# Patient Record
Sex: MALE | Race: WHITE | Employment: OTHER | URBAN - METROPOLITAN AREA
[De-identification: names, ages, dates, MRNs, and addresses within clinical notes are randomized per-mention and may not be internally consistent; named-entity substitution may affect disease eponyms.]

---

## 2017-04-10 NOTE — H&P
80 y.o. male for open access colonoscopy for screening   Additional data for completion of the targeted pre-endoscopy H&P will be provided under 'H&P interval notes'. Please see that document which will be attached to this.   Wesly Castillo MD\

## 2017-04-11 RX ORDER — AMLODIPINE BESYLATE 5 MG/1
5 TABLET ORAL DAILY
COMMUNITY

## 2017-04-11 RX ORDER — ERGOCALCIFEROL 1.25 MG/1
50000 CAPSULE ORAL
COMMUNITY

## 2017-04-11 NOTE — PROGRESS NOTES
[address] [place] [address] Dr Egan Preprocedure Instructions      1. On the day of your surgery, please report to registration located on the 2nd floor of the  [facility]. yes    2. You must have a responsible adult to drive you to the hospital, stay at the hospital during your procedure and drive you home. If they leave your procedure will not be started (no exceptions). yes    3. Do not have anything to eat or drink (including water, gum, mints, coffee, and juice) after midnight. This does not apply to the medications you were instructed to take by your physician. yesIf you are currently taking Plavix, Coumadin, Aspirin, or other blood-thinning agents, contact your physician for special instructions. Yes, n/a    4. If you are having a procedure that requires bowel prep: We recommend that you have only clear liquids the day before your procedure and begin your bowel prep by 5:00 pm.  You may continue to drink clear liquids until midnight. If for any reason you are not able to complete your prep please notify your physician immediately. yes    5. Have a list of all current medications, including vitamins, herbal supplements and any other over the counter medications. no    6. If you wear glasses, contacts, dentures and/or hearing aids, they may be removed prior to procedure, please bring a case to store them in. yes    7. You should understand that if you do not follow these instructions your procedure may be cancelled. If your physical condition changes (I.e. fever, cold or flu) please contact your doctor as soon as possible. 8. It is important that you be on time.   If for any reason you are unable to keep your appointment please call )- the day of or your physicians office prior to your scheduled procedure

## 2017-04-12 ENCOUNTER — ANESTHESIA EVENT (OUTPATIENT)
Dept: ENDOSCOPY | Age: 72
End: 2017-04-12
Payer: MEDICARE

## 2017-04-12 ENCOUNTER — ANESTHESIA (OUTPATIENT)
Dept: ENDOSCOPY | Age: 72
End: 2017-04-12
Payer: MEDICARE

## 2017-04-12 ENCOUNTER — SURGERY (OUTPATIENT)
Age: 72
End: 2017-04-12

## 2017-04-12 ENCOUNTER — HOSPITAL ENCOUNTER (OUTPATIENT)
Age: 72
Setting detail: OUTPATIENT SURGERY
Discharge: HOME | End: 2017-04-12
Attending: SPECIALIST | Admitting: SPECIALIST
Payer: MEDICARE

## 2017-04-12 VITALS
HEART RATE: 78 BPM | OXYGEN SATURATION: 98 % | BODY MASS INDEX: 25.01 KG/M2 | TEMPERATURE: 97.7 F | RESPIRATION RATE: 19 BRPM | DIASTOLIC BLOOD PRESSURE: 80 MMHG | SYSTOLIC BLOOD PRESSURE: 122 MMHG | HEIGHT: 68 IN | WEIGHT: 165 LBS

## 2017-04-12 PROCEDURE — 74011250636 HC RX REV CODE- 250/636: Performed by: PHYSICIAN ASSISTANT

## 2017-04-12 PROCEDURE — 74011250636 HC RX REV CODE- 250/636

## 2017-04-12 PROCEDURE — 76060000031 HC ANESTHESIA FIRST 0.5 HR: Performed by: SPECIALIST

## 2017-04-12 PROCEDURE — 76040000019: Performed by: SPECIALIST

## 2017-04-12 RX ORDER — SODIUM CHLORIDE 9 MG/ML
50 INJECTION, SOLUTION INTRAVENOUS CONTINUOUS
Status: DISCONTINUED | OUTPATIENT
Start: 2017-04-12 | End: 2017-04-12 | Stop reason: HOSPADM

## 2017-04-12 RX ORDER — MIDAZOLAM HYDROCHLORIDE 1 MG/ML
.25-5 INJECTION, SOLUTION INTRAMUSCULAR; INTRAVENOUS
Status: DISCONTINUED | OUTPATIENT
Start: 2017-04-12 | End: 2017-04-12 | Stop reason: HOSPADM

## 2017-04-12 RX ORDER — PROPOFOL 10 MG/ML
INJECTION, EMULSION INTRAVENOUS AS NEEDED
Status: DISCONTINUED | OUTPATIENT
Start: 2017-04-12 | End: 2017-04-12 | Stop reason: HOSPADM

## 2017-04-12 RX ORDER — PROPOFOL 10 MG/ML
INJECTION, EMULSION INTRAVENOUS
Status: DISCONTINUED | OUTPATIENT
Start: 2017-04-12 | End: 2017-04-12 | Stop reason: HOSPADM

## 2017-04-12 RX ORDER — FENTANYL CITRATE 50 UG/ML
100 INJECTION, SOLUTION INTRAMUSCULAR; INTRAVENOUS ONCE
Status: DISCONTINUED | OUTPATIENT
Start: 2017-04-12 | End: 2017-04-12 | Stop reason: HOSPADM

## 2017-04-12 RX ORDER — DEXTROMETHORPHAN/PSEUDOEPHED 2.5-7.5/.8
1.2 DROPS ORAL
Status: DISCONTINUED | OUTPATIENT
Start: 2017-04-12 | End: 2017-04-12 | Stop reason: HOSPADM

## 2017-04-12 RX ORDER — NALOXONE HYDROCHLORIDE 0.4 MG/ML
0.4 INJECTION, SOLUTION INTRAMUSCULAR; INTRAVENOUS; SUBCUTANEOUS
Status: DISCONTINUED | OUTPATIENT
Start: 2017-04-12 | End: 2017-04-12 | Stop reason: HOSPADM

## 2017-04-12 RX ORDER — ATROPINE SULFATE 0.1 MG/ML
0.5 INJECTION INTRAVENOUS
Status: DISCONTINUED | OUTPATIENT
Start: 2017-04-12 | End: 2017-04-12 | Stop reason: HOSPADM

## 2017-04-12 RX ORDER — FLUMAZENIL 0.1 MG/ML
0.2 INJECTION INTRAVENOUS
Status: DISCONTINUED | OUTPATIENT
Start: 2017-04-12 | End: 2017-04-12 | Stop reason: HOSPADM

## 2017-04-12 RX ORDER — EPINEPHRINE 0.1 MG/ML
1 INJECTION INTRACARDIAC; INTRAVENOUS
Status: DISCONTINUED | OUTPATIENT
Start: 2017-04-12 | End: 2017-04-12 | Stop reason: HOSPADM

## 2017-04-12 RX ADMIN — PROPOFOL 80 MG: 10 INJECTION, EMULSION INTRAVENOUS at 09:45

## 2017-04-12 RX ADMIN — PROPOFOL 125 MCG/KG/MIN: 10 INJECTION, EMULSION INTRAVENOUS at 09:45

## 2017-04-12 RX ADMIN — PROPOFOL 20 MG: 10 INJECTION, EMULSION INTRAVENOUS at 09:49

## 2017-04-12 RX ADMIN — SODIUM CHLORIDE 50 ML/HR: 900 INJECTION, SOLUTION INTRAVENOUS at 09:24

## 2017-04-12 NOTE — ANESTHESIA PREPROCEDURE EVALUATION
Anesthetic History   No history of anesthetic complications            Review of Systems / Medical History  Patient summary reviewed, nursing notes reviewed and pertinent labs reviewed    Pulmonary  Within defined limits                 Neuro/Psych   Within defined limits           Cardiovascular  Within defined limits  Hypertension              Exercise tolerance: >4 METS     GI/Hepatic/Renal  Within defined limits              Endo/Other  Within defined limits           Other Findings              Physical Exam    Airway  Mallampati: II    Neck ROM: normal range of motion   Mouth opening: Normal     Cardiovascular  Regular rate and rhythm,  S1 and S2 normal,  no murmur, click, rub, or gallop  Rhythm: regular  Rate: normal         Dental  No notable dental hx       Pulmonary  Breath sounds clear to auscultation               Abdominal  GI exam deferred       Other Findings            Anesthetic Plan    ASA: 2  Anesthesia type: MAC          Induction: Intravenous  Anesthetic plan and risks discussed with: Patient

## 2017-04-12 NOTE — PERIOP NOTES
Initial RN admission and assessment performed and documented in Endoscopy navigator. Patient evaluated by anesthesia in pre-procedure holding. All procedural vital signs, airway assessment, and level of consciousness information monitored and recorded by anesthesia staff on the anesthesia record. Pt comfortable on stretcher, call bell in reach.

## 2017-04-12 NOTE — ANESTHESIA POSTPROCEDURE EVALUATION
Post-Anesthesia Evaluation and Assessment    Patient: Kemal Rosa MRN: [id number]  SSN: [id number]    YOB: 1945  Age: 80 y.o. Sex: male       Cardiovascular Function/Vital Signs  Visit Vitals    /77    Pulse 75    Temp 36.7 °C (98 °F)    Resp 17    Ht 5' 8\" (1.727 m)    Wt 74.8 kg (165 lb)    SpO2 95%    BMI 25.09 kg/m2       Patient is status post MAC anesthesia for Procedure(s):  COLONOSCOPY. Nausea/Vomiting: None    Postoperative hydration reviewed and adequate. Pain:  Pain Scale 1: Numeric (0 - 10) (04/12/17 0912)  Pain Intensity 1: 0 (04/12/17 0912)   Managed    Neurological Status: At baseline    Mental Status and Level of Consciousness: Arousable    Pulmonary Status:   O2 Device: Nasal cannula (04/12/17 0957)   Adequate oxygenation and airway patent    Complications related to anesthesia: None    Post-anesthesia assessment completed.  No concerns    Signed By: Rosy Pizarro MD     April 12, 2017

## 2017-04-12 NOTE — PERIOP NOTES
Report from SEGUN Roy, see anesthesia record. ABD remains soft and non-tender post procedure. Pt has no complaints at this time and tolerated the procedure well.

## 2017-04-12 NOTE — DISCHARGE INSTRUCTIONS
[address] [facility] ROSAURA Huang MD  [phone]      April 12, 2017    Emmanuel Gordon  YOB: 1945    COLONOSCOPY DISCHARGE INSTRUCTIONS    If there is redness at IV site you should apply warm compress to area. If redness or soreness persist contact Dr. Alexandre Huang' or your primary care doctor. There may be a slight amount of blood passed from the rectum. Gaseous discomfort may develop, but walking, belching will help relieve this. You may not operate a vehicle for 12 hours  You may not operate machinery or dangerous appliances for rest of today  You may not drink alcoholic beverages for 12 hours  Avoid making any critical decisions for 24 hours    DIET:  You may resume your normal diet, but some patients find that heavy or large meals may lead to indigestion or vomiting. I suggest a light meal as first food intake. MEDICATIONS:  The use of some over-the-counter pain medication may lead to bleeding after colon biopsies or polyp removal.  Tylenol (also called acetaminophen) is safe to take even if you have had colonoscopy with polyp removal.  Based on the procedure you had today you may safely take aspirin or aspirin-like products for the next ten (10) days. Remember that Tylenol (also called acetaminophen) is safe to take after colonoscopy even if you have had biopsies or polyps removed. ACTIVITY:  You may resume your normal household activities, but it is recommended that you spend the remainder of the day resting -  avoid any strenuous activity. CALL DR. Sobia Luis' OFFICE IF:  Increasing pain, nausea, vomiting  Abdominal distension (swelling)  Significant new or increased bleeding (oral or rectal)  Fever/Chills  Chest pain/shortness of breath                       Additional instructions:   Normal colonoscopy today without polyps. Great news.   Let me know if you need me, otherwise follow up with Dr. Eduard Zazueta for routine medical care. I would say that if you are in excellent health in 10 years you might consider doing this test again then, but if at age [de-identified] you have medical problems then the test might not have value for you. It was an honor to be your doctor today. Please let me or my office staff know if you have any feedback about today's procedure. Mu Skelton MD    Colonoscopy saves lives, and can prevent colon cancer. Everyone aged 48 or older needs colonoscopy.   Tell your family and friends: get the test!

## 2017-04-12 NOTE — IP AVS SNAPSHOT
Call Screen 
 
 
 [address] [facility] [address] [facility] 
[phone] Patient: Nel Kent MRN: [id number] :1945 You are allergic to the following No active allergies Recent Documentation Height Weight BMI Smoking Status 1.727 m 74.8 kg 25.09 kg/m2 Former Smoker Emergency Contacts Name Discharge Info Relation Home Work Mobile [address] CAREGIVER [3] Spouse [3] [phone] About your hospitalization You were admitted on:  2017 You last received care in the:  [facility] ENDOSCOPY You were discharged on:  2017 Unit phone number:  [phone] Why you were hospitalized Your primary diagnosis was:  Not on File Providers Seen During Your Hospitalizations Provider Role Specialty Primary office phone Ninfa Goodpasture, MD Attending Provider Gastroenterology [phone] Your Primary Care Physician (PCP) Primary Care Physician Office Phone Office Fax Ramon ARMENDARIZ [phone] Follow-up Information None Current Discharge Medication List  
  
CONTINUE these medications which have NOT CHANGED Dose & Instructions Dispensing Information Comments Morning Noon Evening Bedtime CALCIUM 600 + D 600-125 mg-unit Tab Generic drug:  calcium-cholecalciferol (d3) Your last dose was: Your next dose is:    
   
   
 Dose:  1 Tab Take 1 Tab by mouth daily. Refills:  0 NORVASC 5 mg tablet Generic drug:  amLODIPine Your last dose was: Your next dose is:    
   
   
 Dose:  5 mg Take 5 mg by mouth daily. Refills:  0  
     
   
   
   
  
 VITAMIN D2 50,000 unit capsule Generic drug:  ergocalciferol Your last dose was: Your next dose is:    
   
   
 Dose:  33871 Units Take 50,000 Units by mouth every seven (7) days. Refills:  0 Discharge Instructions [address] Lilia Camargo, [address] [facility] 
[phone] April 12, 2017 Tommie Ramirez YOB: 1945 COLONOSCOPY DISCHARGE INSTRUCTIONS If there is redness at IV site you should apply warm compress to area. If redness or soreness persist contact Dr. Lilia Camargo' or your primary care doctor. There may be a slight amount of blood passed from the rectum. Gaseous discomfort may develop, but walking, belching will help relieve this. You may not operate a vehicle for 12 hours You may not operate machinery or dangerous appliances for rest of today You may not drink alcoholic beverages for 12 hours Avoid making any critical decisions for 24 hours DIET: 
You may resume your normal diet, but some patients find that heavy or large meals may lead to indigestion or vomiting. I suggest a light meal as first food intake. MEDICATIONS: 
The use of some over-the-counter pain medication may lead to bleeding after colon biopsies or polyp removal.  Tylenol (also called acetaminophen) is safe to take even if you have had colonoscopy with polyp removal.  Based on the procedure you had today you may safely take aspirin or aspirin-like products for the next ten (10) days. Remember that Tylenol (also called acetaminophen) is safe to take after colonoscopy even if you have had biopsies or polyps removed. ACTIVITY: 
You may resume your normal household activities, but it is recommended that you spend the remainder of the day resting -  avoid any strenuous activity. CALL DR. Sylvia Nails' OFFICE IF: Increasing pain, nausea, vomiting Abdominal distension (swelling) Significant new or increased bleeding (oral or rectal) Fever/Chills Chest pain/shortness of breath Additional instructions:  
Normal colonoscopy today without polyps. Great news. Let me know if you need me, otherwise follow up with Dr. Eduard Zazueta for routine medical care. I would say that if you are in excellent health in 10 years you might consider doing this test again then, but if at age [de-identified] you have medical problems then the test might not have value for you. It was an honor to be your doctor today. Please let me or my office staff know if you have any feedback about today's procedure. Heydi Montes MD 
 
Colonoscopy saves lives, and can prevent colon cancer. Everyone aged 48 or older needs colonoscopy. Tell your family and friends: get the test! 
 
 
 
Discharge Orders None Introducing [facility]! Peg Hemp introduces [employer] patient portal. Now you can access parts of your medical record, email your doctor's office, and request medication refills online. 1. In your internet browser, go to [email or web address]://[email or web address]. [employer] 2. Click on the First Time User? Click Here link in the Sign In box. You will see the New Member Sign Up page. 3. Enter your [employer] Access Code exactly as it appears below. You will not need to use this code after youve completed the sign-up process. If you do not sign up before the expiration date, you must request a new code. · [employer] Access Code: [email or web address] Expires: 7/11/2017  8:15 AM 
 
4. Enter the last four digits of your Social Security Number (xxxx) and Date of Birth (mm/dd/yyyy) as indicated and click Submit. You will be taken to the next sign-up page. 5. Create a [employer] ID. This will be your [employer] login ID and cannot be changed, so think of one that is secure and easy to remember. 6. Create a [employer] password. You can change your password at any time. 7. Enter your Password Reset Question and Answer. This can be used at a later time if you forget your password. 8. Enter your e-mail address. You will receive e-mail notification when new information is available in [address] 9. Click Sign Up. You can now view and download portions of your medical record. 10. Click the Download Summary menu link to download a portable copy of your medical information. If you have questions, please visit the Frequently Asked Questions section of the [facility] website. Remember, [facility] is NOT to be used for urgent needs. For medical emergencies, dial 911. Now available from your iPhone and Android! General Information Please provide this summary of care documentation to your next provider. Patient Signature:  ____________________________________________________________ Date:  ____________________________________________________________  
  
[facility] Provider Signature:  ____________________________________________________________ Date:  ____________________________________________________________

## 2017-04-12 NOTE — INTERVAL H&P NOTE
Pre-Endoscopy H&P Update  Chief complaint/HPI/ROS:  The indication for the procedure, the patient's history and the patient's current medications are reviewed prior to the procedure and that data is reported on the H&P to which this document is attached. Any significant complaints with regard to organ systems will be noted, and if not mentioned then a review of systems is not contributory. Past Medical History:   Diagnosis Date    Hypertension       Past Surgical History:   Procedure Laterality Date    HX APPENDECTOMY      HX CATARACT REMOVAL Right      Social   Social History   Substance Use Topics    Smoking status: Former Smoker    Smokeless tobacco: Not on file    Alcohol use No      History reviewed. No pertinent family history. No Known Allergies   Prior to Admission Medications   Prescriptions Last Dose Informant Patient Reported? Taking? amLODIPine (NORVASC) 5 mg tablet 4/11/2017 at am  Yes Yes   Sig: Take 5 mg by mouth daily. calcium-cholecalciferol, d3, (CALCIUM 600 + D) 600-125 mg-unit tab 4/11/2017 at am  Yes Yes   Sig: Take 1 Tab by mouth daily. ergocalciferol (VITAMIN D2) 50,000 unit capsule 4/10/2017 at am  Yes Yes   Sig: Take 50,000 Units by mouth every seven (7) days. Facility-Administered Medications: None       PHYSICAL EXAM:  The patient is examined immediately prior to the procedure. Visit Vitals    /72    Pulse 66    Temp 98 °F (36.7 °C)    Resp 9    Ht 5' 8\" (1.727 m)    Wt 74.8 kg (165 lb)    SpO2 97%    BMI 25.09 kg/m2     Gen: Appears comfortable, no distress. Pulm: comfortable respirations with no abnormal audible breath sounds  CV: heart regular, well perfused  GI: abdomen flat. PLAN:  Informed consent discussion held, patient afforded an opportunity to ask questions and all questions answered. After being advised of the risks, benefits, and alternatives, the patient requested that we proceed and indicated so on a written consent form.       Will proceed with procedure as planned.   Jj Leahy MD

## 2017-04-12 NOTE — PROCEDURES
[address] [facility] ROSAURA Davalos MD  [phone]      2017    Colonoscopy Procedure Note  Jason Alonso  :  1945  [facility] Record Number: [id number]    Indications:     Screening colonoscopy  PCP:  Ej Arizmendi MD  Anesthesia/Sedation: Conscious Sedation/Moderate Sedation  Endoscopist:  Dr. Silvano Byrd  Complications:  None  Estimated Blood Loss:  None    Permit:  The indications, risks, benefits and alternatives were reviewed with the patient or their decision maker who was provided an opportunity to ask questions and all questions were answered. The specific risks of colonoscopy with conscious sedation were reviewed, including but not limited to anesthetic complication, bleeding, adverse drug reaction, missed lesion, infection, IV site reactions, and intestinal perforation which would lead to the need for surgical repair. Alternatives to colonoscopy including radiographic imaging, observation without testing, or laboratory testing were reviewed including the limitations of those alternatives. After considering the options and having all their questions answered, the patient or their decision maker provided both verbal and written consent to proceed. Procedure in Detail:  After obtaining informed consent, positioning of the patient in the left lateral decubitus position, and conduction of a pre-procedure pause or \"time out\" the endoscope was introduced into the anus and advanced to the cecum, which was identified by the ileocecal valve and appendiceal orifice. The quality of the colonic preparation was good. A careful inspection was made as the colonoscope was withdrawn, findings and interventions are described below. Findings: There is diverticulosis in the sigmoid colon without complications such as bleeding, inflammatory change, or luminal narrowing.       Specimens: none    Complications:   None; patient tolerated the procedure well. Impression:  Normal colonoscopy to the cecum, with no evidence of neoplasia, diverticular disease, or mucosal abnormality. Recommendations:     - For colon cancer screening in this average-risk patient, colonoscopy may be repeated in 10 years. Thank you for entrusting me with this patient's care. Please do not hesitate to contact me with any questions or if I can be of assistance with any of your other patients' GI needs.     Signed By: Clarence Booker MD                        April 12, 2017

## 2017-04-12 NOTE — PROGRESS NOTES
Mary Beth Rebolledo  1945  [id number]    Situation:  Verbal report received from:   Wilmer Gil RN  Procedure: Procedure(s):  COLONOSCOPY    Background:    Preoperative diagnosis: SCREENING  Postoperative diagnosis: Diverticulosis    :  Dr. Adriana Mccloud  Assistant(s): Endoscopy Technician-1: Liz Jain  Endoscopy RN-1: Edwin Farley RN    Specimens: * No specimens in log *  H. Pylori  no    Assessment:  Intra-procedure medications      Anesthesia gave intra-procedure sedation and medications, see anesthesia flow sheet yes    Intravenous fluids: NS@ KVO     Vital signs stable   yes    Abdominal assessment: round and soft   yes    Recommendation:  Discharge patient per MD order  yes.   Return to floor  outpatient  Family or Friend   spouse  Permission to share finding with family or friend yes

## 2017-05-30 ENCOUNTER — OFFICE VISIT (OUTPATIENT)
Dept: ONCOLOGY | Age: 72
End: 2017-05-30

## 2017-05-30 ENCOUNTER — HOSPITAL ENCOUNTER (OUTPATIENT)
Dept: INFUSION THERAPY | Age: 72
Discharge: HOME | End: 2017-05-30
Payer: MEDICARE

## 2017-05-30 VITALS
SYSTOLIC BLOOD PRESSURE: 141 MMHG | HEART RATE: 61 BPM | TEMPERATURE: 98 F | RESPIRATION RATE: 16 BRPM | DIASTOLIC BLOOD PRESSURE: 82 MMHG

## 2017-05-30 VITALS
WEIGHT: 165.8 LBS | DIASTOLIC BLOOD PRESSURE: 79 MMHG | HEIGHT: 68 IN | HEART RATE: 63 BPM | OXYGEN SATURATION: 97 % | SYSTOLIC BLOOD PRESSURE: 137 MMHG | RESPIRATION RATE: 18 BRPM | TEMPERATURE: 97.8 F | BODY MASS INDEX: 25.13 KG/M2

## 2017-05-30 DIAGNOSIS — D69.6 THROMBOCYTOPENIA (HCC): ICD-10-CM

## 2017-05-30 DIAGNOSIS — D70.9 NEUTROPENIA, UNSPECIFIED TYPE (HCC): Primary | ICD-10-CM

## 2017-05-30 LAB
ALBUMIN SERPL BCP-MCNC: 4.3 G/DL (ref 3.5–5)
ALBUMIN/GLOB SERPL: 1.2 {RATIO} (ref 1.1–2.2)
ALP SERPL-CCNC: 47 U/L (ref 45–117)
ALT SERPL-CCNC: 33 U/L (ref 12–78)
APTT PPP: 26.6 SEC (ref 22.1–32.5)
AST SERPL W P-5'-P-CCNC: 23 U/L (ref 15–37)
BASOPHILS # BLD AUTO: 0 K/UL (ref 0–0.1)
BASOPHILS # BLD: 0 % (ref 0–1)
BILIRUB DIRECT SERPL-MCNC: 0.1 MG/DL (ref 0–0.2)
BILIRUB SERPL-MCNC: 0.6 MG/DL (ref 0.2–1)
CRP SERPL-MCNC: <0.29 MG/DL
EOSINOPHIL # BLD: 0 K/UL (ref 0–0.4)
EOSINOPHIL NFR BLD: 0 % (ref 0–7)
ERYTHROCYTE [DISTWIDTH] IN BLOOD BY AUTOMATED COUNT: 15.5 % (ref 11.5–14.5)
ERYTHROCYTE [SEDIMENTATION RATE] IN BLOOD: 10 MM/HR (ref 0–20)
FIBRINOGEN PPP-MCNC: 259 MG/DL (ref 200–475)
FOLATE SERPL-MCNC: 19.3 NG/ML (ref 5–21)
GLOBULIN SER CALC-MCNC: 3.6 G/DL (ref 2–4)
HCT VFR BLD AUTO: 40.8 % (ref 36.6–50.3)
HCYS SERPL-SCNC: 15.1 UMOL/L (ref 3.7–13.9)
HGB BLD-MCNC: 13.7 G/DL (ref 12.1–17)
INR PPP: 1.1 (ref 0.9–1.1)
LYMPHOCYTES # BLD AUTO: 45 % (ref 12–49)
LYMPHOCYTES # BLD: 1.5 K/UL (ref 0.8–3.5)
MCH RBC QN AUTO: 29.4 PG (ref 26–34)
MCHC RBC AUTO-ENTMCNC: 33.6 G/DL (ref 30–36.5)
MCV RBC AUTO: 87.6 FL (ref 80–99)
MONOCYTES # BLD: 0.6 K/UL (ref 0–1)
MONOCYTES NFR BLD AUTO: 18 % (ref 5–13)
NEUTS SEG # BLD: 1.2 K/UL (ref 1.8–8)
NEUTS SEG NFR BLD AUTO: 37 % (ref 32–75)
PLATELET # BLD AUTO: 112 K/UL (ref 150–400)
PROT SERPL-MCNC: 7.9 G/DL (ref 6.4–8.2)
PROTHROMBIN TIME: 10.8 SEC (ref 9–11.1)
RBC # BLD AUTO: 4.66 M/UL (ref 4.1–5.7)
THERAPEUTIC RANGE,PTTT: NORMAL SECS (ref 58–77)
VIT B12 SERPL-MCNC: 348 PG/ML (ref 211–911)
WBC # BLD AUTO: 3.3 K/UL (ref 4.1–11.1)

## 2017-05-30 PROCEDURE — 82607 VITAMIN B-12: CPT | Performed by: INTERNAL MEDICINE

## 2017-05-30 PROCEDURE — 83090 ASSAY OF HOMOCYSTEINE: CPT | Performed by: INTERNAL MEDICINE

## 2017-05-30 PROCEDURE — 86140 C-REACTIVE PROTEIN: CPT | Performed by: INTERNAL MEDICINE

## 2017-05-30 PROCEDURE — 86704 HEP B CORE ANTIBODY TOTAL: CPT | Performed by: INTERNAL MEDICINE

## 2017-05-30 PROCEDURE — 85610 PROTHROMBIN TIME: CPT | Performed by: INTERNAL MEDICINE

## 2017-05-30 PROCEDURE — 85652 RBC SED RATE AUTOMATED: CPT | Performed by: INTERNAL MEDICINE

## 2017-05-30 PROCEDURE — 83921 ORGANIC ACID SINGLE QUANT: CPT | Performed by: INTERNAL MEDICINE

## 2017-05-30 PROCEDURE — 85384 FIBRINOGEN ACTIVITY: CPT | Performed by: INTERNAL MEDICINE

## 2017-05-30 PROCEDURE — 80076 HEPATIC FUNCTION PANEL: CPT | Performed by: INTERNAL MEDICINE

## 2017-05-30 PROCEDURE — 82746 ASSAY OF FOLIC ACID SERUM: CPT | Performed by: INTERNAL MEDICINE

## 2017-05-30 PROCEDURE — 36415 COLL VENOUS BLD VENIPUNCTURE: CPT | Performed by: INTERNAL MEDICINE

## 2017-05-30 PROCEDURE — 87389 HIV-1 AG W/HIV-1&-2 AB AG IA: CPT | Performed by: INTERNAL MEDICINE

## 2017-05-30 PROCEDURE — 84165 PROTEIN E-PHORESIS SERUM: CPT | Performed by: INTERNAL MEDICINE

## 2017-05-30 PROCEDURE — 85730 THROMBOPLASTIN TIME PARTIAL: CPT | Performed by: INTERNAL MEDICINE

## 2017-05-30 PROCEDURE — 85025 COMPLETE CBC W/AUTO DIFF WBC: CPT | Performed by: INTERNAL MEDICINE

## 2017-05-30 PROCEDURE — 86038 ANTINUCLEAR ANTIBODIES: CPT | Performed by: INTERNAL MEDICINE

## 2017-05-30 PROCEDURE — 80074 ACUTE HEPATITIS PANEL: CPT | Performed by: INTERNAL MEDICINE

## 2017-05-30 NOTE — PROGRESS NOTES
Blood pressure 141/82, pulse 61, temperature 98 °F (36.7 °C), resp. rate 16. Pt arrived at 1440,labs drawn peripherally from left [facility]. Pt tolerated well and left at 1450.

## 2017-05-30 NOTE — MR AVS SNAPSHOT
Visit Information Date & Time Provider Department Dept. Phone Encounter #  
 5/30/2017  1:00 PM Any Chan MD [facility] at [address] [place] [address] [id number] Follow-up Instructions Return in about 3 months (around 8/30/2017). Follow-up and Disposition History Upcoming Health Maintenance Date Due Hepatitis C Screening 1945 DTaP/Tdap/Td series (1 - Tdap) 10/1/1967 FOBT Q 1 YEAR AGE 50-75 10/1/1996 ZOSTER VACCINE AGE 60> 10/1/2006 GLAUCOMA SCREENING Q2Y 10/1/2011 Pneumococcal 65+ Low/Medium Risk (1 of 2 - PCV13) 10/1/2011 MEDICARE YEARLY EXAM 10/1/2011 INFLUENZA AGE 9 TO ADULT 8/1/2017 Allergies as of 5/30/2017  Review Complete On: 5/30/2017 By: Any Chan MD  
 No Known Allergies Current Immunizations  Never Reviewed No immunizations on file. Not reviewed this visit You Were Diagnosed With   
  
 Codes Comments Neutropenia, unspecified type [facility]    -  Primary ICD-10-CM: D70.9 ICD-9-CM: 288.00 Thrombocytopenia ([facility] [address] [facility]     ICD-10-CM: D69.6 ICD-9-CM: 287.5 Vitals BP Pulse Temp Resp Height(growth percentile) Weight(growth percentile)  
 137/79 63 97.8 °F (36.6 °C) (Temporal) 18 5' 8\" (1.727 m) 165 lb 12.8 oz (75.2 kg) SpO2 BMI Smoking Status 97% 25.21 kg/m2 Never Smoker Vitals History BMI and BSA Data Body Mass Index Body Surface Area  
 25.21 kg/m 2 1.9 m 2 Preferred Pharmacy Pharmacy Name Phone [id number] [address] [facility] [address] [phone] Your Updated Medication List  
  
   
This list is accurate as of: 5/30/17  2:10 PM.  Always use your most recent med list.  
  
  
  
  
 CALCIUM 600 + D 600-125 mg-unit Tab Generic drug:  calcium-cholecalciferol (d3) Take 1 Tab by mouth daily. NORVASC 5 mg tablet Generic drug:  amLODIPine Take 5 mg by mouth daily. VITAMIN D2 50,000 unit capsule Generic drug:  ergocalciferol Take 50,000 Units by mouth every seven (7) days. Follow-up Instructions Return in about 3 months (around 8/30/2017). To-Do List   
 05/30/2017 2:15 PM  
  Appointment with Deshaun Torres at [place] [address] ([phone]) Introducing [facility]! Alonzo Sterling introduces [employer] patient portal. Now you can access parts of your medical record, email your doctor's office, and request medication refills online. 1. In your internet browser, go to [email or web address]. [employer] 2. Click on the First Time User? Click Here link in the Sign In box. You will see the New Member Sign Up page. 3. Enter your [employer] Access Code exactly as it appears below. You will not need to use this code after youve completed the sign-up process. If you do not sign up before the expiration date, you must request a new code. · [employer] Access Code: [email or web address] Expires: 7/11/2017  8:15 AM 
 
4. Enter the last four digits of your Social Security Number (xxxx) and Date of Birth (mm/dd/yyyy) as indicated and click Submit. You will be taken to the next sign-up page. 5. Create a [employer] ID. This will be your [employer] login ID and cannot be changed, so think of one that is secure and easy to remember. 6. Create a [employer] password. You can change your password at any time. 7. Enter your Password Reset Question and Answer. This can be used at a later time if you forget your password. 8. Enter your e-mail address. You will receive e-mail notification when new information is available in [address] 9. Click Sign Up. You can now view and download portions of your medical record. 10. Click the Download Summary menu link to download a portable copy of your medical information. If you have questions, please visit the Frequently Asked Questions section of the [employer] website.  Remember, [employer] is NOT to be used for urgent needs. For medical emergencies, dial 911. Now available from your iPhone and Android! Please provide this summary of care documentation to your next provider. Your primary care clinician is listed as Padmini Kurtz. If you have any questions after today's visit, please call [phone].

## 2017-05-30 NOTE — PROGRESS NOTES
[employer]  [address] [place] [address], [address] [place] [address]  [address]  W: [phone]  F: [address]      Reason for visit:  Evaluation for low WBC    HPI:   Princess Bass is a 80 y.o.  male who I was asked to see in consultation at the request of Dr. Litzy Noble  for evaluation for low WBC.    5/9/17 wbc 2.8, hgb 14.1; plt 124; ANC 1    Denies fevers, no weight loss, no drenching night sweats. Only very occasional etoh use. States that in [place], 9-10 months ago, his wbc and plt count were low as well during an ED visit for a fever. No other complaints today. DX   Encounter Diagnoses   Name Primary?  Thrombocytopenia (HCC)     Neutropenia, unspecified type ([facility] [address] Yes              Past Medical History:   Diagnosis Date    GERD (gastroesophageal reflux disease)     Hypertension      Past Surgical History:   Procedure Laterality Date    COLONOSCOPY N/A 4/12/2017    COLONOSCOPY performed by James Bates MD at [address] [facility] HX APPENDECTOMY      HX CATARACT REMOVAL Right      Social History     Social History    Marital status:      Spouse name: N/A    Number of children: N/A    Years of education: N/A     Social History Main Topics    Smoking status: Never Smoker    Smokeless tobacco: None    Alcohol use No    Drug use: No    Sexual activity: Not Asked     Other Topics Concern    None     Social History Narrative     Family History   Problem Relation Age of Onset    Hypertension Mother        Current Outpatient Prescriptions   Medication Sig Dispense Refill    amLODIPine (NORVASC) 5 mg tablet Take 5 mg by mouth daily.  ergocalciferol (VITAMIN D2) 50,000 unit capsule Take 50,000 Units by mouth every seven (7) days.  calcium-cholecalciferol, d3, (CALCIUM 600 + D) 600-125 mg-unit tab Take 1 Tab by mouth daily.          No Known Allergies    Review of Systems    A comprehensive review of systems was performed and all systems were negative except for HPI. Objective:  Physical Exam:  Visit Vitals    /79    Pulse 63    Temp 97.8 °F (36.6 °C) (Temporal)    Resp 18    Ht 5' 8\" (1.727 m)    Wt 165 lb 12.8 oz (75.2 kg)    SpO2 97%    BMI 25.21 kg/m2       General:  Alert, cooperative, no distress, appears stated age. Head:  Normocephalic, without obvious abnormality, atraumatic. Eyes:  Conjunctivae/corneas clear. PERRL, EOMs intact. Throat: Lips, mucosa, and tongue normal.    Neck: Supple, symmetrical, trachea midline, no adenopathy, thyroid: no enlargement/tenderness/nodules   Back:   Symmetric, no curvature. ROM normal. No CVA tenderness. Lungs:   Clear to auscultation bilaterally. Chest wall:  No tenderness or deformity. Heart:  Regular rate and rhythm, S1, S2 normal, no murmur, click, rub or gallop. Abdomen:   Soft, non-tender. Bowel sounds normal. No masses,  No organomegaly. Extremities: Extremities normal, atraumatic, no cyanosis or edema. Skin: Skin color, texture, turgor normal. No rashes or lesions. Lymph nodes: Cervical, supraclavicular, and axillary nodes normal.   Neurologic: CNII-XII intact. Diagnostic Imaging   No results found for this or any previous visit. No results found for this or any previous visit. Lab Results  No results found for: WBC, HGB, HGBP, HCT, PHCT, RBCH, PLT, MCV, HGBEXT, HCTEXT, PLTEXT, HGBEXT, HCTEXT, PLTEXT    No results found for: NA, K, CL, CO2, AGAP, GLU, BUN, CREA, BUCR, GFRAA, GFRNA, CA, TBIL, GPT, SGOT, AP, TP, ALB, GLOB, AGRAT, ALT    . Assessment/Plan:  80 y.o. male with neutropenia and thrombocytopenia. Records reviewed and summarized above. PS 0    1. Neutropenia:  The most common etiology, especially in patients without recurrent infections, would be benign ethnic neutropenia, as s commonly have a lower WBC than Caucasians.   There is no evidence by history of an inherited neutropenia, no history of chronic or recurrent infection, and no medication that is likely to be contributing. I will explore some other possibilities today with labwork (smear review, ESR, CRP, SILVER, B12, folate, homocysteine, MMA,  HIV (discussed this), SPEP)    If these studies are negative and the neutrophil count remains >1000, then a period of observation may be reasonable, with further evaluation (ie bone marrow biopsy) only if the counts worsen or the patient develops recurrent infections. We discussed this today. 2. Thrombocytopenia:  Mild, count > 100; labs as above. The differential diagnosis for thrombocytopenia is broad, and can be generally broken down into three groups: Decreased Production, Increased Consumption, or Pseudothrombocytopenia. Causes of decreased production include infection, HIV, etoh toxicity, B12 and folate deficiency, and primary bone marrow disorders. Causes of increased consumption include various drugs, DIC, TTP, ITP, APLA, splenomegaly, and physical destruction related to valvular disease, aneurysms, etc.  Pseudothrombocytopenia is a laboratory artifact and can be ruled out by a smear review. I will start by obtaining some labs to explore some of these possibilities. This will include smear review, PT, PTT, fibrinogen, liver function tests, HepB, HepC. If the above tests are normal, will have him return in 3 months for a repeat CBC. Thank you for this consult. All of the patient's questions were answered today. There are no Patient Instructions on file for this visit.    Follow-up Disposition: Not on File    Nenita Buchanan MD

## 2017-05-31 LAB
HAV IGM SERPL QL IA: NONREACTIVE
HBV CORE IGM SER QL: NONREACTIVE
HBV SURFACE AG SER QL: <0.1 INDEX
HBV SURFACE AG SER QL: NEGATIVE
HCV AB SERPL QL IA: NONREACTIVE
HCV COMMENT,HCGAC: NORMAL
HIV 1+2 AB+HIV1 P24 AG SERPL QL IA: NONREACTIVE
HIV12 RESULT COMMENT, HHIVC: NORMAL
SP1: NORMAL
SP2: NORMAL
SP3: NORMAL

## 2017-06-01 ENCOUNTER — TELEPHONE (OUTPATIENT)
Dept: ONCOLOGY | Age: 72
End: 2017-06-01

## 2017-06-01 LAB
ALBUMIN SERPL ELPH-MCNC: 4.2 G/DL (ref 2.9–4.4)
ALBUMIN/GLOB SERPL: 1.3 {RATIO} (ref 0.7–1.7)
ALPHA1 GLOB SERPL ELPH-MCNC: 0.2 G/DL (ref 0–0.4)
ALPHA2 GLOB SERPL ELPH-MCNC: 0.7 G/DL (ref 0.4–1)
ANA SER QL: NEGATIVE
B-GLOBULIN SERPL ELPH-MCNC: 1 G/DL (ref 0.7–1.3)
GAMMA GLOB SERPL ELPH-MCNC: 1.3 G/DL (ref 0.4–1.8)
GLOBULIN SER CALC-MCNC: 3.2 G/DL (ref 2.2–3.9)
HBV CORE AB SERPL QL IA: NEGATIVE
M PROTEIN SERPL ELPH-MCNC: NORMAL G/DL
PROT SERPL-MCNC: 7.4 G/DL (ref 6–8.5)
SEE BELOW:, 164879: NORMAL

## 2017-06-01 NOTE — TELEPHONE ENCOUNTER
Called and spoke to patient's wife and advised her that we were still waiting for results of one lab test.  Wife voices understanding and requests to be called as soon as results are in.

## 2017-06-07 LAB — METHYLMALONATE SERPL-SCNC: 185 NMOL/L (ref 0–378)

## 2017-06-08 NOTE — TELEPHONE ENCOUNTER
Called and spoke to patient and his wife and advised them of lab results and to return to see us in 3 months with labs prior to appointment. Patient states he already has his appointment scheduled and denies any further questions at this time.

## 2017-06-08 NOTE — TELEPHONE ENCOUNTER
----- Message from Wu Huang NP sent at 6/8/2017  4:18 PM EDT -----  See us and CBC in 3 months. We could not find anything wrong so we will keep an eye on this. Thanks!

## 2017-06-08 NOTE — PROGRESS NOTES
See us and CBC in 3 months. We could not find anything wrong so we will keep an eye on this. Thanks!

## 2017-08-28 ENCOUNTER — HOSPITAL ENCOUNTER (OUTPATIENT)
Dept: INFUSION THERAPY | Age: 72
Discharge: HOME | End: 2017-08-28
Payer: MEDICARE

## 2017-08-28 ENCOUNTER — OFFICE VISIT (OUTPATIENT)
Dept: ONCOLOGY | Age: 72
End: 2017-08-28

## 2017-08-28 VITALS
TEMPERATURE: 97 F | SYSTOLIC BLOOD PRESSURE: 133 MMHG | HEART RATE: 90 BPM | DIASTOLIC BLOOD PRESSURE: 79 MMHG | RESPIRATION RATE: 16 BRPM

## 2017-08-28 VITALS
DIASTOLIC BLOOD PRESSURE: 103 MMHG | SYSTOLIC BLOOD PRESSURE: 156 MMHG | OXYGEN SATURATION: 93 % | HEIGHT: 68 IN | RESPIRATION RATE: 18 BRPM | TEMPERATURE: 97.4 F | WEIGHT: 168.6 LBS | HEART RATE: 87 BPM | BODY MASS INDEX: 25.55 KG/M2

## 2017-08-28 DIAGNOSIS — D70.9 NEUTROPENIA, UNSPECIFIED TYPE (HCC): Primary | ICD-10-CM

## 2017-08-28 DIAGNOSIS — D72.821 MONOCYTOSIS: ICD-10-CM

## 2017-08-28 DIAGNOSIS — D69.6 THROMBOCYTOPENIA (HCC): ICD-10-CM

## 2017-08-28 LAB
BASOPHILS # BLD: 0 K/UL (ref 0–0.1)
BASOPHILS NFR BLD: 0 % (ref 0–1)
EOSINOPHIL # BLD: 0 K/UL (ref 0–0.4)
EOSINOPHIL NFR BLD: 0 % (ref 0–7)
ERYTHROCYTE [DISTWIDTH] IN BLOOD BY AUTOMATED COUNT: 15.7 % (ref 11.5–14.5)
HCT VFR BLD AUTO: 39.2 % (ref 36.6–50.3)
HGB BLD-MCNC: 13 G/DL (ref 12.1–17)
LYMPHOCYTES # BLD: 1.6 K/UL (ref 0.8–3.5)
LYMPHOCYTES NFR BLD: 44 % (ref 12–49)
MCH RBC QN AUTO: 28.6 PG (ref 26–34)
MCHC RBC AUTO-ENTMCNC: 33.2 G/DL (ref 30–36.5)
MCV RBC AUTO: 86.2 FL (ref 80–99)
MONOCYTES # BLD: 0.7 K/UL (ref 0–1)
MONOCYTES NFR BLD: 19 % (ref 5–13)
NEUTS SEG # BLD: 1.4 K/UL (ref 1.8–8)
NEUTS SEG NFR BLD: 37 % (ref 32–75)
PLATELET # BLD AUTO: 108 K/UL (ref 150–400)
RBC # BLD AUTO: 4.55 M/UL (ref 4.1–5.7)
WBC # BLD AUTO: 3.8 K/UL (ref 4.1–11.1)

## 2017-08-28 PROCEDURE — 36415 COLL VENOUS BLD VENIPUNCTURE: CPT | Performed by: INTERNAL MEDICINE

## 2017-08-28 PROCEDURE — 85025 COMPLETE CBC W/AUTO DIFF WBC: CPT | Performed by: INTERNAL MEDICINE

## 2017-08-28 PROCEDURE — 36415 COLL VENOUS BLD VENIPUNCTURE: CPT

## 2017-08-28 NOTE — PROGRESS NOTES
[employer]  [address] [place] [address], [address] [facility]  [address]  W: [phone]  F: [phone]     f/uHEME/ONC CONSULT      Reason for visit:  Evaluation for low WBC    HPI:   Mike Adams is a 80 y.o.  male who I was asked to see in consultation at the request of Dr. Princess Leal  for evaluation for low WBC.    5/9/17 wbc 2.8, hgb 14.1; plt 124; ANC 1    Denies fevers, no weight loss, no drenching night sweats. Only very occasional etoh use. States that in [place], 9-10 months ago, his wbc and plt count were low as well during an ED visit for a fever. Interval history:  2/10 back pain. DX   Encounter Diagnoses   Name Primary?  Thrombocytopenia (HCC)     Neutropenia, unspecified type ([facility] [address] Yes    Monocytosis               Past Medical History:   Diagnosis Date    GERD (gastroesophageal reflux disease)     Hypertension      Past Surgical History:   Procedure Laterality Date    COLONOSCOPY N/A 4/12/2017    COLONOSCOPY performed by Duc Walker MD at [facility] HX APPENDECTOMY      HX CATARACT REMOVAL Right      Social History     Social History    Marital status:      Spouse name: N/A    Number of children: N/A    Years of education: N/A     Social History Main Topics    Smoking status: Never Smoker    Smokeless tobacco: Never Used    Alcohol use No    Drug use: No    Sexual activity: Not Asked     Other Topics Concern    None     Social History Narrative     Family History   Problem Relation Age of Onset    Hypertension Mother        Current Outpatient Prescriptions   Medication Sig Dispense Refill    amLODIPine (NORVASC) 5 mg tablet Take 5 mg by mouth daily.  ergocalciferol (VITAMIN D2) 50,000 unit capsule Take 50,000 Units by mouth every seven (7) days.  calcium-cholecalciferol, d3, (CALCIUM 600 + D) 600-125 mg-unit tab Take 1 Tab by mouth two (2) times a day.          No Known Allergies    Review of Systems    A comprehensive review of systems was performed and all systems were negative except for HPI and for the symptom review form, reviewed and scanned in.        Objective:  Physical Exam:  Visit Vitals    BP (!) 156/103    Pulse 87    Temp 97.4 °F (36.3 °C) (Temporal)    Resp 18    Ht 5' 8\" (1.727 m)    Wt 168 lb 9.6 oz (76.5 kg)    SpO2 93%    BMI 25.64 kg/m2       General:  Alert, cooperative, no distress, appears stated age. Head:  Normocephalic, without obvious abnormality, atraumatic. Eyes:  Conjunctivae/corneas clear. PERRL, EOMs intact. Throat: Lips, mucosa, and tongue normal.    Neck: Supple, symmetrical, trachea midline, no adenopathy, thyroid: no enlargement/tenderness/nodules   Back:   Symmetric, no curvature. ROM normal. No CVA tenderness. Lungs:   Clear to auscultation bilaterally. Chest wall:  No tenderness or deformity. Heart:  Regular rate and rhythm, S1, S2 normal, no murmur, click, rub or gallop. Abdomen:   Soft, non-tender. Bowel sounds normal. No masses,  No organomegaly. Extremities: Extremities normal, atraumatic, no cyanosis or edema. Skin: Skin color, texture, turgor normal. No rashes or lesions. Lymph nodes: Cervical, supraclavicular, and axillary nodes normal.   Neurologic: CNII-XII intact. Diagnostic Imaging   No results found for this or any previous visit. No results found for this or any previous visit. Lab Results  Lab Results   Component Value Date/Time    WBC 3.8 08/28/2017 10:52 AM    HGB 13.0 08/28/2017 10:52 AM    HCT 39.2 08/28/2017 10:52 AM    PLATELET 910 01/94/2713 10:52 AM    MCV 86.2 08/28/2017 10:52 AM       Lab Results   Component Value Date/Time    AST (SGOT) 23 05/30/2017 02:34 PM    Alk.  phosphatase 47 05/30/2017 02:34 PM    Protein, total 7.9 05/30/2017 02:34 PM    Protein, total 7.4 05/30/2017 02:34 PM    Albumin 4.3 05/30/2017 02:34 PM    Globulin 3.6 05/30/2017 02:34 PM    A-G Ratio 1.2 05/30/2017 02:34 PM    ALT (SGPT) 33 05/30/2017 02:34 PM       Lab Results   Component Value Date/Time    Vitamin B12 348 05/30/2017 02:34 PM    Folate 19.3 05/30/2017 02:34 PM    Homocysteine, plasma 15.1 05/30/2017 02:34 PM    Methylmalonic acid 185 05/30/2017 02:34 PM    Sed rate, automated 10 05/30/2017 02:34 PM    C-Reactive protein <0.29 05/30/2017 02:34 PM    M-spike Not Observed 05/30/2017 02:34 PM    SILVER Direct NEGATIVE  05/30/2017 02:34 PM    Hep C  virus Ab Interp. NONREACTIVE 05/30/2017 02:34 PM     Lab Results   Component Value Date/Time    INR 1.1 05/30/2017 02:34 PM    aPTT 26.6 05/30/2017 02:34 PM    Fibrinogen 259 05/30/2017 02:34 PM         Assessment/Plan:  80 y.o. male with neutropenia and thrombocytopenia. PS 0    1. Neutropenia:  The most common etiology, especially in patients without recurrent infections, would be benign ethnic neutropenia, as s commonly have a lower WBC than Caucasians. There is no evidence by history of an inherited neutropenia, no history of chronic or recurrent infection, and no medication that is likely to be contributing. Lab work above are unrevealing. Since the neutrophil count remains >1000, then a period of observation may be reasonable, with further evaluation (ie bone marrow biopsy) only if the counts worsen or the patient develops recurrent infections. We discussed this today. He would prefer to wait 3 months and then proceed if the counts have not improved. Also discussed abd US to eval spleen and liver, he would prefer to hold for now    2. Thrombocytopenia:  Mild, worse, count > 100; labs as above. See above, will have him return in 3 months for a repeat CBC. 3. Monocytosis:  Stable; discussed bone marrow biopsy, will monitor for now    Thank you for this consult. All of the patient's questions were answered today. There are no Patient Instructions on file for this visit. Follow-up Disposition:  Return in about 3 months (around 11/28/2017).     Shahida Rowley MD

## 2017-08-28 NOTE — MR AVS SNAPSHOT
Visit Information Date & Time Provider Department Dept. Phone Encounter #  
 8/28/2017 11:00 AM MD [facility] at [address] [place] [address] Rd [id number] Follow-up Instructions Return in about 3 months (around 11/28/2017). Follow-up and Disposition History Your Appointments 11/28/2017 11:00 AM  
ESTABLISHED PATIENT with MD [facility] at [facility]) Appt Note: 3 mo f/u  
 [address] [facility], [address] [facility] [address] [id number]  
[phone]  
  
   
 [facility], [address] [facility] Upcoming Health Maintenance Date Due DTaP/Tdap/Td series (1 - Tdap) 10/1/1967 FOBT Q 1 YEAR AGE 50-75 10/1/1996 ZOSTER VACCINE AGE 60> 8/1/2006 GLAUCOMA SCREENING Q2Y 10/1/2011 Pneumococcal 65+ Low/Medium Risk (1 of 2 - PCV13) 10/1/2011 MEDICARE YEARLY EXAM 10/1/2011 INFLUENZA AGE 9 TO ADULT 8/1/2017 Allergies as of 8/28/2017  Review Complete On: 8/28/2017 By: Gunjan Funk MD  
 No Known Allergies Current Immunizations  Never Reviewed No immunizations on file. Not reviewed this visit You Were Diagnosed With   
  
 Codes Comments Neutropenia, unspecified type ([facility] [address]    -  Primary ICD-10-CM: D70.9 ICD-9-CM: 288.00 Thrombocytopenia ([facility] [address]     ICD-10-CM: D69.6 ICD-9-CM: 287.5 Monocytosis     ICD-10-CM: U13.747 ICD-9-CM: 319.69 Vitals BP Pulse Temp Resp Height(growth percentile) Weight(growth percentile) (!) 156/103 87 97.4 °F (36.3 °C) (Temporal) 18 5' 8\" (1.727 m) 168 lb 9.6 oz (76.5 kg) SpO2 BMI Smoking Status 93% 25.64 kg/m2 Never Smoker Vitals History BMI and BSA Data Body Mass Index Body Surface Area  
 25.64 kg/m 2 1.92 m 2 Preferred Pharmacy Pharmacy Name Phone [id number] [address] [phone] Your Updated Medication List  
  
   
This list is accurate as of: 8/28/17 12:12 PM.  Always use your most recent med list.  
  
  
  
  
 CALCIUM 600 + D 600-125 mg-unit Tab Generic drug:  calcium-cholecalciferol (d3) Take 1 Tab by mouth two (2) times a day. NORVASC 5 mg tablet Generic drug:  amLODIPine Take 5 mg by mouth daily. VITAMIN D2 50,000 unit capsule Generic drug:  ergocalciferol Take 50,000 Units by mouth every seven (7) days. Follow-up Instructions Return in about 3 months (around 11/28/2017). Introducing [facility]! [facility] introduces [employer] patient portal. Now you can access parts of your medical record, email your doctor's office, and request medication refills online. 1. In your internet browser, go to [email or web address] 2. Click on the First Time User? Click Here link in the Sign In box. You will see the New Member Sign Up page. 3. Enter your [employer] Access Code exactly as it appears below. You will not need to use this code after youve completed the sign-up process. If you do not sign up before the expiration date, you must request a new code. · [employer] Access Code: [email or web address] Expires: 10/7/2017  2:09 PM 
 
4. Enter the last four digits of your Social Security Number (xxxx) and Date of Birth (mm/dd/yyyy) as indicated and click Submit. You will be taken to the next sign-up page. 5. Create a fintonict ID. This will be your [employer] login ID and cannot be changed, so think of one that is secure and easy to remember. 6. Create a [employer] password. You can change your password at any time. 7. Enter your Password Reset Question and Answer. This can be used at a later time if you forget your password. 8. Enter your e-mail address. You will receive e-mail notification when new information is available in [address] 9. Click Sign Up. You can now view and download portions of your medical record. 10. Click the Download Summary menu link to download a portable copy of your medical information. If you have questions, please visit the Frequently Asked Questions section of the [employer] website. Remember, [employer] is NOT to be used for urgent needs. For medical emergencies, dial 911. Now available from your iPhone and Android! Please provide this summary of care documentation to your next provider. Your primary care clinician is listed as Abdulaziz Upton. If you have any questions after today's visit, please call [phone].

## 2017-08-28 NOTE — PROGRESS NOTES
[facility]    Pt arrived at [phone] left at 1053    Blood pressure 133/79, pulse 90, temperature 97 °F (36.1 °C), resp. rate 16.       Labs drawn peripherally as ordered    Lab will be in [facility]

## 2017-08-29 ENCOUNTER — APPOINTMENT (OUTPATIENT)
Dept: INFUSION THERAPY | Age: 72
End: 2017-08-29
Payer: MEDICARE

## 2017-11-28 ENCOUNTER — OFFICE VISIT (OUTPATIENT)
Dept: ONCOLOGY | Age: 72
End: 2017-11-28

## 2017-11-28 ENCOUNTER — HOSPITAL ENCOUNTER (OUTPATIENT)
Dept: INFUSION THERAPY | Age: 72
Discharge: HOME | End: 2017-11-28
Payer: MEDICARE

## 2017-11-28 VITALS
HEIGHT: 68 IN | SYSTOLIC BLOOD PRESSURE: 162 MMHG | BODY MASS INDEX: 25.79 KG/M2 | RESPIRATION RATE: 18 BRPM | TEMPERATURE: 97.6 F | OXYGEN SATURATION: 97 % | HEART RATE: 63 BPM | WEIGHT: 170.2 LBS | DIASTOLIC BLOOD PRESSURE: 89 MMHG

## 2017-11-28 VITALS
RESPIRATION RATE: 16 BRPM | DIASTOLIC BLOOD PRESSURE: 89 MMHG | SYSTOLIC BLOOD PRESSURE: 162 MMHG | TEMPERATURE: 97.6 F | HEART RATE: 63 BPM

## 2017-11-28 DIAGNOSIS — D72.821 MONOCYTOSIS: ICD-10-CM

## 2017-11-28 DIAGNOSIS — D70.9 NEUTROPENIA, UNSPECIFIED TYPE (HCC): Primary | ICD-10-CM

## 2017-11-28 DIAGNOSIS — D69.6 THROMBOCYTOPENIA (HCC): ICD-10-CM

## 2017-11-28 LAB
BASOPHILS # BLD: 0 K/UL (ref 0–0.1)
BASOPHILS NFR BLD: 0 % (ref 0–1)
DIFFERENTIAL METHOD BLD: ABNORMAL
EOSINOPHIL # BLD: 0 K/UL (ref 0–0.4)
EOSINOPHIL NFR BLD: 0 % (ref 0–7)
ERYTHROCYTE [DISTWIDTH] IN BLOOD BY AUTOMATED COUNT: 15.4 % (ref 11.5–14.5)
HCT VFR BLD AUTO: 40 % (ref 36.6–50.3)
HGB BLD-MCNC: 13.7 G/DL (ref 12.1–17)
LYMPHOCYTES # BLD: 1.9 K/UL (ref 0.8–3.5)
LYMPHOCYTES NFR BLD: 50 % (ref 12–49)
MCH RBC QN AUTO: 28.7 PG (ref 26–34)
MCHC RBC AUTO-ENTMCNC: 34.3 G/DL (ref 30–36.5)
MCV RBC AUTO: 83.9 FL (ref 80–99)
MONOCYTES # BLD: 0.8 K/UL (ref 0–1)
MONOCYTES NFR BLD: 20 % (ref 5–13)
NEUTS SEG # BLD: 1.2 K/UL (ref 1.8–8)
NEUTS SEG NFR BLD: 30 % (ref 32–75)
PLATELET # BLD AUTO: 104 K/UL (ref 150–400)
RBC # BLD AUTO: 4.77 M/UL (ref 4.1–5.7)
RBC MORPH BLD: ABNORMAL
WBC # BLD AUTO: 3.9 K/UL (ref 4.1–11.1)

## 2017-11-28 PROCEDURE — 85025 COMPLETE CBC W/AUTO DIFF WBC: CPT | Performed by: INTERNAL MEDICINE

## 2017-11-28 PROCEDURE — 36415 COLL VENOUS BLD VENIPUNCTURE: CPT | Performed by: INTERNAL MEDICINE

## 2017-11-28 NOTE — PROGRESS NOTES
Blood pressure 162/89, pulse 63, temperature 97.6 °F (36.4 °C), resp. rate 16. Pt arrived at 1052,labs drawn peripherally from left [facility]. Pt tolerated well and left at 1057.

## 2017-11-28 NOTE — PROGRESS NOTES
[address]  [address] [place] [address], [address] [place] [address]  [address], [address]  W: [phone]  F: [phone]     f/uHEME/ONC CONSULT      Reason for visit:  Evaluation for low WBC    HPI:   Doug Thomas is a 71 y.o.  male who I was asked to see in consultation at the request of Dr. Vincent Leigh  for evaluation for low WBC.    5/9/17 wbc 2.8, hgb 14.1; plt 124; ANC 1    Denies fevers, no weight loss, no drenching night sweats. Only very occasional etoh use. States that in [place], 9-10 months ago, his wbc and plt count were low as well during an ED visit for a fever. Interval history:  No complaints    DX   Encounter Diagnoses   Name Primary?  Thrombocytopenia (HCC)     Neutropenia, unspecified type ([facility] [address] Yes    Monocytosis               Past Medical History:   Diagnosis Date    GERD (gastroesophageal reflux disease)     Hypertension      Past Surgical History:   Procedure Laterality Date    COLONOSCOPY N/A 4/12/2017    COLONOSCOPY performed by Sparkle Segura MD at [address] [facility] HX APPENDECTOMY      HX CATARACT REMOVAL Right      Social History     Social History    Marital status:      Spouse name: N/A    Number of children: N/A    Years of education: N/A     Social History Main Topics    Smoking status: Never Smoker    Smokeless tobacco: Never Used    Alcohol use No    Drug use: No    Sexual activity: Not Asked     Other Topics Concern    None     Social History Narrative     Family History   Problem Relation Age of Onset    Hypertension Mother        Current Outpatient Prescriptions   Medication Sig Dispense Refill    amLODIPine (NORVASC) 5 mg tablet Take 5 mg by mouth daily.  ergocalciferol (VITAMIN D2) 50,000 unit capsule Take 50,000 Units by mouth every seven (7) days.          No Known Allergies    Review of Systems    A comprehensive review of systems was performed and all systems were negative except for HPI and for the symptom review form, reviewed and scanned in.        Objective:  Physical Exam:  Visit Vitals    /89    Pulse 63    Temp 97.6 °F (36.4 °C) (Temporal)    Resp 18    Ht 5' 8\" (1.727 m)    Wt 170 lb 3.2 oz (77.2 kg)    SpO2 97%    BMI 25.88 kg/m2       General:  Alert, cooperative, no distress, appears stated age. Head:  Normocephalic, without obvious abnormality, atraumatic. Eyes:  Conjunctivae/corneas clear. PERRL, EOMs intact. Throat: Lips, mucosa, and tongue normal.    Neck: Supple, symmetrical, trachea midline, no adenopathy, thyroid: no enlargement/tenderness/nodules   Back:   Symmetric, no curvature. ROM normal. No CVA tenderness. Lungs:   Clear to auscultation bilaterally. Chest wall:  No tenderness or deformity. Heart:  Regular rate and rhythm, S1, S2 normal, no murmur, click, rub or gallop. Abdomen:   Soft, non-tender. Bowel sounds normal. No masses,  No organomegaly. Extremities: Extremities normal, atraumatic, no cyanosis or edema. Skin: Skin color, texture, turgor normal. No rashes or lesions. Lymph nodes: Cervical, supraclavicular, and axillary nodes normal.   Neurologic: CNII-XII intact. Diagnostic Imaging   No results found for this or any previous visit. No results found for this or any previous visit. Lab Results  Lab Results   Component Value Date/Time    WBC 3.9 11/28/2017 10:54 AM    HGB 13.7 11/28/2017 10:54 AM    HCT 40.0 11/28/2017 10:54 AM    PLATELET 248 54/49/0649 10:54 AM    MCV 83.9 11/28/2017 10:54 AM       Lab Results   Component Value Date/Time    AST (SGOT) 23 05/30/2017 02:34 PM    Alk.  phosphatase 47 05/30/2017 02:34 PM    Protein, total 7.9 05/30/2017 02:34 PM    Protein, total 7.4 05/30/2017 02:34 PM    Albumin 4.3 05/30/2017 02:34 PM    Globulin 3.6 05/30/2017 02:34 PM    A-G Ratio 1.2 05/30/2017 02:34 PM    ALT (SGPT) 33 05/30/2017 02:34 PM       Lab Results   Component Value Date/Time    Vitamin B12 348 05/30/2017 02:34 PM    Folate 19.3 05/30/2017 02:34 PM Homocysteine, plasma 15.1 05/30/2017 02:34 PM    Methylmalonic acid 185 05/30/2017 02:34 PM    Sed rate, automated 10 05/30/2017 02:34 PM    C-Reactive protein <0.29 05/30/2017 02:34 PM    M-spike Not Observed 05/30/2017 02:34 PM    SILVER Direct NEGATIVE  05/30/2017 02:34 PM    Hep C  virus Ab Interp. NONREACTIVE 05/30/2017 02:34 PM     Lab Results   Component Value Date/Time    INR 1.1 05/30/2017 02:34 PM    aPTT 26.6 05/30/2017 02:34 PM    Fibrinogen 259 05/30/2017 02:34 PM         Assessment/Plan:  71 y.o. male with neutropenia and thrombocytopenia. PS 0    1. Neutropenia:  The most common etiology, especially in patients without recurrent infections, would be benign ethnic neutropenia, as s commonly have a lower WBC than Caucasians. There is no evidence by history of an inherited neutropenia, no history of chronic or recurrent infection, and no medication that is likely to be contributing. Lab work above are unrevealing. Since the neutrophil count remains >1000, then a period of observation may be reasonable, with further evaluation (ie bone marrow biopsy) only if the counts worsen or the patient develops recurrent infections. We discussed that this testing would be warranted if his counts are worse today -- if stable, then ok with monitoring. Overall stable. He would prefer to wait 3 months and then proceed if the counts have worsened. Also discussed abd US to eval spleen and liver, he would prefer to hold for now    2. Thrombocytopenia:  Mild, worse, count > 100; labs as above. See above, will have him return in 3 months for a repeat CBC. 3. Monocytosis:  Stable; discussed bone marrow biopsy, will monitor for now    Thank you for this consult. All of the patient's questions were answered today. > 15 min were spent with this patient with > 50% of that time spent in face to face counseling    There are no Patient Instructions on file for this visit.    Follow-up Disposition:  Return in about 3 months (around 2/28/2018).     Christine Sotelo MD

## 2018-03-07 ENCOUNTER — HOSPITAL ENCOUNTER (OUTPATIENT)
Dept: INFUSION THERAPY | Age: 73
Discharge: HOME | End: 2018-03-07
Payer: MEDICARE

## 2018-03-07 ENCOUNTER — OFFICE VISIT (OUTPATIENT)
Dept: ONCOLOGY | Age: 73
End: 2018-03-07

## 2018-03-07 VITALS
DIASTOLIC BLOOD PRESSURE: 80 MMHG | RESPIRATION RATE: 18 BRPM | SYSTOLIC BLOOD PRESSURE: 146 MMHG | HEIGHT: 68 IN | OXYGEN SATURATION: 97 % | TEMPERATURE: 97.3 F | BODY MASS INDEX: 25.58 KG/M2 | WEIGHT: 168.8 LBS | HEART RATE: 65 BPM

## 2018-03-07 VITALS
RESPIRATION RATE: 20 BRPM | DIASTOLIC BLOOD PRESSURE: 72 MMHG | HEART RATE: 70 BPM | TEMPERATURE: 98.2 F | SYSTOLIC BLOOD PRESSURE: 148 MMHG

## 2018-03-07 DIAGNOSIS — D70.9 NEUTROPENIA, UNSPECIFIED TYPE (HCC): Primary | ICD-10-CM

## 2018-03-07 DIAGNOSIS — D69.6 THROMBOCYTOPENIA (HCC): ICD-10-CM

## 2018-03-07 DIAGNOSIS — D72.821 MONOCYTOSIS: ICD-10-CM

## 2018-03-07 LAB
BASOPHILS # BLD: 0 K/UL (ref 0–0.1)
BASOPHILS NFR BLD: 0 % (ref 0–1)
DIFFERENTIAL METHOD BLD: ABNORMAL
EOSINOPHIL # BLD: 0 K/UL (ref 0–0.4)
EOSINOPHIL NFR BLD: 0 % (ref 0–7)
ERYTHROCYTE [DISTWIDTH] IN BLOOD BY AUTOMATED COUNT: 16 % (ref 11.5–14.5)
HCT VFR BLD AUTO: 39.1 % (ref 36.6–50.3)
HGB BLD-MCNC: 12.6 G/DL (ref 12.1–17)
IMM GRANULOCYTES # BLD: 0.1 K/UL (ref 0–0.04)
IMM GRANULOCYTES NFR BLD AUTO: 1 % (ref 0–0.5)
LYMPHOCYTES # BLD: 1.7 K/UL (ref 0.8–3.5)
LYMPHOCYTES NFR BLD: 45 % (ref 12–49)
MCH RBC QN AUTO: 27.9 PG (ref 26–34)
MCHC RBC AUTO-ENTMCNC: 32.2 G/DL (ref 30–36.5)
MCV RBC AUTO: 86.5 FL (ref 80–99)
MONOCYTES # BLD: 0.7 K/UL (ref 0–1)
MONOCYTES NFR BLD: 18 % (ref 5–13)
NEUTS SEG # BLD: 1.3 K/UL (ref 1.8–8)
NEUTS SEG NFR BLD: 36 % (ref 32–75)
NRBC # BLD: 0 K/UL (ref 0–0.01)
NRBC BLD-RTO: 0 PER 100 WBC
PLATELET # BLD AUTO: 130 K/UL (ref 150–400)
PMV BLD AUTO: 10.8 FL (ref 8.9–12.9)
RBC # BLD AUTO: 4.52 M/UL (ref 4.1–5.7)
WBC # BLD AUTO: 3.7 K/UL (ref 4.1–11.1)

## 2018-03-07 PROCEDURE — 36415 COLL VENOUS BLD VENIPUNCTURE: CPT | Performed by: INTERNAL MEDICINE

## 2018-03-07 PROCEDURE — 85025 COMPLETE CBC W/AUTO DIFF WBC: CPT | Performed by: INTERNAL MEDICINE

## 2018-03-07 RX ORDER — VITAMIN E 1000 UNIT
1000 CAPSULE ORAL DAILY
COMMUNITY

## 2018-03-07 NOTE — PROGRESS NOTES
[facility]  [address] [employer], [address] [place] [address] [address]  W: [phone]  F: [phone]     f/uHEME/ONC CONSULT      Reason for visit:  Evaluation for low WBC    HPI:   Den White is a 71 y.o.  male who I was asked to see in consultation at the request of Dr. Connie Lizarraga  for evaluation for low WBC.    5/9/17 wbc 2.8, hgb 14.1; plt 124; ANC 1    Denies fevers, no weight loss, no drenching night sweats. Only very occasional etoh use. Interval history:  In today for follow up. Complains of gr 1 sob, gr 1 libido. DX   Encounter Diagnoses   Name Primary?  Neutropenia, unspecified type ([address] Yes    Thrombocytopenia ([address]     Monocytosis               Past Medical History:   Diagnosis Date    GERD (gastroesophageal reflux disease)     Hypertension      Past Surgical History:   Procedure Laterality Date    COLONOSCOPY N/A 4/12/2017    COLONOSCOPY performed by Loyd Juares MD at [facility] HX APPENDECTOMY      HX CATARACT REMOVAL Right      Social History     Social History    Marital status:      Spouse name: N/A    Number of children: N/A    Years of education: N/A     Social History Main Topics    Smoking status: Never Smoker    Smokeless tobacco: Never Used    Alcohol use No    Drug use: No    Sexual activity: Not Asked     Other Topics Concern    None     Social History Narrative     Family History   Problem Relation Age of Onset    Hypertension Mother        Current Outpatient Prescriptions   Medication Sig Dispense Refill    ascorbic acid, vitamin C, (VITAMIN C) 1,000 mg tablet Take 1,000 mg by mouth daily.  amLODIPine (NORVASC) 5 mg tablet Take 5 mg by mouth daily.  ergocalciferol (VITAMIN D2) 50,000 unit capsule Take 50,000 Units by mouth every seven (7) days.          No Known Allergies    Review of Systems    A comprehensive review of systems was performed and all systems were negative except for HPI and for the symptom review form, reviewed and scanned in.        Objective:  Physical Exam:  Visit Vitals    /80    Pulse 65    Temp 97.3 °F (36.3 °C) (Temporal)    Resp 18    Ht 5' 8\" (1.727 m)    Wt 168 lb 12.8 oz (76.6 kg)    SpO2 97%    BMI 25.67 kg/m2       General:  Alert, cooperative, no distress, appears stated age. Head:  Normocephalic, without obvious abnormality, atraumatic. Eyes:  Conjunctivae/corneas clear. PERRL, EOMs intact. Throat: Lips, mucosa, and tongue normal.    Neck: Supple, symmetrical, trachea midline, no adenopathy, thyroid: no enlargement/tenderness/nodules   Back:   Symmetric, no curvature. ROM normal. No CVA tenderness. Lungs:   Clear to auscultation bilaterally. Chest wall:  No tenderness or deformity. Heart:  Regular rate and rhythm, S1, S2 normal, no murmur, click, rub or gallop. Abdomen:   Soft, non-tender. Bowel sounds normal. No masses,  No organomegaly. Extremities: Extremities normal, atraumatic, no cyanosis or edema. Skin: Skin color, texture, turgor normal. No rashes or lesions. Lymph nodes: Cervical, supraclavicular, and axillary nodes normal.   Neurologic: CNII-XII intact. Diagnostic Imaging   No results found for this or any previous visit. No results found for this or any previous visit. Lab Results  Lab Results   Component Value Date/Time    WBC 3.7 (L) 03/07/2018 08:55 AM    HGB 12.6 03/07/2018 08:55 AM    HCT 39.1 03/07/2018 08:55 AM    PLATELET 078 (L) 94/55/7262 08:55 AM    MCV 86.5 03/07/2018 08:55 AM       Lab Results   Component Value Date/Time    AST (SGOT) 23 05/30/2017 02:34 PM    Alk.  phosphatase 47 05/30/2017 02:34 PM    Protein, total 7.9 05/30/2017 02:34 PM    Protein, total 7.4 05/30/2017 02:34 PM    Albumin 4.3 05/30/2017 02:34 PM    Globulin 3.6 05/30/2017 02:34 PM    A-G Ratio 1.2 05/30/2017 02:34 PM    ALT (SGPT) 33 05/30/2017 02:34 PM       Lab Results   Component Value Date/Time    Vitamin B12 348 05/30/2017 02:34 PM    Folate 19.3 05/30/2017 02:34 PM    Homocysteine, plasma 15.1 (H) 05/30/2017 02:34 PM    Methylmalonic acid 185 05/30/2017 02:34 PM    Sed rate, automated 10 05/30/2017 02:34 PM    C-Reactive protein <0.29 05/30/2017 02:34 PM    M-Aric Not Observed 05/30/2017 02:34 PM    SILVER Direct NEGATIVE  05/30/2017 02:34 PM    Hep C  virus Ab Interp. NONREACTIVE 05/30/2017 02:34 PM     Lab Results   Component Value Date/Time    INR 1.1 05/30/2017 02:34 PM    aPTT 26.6 05/30/2017 02:34 PM    Fibrinogen 259 05/30/2017 02:34 PM         Assessment/Plan:  71 y.o. male with neutropenia and thrombocytopenia. PS 0    1. Neutropenia:  The most common etiology, especially in patients without recurrent infections, would be benign ethnic neutropenia, as s commonly have a lower WBC than Caucasians. There is no evidence by history of an inherited neutropenia, no history of chronic or recurrent infection, and no medication that is likely to be contributing. Lab work above are unrevealing. Since the neutrophil count remains >1000, then a period of observation may be reasonable, with further evaluation (ie bone marrow biopsy) only if the counts worsen or the patient develops recurrent infections. We discussed that this testing would be warranted if his counts are worse today -- if stable, then ok with monitoring. Overall stable. He would prefer to wait 3 months and then proceed if the counts have worsened. Also discussed abd US to eval spleen and liver, he would prefer to hold for now    2. Thrombocytopenia:  Mild, worse, count > 100; labs as above. See above, will have him return in 3 months for a repeat CBC. 3. Monocytosis:  Stable; discussed bone marrow biopsy, will monitor for now    Thank you for this consult. All of the patient's questions were answered today.     > 15 min were spent with this patient with > 50% of that time spent in face to face counseling    There are no Patient Instructions on file for this visit. Follow-up Disposition:  Return in about 3 months (around 6/7/2018).     Aurora Holley MD

## 2018-03-07 NOTE — PROGRESS NOTES
Patient arrived at 853 am alert and with no complications. Patient BP was taken from Right Arm and blood was drawn from his [facility], operated by [facility] with no complications.      BP: 148/78  Temp: 98.2  Heart Rate: 70  Resp: 20

## 2018-06-05 ENCOUNTER — OFFICE VISIT (OUTPATIENT)
Dept: ONCOLOGY | Age: 73
End: 2018-06-05

## 2018-06-05 ENCOUNTER — HOSPITAL ENCOUNTER (OUTPATIENT)
Dept: INFUSION THERAPY | Age: 73
Discharge: HOME | End: 2018-06-05
Payer: MEDICARE

## 2018-06-05 VITALS
OXYGEN SATURATION: 97 % | HEIGHT: 68 IN | SYSTOLIC BLOOD PRESSURE: 131 MMHG | TEMPERATURE: 98 F | BODY MASS INDEX: 25.4 KG/M2 | WEIGHT: 167.6 LBS | RESPIRATION RATE: 18 BRPM | DIASTOLIC BLOOD PRESSURE: 88 MMHG | HEART RATE: 69 BPM

## 2018-06-05 VITALS
DIASTOLIC BLOOD PRESSURE: 77 MMHG | SYSTOLIC BLOOD PRESSURE: 136 MMHG | TEMPERATURE: 98.2 F | RESPIRATION RATE: 18 BRPM | HEART RATE: 63 BPM

## 2018-06-05 DIAGNOSIS — D70.9 NEUTROPENIA, UNSPECIFIED TYPE (HCC): Primary | ICD-10-CM

## 2018-06-05 DIAGNOSIS — D69.6 THROMBOCYTOPENIA (HCC): ICD-10-CM

## 2018-06-05 DIAGNOSIS — D72.821 MONOCYTOSIS: ICD-10-CM

## 2018-06-05 LAB
BASOPHILS # BLD: 0 K/UL (ref 0–0.1)
BASOPHILS NFR BLD: 0 % (ref 0–1)
DIFFERENTIAL METHOD BLD: ABNORMAL
EOSINOPHIL # BLD: 0 K/UL (ref 0–0.4)
EOSINOPHIL NFR BLD: 0 % (ref 0–7)
ERYTHROCYTE [DISTWIDTH] IN BLOOD BY AUTOMATED COUNT: 14.7 % (ref 11.5–14.5)
HCT VFR BLD AUTO: 41.4 % (ref 36.6–50.3)
HGB BLD-MCNC: 13.5 G/DL (ref 12.1–17)
IMM GRANULOCYTES # BLD: 0 K/UL (ref 0–0.04)
IMM GRANULOCYTES NFR BLD AUTO: 1 % (ref 0–0.5)
LYMPHOCYTES # BLD: 1.7 K/UL (ref 0.8–3.5)
LYMPHOCYTES NFR BLD: 47 % (ref 12–49)
MCH RBC QN AUTO: 28.4 PG (ref 26–34)
MCHC RBC AUTO-ENTMCNC: 32.6 G/DL (ref 30–36.5)
MCV RBC AUTO: 87 FL (ref 80–99)
MONOCYTES # BLD: 0.7 K/UL (ref 0–1)
MONOCYTES NFR BLD: 19 % (ref 5–13)
NEUTS SEG # BLD: 1.2 K/UL (ref 1.8–8)
NEUTS SEG NFR BLD: 33 % (ref 32–75)
NRBC # BLD: 0 K/UL (ref 0–0.01)
NRBC BLD-RTO: 0 PER 100 WBC
PLATELET # BLD AUTO: 91 K/UL (ref 150–400)
PMV BLD AUTO: 10.6 FL (ref 8.9–12.9)
RBC # BLD AUTO: 4.76 M/UL (ref 4.1–5.7)
WBC # BLD AUTO: 3.6 K/UL (ref 4.1–11.1)

## 2018-06-05 PROCEDURE — 85025 COMPLETE CBC W/AUTO DIFF WBC: CPT | Performed by: INTERNAL MEDICINE

## 2018-06-05 PROCEDURE — 36415 COLL VENOUS BLD VENIPUNCTURE: CPT | Performed by: INTERNAL MEDICINE

## 2018-06-05 NOTE — PROGRESS NOTES
[employer]  [facility], [address] [place] [address]  [place]  W: [phone]  F: [phone]     f/uHEME/ONC CONSULT      Reason for visit:  Evaluation for low WBC    HPI:   Torrey Huff is a 71 y.o.  male who I was asked to see in consultation at the request of Dr. Destiney Solorzano  for evaluation for low WBC.    5/9/17 wbc 2.8, hgb 14.1; plt 124; ANC 1    Denies fevers, no weight loss, no drenching night sweats. Only very occasional etoh use. Interval history:  In today for follow up. Complains of gr 2 libido. DX   Encounter Diagnoses   Name Primary?  Neutropenia, unspecified type ([address] Yes    Thrombocytopenia ([address]     Monocytosis               Past Medical History:   Diagnosis Date    GERD (gastroesophageal reflux disease)     Hypertension      Past Surgical History:   Procedure Laterality Date    COLONOSCOPY N/A 4/12/2017    COLONOSCOPY performed by Vibha Holley MD at [facility] HX APPENDECTOMY      HX CATARACT REMOVAL Right      Social History     Social History    Marital status:      Spouse name: N/A    Number of children: N/A    Years of education: N/A     Social History Main Topics    Smoking status: Never Smoker    Smokeless tobacco: Never Used    Alcohol use No    Drug use: No    Sexual activity: Not Asked     Other Topics Concern    None     Social History Narrative     Family History   Problem Relation Age of Onset    Hypertension Mother        Current Outpatient Prescriptions   Medication Sig Dispense Refill    ascorbic acid, vitamin C, (VITAMIN C) 1,000 mg tablet Take 1,000 mg by mouth daily.  amLODIPine (NORVASC) 5 mg tablet Take 5 mg by mouth daily.  ergocalciferol (VITAMIN D2) 50,000 unit capsule Take 50,000 Units by mouth every seven (7) days.          No Known Allergies    Review of Systems    A comprehensive review of systems was performed and all systems were negative except for HPI and for the symptom review form, reviewed and scanned in.        Objective:  Physical Exam:  Visit Vitals    /88    Pulse 69    Temp 98 °F (36.7 °C) (Temporal)    Resp 18    Ht 5' 8\" (1.727 m)    Wt 167 lb 9.6 oz (76 kg)    SpO2 97%    BMI 25.48 kg/m2       General:  Alert, cooperative, no distress, appears stated age. Head:  Normocephalic, without obvious abnormality, atraumatic. Eyes:  Conjunctivae/corneas clear. PERRL, EOMs intact. Throat: Lips, mucosa, and tongue normal.    Neck: Supple, symmetrical, trachea midline, no adenopathy, thyroid: no enlargement/tenderness/nodules   Back:   Symmetric, no curvature. ROM normal. No CVA tenderness. Lungs:   Clear to auscultation bilaterally. Chest wall:  No tenderness or deformity. Heart:  Regular rate and rhythm, S1, S2 normal, no murmur, click, rub or gallop. Abdomen:   Soft, non-tender. Bowel sounds normal. No masses,  No organomegaly. Extremities: Extremities normal, atraumatic, no cyanosis or edema. Skin: Skin color, texture, turgor normal. No rashes or lesions. Lymph nodes: Cervical, supraclavicular, and axillary nodes normal.   Neurologic: CNII-XII intact. Diagnostic Imaging   No results found for this or any previous visit. No results found for this or any previous visit. Lab Results  Lab Results   Component Value Date/Time    WBC 3.7 (L) 03/07/2018 08:55 AM    HGB 12.6 03/07/2018 08:55 AM    HCT 39.1 03/07/2018 08:55 AM    PLATELET 163 (L) 30/65/6521 08:55 AM    MCV 86.5 03/07/2018 08:55 AM       Lab Results   Component Value Date/Time    AST (SGOT) 23 05/30/2017 02:34 PM    Alk.  phosphatase 47 05/30/2017 02:34 PM    Protein, total 7.9 05/30/2017 02:34 PM    Protein, total 7.4 05/30/2017 02:34 PM    Albumin 4.3 05/30/2017 02:34 PM    Globulin 3.6 05/30/2017 02:34 PM    A-G Ratio 1.2 05/30/2017 02:34 PM    ALT (SGPT) 33 05/30/2017 02:34 PM       Lab Results   Component Value Date/Time    Vitamin B12 348 05/30/2017 02:34 PM    Folate 19.3 05/30/2017 02:34 PM    Homocysteine, plasma 15.1 (H) 05/30/2017 02:34 PM    Methylmalonic acid 185 05/30/2017 02:34 PM    Sed rate, automated 10 05/30/2017 02:34 PM    C-Reactive protein <0.29 05/30/2017 02:34 PM    M-Aric Not Observed 05/30/2017 02:34 PM    SILVER Direct NEGATIVE  05/30/2017 02:34 PM    Hep C  virus Ab Interp. NONREACTIVE 05/30/2017 02:34 PM     Lab Results   Component Value Date/Time    INR 1.1 05/30/2017 02:34 PM    aPTT 26.6 05/30/2017 02:34 PM    Fibrinogen 259 05/30/2017 02:34 PM         Assessment/Plan:  71 y.o. male with neutropenia and thrombocytopenia. PS 0    1. Neutropenia:  The most common etiology, especially in patients without recurrent infections, would be benign ethnic neutropenia, as s commonly have a lower WBC than Caucasians. There is no evidence by history of an inherited neutropenia, no history of chronic or recurrent infection, and no medication that is likely to be contributing. Lab work above are unrevealing. Since the neutrophil count remains >1000, then a period of observation may be reasonable, with further evaluation (ie bone marrow biopsy) only if the counts worsen or the patient develops recurrent infections. We discussed that this testing would be warranted if his counts are worse today -- if stable, then ok with monitoring. Overall they have been stable. He would prefer to monitor his labs and then proceed if the counts have worsened. Also discussed abd US to eval spleen and liver, he would prefer to hold for now    [address] is 1.2    2. Thrombocytopenia: goal count > 100; labs as above. See above, will have him return in 6 months for a repeat CBC. 91 today    3. Monocytosis:  Stable; discussed bone marrow biopsy, will monitor for now    Thank you for this consult. All of the patient's questions were answered today.     > 15 min were spent with this patient with > 50% of that time spent in face to face counseling    There are no Patient Instructions on file for this visit. Follow-up Disposition:  Return for 6 month, Rebeca Greene.     Joanie Villa MD

## 2018-06-05 NOTE — PROGRESS NOTES
Patient arrived at 901am for lab work. Patient was drawn from [facility] with no complications. Patient left at 0913.      BP: 136/77  Heart Rate: 63  Resp: 18  Temp: 98.2

## 2018-06-06 NOTE — PROGRESS NOTES
Please fax to pt'shome number.   Please let him know his wbc is stable, plt's have worsened but can monitor them again in 6 months

## 2018-11-26 DIAGNOSIS — D72.821 MONOCYTOSIS: ICD-10-CM

## 2018-11-26 DIAGNOSIS — D69.6 THROMBOCYTOPENIA (HCC): ICD-10-CM

## 2018-11-26 DIAGNOSIS — D70.9 NEUTROPENIA, UNSPECIFIED TYPE (HCC): ICD-10-CM

## 2018-12-12 LAB
BASOPHILS # BLD AUTO: 0 X10E3/UL (ref 0–0.2)
BASOPHILS NFR BLD AUTO: 0 %
EOSINOPHIL # BLD AUTO: 0 X10E3/UL (ref 0–0.4)
EOSINOPHIL NFR BLD AUTO: 0 %
ERYTHROCYTE [DISTWIDTH] IN BLOOD BY AUTOMATED COUNT: 15.6 % (ref 12.3–15.4)
HCT VFR BLD AUTO: 41.7 % (ref 37.5–51)
HGB BLD-MCNC: 14.2 G/DL (ref 13–17.7)
IMM GRANULOCYTES # BLD: 0 X10E3/UL (ref 0–0.1)
IMM GRANULOCYTES NFR BLD: 1 %
LYMPHOCYTES # BLD AUTO: 2.2 X10E3/UL (ref 0.7–3.1)
LYMPHOCYTES NFR BLD AUTO: 51 %
MCH RBC QN AUTO: 28.6 PG (ref 26.6–33)
MCHC RBC AUTO-ENTMCNC: 34.1 G/DL (ref 31.5–35.7)
MCV RBC AUTO: 84 FL (ref 79–97)
MONOCYTES # BLD AUTO: 1 X10E3/UL (ref 0.1–0.9)
MONOCYTES NFR BLD AUTO: 22 %
MORPHOLOGY BLD-IMP: ABNORMAL
NEUTROPHILS # BLD AUTO: 1.1 X10E3/UL (ref 1.4–7)
NEUTROPHILS NFR BLD AUTO: 26 %
PLATELET # BLD AUTO: 92 X10E3/UL (ref 150–379)
RBC # BLD AUTO: 4.97 X10E6/UL (ref 4.14–5.8)
WBC # BLD AUTO: 4.4 X10E3/UL (ref 3.4–10.8)

## 2018-12-18 ENCOUNTER — OFFICE VISIT (OUTPATIENT)
Dept: ONCOLOGY | Age: 73
End: 2018-12-18

## 2018-12-18 VITALS
WEIGHT: 167.2 LBS | BODY MASS INDEX: 25.34 KG/M2 | SYSTOLIC BLOOD PRESSURE: 143 MMHG | HEART RATE: 64 BPM | DIASTOLIC BLOOD PRESSURE: 81 MMHG | TEMPERATURE: 98 F | RESPIRATION RATE: 18 BRPM | HEIGHT: 68 IN | OXYGEN SATURATION: 97 %

## 2018-12-18 DIAGNOSIS — D70.9 NEUTROPENIA, UNSPECIFIED TYPE (HCC): ICD-10-CM

## 2018-12-18 DIAGNOSIS — D69.6 THROMBOCYTOPENIA (HCC): Primary | ICD-10-CM

## 2018-12-18 NOTE — PROGRESS NOTES
[address], [address] [place] [address]  [address]  W: [phone]  F: [phone]     f/uHEME/ONC CONSULT      Reason for visit:  Evaluation for low WBC    HPI:   Rubén Baugh is a 68 y.o.  male who I was asked to see in consultation at the request of Dr. Bernstein Plan  for evaluation for low WBC.    5/9/17 wbc 2.8, hgb 14.1; plt 124; ANC 1    Denies fevers, no weight loss, no drenching night sweats. Only very occasional etoh use. Interval history:  In today for follow up. Complains of gr 1 libido. DX   Encounter Diagnoses   Name Primary?  Thrombocytopenia ([facility]) Yes    Neutropenia, unspecified type ([facility]               Past Medical History:   Diagnosis Date    GERD (gastroesophageal reflux disease)     Hypertension      Past Surgical History:   Procedure Laterality Date    COLONOSCOPY N/A 4/12/2017    COLONOSCOPY performed by Linda Vargas MD at [address] [facility] HX APPENDECTOMY      HX CATARACT REMOVAL Right      Social History     Socioeconomic History    Marital status:      Spouse name: Not on file    Number of children: Not on file    Years of education: Not on file    Highest education level: Not on file   Tobacco Use    Smoking status: Never Smoker    Smokeless tobacco: Never Used   Substance and Sexual Activity    Alcohol use: No    Drug use: No     Family History   Problem Relation Age of Onset    Hypertension Mother        Current Outpatient Medications   Medication Sig Dispense Refill    ascorbic acid, vitamin C, (VITAMIN C) 1,000 mg tablet Take 1,000 mg by mouth daily.  amLODIPine (NORVASC) 5 mg tablet Take 5 mg by mouth daily.  ergocalciferol (VITAMIN D2) 50,000 unit capsule Take 50,000 Units by mouth every seven (7) days.          No Known Allergies    Review of Systems    A comprehensive review of systems was performed and all systems were negative except for HPI and for the symptom review form, reviewed and scanned in.        Objective:  Physical Exam:  Visit Vitals  /81   Pulse 64   Temp 98 °F (36.7 °C) (Temporal)   Resp 18   Ht 5' 8\" (1.727 m)   Wt 167 lb 3.2 oz (75.8 kg)   SpO2 97%   BMI 25.42 kg/m²       General:  Alert, cooperative, no distress, appears stated age. Head:  Normocephalic, without obvious abnormality, atraumatic. Eyes:  Conjunctivae/corneas clear. PERRL, EOMs intact. Throat: Lips, mucosa, and tongue normal.    Neck: Supple, symmetrical, trachea midline, no adenopathy, thyroid: no enlargement/tenderness/nodules   Back:   Symmetric, no curvature. ROM normal. No CVA tenderness. Lungs:   Clear to auscultation bilaterally. Chest wall:  No tenderness or deformity. Heart:  Regular rate and rhythm, S1, S2 normal, no murmur, click, rub or gallop. Abdomen:   Soft, non-tender. Bowel sounds normal. No masses,  No organomegaly. Extremities: Extremities normal, atraumatic, no cyanosis or edema. Skin: Skin color, texture, turgor normal. No rashes or lesions. Lymph nodes: Cervical, supraclavicular, and axillary nodes normal.   Neurologic: CNII-XII intact. Diagnostic Imaging   No results found for this or any previous visit. No results found for this or any previous visit. Lab Results  Lab Results   Component Value Date/Time    WBC 4.4 12/11/2018 10:28 AM    HGB 14.2 12/11/2018 10:28 AM    HCT 41.7 12/11/2018 10:28 AM    PLATELET 92 (LL) 07/03/0071 10:28 AM    MCV 84 12/11/2018 10:28 AM       Lab Results   Component Value Date/Time    AST (SGOT) 23 05/30/2017 02:34 PM    Alk.  phosphatase 47 05/30/2017 02:34 PM    Protein, total 7.9 05/30/2017 02:34 PM    Protein, total 7.4 05/30/2017 02:34 PM    Albumin 4.3 05/30/2017 02:34 PM    Globulin 3.6 05/30/2017 02:34 PM    A-G Ratio 1.2 05/30/2017 02:34 PM    ALT (SGPT) 33 05/30/2017 02:34 PM       Lab Results   Component Value Date/Time    Vitamin B12 348 05/30/2017 02:34 PM    Folate 19.3 05/30/2017 02:34 PM    Homocysteine, plasma 15.1 (H) 05/30/2017 02:34 PM    Methylmalonic acid 185 05/30/2017 02:34 PM    Sed rate, automated 10 05/30/2017 02:34 PM    C-Reactive protein <0.29 05/30/2017 02:34 PM    M-Aric Not Observed 05/30/2017 02:34 PM    SILVER Direct NEGATIVE  05/30/2017 02:34 PM    Hep C  virus Ab Interp. NONREACTIVE 05/30/2017 02:34 PM     Lab Results   Component Value Date/Time    INR 1.1 05/30/2017 02:34 PM    aPTT 26.6 05/30/2017 02:34 PM    Fibrinogen 259 05/30/2017 02:34 PM         Assessment/Plan:  68 y.o. male with neutropenia and thrombocytopenia. PS 0    1. Neutropenia:  The most common etiology, especially in patients without recurrent infections, would be benign ethnic neutropenia, as s commonly have a lower WBC than Caucasians. There is no evidence by history of an inherited neutropenia, no history of chronic or recurrent infection, and no medication that is likely to be contributing. Lab work above are unrevealing. Since the neutrophil count remains >1000, then a period of observation may be reasonable, with further evaluation (ie bone marrow biopsy) only if the counts worsen or the patient develops recurrent infections. We discussed that this testing would be warranted if his counts are worse today -- if stable, then ok with monitoring. Overall they have been stable. He would prefer to monitor his labs and then proceed if the counts have worsened. Also discussed abd US to eval spleen and liver, he is agreeable now. ANC is 1.1    2. Thrombocytopenia: goal count > 100; labs as above. See above, will have him return in 6 months for a repeat CBC. 92 today    3. Monocytosis:  Stable; discussed bone marrow biopsy, will monitor for now    Thank you for this consult. All of the patient's questions were answered today. There are no Patient Instructions on file for this visit. Follow-up Disposition:  Return in about 6 months (around 6/18/2019).     Praveena Springer MD

## 2018-12-26 ENCOUNTER — HOSPITAL ENCOUNTER (OUTPATIENT)
Dept: ULTRASOUND IMAGING | Age: 73
Discharge: HOME | End: 2018-12-26
Attending: INTERNAL MEDICINE
Payer: MEDICARE

## 2018-12-26 DIAGNOSIS — D69.6 THROMBOCYTOPENIA (HCC): ICD-10-CM

## 2018-12-26 PROCEDURE — 76700 US EXAM ABDOM COMPLETE: CPT

## 2018-12-26 NOTE — PROGRESS NOTES
Let him know this shows fatty liver and normal spleen.   Fatty liver can cause a mildly low plt count

## 2018-12-28 ENCOUNTER — TELEPHONE (OUTPATIENT)
Dept: ONCOLOGY | Age: 73
End: 2018-12-28

## 2018-12-28 NOTE — TELEPHONE ENCOUNTER
12/28/18 5:05 PM: Called patient and verified ID x 2. Advised patient that per Dr. Jeanne Juan, his abdominal ultrasound showed fatty liver and normal spleen, and that fatty liver can cause a mildly low platelet count. Patient verbalized understanding and denied questions or concerns. ----- Message from Humaira Yoo MD sent at 12/26/2018  3:43 PM EST -----  Let him know this shows fatty liver and normal spleen.   Fatty liver can cause a mildly low plt count

## 2019-06-07 LAB
BASOPHILS # BLD AUTO: 0 X10E3/UL (ref 0–0.2)
BASOPHILS NFR BLD AUTO: 0 %
EOSINOPHIL # BLD AUTO: 0 X10E3/UL (ref 0–0.4)
EOSINOPHIL NFR BLD AUTO: 0 %
ERYTHROCYTE [DISTWIDTH] IN BLOOD BY AUTOMATED COUNT: 15.8 % (ref 12.3–15.4)
HCT VFR BLD AUTO: 39.7 % (ref 37.5–51)
HGB BLD-MCNC: 13.5 G/DL (ref 13–17.7)
IMM GRANULOCYTES # BLD AUTO: 0 X10E3/UL (ref 0–0.1)
IMM GRANULOCYTES NFR BLD AUTO: 1 %
LYMPHOCYTES # BLD AUTO: 1.6 X10E3/UL (ref 0.7–3.1)
LYMPHOCYTES NFR BLD AUTO: 47 %
MCH RBC QN AUTO: 29.3 PG (ref 26.6–33)
MCHC RBC AUTO-ENTMCNC: 34 G/DL (ref 31.5–35.7)
MCV RBC AUTO: 86 FL (ref 79–97)
MONOCYTES # BLD AUTO: 0.7 X10E3/UL (ref 0.1–0.9)
MONOCYTES NFR BLD AUTO: 19 %
MORPHOLOGY BLD-IMP: ABNORMAL
NEUTROPHILS # BLD AUTO: 1.2 X10E3/UL (ref 1.4–7)
NEUTROPHILS NFR BLD AUTO: 33 %
PLATELET # BLD AUTO: 85 X10E3/UL (ref 150–450)
RBC # BLD AUTO: 4.61 X10E6/UL (ref 4.14–5.8)
WBC # BLD AUTO: 3.5 X10E3/UL (ref 3.4–10.8)

## 2019-06-12 ENCOUNTER — OFFICE VISIT (OUTPATIENT)
Dept: ONCOLOGY | Age: 74
End: 2019-06-12

## 2019-06-12 VITALS
DIASTOLIC BLOOD PRESSURE: 83 MMHG | RESPIRATION RATE: 18 BRPM | HEART RATE: 63 BPM | OXYGEN SATURATION: 97 % | HEIGHT: 68 IN | BODY MASS INDEX: 25.07 KG/M2 | SYSTOLIC BLOOD PRESSURE: 116 MMHG | TEMPERATURE: 97.7 F | WEIGHT: 165.4 LBS

## 2019-06-12 DIAGNOSIS — D69.6 THROMBOCYTOPENIA (HCC): Primary | ICD-10-CM

## 2019-06-12 DIAGNOSIS — D72.821 MONOCYTOSIS: ICD-10-CM

## 2019-06-12 DIAGNOSIS — D70.9 NEUTROPENIA, UNSPECIFIED TYPE (HCC): ICD-10-CM

## 2019-06-12 NOTE — PROGRESS NOTES
[address]  [address] [place] [address], [address] [place] [address]  [place], [address]  W: [phone]  F: [phone]     f/uHEME/ONC CONSULT      Reason for visit:  Evaluation for low WBC    HPI:   Siomara Dennis is a 68 y.o.  male who I was asked to see in consultation at the request of Dr. Sung Hernandez  for evaluation for low WBC.    5/9/17 wbc 2.8, hgb 14.1; plt 124; ANC 1    Denies fevers, no weight loss, no drenching night sweats. Only very occasional etoh use. Interval history:  In today for follow up. Complains of gr 1 libido. DX   Encounter Diagnoses   Name Primary?  Thrombocytopenia (HCC) Yes    Neutropenia, unspecified type ([facility]     Monocytosis               Past Medical History:   Diagnosis Date    GERD (gastroesophageal reflux disease)     Hypertension      Past Surgical History:   Procedure Laterality Date    COLONOSCOPY N/A 4/12/2017    COLONOSCOPY performed by Marie Chandler MD at [address] HX APPENDECTOMY      HX CATARACT REMOVAL Right      Social History     Socioeconomic History    Marital status:      Spouse name: Not on file    Number of children: Not on file    Years of education: Not on file    Highest education level: Not on file   Tobacco Use    Smoking status: Never Smoker    Smokeless tobacco: Never Used   Substance and Sexual Activity    Alcohol use: No    Drug use: No     Family History   Problem Relation Age of Onset    Hypertension Mother        Current Outpatient Medications   Medication Sig Dispense Refill    ascorbic acid, vitamin C, (VITAMIN C) 1,000 mg tablet Take 1,000 mg by mouth daily.  amLODIPine (NORVASC) 5 mg tablet Take 5 mg by mouth daily.  ergocalciferol (VITAMIN D2) 50,000 unit capsule Take 50,000 Units by mouth every seven (7) days.          No Known Allergies    Review of Systems    A comprehensive review of systems was performed and all systems were negative except for HPI and for the symptom review form, reviewed and scanned in.        Objective:  Physical Exam:  Visit Vitals  /83   Pulse 63   Temp 97.7 °F (36.5 °C) (Temporal)   Resp 18   Ht 5' 8\" (1.727 m)   Wt 165 lb 6.4 oz (75 kg)   SpO2 97%   BMI 25.15 kg/m²       General:  Alert, cooperative, no distress, appears stated age. Head:  Normocephalic, without obvious abnormality, atraumatic. Eyes:  Conjunctivae/corneas clear. PERRL, EOMs intact. Throat: Lips, mucosa, and tongue normal.    Neck: Supple, symmetrical, trachea midline, no adenopathy, thyroid: no enlargement/tenderness/nodules   Back:   Symmetric, no curvature. ROM normal. No CVA tenderness. Lungs:   Clear to auscultation bilaterally. Chest wall:  No tenderness or deformity. Heart:  Regular rate and rhythm, S1, S2 normal, no murmur, click, rub or gallop. Abdomen:   Soft, non-tender. Bowel sounds normal. No masses,  No organomegaly. Extremities: Extremities normal, atraumatic, no cyanosis or edema. Skin: Skin color, texture, turgor normal. No rashes or lesions. Lymph nodes: Cervical, supraclavicular, and axillary nodes normal.   Neurologic: CNII-XII intact. Diagnostic Imaging   No results found for this or any previous visit. No results found for this or any previous visit. Lab Results  Lab Results   Component Value Date/Time    WBC 3.5 06/05/2019 10:40 AM    HGB 13.5 06/05/2019 10:40 AM    HCT 39.7 06/05/2019 10:40 AM    PLATELET 85 (LL) 37/35/5953 10:40 AM    MCV 86 06/05/2019 10:40 AM       Lab Results   Component Value Date/Time    AST (SGOT) 23 05/30/2017 02:34 PM    Alk.  phosphatase 47 05/30/2017 02:34 PM    Protein, total 7.9 05/30/2017 02:34 PM    Protein, total 7.4 05/30/2017 02:34 PM    Albumin 4.3 05/30/2017 02:34 PM    Globulin 3.6 05/30/2017 02:34 PM    A-G Ratio 1.2 05/30/2017 02:34 PM    ALT (SGPT) 33 05/30/2017 02:34 PM       Lab Results   Component Value Date/Time    Vitamin B12 348 05/30/2017 02:34 PM    Folate 19.3 05/30/2017 02:34 PM Homocysteine, plasma 15.1 (H) 05/30/2017 02:34 PM    Methylmalonic acid 185 05/30/2017 02:34 PM    Sed rate, automated 10 05/30/2017 02:34 PM    C-Reactive protein <0.29 05/30/2017 02:34 PM    M-Aric Not Observed 05/30/2017 02:34 PM    SILVER Direct NEGATIVE  05/30/2017 02:34 PM    Hep C  virus Ab Interp. NONREACTIVE 05/30/2017 02:34 PM     Lab Results   Component Value Date/Time    INR 1.1 05/30/2017 02:34 PM    aPTT 26.6 05/30/2017 02:34 PM    Fibrinogen 259 05/30/2017 02:34 PM     12/26/18 US abd  IMPRESSION:      Less than complete visualization of the liver due to overlying bowel gas. However, heterogeneous increased echogenicity suggest possible fatty  infiltration.     No gallstones.     Multiple bilateral renal cysts.     Slight increase echogenicity of left renal parenchyma may suggest some component  of medical renal disease.     Small nonobstructing renal calculus on the left. Assessment/Plan:  68 y.o. male with neutropenia and thrombocytopenia. PS 0    1. Neutropenia:  The most common etiology, especially in patients without recurrent infections, would be benign ethnic neutropenia, as s commonly have a lower WBC than Caucasians. There is no evidence by history of an inherited neutropenia, no history of chronic or recurrent infection, and no medication that is likely to be contributing. Lab work above are unrevealing. Since the neutrophil count remains >1000, then a period of observation may be reasonable, with further evaluation (ie bone marrow biopsy) only if the counts worsen or the patient develops recurrent infections. We discussed that this testing would be warranted if his counts are worse today -- if stable, then ok with monitoring. Overall they have been stable. He would prefer to monitor his labs and then proceed if the counts have worsened. ANC is 1.2    2. Thrombocytopenia: goal count > 100; labs as above.   See above, will have him return in 6 months for a repeat CBC. 85 today, but clumping    3. Monocytosis: resolved    4. Occasional metamyelocyte:  Discussed bone marrow biopsy    Thank you for this consult. All of the patient's questions were answered today. There are no Patient Instructions on file for this visit. Follow-up and Dispositions    · Return in about 6 months (around 12/12/2019).          Stevo Nielsen MD

## 2020-01-18 LAB
BASOPHILS # BLD AUTO: 0 X10E3/UL (ref 0–0.2)
BASOPHILS NFR BLD AUTO: 1 %
EOSINOPHIL # BLD AUTO: 0 X10E3/UL (ref 0–0.4)
EOSINOPHIL NFR BLD AUTO: 2 %
ERYTHROCYTE [DISTWIDTH] IN BLOOD BY AUTOMATED COUNT: 13.9 % (ref 11.6–15.4)
HCT VFR BLD AUTO: 40.3 % (ref 37.5–51)
HGB BLD-MCNC: 13.7 G/DL (ref 13–17.7)
IMM GRANULOCYTES # BLD AUTO: 0 X10E3/UL (ref 0–0.1)
IMM GRANULOCYTES NFR BLD AUTO: 1 %
LYMPHOCYTES # BLD AUTO: 1.5 X10E3/UL (ref 0.7–3.1)
LYMPHOCYTES NFR BLD AUTO: 38 %
MCH RBC QN AUTO: 28.9 PG (ref 26.6–33)
MCHC RBC AUTO-ENTMCNC: 34 G/DL (ref 31.5–35.7)
MCV RBC AUTO: 85 FL (ref 79–97)
MONOCYTES # BLD AUTO: 0.8 X10E3/UL (ref 0.1–0.9)
MONOCYTES NFR BLD AUTO: 23 %
MORPHOLOGY BLD-IMP: ABNORMAL
NEUTROPHILS # BLD AUTO: 1.5 X10E3/UL (ref 1.4–7)
NEUTROPHILS NFR BLD AUTO: 35 %
PLATELET # BLD AUTO: 86 X10E3/UL (ref 150–450)
RBC # BLD AUTO: 4.74 X10E6/UL (ref 4.14–5.8)
WBC # BLD AUTO: 3.9 X10E3/UL (ref 3.4–10.8)

## 2020-01-22 ENCOUNTER — OFFICE VISIT (OUTPATIENT)
Dept: ONCOLOGY | Age: 75
End: 2020-01-22

## 2020-01-22 VITALS
SYSTOLIC BLOOD PRESSURE: 127 MMHG | DIASTOLIC BLOOD PRESSURE: 79 MMHG | WEIGHT: 165.4 LBS | BODY MASS INDEX: 25.07 KG/M2 | TEMPERATURE: 96.1 F | HEART RATE: 61 BPM | RESPIRATION RATE: 18 BRPM | OXYGEN SATURATION: 97 % | HEIGHT: 68 IN

## 2020-01-22 DIAGNOSIS — D69.6 THROMBOCYTOPENIA (HCC): Primary | ICD-10-CM

## 2020-01-22 NOTE — PROGRESS NOTES
[employer]  [address] [facility], [address] [facility]  [address]  W: [phone]  F: [phone]     f/uHEME/ONC CONSULT      Reason for visit:  Evaluation for low WBC    HPI:   Vincent Hester is a 69 y.o.  male who I was asked to see in consultation at the request of Dr. Steve Proctor  for evaluation for low WBC.    5/9/17 wbc 2.8, hgb 14.1; plt 124; ANC 1    Denies fevers, no weight loss, no drenching night sweats. Only very occasional etoh use. Interval history:  In today for follow up. No complaints today    DX   No diagnosis found. Past Medical History:   Diagnosis Date    GERD (gastroesophageal reflux disease)     Hypertension      Past Surgical History:   Procedure Laterality Date    COLONOSCOPY N/A 4/12/2017    COLONOSCOPY performed by Annette Jimenez MD at [facility] HX APPENDECTOMY      HX CATARACT REMOVAL Right      Social History     Socioeconomic History    Marital status:      Spouse name: Not on file    Number of children: Not on file    Years of education: Not on file    Highest education level: Not on file   Tobacco Use    Smoking status: Never Smoker    Smokeless tobacco: Never Used   Substance and Sexual Activity    Alcohol use: No    Drug use: No     Family History   Problem Relation Age of Onset    Hypertension Mother        Current Outpatient Medications   Medication Sig Dispense Refill    ascorbic acid, vitamin C, (VITAMIN C) 1,000 mg tablet Take 1,000 mg by mouth daily.  amLODIPine (NORVASC) 5 mg tablet Take 5 mg by mouth daily.  ergocalciferol (VITAMIN D2) 50,000 unit capsule Take 50,000 Units by mouth every seven (7) days.          No Known Allergies    Review of Systems    A comprehensive review of systems was performed and all systems were negative except for HPI and for the symptom review form, reviewed and scanned in.        Objective:  Physical Exam:  Visit Vitals  /79 (BP 1 Location: Left arm, BP Patient Position: Sitting)   Pulse 61   Temp 96.1 °F (35.6 °C) (Temporal)   Resp 18   Ht 5' 8\" (1.727 m)   Wt 165 lb 6.4 oz (75 kg)   SpO2 97%   BMI 25.15 kg/m²       General:  Alert, cooperative, no distress, appears stated age. Head:  Normocephalic, without obvious abnormality, atraumatic. Eyes:  Conjunctivae/corneas clear. PERRL, EOMs intact. Throat: Lips, mucosa, and tongue normal.    Neck: Supple, symmetrical, trachea midline, no adenopathy, thyroid: no enlargement/tenderness/nodules   Back:   Symmetric, no curvature. ROM normal. No CVA tenderness. Lungs:   Clear to auscultation bilaterally. Chest wall:  No tenderness or deformity. Heart:  Regular rate and rhythm, S1, S2 normal, no murmur, click, rub or gallop. Abdomen:   Soft, non-tender. Bowel sounds normal. No masses,  No organomegaly. Extremities: Extremities normal, atraumatic, no cyanosis or edema. Skin: Skin color, texture, turgor normal. No rashes or lesions. Lymph nodes: Cervical, supraclavicular, and axillary nodes normal.   Neurologic: CNII-XII intact. Diagnostic Imaging   No results found for this or any previous visit. No results found for this or any previous visit. Lab Results  Lab Results   Component Value Date/Time    WBC 3.9 01/17/2020 11:39 AM    HGB 13.7 01/17/2020 11:39 AM    HCT 40.3 01/17/2020 11:39 AM    PLATELET 86 (LL) 78/51/3538 11:39 AM    MCV 85 01/17/2020 11:39 AM       Lab Results   Component Value Date/Time    AST (SGOT) 23 05/30/2017 02:34 PM    Alk.  phosphatase 47 05/30/2017 02:34 PM    Protein, total 7.9 05/30/2017 02:34 PM    Protein, total 7.4 05/30/2017 02:34 PM    Albumin 4.3 05/30/2017 02:34 PM    Globulin 3.6 05/30/2017 02:34 PM    A-G Ratio 1.2 05/30/2017 02:34 PM    ALT (SGPT) 33 05/30/2017 02:34 PM       Lab Results   Component Value Date/Time    Vitamin B12 348 05/30/2017 02:34 PM    Folate 19.3 05/30/2017 02:34 PM    Homocysteine, plasma 15.1 (H) 05/30/2017 02:34 PM Methylmalonic acid 185 05/30/2017 02:34 PM    Sed rate, automated 10 05/30/2017 02:34 PM    C-Reactive protein <0.29 05/30/2017 02:34 PM    M-Aric Not Observed 05/30/2017 02:34 PM    SILVER Direct NEGATIVE  05/30/2017 02:34 PM    Hep C  virus Ab Interp. NONREACTIVE 05/30/2017 02:34 PM     Lab Results   Component Value Date/Time    INR 1.1 05/30/2017 02:34 PM    aPTT 26.6 05/30/2017 02:34 PM    Fibrinogen 259 05/30/2017 02:34 PM     12/26/18 US abd  IMPRESSION:      Less than complete visualization of the liver due to overlying bowel gas. However, heterogeneous increased echogenicity suggest possible fatty  infiltration.     No gallstones.     Multiple bilateral renal cysts.     Slight increase echogenicity of left renal parenchyma may suggest some component  of medical renal disease.     Small nonobstructing renal calculus on the left. Assessment/Plan:  69 y.o. male with neutropenia and thrombocytopenia. PS 0    1. Neutropenia:  The most common etiology, especially in patients without recurrent infections, would be benign ethnic neutropenia, as s commonly have a lower WBC than Caucasians. There is no evidence by history of an inherited neutropenia, no history of chronic or recurrent infection, and no medication that is likely to be contributing. Lab work above are unrevealing. Since the neutrophil count remains >1000, then a period of observation may be reasonable, with further evaluation (ie bone marrow biopsy) only if the counts worsen or the patient develops recurrent infections. We discussed that this testing would be warranted if his counts are worse today -- if stable, then ok with monitoring. Overall they have been stable. He would prefer to monitor his labs and then proceed if the counts have worsened. ANC is 1.5 on 1/17/20    2. Thrombocytopenia: goal count > 100; labs as above. See above, will have him return in 6 months for a repeat CBC. 86 today, but clumping    3. Monocytosis: resolved    4. Occasional metamyelocyte:  Not seen    Thank you for this consult. All of the patient's questions were answered today. > 15 min were spent with this patient with > 50% of that time spent in face to face counseling        There are no Patient Instructions on file for this visit.        Zhanna Castellanos MD

## 2020-01-22 NOTE — PROGRESS NOTES
Demetrio Ana Cristina is a 69 y.o. male Follow up for the Evaluation for Thrombocytopenia. 1. Have you been to the ER, urgent care clinic since your last visit? Hospitalized since your last visit? No      2. Have you seen or consulted any other health care providers outside of the [address] [place] [address] since your last visit? Include any pap smears or colon screening.  No

## 2020-08-12 LAB
BASOPHILS # BLD AUTO: 0 X10E3/UL (ref 0–0.2)
BASOPHILS NFR BLD AUTO: 1 %
EOSINOPHIL # BLD AUTO: 0 X10E3/UL (ref 0–0.4)
EOSINOPHIL NFR BLD AUTO: 0 %
ERYTHROCYTE [DISTWIDTH] IN BLOOD BY AUTOMATED COUNT: 14.4 % (ref 11.6–15.4)
HCT VFR BLD AUTO: 42 % (ref 37.5–51)
HGB BLD-MCNC: 14.4 G/DL (ref 13–17.7)
IMM GRANULOCYTES # BLD AUTO: 0.1 X10E3/UL (ref 0–0.1)
IMM GRANULOCYTES NFR BLD AUTO: 1 %
LYMPHOCYTES # BLD AUTO: 1.6 X10E3/UL (ref 0.7–3.1)
LYMPHOCYTES NFR BLD AUTO: 38 %
MCH RBC QN AUTO: 29.3 PG (ref 26.6–33)
MCHC RBC AUTO-ENTMCNC: 34.3 G/DL (ref 31.5–35.7)
MCV RBC AUTO: 85 FL (ref 79–97)
MONOCYTES # BLD AUTO: 1.1 X10E3/UL (ref 0.1–0.9)
MONOCYTES NFR BLD AUTO: 26 %
MORPHOLOGY BLD-IMP: ABNORMAL
NEUTROPHILS # BLD AUTO: 1.5 X10E3/UL (ref 1.4–7)
NEUTROPHILS NFR BLD AUTO: 34 %
PLATELET # BLD AUTO: 94 X10E3/UL (ref 150–450)
RBC # BLD AUTO: 4.92 X10E6/UL (ref 4.14–5.8)
WBC # BLD AUTO: 4.3 X10E3/UL (ref 3.4–10.8)

## 2020-08-19 ENCOUNTER — OFFICE VISIT (OUTPATIENT)
Dept: ONCOLOGY | Age: 75
End: 2020-08-19
Payer: MEDICARE

## 2020-08-19 VITALS
DIASTOLIC BLOOD PRESSURE: 73 MMHG | RESPIRATION RATE: 18 BRPM | WEIGHT: 162 LBS | TEMPERATURE: 98.7 F | SYSTOLIC BLOOD PRESSURE: 134 MMHG | BODY MASS INDEX: 24.63 KG/M2 | OXYGEN SATURATION: 97 % | HEART RATE: 56 BPM

## 2020-08-19 DIAGNOSIS — D69.6 THROMBOCYTOPENIA (HCC): Primary | ICD-10-CM

## 2020-08-19 DIAGNOSIS — D69.6 THROMBOCYTOPENIA (HCC): ICD-10-CM

## 2020-08-19 PROCEDURE — G8427 DOCREV CUR MEDS BY ELIG CLIN: HCPCS | Performed by: INTERNAL MEDICINE

## 2020-08-19 PROCEDURE — 3017F COLORECTAL CA SCREEN DOC REV: CPT | Performed by: INTERNAL MEDICINE

## 2020-08-19 PROCEDURE — G8420 CALC BMI NORM PARAMETERS: HCPCS | Performed by: INTERNAL MEDICINE

## 2020-08-19 PROCEDURE — G8432 DEP SCR NOT DOC, RNG: HCPCS | Performed by: INTERNAL MEDICINE

## 2020-08-19 PROCEDURE — G8536 NO DOC ELDER MAL SCRN: HCPCS | Performed by: INTERNAL MEDICINE

## 2020-08-19 PROCEDURE — 99214 OFFICE O/P EST MOD 30 MIN: CPT | Performed by: INTERNAL MEDICINE

## 2020-08-19 PROCEDURE — 1101F PT FALLS ASSESS-DOCD LE1/YR: CPT | Performed by: INTERNAL MEDICINE

## 2020-08-19 NOTE — PROGRESS NOTES
Debbi Meza is a 69 y.o. male Follow up for the Evaluation of Thrombocytopenia. 1. Have you been to the ER, urgent care clinic since your last visit? Hospitalized since your last visit? No    2. Have you seen or consulted any other health care providers outside of the [address] [place] [address] since your last visit? Include any pap smears or colon screening.  No

## 2020-08-19 NOTE — PROGRESS NOTES
[address]  [address] [place] [address], [address] [place] [address] [address]  W: [phone]  F: [phone]     f/uHEME/ONC CONSULT      Reason for visit:  Evaluation for low WBC    HPI:   Jenny Eisenberg is a 69 y.o.  male who I was asked to see in consultation at the request of Dr. Lytle Phalen  for evaluation for low WBC.    5/9/17 wbc 2.8, hgb 14.1; plt 124; ANC 1    Denies fevers, no weight loss, no drenching night sweats. Only very occasional etoh use. Interval history:  In today for follow up. No complaints today    DX   No diagnosis found. Past Medical History:   Diagnosis Date    GERD (gastroesophageal reflux disease)     Hypertension      Past Surgical History:   Procedure Laterality Date    COLONOSCOPY N/A 4/12/2017    COLONOSCOPY performed by Griselda Lincoln MD at [facility] [facility] HX APPENDECTOMY      HX CATARACT REMOVAL Right      Social History     Socioeconomic History    Marital status:      Spouse name: Not on file    Number of children: Not on file    Years of education: Not on file    Highest education level: Not on file   Tobacco Use    Smoking status: Never Smoker    Smokeless tobacco: Never Used   Substance and Sexual Activity    Alcohol use: No    Drug use: No     Family History   Problem Relation Age of Onset    Hypertension Mother        Current Outpatient Medications   Medication Sig Dispense Refill    ascorbic acid, vitamin C, (VITAMIN C) 1,000 mg tablet Take 1,000 mg by mouth daily.  amLODIPine (NORVASC) 5 mg tablet Take 5 mg by mouth daily.  ergocalciferol (VITAMIN D2) 50,000 unit capsule Take 50,000 Units by mouth every seven (7) days.          No Known Allergies    Review of Systems    A comprehensive review of systems was performed and all systems were negative except for HPI and for the symptom review form, reviewed and scanned in.        Objective:  Physical Exam:  Visit Vitals  /73 (BP 1 Location: Right arm, BP Patient Position: Sitting)   Pulse (!) 56   Temp 98.7 °F (37.1 °C) (Temporal)   Resp 18   Wt 162 lb (73.5 kg)   SpO2 97%   BMI 24.63 kg/m²       General: No distress  Eyes: Anicteric sclerae  HENT: Atraumatic  Neck: Supple  Respiratory: Normal respiratory effort  CV: No peripheral edema  GI: nondistended  Skin: No rashes, ecchymoses, or petechiae  Psych: Alert, oriented, appropriate affect, normal judgment/insight                                                                  Diagnostic Imaging   No results found for this or any previous visit. No results found for this or any previous visit. Lab Results  Lab Results   Component Value Date/Time    WBC 4.3 08/11/2020 10:16 AM    HGB 14.4 08/11/2020 10:16 AM    HCT 42.0 08/11/2020 10:16 AM    PLATELET 94 (LL) 99/65/7241 10:16 AM    MCV 85 08/11/2020 10:16 AM       Lab Results   Component Value Date/Time    Alk. phosphatase 47 05/30/2017 02:34 PM    Protein, total 7.9 05/30/2017 02:34 PM    Protein, total 7.4 05/30/2017 02:34 PM    Albumin 4.3 05/30/2017 02:34 PM    Globulin 3.6 05/30/2017 02:34 PM    A-G Ratio 1.2 05/30/2017 02:34 PM    ALT (SGPT) 33 05/30/2017 02:34 PM       Lab Results   Component Value Date/Time    Vitamin B12 348 05/30/2017 02:34 PM    Folate 19.3 05/30/2017 02:34 PM    Homocysteine, plasma 15.1 (H) 05/30/2017 02:34 PM    Methylmalonic acid 185 05/30/2017 02:34 PM    Sed rate, automated 10 05/30/2017 02:34 PM    C-Reactive protein <0.29 05/30/2017 02:34 PM    M-Aric Not Observed 05/30/2017 02:34 PM    SILVER Direct NEGATIVE  05/30/2017 02:34 PM    Hep C  virus Ab Interp. NONREACTIVE 05/30/2017 02:34 PM     Lab Results   Component Value Date/Time    INR 1.1 05/30/2017 02:34 PM    aPTT 26.6 05/30/2017 02:34 PM    Fibrinogen 259 05/30/2017 02:34 PM     12/26/18 US abd  IMPRESSION:      Less than complete visualization of the liver due to overlying bowel gas.   However, heterogeneous increased echogenicity suggest possible fatty  infiltration.     No gallstones.     Multiple bilateral renal cysts.     Slight increase echogenicity of left renal parenchyma may suggest some component  of medical renal disease.     Small nonobstructing renal calculus on the left. Assessment/Plan:  69 y.o. male with neutropenia and thrombocytopenia. PS 0    1. Neutropenia:  The most common etiology, especially in patients without recurrent infections, would be benign ethnic neutropenia, as s commonly have a lower WBC than Caucasians. There is no evidence by history of an inherited neutropenia, no history of chronic or recurrent infection, and no medication that is likely to be contributing. Lab work above are unrevealing. Since the neutrophil count remains >1000, then a period of observation may be reasonable, with further evaluation (ie bone marrow biopsy) only if the counts worsen or the patient develops recurrent infections. We discussed that this testing would be warranted if his counts are worse today -- if stable, then ok with monitoring. Overall they have been stable. He would prefer to monitor his labs and then proceed if the counts have worsened. ANC is 1.5 on 1/17/20 and 8/11/20    2. Thrombocytopenia: goal count > 100; labs as above. See above, will have him return in 12 months for a repeat CBC. 94 8/11/20, but clumping    3. Monocytosis: mild, continuing to monitor    4. Occasional metamyelocyte:  Not seen    Thank you for this consult. All of the patient's questions were answered today. There are no Patient Instructions on file for this visit.        Marcella Byers MD

## 2021-09-13 ENCOUNTER — TELEPHONE (OUTPATIENT)
Dept: ONCOLOGY | Age: 76
End: 2021-09-13

## 2021-09-13 NOTE — TELEPHONE ENCOUNTER
Called patient to cancel appt per Dr. Lance Cary. Informed that we will call back to reschedule. No answer.  Left a vm

## 2021-10-25 ENCOUNTER — OFFICE VISIT (OUTPATIENT)
Dept: ONCOLOGY | Age: 76
End: 2021-10-25
Payer: MEDICARE

## 2021-10-25 VITALS
DIASTOLIC BLOOD PRESSURE: 84 MMHG | TEMPERATURE: 98 F | HEIGHT: 68 IN | BODY MASS INDEX: 25.01 KG/M2 | WEIGHT: 165 LBS | RESPIRATION RATE: 18 BRPM | OXYGEN SATURATION: 96 % | HEART RATE: 81 BPM | SYSTOLIC BLOOD PRESSURE: 128 MMHG

## 2021-10-25 DIAGNOSIS — D70.9 NEUTROPENIA, UNSPECIFIED TYPE (HCC): ICD-10-CM

## 2021-10-25 DIAGNOSIS — D69.6 THROMBOCYTOPENIA (HCC): Primary | ICD-10-CM

## 2021-10-25 PROCEDURE — 3017F COLORECTAL CA SCREEN DOC REV: CPT | Performed by: INTERNAL MEDICINE

## 2021-10-25 PROCEDURE — G8419 CALC BMI OUT NRM PARAM NOF/U: HCPCS | Performed by: INTERNAL MEDICINE

## 2021-10-25 PROCEDURE — G8432 DEP SCR NOT DOC, RNG: HCPCS | Performed by: INTERNAL MEDICINE

## 2021-10-25 PROCEDURE — G8427 DOCREV CUR MEDS BY ELIG CLIN: HCPCS | Performed by: INTERNAL MEDICINE

## 2021-10-25 PROCEDURE — 99214 OFFICE O/P EST MOD 30 MIN: CPT | Performed by: INTERNAL MEDICINE

## 2021-10-25 PROCEDURE — G8536 NO DOC ELDER MAL SCRN: HCPCS | Performed by: INTERNAL MEDICINE

## 2021-10-25 PROCEDURE — 1101F PT FALLS ASSESS-DOCD LE1/YR: CPT | Performed by: INTERNAL MEDICINE

## 2021-10-25 RX ORDER — DOXYCYCLINE 100 MG/1
100 CAPSULE ORAL 2 TIMES DAILY
Qty: 20 CAPSULE | Refills: 0 | Status: SHIPPED | OUTPATIENT
Start: 2021-10-25 | End: 2021-11-04

## 2021-10-25 NOTE — PROGRESS NOTES
[employer]  [address] [place] [address], [address] [place] [address]  Moriah [address]  W: [phone]  F: [phone]     f/uHEME/ONC CONSULT      Reason for visit:  Evaluation for low WBC    HPI:   Nirmal Pedroza is a 77 y.o.  male who I was asked to see in consultation at the request of Dr. Solitario Swartz  for evaluation for low WBC.    5/9/17 wbc 2.8, hgb 14.1; plt 124; ANC 1    Denies fevers, no weight loss, no drenching night sweats. Only very occasional etoh use. Interval history:  In today for follow up. Reports gr 1 itching    DX   No diagnosis found. Past Medical History:   Diagnosis Date    GERD (gastroesophageal reflux disease)     Hypertension      Past Surgical History:   Procedure Laterality Date    COLONOSCOPY N/A 4/12/2017    COLONOSCOPY performed by Kelly Toledo MD at [facility] HX APPENDECTOMY      HX CATARACT REMOVAL Right      Social History     Socioeconomic History    Marital status:      Spouse name: Not on file    Number of children: Not on file    Years of education: Not on file    Highest education level: Not on file   Tobacco Use    Smoking status: Never Smoker    Smokeless tobacco: Never Used   Substance and Sexual Activity    Alcohol use: No    Drug use: No     Social Determinants of Health     Financial Resource Strain:     Difficulty of Paying Living Expenses:    Food Insecurity:     Worried About Running Out of Food in the Last Year:     Ran Out of Food in the Last Year:    Transportation Needs:     Lack of Transportation (Medical):      Lack of Transportation (Non-Medical):    Physical Activity:     Days of Exercise per Week:     Minutes of Exercise per Session:    Stress:     Feeling of Stress :    Social Connections:     Frequency of Communication with Friends and Family:     Frequency of Social Gatherings with Friends and Family:     Attends [employer]ist Services:     Active Member of Clubs or Organizations:     Attends Club or Organization Meetings:     Marital Status:      Family History   Problem Relation Age of Onset    Hypertension Mother        Current Outpatient Medications   Medication Sig Dispense Refill    ascorbic acid, vitamin C, (VITAMIN C) 1,000 mg tablet Take 1,000 mg by mouth daily.  amLODIPine (NORVASC) 5 mg tablet Take 5 mg by mouth daily.  ergocalciferol (VITAMIN D2) 50,000 unit capsule Take 50,000 Units by mouth every seven (7) days. No Known Allergies    Review of Systems    A comprehensive review of systems was performed and all systems were negative except for HPI and for the symptom review form, reviewed and scanned in.        Objective:  Physical Exam:  Visit Vitals  /84   Pulse 81   Temp 98 °F (36.7 °C) (Temporal)   Resp 18   Ht 5' 8\" (1.727 m)   Wt 165 lb (74.8 kg)   SpO2 96%   BMI 25.09 kg/m²       General: No distress  Eyes: Anicteric sclerae  HENT: Atraumatic  Neck: Supple  Respiratory: Normal respiratory effort  CV: No peripheral edema  GI: nondistended  Skin: erythema migrans on back  Psych: Alert, oriented, appropriate affect, normal judgment/insight                                                                  Diagnostic Imaging   No results found for this or any previous visit. No results found for this or any previous visit. Lab Results  Lab Results   Component Value Date/Time    WBC 4.3 08/11/2020 10:16 AM    HGB 14.4 08/11/2020 10:16 AM    HCT 42.0 08/11/2020 10:16 AM    PLATELET 94 (LL) 44/61/2438 10:16 AM    MCV 85 08/11/2020 10:16 AM       Lab Results   Component Value Date/Time    Alk.  phosphatase 47 05/30/2017 02:34 PM    Protein, total 7.9 05/30/2017 02:34 PM    Protein, total 7.4 05/30/2017 02:34 PM    Albumin 4.3 05/30/2017 02:34 PM    Globulin 3.6 05/30/2017 02:34 PM    A-G Ratio 1.2 05/30/2017 02:34 PM    ALT (SGPT) 33 05/30/2017 02:34 PM       Lab Results   Component Value Date/Time    Vitamin B12 348 05/30/2017 02:34 PM    Folate 19.3 05/30/2017 02:34 PM    Homocysteine, plasma 15.1 (H) 05/30/2017 02:34 PM    Methylmalonic acid 185 05/30/2017 02:34 PM    Sed rate, automated 10 05/30/2017 02:34 PM    C-Reactive protein <0.29 05/30/2017 02:34 PM    M-Aric Not Observed 05/30/2017 02:34 PM    SILVER Direct NEGATIVE  05/30/2017 02:34 PM    Hep C virus Ab Interp. NONREACTIVE 05/30/2017 02:34 PM     Lab Results   Component Value Date/Time    INR 1.1 05/30/2017 02:34 PM    aPTT 26.6 05/30/2017 02:34 PM    Fibrinogen 259 05/30/2017 02:34 PM     12/26/18 US abd  IMPRESSION:      Less than complete visualization of the liver due to overlying bowel gas. However, heterogeneous increased echogenicity suggest possible fatty  infiltration.     No gallstones.     Multiple bilateral renal cysts.     Slight increase echogenicity of left renal parenchyma may suggest some component  of medical renal disease.     Small nonobstructing renal calculus on the left. Assessment/Plan:  77 y.o. male with neutropenia and thrombocytopenia. PS 0    1. Neutropenia:  The most common etiology, especially in patients without recurrent infections, would be benign ethnic neutropenia, as s commonly have a lower WBC than Caucasians. There is no evidence by history of an inherited neutropenia, no history of chronic or recurrent infection, and no medication that is likely to be contributing. Lab work above are unrevealing. ANC is 1.5 on 1/17/20 and 8/11/20    ANC 0.98 on 7/23/21. Discussed bone marrow bx today, he declines, recommend then getting cbc with diff in 6 months, if worse, will recommend bone marrow bx    2. Thrombocytopenia: goal count > 100; labs as above. See above, will have him return in 12 months for a repeat CBC. 94 8/11/20, but clumping    plt 83 on 7/23/21    3. Monocytosis: normal, 7/2021    4. Occasional metamyelocyte:  Not seen    5.  Lyme disease/erythema migrans: will rx doxycycline 100 mg bid for 10 days    Thank you for this consult. All of the patient's questions were answered today. Strongly recommend covid19 vaccine for him    There are no Patient Instructions on file for this visit.        Mak Gómez MD

## 2021-10-25 NOTE — PROGRESS NOTES
Yamileth Madrigal is a 77 y.o. male Evaluation for low WBC. 1. Have you been to the ER, urgent care clinic since your last visit? Hospitalized since your last visit? No    2. Have you seen or consulted any other health care providers outside of the [address] [place] [address] since your last visit? Include any pap smears or colon screening.  No

## 2021-12-02 ENCOUNTER — APPOINTMENT (OUTPATIENT)
Dept: GENERAL RADIOLOGY | Age: 76
DRG: 177 | End: 2021-12-02
Attending: STUDENT IN AN ORGANIZED HEALTH CARE EDUCATION/TRAINING PROGRAM
Payer: MEDICARE

## 2021-12-02 ENCOUNTER — HOSPITAL ENCOUNTER (EMERGENCY)
Age: 76
Discharge: HOME | DRG: 177 | End: 2021-12-02
Attending: STUDENT IN AN ORGANIZED HEALTH CARE EDUCATION/TRAINING PROGRAM
Payer: MEDICARE

## 2021-12-02 VITALS
WEIGHT: 165 LBS | SYSTOLIC BLOOD PRESSURE: 124 MMHG | HEART RATE: 94 BPM | DIASTOLIC BLOOD PRESSURE: 71 MMHG | OXYGEN SATURATION: 94 % | TEMPERATURE: 100.2 F | RESPIRATION RATE: 20 BRPM | HEIGHT: 68 IN | BODY MASS INDEX: 25.01 KG/M2

## 2021-12-02 DIAGNOSIS — U07.1 COVID-19: Primary | ICD-10-CM

## 2021-12-02 LAB
ALBUMIN SERPL-MCNC: 4 G/DL (ref 3.5–5)
ALBUMIN/GLOB SERPL: 1 {RATIO} (ref 1.1–2.2)
ALP SERPL-CCNC: 43 U/L (ref 45–117)
ALT SERPL-CCNC: 27 U/L (ref 12–78)
ANION GAP SERPL CALC-SCNC: 12 MMOL/L (ref 5–15)
AST SERPL-CCNC: 24 U/L (ref 15–37)
BASOPHILS # BLD: 0 K/UL (ref 0–0.1)
BASOPHILS NFR BLD: 0 % (ref 0–1)
BILIRUB SERPL-MCNC: 0.8 MG/DL (ref 0.2–1)
BUN SERPL-MCNC: 23 MG/DL (ref 6–20)
BUN/CREAT SERPL: 17 (ref 12–20)
CALCIUM SERPL-MCNC: 8.6 MG/DL (ref 8.5–10.1)
CHLORIDE SERPL-SCNC: 98 MMOL/L (ref 97–108)
CO2 SERPL-SCNC: 26 MMOL/L (ref 21–32)
COVID-19 RAPID TEST, COVR: DETECTED
CREAT SERPL-MCNC: 1.34 MG/DL (ref 0.7–1.3)
D DIMER PPP FEU-MCNC: 1.23 MG/L FEU (ref 0–0.65)
DIFFERENTIAL METHOD BLD: ABNORMAL
EOSINOPHIL # BLD: 0 K/UL (ref 0–0.4)
EOSINOPHIL NFR BLD: 0 % (ref 0–7)
ERYTHROCYTE [DISTWIDTH] IN BLOOD BY AUTOMATED COUNT: 15.4 % (ref 11.5–14.5)
GLOBULIN SER CALC-MCNC: 4.1 G/DL (ref 2–4)
GLUCOSE SERPL-MCNC: 132 MG/DL (ref 65–100)
HCT VFR BLD AUTO: 42 % (ref 36.6–50.3)
HGB BLD-MCNC: 14.1 G/DL (ref 12.1–17)
IMM GRANULOCYTES # BLD AUTO: 0 K/UL
IMM GRANULOCYTES NFR BLD AUTO: 0 %
LACTATE BLD-SCNC: 1.02 MMOL/L (ref 0.4–2)
LYMPHOCYTES # BLD: 1.1 K/UL (ref 0.8–3.5)
LYMPHOCYTES NFR BLD: 26 % (ref 12–49)
MCH RBC QN AUTO: 29 PG (ref 26–34)
MCHC RBC AUTO-ENTMCNC: 33.6 G/DL (ref 30–36.5)
MCV RBC AUTO: 86.4 FL (ref 80–99)
MONOCYTES # BLD: 1.7 K/UL (ref 0–1)
MONOCYTES NFR BLD: 43 % (ref 5–13)
NEUTS SEG # BLD: 1.3 K/UL (ref 1.8–8)
NEUTS SEG NFR BLD: 31 % (ref 32–75)
NRBC # BLD: 0 K/UL (ref 0–0.01)
NRBC BLD-RTO: 0 PER 100 WBC
PLATELET # BLD AUTO: 47 K/UL (ref 150–400)
PMV BLD AUTO: 11.2 FL (ref 8.9–12.9)
POTASSIUM SERPL-SCNC: 3.9 MMOL/L (ref 3.5–5.1)
PROCALCITONIN SERPL-MCNC: <0.05 NG/ML
PROT SERPL-MCNC: 8.1 G/DL (ref 6.4–8.2)
RBC # BLD AUTO: 4.86 M/UL (ref 4.1–5.7)
RBC MORPH BLD: ABNORMAL
SODIUM SERPL-SCNC: 136 MMOL/L (ref 136–145)
SOURCE, COVRS: ABNORMAL
WBC # BLD AUTO: 4.1 K/UL (ref 4.1–11.1)
WBC MORPH BLD: ABNORMAL

## 2021-12-02 PROCEDURE — 87635 SARS-COV-2 COVID-19 AMP PRB: CPT

## 2021-12-02 PROCEDURE — 80053 COMPREHEN METABOLIC PANEL: CPT

## 2021-12-02 PROCEDURE — 85025 COMPLETE CBC W/AUTO DIFF WBC: CPT

## 2021-12-02 PROCEDURE — 84145 PROCALCITONIN (PCT): CPT

## 2021-12-02 PROCEDURE — 99284 EMERGENCY DEPT VISIT MOD MDM: CPT

## 2021-12-02 PROCEDURE — 71045 X-RAY EXAM CHEST 1 VIEW: CPT

## 2021-12-02 PROCEDURE — 74011250636 HC RX REV CODE- 250/636: Performed by: STUDENT IN AN ORGANIZED HEALTH CARE EDUCATION/TRAINING PROGRAM

## 2021-12-02 PROCEDURE — 36415 COLL VENOUS BLD VENIPUNCTURE: CPT

## 2021-12-02 PROCEDURE — 96360 HYDRATION IV INFUSION INIT: CPT

## 2021-12-02 PROCEDURE — 74011250637 HC RX REV CODE- 250/637: Performed by: STUDENT IN AN ORGANIZED HEALTH CARE EDUCATION/TRAINING PROGRAM

## 2021-12-02 PROCEDURE — 85379 FIBRIN DEGRADATION QUANT: CPT

## 2021-12-02 PROCEDURE — 93005 ELECTROCARDIOGRAM TRACING: CPT

## 2021-12-02 PROCEDURE — 83605 ASSAY OF LACTIC ACID: CPT

## 2021-12-02 RX ORDER — ACETAMINOPHEN 650 MG/1
650 SUPPOSITORY RECTAL
Status: DISCONTINUED | OUTPATIENT
Start: 2021-12-02 | End: 2021-12-02 | Stop reason: HOSPADM

## 2021-12-02 RX ORDER — ACETAMINOPHEN 325 MG/1
650 TABLET ORAL
Status: DISCONTINUED | OUTPATIENT
Start: 2021-12-02 | End: 2021-12-02 | Stop reason: HOSPADM

## 2021-12-02 RX ADMIN — ACETAMINOPHEN 650 MG: 325 TABLET ORAL at 16:48

## 2021-12-02 RX ADMIN — SODIUM CHLORIDE 1000 ML: 9 INJECTION, SOLUTION INTRAVENOUS at 17:27

## 2021-12-02 NOTE — ED PROVIDER NOTES
The history is provided by the patient. Shortness of Breath  This is a new problem. The problem occurs frequently. The current episode started more than 2 days ago. The problem has been gradually worsening. Associated symptoms include a fever and cough. Pertinent negatives include no headaches, no chest pain, no vomiting and no abdominal pain. Precipitated by: wife also sick and here in the ED, both unvaccinated. He has tried nothing for the symptoms. He has had no prior hospitalizations. He has had no prior ED visits. Associated medical issues do not include asthma, COPD or heart failure. Past Medical History:   Diagnosis Date    GERD (gastroesophageal reflux disease)     Hypertension        Past Surgical History:   Procedure Laterality Date    COLONOSCOPY N/A 4/12/2017    COLONOSCOPY performed by Hamlet Vaughn MD at [facility] HX APPENDECTOMY      HX CATARACT REMOVAL Right          Family History:   Problem Relation Age of Onset    Hypertension Mother        Social History     Socioeconomic History    Marital status:      Spouse name: Not on file    Number of children: Not on file    Years of education: Not on file    Highest education level: Not on file   Occupational History    Not on file   Tobacco Use    Smoking status: Never Smoker    Smokeless tobacco: Never Used   Substance and Sexual Activity    Alcohol use: No    Drug use: No    Sexual activity: Not on file   Other Topics Concern    Not on file   Social History Narrative    Not on file     Social Determinants of Health     Financial Resource Strain:     Difficulty of Paying Living Expenses: Not on file   Food Insecurity:     Worried About [address] in the Last Year: Not on file    [address] in the Last Year: Not on file   Transportation Needs:     Lack of Transportation (Medical): Not on file    Lack of Transportation (Non-Medical):  Not on file   Physical Activity:     Days of Exercise per Week: Not on file    Minutes of Exercise per Session: Not on file   Stress:     Feeling of Stress : Not on file   Social Connections:     Frequency of Communication with Friends and Family: Not on file    Frequency of Social Gatherings with Friends and Family: Not on file    Attends Muslim Services: Not on file    Active Member of [address] [place] [address] [employer] or Organizations: Not on file    Attends Club or Organization Meetings: Not on file    Marital Status: Not on file   Intimate Partner Violence:     Fear of Current or Ex-Partner: Not on file    Emotionally Abused: Not on file    Physically Abused: Not on file    Sexually Abused: Not on file   Housing Stability:     Unable to Pay for Housing in the Last Year: Not on file    Number of [place] in the Last Year: Not on file    Unstable Housing in the Last Year: Not on file         ALLERGIES: Patient has no known allergies. Review of Systems   Constitutional: Positive for fatigue and fever. Respiratory: Positive for cough and shortness of breath (see HPI). Cardiovascular: Negative for chest pain. Gastrointestinal: Negative for abdominal pain, diarrhea and vomiting. Musculoskeletal: Positive for myalgias. Neurological: Negative for headaches. All other systems reviewed and are negative. There were no vitals filed for this visit. Physical Exam  Vitals and nursing note reviewed. Constitutional:       General: He is not in acute distress. Appearance: He is well-developed. HENT:      Head: Normocephalic and atraumatic. Eyes:      Conjunctiva/sclera: Conjunctivae normal.   Cardiovascular:      Rate and Rhythm: Regular rhythm. Tachycardia present. Pulmonary:      Effort: Tachypnea and accessory muscle usage present. No respiratory distress. Abdominal:      General: Abdomen is flat. There is no distension. Musculoskeletal:      Cervical back: Normal range of motion and neck supple. Right lower leg: No edema.       Left lower leg: No edema. Skin:     General: Skin is warm and dry. Neurological:      Mental Status: He is alert and oriented to person, place, and time. Motor: No abnormal muscle tone. MDM       Procedures    EKG interpretation: 4:41  Rhythm: sinus tachycardia; and regular . Rate (approx.): 107; Axis: normal; Intervals: normal ; ST/T wave: no STEMI; EKG documented and interpreted by Kierra Hudson MD, ED MD.    6:06 PM  Patient requesting discharge home. He has to care for his pet dog. Wife is being hospitalized currently for Covid pneumonia and respiratory failure. Discussed outpatient management and return to ER precautions provided. He agrees with understands plan. All questions answered.

## 2021-12-02 NOTE — ED NOTES
The patient was discharged home by Dr. Miles Mansfield  in stable condition. The patient is alert and oriented, in no respiratory distress and discharge vital signs obtained. The patient's diagnosis, condition and treatment were explained. The patient expressed understanding. No prescriptions given/e-scribed to pharmacy. No work/school note given. A discharge plan has been developed. A  was not involved in the process. Aftercare instructions were given. Pt ambulatory out of the ED.

## 2021-12-02 NOTE — ED TRIAGE NOTES
Patient presents ambulatory to treatment area with a steady gait. Patient complains of shortness of breath, cough, fever, diarrhea, and vomiting. Symptoms began on Saturday. Patient is not vaccinated against covid 19. SPO2 91% after walking.

## 2021-12-03 ENCOUNTER — PATIENT OUTREACH (OUTPATIENT)
Dept: CASE MANAGEMENT | Age: 76
End: 2021-12-03

## 2021-12-03 LAB
ATRIAL RATE: 107 BPM
CALCULATED P AXIS, ECG09: 23 DEGREES
CALCULATED R AXIS, ECG10: -19 DEGREES
CALCULATED T AXIS, ECG11: 23 DEGREES
DIAGNOSIS, 93000: NORMAL
P-R INTERVAL, ECG05: 148 MS
PATH REV BLD -IMP: NORMAL
Q-T INTERVAL, ECG07: 330 MS
QRS DURATION, ECG06: 88 MS
QTC CALCULATION (BEZET), ECG08: 440 MS
VENTRICULAR RATE, ECG03: 107 BPM

## 2021-12-03 NOTE — PROGRESS NOTES
Patient contacted regarding COVID-19 diagnosis. Discussed COVID-19 related testing which was available at this time. Test results were positive. Patient informed of results, if available? yes. LPN Care Coordinator contacted the patient by telephone to perform post discharge assessment. Call within 2 business days of discharge: Yes Verified name and  with patient as identifiers. Provided introduction to self, and explanation of the CTN/ACM role, and reason for call due to risk factors for infection and/or exposure to COVID-19. Symptoms reviewed with patient who verbalized the following symptoms: no new symptoms and no worsening symptoms      Due to no new or worsening symptoms encounter was not routed to provider for escalation. Discussed follow-up appointments. If no appointment was previously scheduled, appointment scheduling offered:  no. [facility] follow up appointment(s): No future appointments. Non-[facility] follow up appointment(s): NA         Advance Care Planning:   Does patient have an Advance Directive: decision makers updated. Educated patient about risk for severe COVID-19 due to risk factors according to CDC guidelines. LPN CC reviewed discharge instructions, medical action plan and red flag symptoms with the patient who verbalized understanding. Discussed COVID vaccination status: no. Education provided on COVID-19 vaccination as appropriate. Discussed exposure protocols and quarantine with CDC Guidelines. Patient was given an opportunity to verbalize any questions and concerns and agrees to contact LPN CC or health care provider for questions related to their healthcare. Reviewed and educated patient on any new and changed medications related to discharge diagnosis     Was patient discharged with a pulse oximeter? no     LPN CC provided contact information. Plan for follow-up call in 5-7 days based on severity of symptoms and risk factors.

## 2021-12-05 ENCOUNTER — APPOINTMENT (OUTPATIENT)
Dept: GENERAL RADIOLOGY | Age: 76
DRG: 177 | End: 2021-12-05
Attending: EMERGENCY MEDICINE
Payer: MEDICARE

## 2021-12-05 ENCOUNTER — HOSPITAL ENCOUNTER (INPATIENT)
Age: 76
LOS: 8 days | Discharge: HOME | DRG: 177 | End: 2021-12-13
Attending: EMERGENCY MEDICINE | Admitting: INTERNAL MEDICINE
Payer: MEDICARE

## 2021-12-05 DIAGNOSIS — E87.6 HYPOKALEMIA: ICD-10-CM

## 2021-12-05 DIAGNOSIS — E87.1 HYPONATREMIA: ICD-10-CM

## 2021-12-05 DIAGNOSIS — R79.89 ABNORMAL LFTS: ICD-10-CM

## 2021-12-05 DIAGNOSIS — U07.1 COVID: Primary | ICD-10-CM

## 2021-12-05 PROBLEM — J96.01 ACUTE RESPIRATORY FAILURE WITH HYPOXIA (HCC): Status: ACTIVE | Noted: 2021-12-05

## 2021-12-05 LAB
ALBUMIN SERPL-MCNC: 3.7 G/DL (ref 3.5–5)
ALBUMIN/GLOB SERPL: 0.9 {RATIO} (ref 1.1–2.2)
ALP SERPL-CCNC: 36 U/L (ref 45–117)
ALT SERPL-CCNC: 43 U/L (ref 12–78)
ANION GAP SERPL CALC-SCNC: 13 MMOL/L (ref 5–15)
AST SERPL-CCNC: 51 U/L (ref 15–37)
BASOPHILS # BLD: 0 K/UL (ref 0–0.1)
BASOPHILS NFR BLD: 0 % (ref 0–1)
BILIRUB SERPL-MCNC: 1.6 MG/DL (ref 0.2–1)
BUN SERPL-MCNC: 26 MG/DL (ref 6–20)
BUN/CREAT SERPL: 21 (ref 12–20)
CALCIUM SERPL-MCNC: 8.1 MG/DL (ref 8.5–10.1)
CHLORIDE SERPL-SCNC: 92 MMOL/L (ref 97–108)
CO2 SERPL-SCNC: 24 MMOL/L (ref 21–32)
CREAT SERPL-MCNC: 1.25 MG/DL (ref 0.7–1.3)
DIFFERENTIAL METHOD BLD: ABNORMAL
EOSINOPHIL # BLD: 0 K/UL (ref 0–0.4)
EOSINOPHIL NFR BLD: 0 % (ref 0–7)
ERYTHROCYTE [DISTWIDTH] IN BLOOD BY AUTOMATED COUNT: 15 % (ref 11.5–14.5)
GLOBULIN SER CALC-MCNC: 4.2 G/DL (ref 2–4)
GLUCOSE SERPL-MCNC: 135 MG/DL (ref 65–100)
HCT VFR BLD AUTO: 39.3 % (ref 36.6–50.3)
HGB BLD-MCNC: 13.6 G/DL (ref 12.1–17)
IMM GRANULOCYTES # BLD AUTO: 0 K/UL
IMM GRANULOCYTES NFR BLD AUTO: 0 %
LYMPHOCYTES # BLD: 2.2 K/UL (ref 0.8–3.5)
LYMPHOCYTES NFR BLD: 30 % (ref 12–49)
MCH RBC QN AUTO: 28.6 PG (ref 26–34)
MCHC RBC AUTO-ENTMCNC: 34.6 G/DL (ref 30–36.5)
MCV RBC AUTO: 82.6 FL (ref 80–99)
MONOCYTES # BLD: 2.5 K/UL (ref 0–1)
MONOCYTES NFR BLD: 35 % (ref 5–13)
MYELOCYTES NFR BLD MANUAL: 1 %
NEUTS BAND NFR BLD MANUAL: 3 % (ref 0–6)
NEUTS SEG # BLD: 2.4 K/UL (ref 1.8–8)
NEUTS SEG NFR BLD: 31 % (ref 32–75)
NRBC # BLD: 0 K/UL (ref 0–0.01)
NRBC BLD-RTO: 0 PER 100 WBC
PLATELET # BLD AUTO: 32 K/UL (ref 150–400)
PMV BLD AUTO: ABNORMAL FL (ref 8.9–12.9)
POTASSIUM SERPL-SCNC: 3.1 MMOL/L (ref 3.5–5.1)
PROT SERPL-MCNC: 7.9 G/DL (ref 6.4–8.2)
RBC # BLD AUTO: 4.76 M/UL (ref 4.1–5.7)
RBC MORPH BLD: ABNORMAL
RBC MORPH BLD: ABNORMAL
SODIUM SERPL-SCNC: 129 MMOL/L (ref 136–145)
TROPONIN-HIGH SENSITIVITY: 25 NG/L (ref 0–76)
WBC # BLD AUTO: 7.2 K/UL (ref 4.1–11.1)
WBC MORPH BLD: ABNORMAL

## 2021-12-05 PROCEDURE — 84484 ASSAY OF TROPONIN QUANT: CPT

## 2021-12-05 PROCEDURE — 85025 COMPLETE CBC W/AUTO DIFF WBC: CPT

## 2021-12-05 PROCEDURE — 80053 COMPREHEN METABOLIC PANEL: CPT

## 2021-12-05 PROCEDURE — 71045 X-RAY EXAM CHEST 1 VIEW: CPT

## 2021-12-05 PROCEDURE — 74011000258 HC RX REV CODE- 258: Performed by: EMERGENCY MEDICINE

## 2021-12-05 PROCEDURE — 36415 COLL VENOUS BLD VENIPUNCTURE: CPT

## 2021-12-05 PROCEDURE — 65270000029 HC RM PRIVATE

## 2021-12-05 PROCEDURE — 96374 THER/PROPH/DIAG INJ IV PUSH: CPT

## 2021-12-05 PROCEDURE — 74011250636 HC RX REV CODE- 250/636: Performed by: EMERGENCY MEDICINE

## 2021-12-05 PROCEDURE — 99284 EMERGENCY DEPT VISIT MOD MDM: CPT

## 2021-12-05 RX ADMIN — DOXYCYCLINE 100 MG: 100 INJECTION, POWDER, LYOPHILIZED, FOR SOLUTION INTRAVENOUS at 22:15

## 2021-12-06 ENCOUNTER — APPOINTMENT (OUTPATIENT)
Dept: CT IMAGING | Age: 76
DRG: 177 | End: 2021-12-06
Attending: FAMILY MEDICINE
Payer: MEDICARE

## 2021-12-06 LAB
ANION GAP SERPL CALC-SCNC: 11 MMOL/L (ref 5–15)
B PERT DNA SPEC QL NAA+PROBE: NOT DETECTED
BORDETELLA PARAPERTUSSIS PCR, BORPAR: NOT DETECTED
BUN SERPL-MCNC: 22 MG/DL (ref 6–20)
BUN/CREAT SERPL: 22 (ref 12–20)
C PNEUM DNA SPEC QL NAA+PROBE: NOT DETECTED
CALCIUM SERPL-MCNC: 7.5 MG/DL (ref 8.5–10.1)
CHLORIDE SERPL-SCNC: 94 MMOL/L (ref 97–108)
CO2 SERPL-SCNC: 26 MMOL/L (ref 21–32)
COMMENT, HOLDF: NORMAL
CREAT SERPL-MCNC: 1 MG/DL (ref 0.7–1.3)
CRP SERPL-MCNC: 5.63 MG/DL (ref 0–0.6)
D DIMER PPP FEU-MCNC: 3.74 MG/L FEU (ref 0–0.65)
FLUAV H1 2009 PAND RNA SPEC QL NAA+PROBE: NOT DETECTED
FLUAV H1 RNA SPEC QL NAA+PROBE: NOT DETECTED
FLUAV H3 RNA SPEC QL NAA+PROBE: NOT DETECTED
FLUAV SUBTYP SPEC NAA+PROBE: NOT DETECTED
FLUBV RNA SPEC QL NAA+PROBE: NOT DETECTED
GLUCOSE SERPL-MCNC: 129 MG/DL (ref 65–100)
HADV DNA SPEC QL NAA+PROBE: NOT DETECTED
HCOV 229E RNA SPEC QL NAA+PROBE: NOT DETECTED
HCOV HKU1 RNA SPEC QL NAA+PROBE: NOT DETECTED
HCOV NL63 RNA SPEC QL NAA+PROBE: NOT DETECTED
HCOV OC43 RNA SPEC QL NAA+PROBE: NOT DETECTED
HMPV RNA SPEC QL NAA+PROBE: NOT DETECTED
HPIV1 RNA SPEC QL NAA+PROBE: NOT DETECTED
HPIV2 RNA SPEC QL NAA+PROBE: NOT DETECTED
HPIV3 RNA SPEC QL NAA+PROBE: NOT DETECTED
HPIV4 RNA SPEC QL NAA+PROBE: NOT DETECTED
M PNEUMO DNA SPEC QL NAA+PROBE: NOT DETECTED
MAGNESIUM SERPL-MCNC: 2.3 MG/DL (ref 1.6–2.4)
PHOSPHATE SERPL-MCNC: 2.7 MG/DL (ref 2.6–4.7)
POTASSIUM SERPL-SCNC: 3.1 MMOL/L (ref 3.5–5.1)
PROCALCITONIN SERPL-MCNC: 0.09 NG/ML
RSV RNA SPEC QL NAA+PROBE: NOT DETECTED
RV+EV RNA SPEC QL NAA+PROBE: NOT DETECTED
SAMPLES BEING HELD,HOLD: NORMAL
SARS-COV-2 PCR, COVPCR: DETECTED
SODIUM SERPL-SCNC: 131 MMOL/L (ref 136–145)

## 2021-12-06 PROCEDURE — 86738 MYCOPLASMA ANTIBODY: CPT

## 2021-12-06 PROCEDURE — 71275 CT ANGIOGRAPHY CHEST: CPT

## 2021-12-06 PROCEDURE — 74011250637 HC RX REV CODE- 250/637: Performed by: INTERNAL MEDICINE

## 2021-12-06 PROCEDURE — 3E0333Z INTRODUCTION OF ANTI-INFLAMMATORY INTO PERIPHERAL VEIN, PERCUTANEOUS APPROACH: ICD-10-PCS | Performed by: INTERNAL MEDICINE

## 2021-12-06 PROCEDURE — 84100 ASSAY OF PHOSPHORUS: CPT

## 2021-12-06 PROCEDURE — 94640 AIRWAY INHALATION TREATMENT: CPT

## 2021-12-06 PROCEDURE — 86713 LEGIONELLA ANTIBODY: CPT

## 2021-12-06 PROCEDURE — 86140 C-REACTIVE PROTEIN: CPT

## 2021-12-06 PROCEDURE — 74011250636 HC RX REV CODE- 250/636: Performed by: INTERNAL MEDICINE

## 2021-12-06 PROCEDURE — 85379 FIBRIN DEGRADATION QUANT: CPT

## 2021-12-06 PROCEDURE — 80048 BASIC METABOLIC PNL TOTAL CA: CPT

## 2021-12-06 PROCEDURE — 74011000636 HC RX REV CODE- 636

## 2021-12-06 PROCEDURE — 74011250637 HC RX REV CODE- 250/637: Performed by: EMERGENCY MEDICINE

## 2021-12-06 PROCEDURE — 74011250637 HC RX REV CODE- 250/637: Performed by: FAMILY MEDICINE

## 2021-12-06 PROCEDURE — 74011000250 HC RX REV CODE- 250: Performed by: INTERNAL MEDICINE

## 2021-12-06 PROCEDURE — 65270000029 HC RM PRIVATE

## 2021-12-06 PROCEDURE — 84145 PROCALCITONIN (PCT): CPT

## 2021-12-06 PROCEDURE — 0202U NFCT DS 22 TRGT SARS-COV-2: CPT

## 2021-12-06 PROCEDURE — 87040 BLOOD CULTURE FOR BACTERIA: CPT

## 2021-12-06 PROCEDURE — 36415 COLL VENOUS BLD VENIPUNCTURE: CPT

## 2021-12-06 PROCEDURE — 83735 ASSAY OF MAGNESIUM: CPT

## 2021-12-06 RX ORDER — ACETAMINOPHEN 325 MG/1
650 TABLET ORAL
Status: DISCONTINUED | OUTPATIENT
Start: 2021-12-06 | End: 2021-12-13 | Stop reason: HOSPADM

## 2021-12-06 RX ORDER — GUAIFENESIN 600 MG/1
600 TABLET, EXTENDED RELEASE ORAL EVERY 12 HOURS
Status: DISCONTINUED | OUTPATIENT
Start: 2021-12-06 | End: 2021-12-13 | Stop reason: HOSPADM

## 2021-12-06 RX ORDER — DEXAMETHASONE SODIUM PHOSPHATE 10 MG/ML
6 INJECTION INTRAMUSCULAR; INTRAVENOUS EVERY 24 HOURS
Status: DISCONTINUED | OUTPATIENT
Start: 2021-12-06 | End: 2021-12-08

## 2021-12-06 RX ORDER — ONDANSETRON 2 MG/ML
4 INJECTION INTRAMUSCULAR; INTRAVENOUS
Status: DISCONTINUED | OUTPATIENT
Start: 2021-12-06 | End: 2021-12-13 | Stop reason: HOSPADM

## 2021-12-06 RX ORDER — ONDANSETRON 4 MG/1
4 TABLET, ORALLY DISINTEGRATING ORAL
Status: DISCONTINUED | OUTPATIENT
Start: 2021-12-06 | End: 2021-12-13 | Stop reason: HOSPADM

## 2021-12-06 RX ORDER — ENOXAPARIN SODIUM 100 MG/ML
40 INJECTION SUBCUTANEOUS DAILY
Status: DISCONTINUED | OUTPATIENT
Start: 2021-12-07 | End: 2021-12-06

## 2021-12-06 RX ORDER — IPRATROPIUM BROMIDE AND ALBUTEROL SULFATE 2.5; .5 MG/3ML; MG/3ML
3 SOLUTION RESPIRATORY (INHALATION)
Status: DISCONTINUED | OUTPATIENT
Start: 2021-12-06 | End: 2021-12-13 | Stop reason: HOSPADM

## 2021-12-06 RX ORDER — POTASSIUM CHLORIDE 750 MG/1
40 TABLET, FILM COATED, EXTENDED RELEASE ORAL
Status: COMPLETED | OUTPATIENT
Start: 2021-12-06 | End: 2021-12-06

## 2021-12-06 RX ORDER — SODIUM CHLORIDE AND POTASSIUM CHLORIDE .9; .15 G/100ML; G/100ML
SOLUTION INTRAVENOUS CONTINUOUS
Status: DISCONTINUED | OUTPATIENT
Start: 2021-12-06 | End: 2021-12-09

## 2021-12-06 RX ORDER — SODIUM CHLORIDE 0.9 % (FLUSH) 0.9 %
5-40 SYRINGE (ML) INJECTION EVERY 8 HOURS
Status: DISCONTINUED | OUTPATIENT
Start: 2021-12-06 | End: 2021-12-13 | Stop reason: HOSPADM

## 2021-12-06 RX ORDER — POLYETHYLENE GLYCOL 3350 17 G/17G
17 POWDER, FOR SOLUTION ORAL DAILY PRN
Status: DISCONTINUED | OUTPATIENT
Start: 2021-12-06 | End: 2021-12-12

## 2021-12-06 RX ORDER — ACETAMINOPHEN 500 MG
1000 TABLET ORAL
Status: COMPLETED | OUTPATIENT
Start: 2021-12-06 | End: 2021-12-06

## 2021-12-06 RX ORDER — ACETAMINOPHEN 650 MG/1
650 SUPPOSITORY RECTAL
Status: DISCONTINUED | OUTPATIENT
Start: 2021-12-06 | End: 2021-12-13 | Stop reason: HOSPADM

## 2021-12-06 RX ORDER — SODIUM CHLORIDE 0.9 % (FLUSH) 0.9 %
5-40 SYRINGE (ML) INJECTION AS NEEDED
Status: DISCONTINUED | OUTPATIENT
Start: 2021-12-06 | End: 2021-12-13 | Stop reason: HOSPADM

## 2021-12-06 RX ORDER — IPRATROPIUM BROMIDE AND ALBUTEROL SULFATE 2.5; .5 MG/3ML; MG/3ML
3 SOLUTION RESPIRATORY (INHALATION)
Status: DISCONTINUED | OUTPATIENT
Start: 2021-12-06 | End: 2021-12-06

## 2021-12-06 RX ORDER — BENZONATATE 100 MG/1
100 CAPSULE ORAL
Status: DISCONTINUED | OUTPATIENT
Start: 2021-12-06 | End: 2021-12-13 | Stop reason: HOSPADM

## 2021-12-06 RX ADMIN — Medication 10 ML: at 14:00

## 2021-12-06 RX ADMIN — IPRATROPIUM BROMIDE AND ALBUTEROL 1 PUFF: 20; 100 SPRAY, METERED RESPIRATORY (INHALATION) at 19:58

## 2021-12-06 RX ADMIN — Medication 10 ML: at 21:00

## 2021-12-06 RX ADMIN — GUAIFENESIN 600 MG: 600 TABLET ORAL at 20:55

## 2021-12-06 RX ADMIN — POTASSIUM CHLORIDE 40 MEQ: 750 TABLET, FILM COATED, EXTENDED RELEASE ORAL at 12:55

## 2021-12-06 RX ADMIN — WATER 2 G: 1 INJECTION INTRAMUSCULAR; INTRAVENOUS; SUBCUTANEOUS at 08:33

## 2021-12-06 RX ADMIN — IOPAMIDOL 100 ML: 755 INJECTION, SOLUTION INTRAVENOUS at 19:33

## 2021-12-06 RX ADMIN — DEXAMETHASONE SODIUM PHOSPHATE 6 MG: 10 INJECTION INTRAMUSCULAR; INTRAVENOUS at 08:32

## 2021-12-06 RX ADMIN — POTASSIUM CHLORIDE AND SODIUM CHLORIDE: 900; 150 INJECTION, SOLUTION INTRAVENOUS at 08:34

## 2021-12-06 RX ADMIN — GUAIFENESIN 600 MG: 600 TABLET ORAL at 09:00

## 2021-12-06 RX ADMIN — ACETAMINOPHEN 1000 MG: 500 TABLET, FILM COATED ORAL at 08:26

## 2021-12-06 RX ADMIN — AZITHROMYCIN DIHYDRATE 500 MG: 500 INJECTION, POWDER, LYOPHILIZED, FOR SOLUTION INTRAVENOUS at 08:36

## 2021-12-06 NOTE — ED NOTES
TRANSFER - OUT REPORT:    Verbal report given to jose martin paramedic (name) on Yamileth Madrigal  being transferred to [facility] (unit) for routine progression of care       Report consisted of patients Situation, Background, Assessment and   Recommendations(SBAR). Information from the following report(s) SBAR was reviewed with the receiving nurse. Lines:   Peripheral IV 12/05/21 Right Antecubital (Active)   Site Assessment Clean, dry, & intact 12/05/21 2120   Phlebitis Assessment 0 12/05/21 2120   Infiltration Assessment 0 12/05/21 2120   Dressing Status Clean, dry, & intact 12/05/21 2120   Dressing Type Transparent 12/05/21 2120   Hub Color/Line Status Pink; Flushed; Patent 12/05/21 2120   Action Taken Blood drawn 12/05/21 2120        Opportunity for questions and clarification was provided.       Patient transported with:   Monitor

## 2021-12-06 NOTE — ROUTINE PROCESS
TRANSFER - OUT REPORT:    Verbal report given to FIONA Whitman rn (name) on Yamileth Madrigal  being transferred to [facility] (unit) for change in patient condition(na)       Report consisted of patients Situation, Background, Assessment and   Recommendations(SBAR). Information from the following report(s) SBAR was reviewed with the receiving nurse. Lines:   Peripheral IV 12/05/21 Right Antecubital (Active)   Site Assessment Clean, dry, & intact 12/05/21 2120   Phlebitis Assessment 0 12/05/21 2120   Infiltration Assessment 0 12/05/21 2120   Dressing Status Clean, dry, & intact 12/05/21 2120   Dressing Type Transparent 12/05/21 2120   Hub Color/Line Status Pink; Flushed; Patent 12/05/21 2120   Action Taken Blood drawn 12/05/21 2120        Opportunity for questions and clarification was provided.       Patient transported with:   Monitor

## 2021-12-06 NOTE — ED TRIAGE NOTES
Pt is brought in by EMS. EMS reports that pt was seen in our facility on the 2nd of December and was diagnosed Covid-19 positive. Pt complains of SOB. Pt is on room air and 96% however, pt has been having bronchospasms.

## 2021-12-06 NOTE — CONSULTS
Pulmonary, Critical Care, and Sleep Medicine~Consult Note    Name: Jori Oneal MRN: [id number]   : 1945 Hospital: [address] [place] [address] Dr   Date: 2021 5:05 PM Admission: 2021     Impression Plan   1. Acute hypoxemic respiratory failure  2. covid-19 pneumonia  3. Elevated d-dimer  4. Thrombocytopenia  5. hyponatremia 1. Cont supp o2 for goal sats >90%  2. Cont dexamethasone  3. He declines baricitinib  4. Agree w/ CTA chest   5. Trend ddimer, crp  6. Cont abx for now. F/u cx  7. SCDs (no pharm dvt ppx with thrombocytopenia)    Thank you for the consult     Daily Progression:    HPI: 78 y/o unvaccinated M who was diagnosed with covid-19 in our ED on  presents with worsening shortness of breath. Symptoms began on . Hypoxemic to 81% in the ED. Currently on 2L with sats 99%. Febrile to 102.6    Labs: wbc 7.2, plt 32, ddimer 3.74, Na 131, pct 0.09, crp 5.63    CXR with LLL opacity    ROS: Feeling ok currently. Repeatedly asks if he can get up to go to the bathroom. Denies SOB, cough, chest pain, leg pain/swelling. Social hx: non-smoker    I have reviewed the labs and previous days notes. Past Medical History:   Diagnosis Date    GERD (gastroesophageal reflux disease)     Hypertension       Past Surgical History:   Procedure Laterality Date    COLONOSCOPY N/A 2017    COLONOSCOPY performed by Marie Clay MD at [facility] HX APPENDECTOMY      HX CATARACT REMOVAL Right       Prior to Admission medications    Medication Sig Start Date End Date Taking? Authorizing Provider   ascorbic acid, vitamin C, (VITAMIN C) 1,000 mg tablet Take 1,000 mg by mouth daily. Yes Provider, Historical   amLODIPine (NORVASC) 5 mg tablet Take 5 mg by mouth daily. Yes Provider, Historical   ergocalciferol (VITAMIN D2) 50,000 unit capsule Take 50,000 Units by mouth.  Take every other Monday   Yes Provider, Historical     No Known Allergies   Social History     Tobacco Use    Smoking status: Never Smoker    Smokeless tobacco: Never Used   Substance Use Topics    Alcohol use: No      Family History   Problem Relation Age of Onset    Hypertension Mother      OBJECTIVE:     Vital Signs:       Visit Vitals  /72 (BP 1 Location: Left upper arm, BP Patient Position: Lying; At rest)   Pulse 88   Temp 97.6 °F (36.4 °C)   Resp 18   SpO2 96%      Temp (24hrs), Av.4 °F (37.4 °C), Min:97.6 °F (36.4 °C), Max:102.6 °F (39.2 °C)     Intake/Output:     Last shift: No intake/output data recorded. Last 3 shifts: No intake/output data recorded.       No intake or output data in the 24 hours ending 21 1705    Physical Exam:                                        Exam Findings Other   General: No resp distress noted, appears stated age    [de-identified]:  No ulcers, JVD not elevated, no cervical LAD    Chest: No pectus deformity, normal chest rise b/l    HEART:  RRR, no murmurs/rubs/gallops    Lungs:  Normal resp effort    ABD: Soft/NT, non rigid mildly distended    EXT: No cyanosis/clubbing/edema, normal peripheral pulses    Skin: No rashes or ulcers, no mottling    Neuro: A/O x 3        Medications:  Current Facility-Administered Medications   Medication Dose Route Frequency    0.9% sodium chloride with KCl 20 mEq/L infusion   IntraVENous CONTINUOUS    cefTRIAXone (ROCEPHIN) 2 g in sterile water (preservative free) 20 mL IV syringe  2 g IntraVENous Q24H    guaiFENesin ER (MUCINEX) tablet 600 mg  600 mg Oral Q12H    benzonatate (TESSALON) capsule 100 mg  100 mg Oral TID PRN    dexamethasone (PF) (DECADRON) 10 mg/mL injection 6 mg  6 mg IntraVENous Q24H    azithromycin (ZITHROMAX) 500 mg in 0.9% sodium chloride 250 mL (VIAL-MATE)  500 mg IntraVENous Q24H    albuterol-ipratropium (DUO-NEB) 2.5 MG-0.5 MG/3 ML  3 mL Nebulization Q4H PRN    Or    ipratropium-albuterol (COMBIVENT RESPIMAT) 20 mcg-100 mcg inhalation spray  1 Puff Inhalation Q4H PRN    sodium chloride (NS) flush 5-40 mL  5-40 mL IntraVENous Q8H    sodium chloride (NS) flush 5-40 mL  5-40 mL IntraVENous PRN    acetaminophen (TYLENOL) tablet 650 mg  650 mg Oral Q6H PRN    Or    acetaminophen (TYLENOL) suppository 650 mg  650 mg Rectal Q6H PRN    polyethylene glycol (MIRALAX) packet 17 g  17 g Oral DAILY PRN    ondansetron (ZOFRAN ODT) tablet 4 mg  4 mg Oral Q8H PRN    Or    ondansetron (ZOFRAN) injection 4 mg  4 mg IntraVENous Q6H PRN    ipratropium-albuterol (COMBIVENT RESPIMAT) 20 mcg-100 mcg inhalation spray  1 Puff Inhalation Q6H RT       Labs:  ABG No results for input(s): PHI, PCO2I, PO2I, HCO3I, SO2I, FIO2I in the last 72 hours. CBC Recent Labs     12/05/21 2136   WBC 7.2   HGB 13.6   HCT 39.3   PLT 32*   MCV 82.6   MCH 59.6        Metabolic  Panel Recent Labs     12/06/21  0846 12/05/21  2136   * 129*   K 3.1* 3.1*   CL 94* 92*   CO2 26 24   * 135*   BUN 22* 26*   CREA 1.00 1.25   CA 7.5* 8.1*   MG 2.3  --    PHOS 2.7  --    ALB  --  3.7   ALT  --  43        Pertinent Labs                Sindi Barajas [place], [place]  12/6/2021

## 2021-12-06 NOTE — H&P
Hospitalist Admission Note    NAME: Sarah Connolly   :  1945   MRN:  [id number]     Date/Time:  2021 12:13 PM    Patient PCP: Manfred Vazquez MD  ______________________________________________________________________  Given the patient's current clinical presentation, I have a high level of concern for decompensation if discharged from the emergency department. Complex decision making was performed, which includes reviewing the patient's available past medical records, laboratory results, and x-ray films. My assessment of this patient's clinical condition and my plan of care is as follows. Assessment / Plan:    Covid Pneumonia   - Patient diagnosed Covid + on    - sating 99% on room air   - continue Decadron  - does not meet criteria for remdesivir   - CXR reviewed . Left lobe opacity   - continue IV Rocephin and IV Azithromycin for secondary bacteria infection in viral pneumonia   - Keep sat > 90%  - Albuterol inhaler as needed for bronchospasms  - Droplet and contact Isolation  - Monitor Inflammatory Marker   - Urine Legionella and strep Pneumonia pending   - Follow blood culture   - check UA   - Tylenol for fever  - pulmonology consult     Thrombocytopenia  - Likely 2/2 above   - Monitor platelets closely   - transfuse if < 10K or < 50K with bleeding     Hyponatremia PTA  - Na 129   - Monitor closely q6h   - Likely due to pneumonia   - Check Serum Osm, Urine Osm and Na level     Hypokalemia  -K 3.1 pta  - Replace     Elevated ddimer   - dimer 3.74  - will obtain CTA chest to r/o PE    Elevated LFT's  - repeat Labs in am     Hx HTN  - Resume Norvasc       Code Status: Full code     DVT Prophylaxis: SCD's  GI Prophylaxis: not indicated          Subjective:   CHIEF COMPLAINT: Shortness of breath    HISTORY OF PRESENT ILLNESS:     Janene Reid is a 77 y.o.    male who presents with hx of GERD, HTN and hypertension who presented at [address] [place] [address] with complaint of shortness of breath. Patient was diagnosed with COVID -19 infection on 12/2. He reports that at home he continued to feel weak with occasional cough and bronchospasms. Patient reports that he is not vaccinated. He is a poor historian. In the ED he had CXR which shows a left lower lobe opacification. He was also febrile T 102. 6. Routine lab test were abnormal for Na 129, K 3.1 and platelets 32. Patient D-dimer 3.74. He was given Doxycyline x 1 dose and transferred for admission to Hospitalist services. We were asked to admit for work up and evaluation of the above problems. Past Medical History:   Diagnosis Date    GERD (gastroesophageal reflux disease)     Hypertension         Past Surgical History:   Procedure Laterality Date    COLONOSCOPY N/A 4/12/2017    [facility] HX APPENDECTOMY      HX CATARACT REMOVAL Right        Social History     Tobacco Use    Smoking status: Never Smoker    Smokeless tobacco: Never Used   Substance Use Topics    Alcohol use: No        Family History   Problem Relation Age of Onset    Hypertension Mother      No Known Allergies     Prior to Admission medications    Medication Sig Start Date End Date Taking? Authorizing Provider   amLODIPine (NORVASC) 5 mg tablet Take 5 mg by mouth daily. Yes Provider, Historical   ascorbic acid, vitamin C, (VITAMIN C) 1,000 mg tablet Take 1,000 mg by mouth daily. Patient not taking: Reported on 12/2/2021    Provider, Historical   ergocalciferol (VITAMIN D2) 50,000 unit capsule Take 50,000 Units by mouth. Take every other Monday    Provider, Historical       REVIEW OF SYSTEMS:     I am not able to complete the review of systems because:    The patient is intubated and sedated    The patient has altered mental status due to his acute medical problems    The patient has baseline aphasia from prior stroke(s)    The patient has baseline dementia and is not reliable historian    The patient is in acute medical distress and unable to provide information           Total of 12 systems reviewed as follows:       POSITIVE= underlined text  Negative = text not underlined  General:  fever, chills, sweats, generalized weakness, weight loss/gain,      loss of appetite   Eyes:    blurred vision, eye pain, loss of vision, double vision  ENT:    rhinorrhea, pharyngitis   Respiratory:   cough, sputum production, SOB, FLORES, wheezing, pleuritic pain   Cardiology:   chest pain, palpitations, orthopnea, PND, edema, syncope   Gastrointestinal:  abdominal pain , N/V, diarrhea, dysphagia, constipation, bleeding   Genitourinary:  frequency, urgency, dysuria, hematuria, incontinence   Muskuloskeletal :  arthralgia, myalgia, back pain  Hematology:  easy bruising, nose or gum bleeding, lymphadenopathy   Dermatological: rash, ulceration, pruritis, color change / jaundice  Endocrine:   hot flashes or polydipsia   Neurological:  headache, dizziness, confusion, focal weakness, paresthesia,     Speech difficulties, memory loss, gait difficulty  Psychological: Feelings of anxiety, depression, agitation    Objective:   VITALS:    Visit Vitals  /72 (BP 1 Location: Left upper arm, BP Patient Position: Sitting)   Pulse 87   Temp (!) 102.6 °F (39.2 °C)   Resp 19   SpO2 95%       PHYSICAL EXAM:    General:    Lethargic, no distress, appears stated age. HEENT: Atraumatic, anicteric sclerae, pink conjunctivae     No oral ulcers, mucosa moist, throat clear, dentition fair  Neck:  Supple, symmetrical,  thyroid: non tender  Lungs:   Clear to auscultation bilaterally. No Wheezing or Rhonchi. No rales. Chest wall:  No tenderness  No Accessory muscle use. Heart:   Regular  rhythm,  No  murmur   No edema  Abdomen:   Soft, non-tender. Not distended. Bowel sounds normal  Extremities: No cyanosis. No clubbing,      Skin turgor normal, Capillary refill normal, Radial dial pulse 2+  Skin:     Not pale.   Not Jaundiced  No rashes   Psych:  Good insight. Not depressed. Not anxious or agitated. Neurologic: EOMs intact. No facial asymmetry. No aphasia or slurred speech. Symmetrical strength, Sensation grossly intact. Alert and oriented X 4.     _______________________________________________________________________  Care Plan discussed with:    Comments   Patient x    Family      RN     Care Manager                    Consultant:      _______________________________________________________________________  Expected  Disposition:   Home with Family x   HH/PT/OT/RN    SNF/LTC    MIRZA    ________________________________________________________________________  TOTAL TIME:  39 Minutes    Critical Care Provided     Minutes non procedure based      Comments     Reviewed previous records   >50% of visit spent in counseling and coordination of care x Discussion with patient and/or family and questions answered       ________________________________________________________________________  Signed: Tyron Alamo MD    Procedures: see electronic medical records for all procedures/Xrays and details which were not copied into this note but were reviewed prior to creation of Plan. LAB DATA REVIEWED:    Recent Results (from the past 24 hour(s))   CBC WITH AUTOMATED DIFF    Collection Time: 12/05/21  9:36 PM   Result Value Ref Range    WBC 7.2 4.1 - 11.1 K/uL    RBC 4.76 4.10 - 5.70 M/uL    HGB 13.6 12.1 - 17.0 g/dL    HCT 39.3 36.6 - 50.3 %    MCV 82.6 80.0 - 99.0 FL    MCH 28.6 26.0 - 34.0 PG    MCHC 34.6 30.0 - 36.5 g/dL    RDW 15.0 (H) 11.5 - 14.5 %    PLATELET 32 (LL) 904 - 400 K/uL    MPV ABNORMAL 8.9 - 12.9 FL    NRBC 0.0 0.0  WBC    ABSOLUTE NRBC 0.00 0.00 - 0.01 K/uL    NEUTROPHILS 31 (L) 32 - 75 %    BAND NEUTROPHILS 3 0 - 6 %    LYMPHOCYTES 30 12 - 49 %    MONOCYTES 35 (H) 5 - 13 %    EOSINOPHILS 0 0 - 7 %    BASOPHILS 0 0 - 1 %    MYELOCYTES 1 (H) 0 %    IMMATURE GRANULOCYTES 0 %    ABS.  NEUTROPHILS 2.4 1.8 - 8.0 K/UL    ABS. LYMPHOCYTES 2.2 0.8 - 3.5 K/UL    ABS. MONOCYTES 2.5 (H) 0.0 - 1.0 K/UL    ABS. EOSINOPHILS 0.0 0.0 - 0.4 K/UL    ABS. BASOPHILS 0.0 0.0 - 0.1 K/UL    ABS. IMM. GRANS. 0.0 K/UL    DF MANUAL      RBC COMMENTS HOLLY CELLS  PRESENT        RBC COMMENTS ANISOCYTOSIS  1+        WBC COMMENTS REACTIVE LYMPHS     METABOLIC PANEL, COMPREHENSIVE    Collection Time: 12/05/21  9:36 PM   Result Value Ref Range    Sodium 129 (L) 136 - 145 mmol/L    Potassium 3.1 (L) 3.5 - 5.1 mmol/L    Chloride 92 (L) 97 - 108 mmol/L    CO2 24 21 - 32 mmol/L    Anion gap 13 5 - 15 mmol/L    Glucose 135 (H) 65 - 100 mg/dL    BUN 26 (H) 6 - 20 MG/DL    Creatinine 1.25 0.70 - 1.30 MG/DL    BUN/Creatinine ratio 21 (H) 12 - 20      GFR est AA >60 >60 ml/min/1.73m2    GFR est non-AA 56 (L) >60 ml/min/1.73m2    Calcium 8.1 (L) 8.5 - 10.1 MG/DL    Bilirubin, total 1.6 (H) 0.2 - 1.0 MG/DL    ALT (SGPT) 43 12 - 78 U/L    AST (SGOT) 51 (H) 15 - 37 U/L    Alk.  phosphatase 36 (L) 45 - 117 U/L    Protein, total 7.9 6.4 - 8.2 g/dL    Albumin 3.7 3.5 - 5.0 g/dL    Globulin 4.2 (H) 2.0 - 4.0 g/dL    A-G Ratio 0.9 (L) 1.1 - 2.2     TROPONIN-HIGH SENSITIVITY    Collection Time: 12/05/21  9:36 PM   Result Value Ref Range    Troponin-High Sensitivity 25 0 - 76 ng/L   METABOLIC PANEL, BASIC    Collection Time: 12/06/21  8:46 AM   Result Value Ref Range    Sodium 131 (L) 136 - 145 mmol/L    Potassium 3.1 (L) 3.5 - 5.1 mmol/L    Chloride 94 (L) 97 - 108 mmol/L    CO2 26 21 - 32 mmol/L    Anion gap 11 5 - 15 mmol/L    Glucose 129 (H) 65 - 100 mg/dL    BUN 22 (H) 6 - 20 MG/DL    Creatinine 1.00 0.70 - 1.30 MG/DL    BUN/Creatinine ratio 22 (H) 12 - 20      GFR est AA >60 >60 ml/min/1.73m2    GFR est non-AA >60 >60 ml/min/1.73m2    Calcium 7.5 (L) 8.5 - 10.1 MG/DL   PHOSPHORUS    Collection Time: 12/06/21  8:46 AM   Result Value Ref Range    Phosphorus 2.7 2.6 - 4.7 MG/DL   MAGNESIUM    Collection Time: 12/06/21  8:46 AM   Result Value Ref Range Magnesium 2.3 1.6 - 2.4 mg/dL   D DIMER    Collection Time: 12/06/21  8:46 AM   Result Value Ref Range    D-dimer 3.74 (H) 0.00 - 0.65 mg/L FEU   C REACTIVE PROTEIN, QT    Collection Time: 12/06/21  8:46 AM   Result Value Ref Range    C-Reactive protein 5.63 (H) 0.00 - 0.60 mg/dL   SAMPLES BEING HELD    Collection Time: 12/06/21  8:54 AM   Result Value Ref Range    SAMPLES BEING HELD 1LAV,1BL,1GY     COMMENT        Add-on orders for these samples will be processed based on acceptable specimen integrity and analyte stability, which may vary by analyte.

## 2021-12-06 NOTE — ED PROVIDER NOTES
71-year-old male history of GERD and hypertension presents to the emergency department by EMS with chief complaint shortness of breath. He was diagnosed 3 days ago in this emergency department with Covid. He is not vaccinated. He called EMS today for increasing shortness of breath. Currently he is having the hiccups and complains of \"bronchospasm. \"  Review of the medical record indicates that he was seen by oncology for neutropenia, at that time he also had mild thrombocytopenia with plan for 12-month follow-up. Recent labs showing significantly declining platelet count. The history is provided by the patient and medical records. Shortness of Breath  This is a new problem. The problem occurs continuously. The current episode started more than 2 days ago. The problem has been rapidly worsening. Associated symptoms include a fever, headaches, sore throat, cough, wheezing and chest pain. Pertinent negatives include no vomiting, no abdominal pain, no rash and no leg swelling. He has had no prior hospitalizations. He has had prior ED visits. Associated medical issues comments: Covid.         Past Medical History:   Diagnosis Date    GERD (gastroesophageal reflux disease)     Hypertension        Past Surgical History:   Procedure Laterality Date    COLONOSCOPY N/A 4/12/2017    [facility] HX APPENDECTOMY      HX CATARACT REMOVAL Right          Family History:   Problem Relation Age of Onset    Hypertension Mother        Social History     Socioeconomic History    Marital status:      Spouse name: Not on file    Number of children: Not on file    Years of education: Not on file    Highest education level: Not on file   Occupational History    Not on file   Tobacco Use    Smoking status: Never Smoker    Smokeless tobacco: Never Used   Substance and Sexual Activity    Alcohol use: No    Drug use: No    Sexual activity: Not on file   Other Topics Concern    Not on file   Social History Narrative    Not on file     Social Determinants of Health     Financial Resource Strain:     Difficulty of Paying Living Expenses: Not on file   Food Insecurity:     Worried About Running Out of Food in the Last Year: Not on file    Jaun of Food in the Last Year: Not on file   Transportation Needs:     Lack of Transportation (Medical): Not on file    Lack of Transportation (Non-Medical): Not on file   Physical Activity:     Days of Exercise per Week: Not on file    Minutes of Exercise per Session: Not on file   Stress:     Feeling of Stress : Not on file   Social Connections:     Frequency of Communication with Friends and Family: Not on file    Frequency of Social Gatherings with Friends and Family: Not on file    Attends [employer] Services: Not on file    Active Member of [address] [place] [address] or Organizations: Not on file    Attends Club or Organization Meetings: Not on file    Marital Status: Not on file   Intimate Partner Violence:     Fear of Current or Ex-Partner: Not on file    Emotionally Abused: Not on file    Physically Abused: Not on file    Sexually Abused: Not on file   Housing Stability:     Unable to Pay for Housing in the Last Year: Not on file    Number of [place] in the Last Year: Not on file    Unstable Housing in the Last Year: Not on file         ALLERGIES: Patient has no known allergies. Review of Systems   Constitutional: Positive for fever. Negative for fatigue. HENT: Positive for sore throat. Negative for sneezing. Respiratory: Positive for cough, shortness of breath and wheezing. Cardiovascular: Positive for chest pain. Negative for leg swelling. Gastrointestinal: Negative for abdominal pain, diarrhea, nausea and vomiting. Genitourinary: Negative for difficulty urinating and dysuria. Musculoskeletal: Negative for arthralgias and myalgias. Skin: Negative for color change and rash.    Neurological: Positive for headaches. Negative for weakness. Psychiatric/Behavioral: Negative for agitation and behavioral problems. Vitals:    12/05/21 2115   BP: (!) 151/80   Pulse: 95   Resp: 18   Temp: 99.5 °F (37.5 °C)   SpO2: 97%            Physical Exam  Vitals and nursing note reviewed. Constitutional:       General: He is not in acute distress. Appearance: Normal appearance. He is well-developed. He is not ill-appearing, toxic-appearing or diaphoretic. HENT:      Head: Normocephalic and atraumatic. Nose: Nose normal.      Mouth/Throat:      Mouth: Mucous membranes are moist.      Pharynx: Oropharynx is clear. Eyes:      Extraocular Movements: Extraocular movements intact. Conjunctiva/sclera: Conjunctivae normal.      Pupils: Pupils are equal, round, and reactive to light. Cardiovascular:      Rate and Rhythm: Normal rate and regular rhythm. Pulses: Normal pulses. Heart sounds: No murmur heard. Pulmonary:      Effort: Pulmonary effort is normal. No respiratory distress. Breath sounds: Normal breath sounds. No wheezing. Chest:      Chest wall: No mass or tenderness. Abdominal:      General: There is no distension. Palpations: Abdomen is soft. Tenderness: There is no abdominal tenderness. There is no guarding or rebound. Musculoskeletal:         General: No swelling, tenderness, deformity or signs of injury. Normal range of motion. Cervical back: Normal range of motion and neck supple. No rigidity. No muscular tenderness. Right lower leg: No tenderness. No edema. Left lower leg: No tenderness. No edema. Skin:     General: Skin is warm and dry. Capillary Refill: Capillary refill takes less than 2 seconds. Neurological:      General: No focal deficit present. Mental Status: He is alert and oriented to person, place, and time.    Psychiatric:         Mood and Affect: Mood normal.         Behavior: Behavior normal.          MDM  Number of Diagnoses or Management Options  Diagnosis management comments: 28-year-old male presents as above with worsening shortness of breath in setting of Covid infection. He has no hypoxia but does have significant hyponatremia, thrombocytopenia, focal left lower lobe infiltrate. Also has abnormal LFTs. Although he test positive for Covid this raises a concern for potential legionnaires disease, rather typical or atypical pneumonia. Plan for admission to the hospital for further management. Amount and/or Complexity of Data Reviewed  Clinical lab tests: reviewed  Tests in the radiology section of CPT®: reviewed  Decide to obtain previous medical records or to obtain history from someone other than the patient: yes           Procedures          Perfect Serve Consult for Admission  10:11 PM    ED Room Number: Joel  Patient Name and age:  Don Rubio 77 y.o.  male  Working Diagnosis:   1. COVID    2. Hyponatremia    3. Abnormal LFTs    4. Hypokalemia        COVID-19 Suspicion:  yes  Sepsis present:  no  Reassessment needed: N/A  Code Status:  Full Code  Readmission: no  Isolation Requirements:  yes  Recommended Level of Care:  telemetry  Department:[facility] - [phone]  Other: Patient with positive Covid test, wife has Covid. Has focal left lower lobe infiltrate on x-ray. Hyponatremia, thrombocytopenia, abnormal LFTs concerning for potential legionnaires disease.

## 2021-12-06 NOTE — ED NOTES
Discharge or Transfer Assessment: Patient A&O x4 and in no distress. Physical re-examination reveals assessment of vital signs completed at the time of admission transfer and/or discharge.

## 2021-12-07 LAB
ANION GAP SERPL CALC-SCNC: 11 MMOL/L (ref 5–15)
ANION GAP SERPL CALC-SCNC: 8 MMOL/L (ref 5–15)
BASOPHILS # BLD: 0 K/UL (ref 0–0.1)
BASOPHILS NFR BLD: 0 % (ref 0–1)
BUN SERPL-MCNC: 27 MG/DL (ref 6–20)
BUN SERPL-MCNC: 28 MG/DL (ref 6–20)
BUN/CREAT SERPL: 26 (ref 12–20)
BUN/CREAT SERPL: 28 (ref 12–20)
CALCIUM SERPL-MCNC: 7.2 MG/DL (ref 8.5–10.1)
CALCIUM SERPL-MCNC: 7.7 MG/DL (ref 8.5–10.1)
CHLORIDE SERPL-SCNC: 100 MMOL/L (ref 97–108)
CHLORIDE SERPL-SCNC: 100 MMOL/L (ref 97–108)
CO2 SERPL-SCNC: 23 MMOL/L (ref 21–32)
CO2 SERPL-SCNC: 24 MMOL/L (ref 21–32)
COMMENT, HOLDF: NORMAL
CREAT SERPL-MCNC: 1 MG/DL (ref 0.7–1.3)
CREAT SERPL-MCNC: 1.02 MG/DL (ref 0.7–1.3)
CRP SERPL HS-MCNC: >9.5 MG/L
CRP SERPL HS-MCNC: >9.5 MG/L
CRP SERPL-MCNC: 8.21 MG/DL (ref 0–0.6)
D DIMER PPP FEU-MCNC: 2.25 MG/L FEU (ref 0–0.65)
D DIMER PPP FEU-MCNC: 2.98 MG/L FEU (ref 0–0.65)
DIFFERENTIAL METHOD BLD: ABNORMAL
EOSINOPHIL # BLD: 0 K/UL (ref 0–0.4)
EOSINOPHIL # BLD: 0 K/UL (ref 0–0.4)
EOSINOPHIL # BLD: 0.1 K/UL (ref 0–0.4)
EOSINOPHIL NFR BLD: 0 % (ref 0–7)
EOSINOPHIL NFR BLD: 0 % (ref 0–7)
EOSINOPHIL NFR BLD: 1 % (ref 0–7)
ERYTHROCYTE [DISTWIDTH] IN BLOOD BY AUTOMATED COUNT: 15.5 % (ref 11.5–14.5)
ERYTHROCYTE [DISTWIDTH] IN BLOOD BY AUTOMATED COUNT: 15.6 % (ref 11.5–14.5)
ERYTHROCYTE [DISTWIDTH] IN BLOOD BY AUTOMATED COUNT: 15.8 % (ref 11.5–14.5)
GLUCOSE SERPL-MCNC: 144 MG/DL (ref 65–100)
GLUCOSE SERPL-MCNC: 147 MG/DL (ref 65–100)
HCT VFR BLD AUTO: 35.2 % (ref 36.6–50.3)
HCT VFR BLD AUTO: 36.7 % (ref 36.6–50.3)
HCT VFR BLD AUTO: 37.1 % (ref 36.6–50.3)
HGB BLD-MCNC: 12.1 G/DL (ref 12.1–17)
HGB BLD-MCNC: 12.8 G/DL (ref 12.1–17)
HGB BLD-MCNC: 13.2 G/DL (ref 12.1–17)
IMM GRANULOCYTES # BLD AUTO: 0 K/UL
IMM GRANULOCYTES # BLD AUTO: 0 K/UL
IMM GRANULOCYTES # BLD AUTO: 0 K/UL (ref 0–0.04)
IMM GRANULOCYTES NFR BLD AUTO: 0 %
IMM GRANULOCYTES NFR BLD AUTO: 0 %
IMM GRANULOCYTES NFR BLD AUTO: 0 % (ref 0–0.5)
LYMPHOCYTES # BLD: 0.3 K/UL (ref 0.8–3.5)
LYMPHOCYTES # BLD: 0.4 K/UL (ref 0.8–3.5)
LYMPHOCYTES # BLD: 1.1 K/UL (ref 0.8–3.5)
LYMPHOCYTES NFR BLD: 2 % (ref 12–49)
LYMPHOCYTES NFR BLD: 3 % (ref 12–49)
LYMPHOCYTES NFR BLD: 7 % (ref 12–49)
M PNEUMO IGG SER IA-ACNC: 401 U/ML (ref 0–99)
M PNEUMO IGM SER IA-ACNC: <770 U/ML (ref 0–769)
MCH RBC QN AUTO: 28.3 PG (ref 26–34)
MCH RBC QN AUTO: 28.5 PG (ref 26–34)
MCH RBC QN AUTO: 28.8 PG (ref 26–34)
MCHC RBC AUTO-ENTMCNC: 34.4 G/DL (ref 30–36.5)
MCHC RBC AUTO-ENTMCNC: 34.9 G/DL (ref 30–36.5)
MCHC RBC AUTO-ENTMCNC: 35.6 G/DL (ref 30–36.5)
MCV RBC AUTO: 80.8 FL (ref 80–99)
MCV RBC AUTO: 81.7 FL (ref 80–99)
MCV RBC AUTO: 82.4 FL (ref 80–99)
METAMYELOCYTES NFR BLD MANUAL: 2 %
METAMYELOCYTES NFR BLD MANUAL: 2 %
METAMYELOCYTES NFR BLD MANUAL: 5 %
MONOCYTES # BLD: 4.1 K/UL (ref 0–1)
MONOCYTES # BLD: 4.4 K/UL (ref 0–1)
MONOCYTES # BLD: 4.8 K/UL (ref 0–1)
MONOCYTES NFR BLD: 27 % (ref 5–13)
MONOCYTES NFR BLD: 32 % (ref 5–13)
MONOCYTES NFR BLD: 38 % (ref 5–13)
MYELOCYTES NFR BLD MANUAL: 1 %
MYELOCYTES NFR BLD MANUAL: 2 %
NEUTS BAND NFR BLD MANUAL: 1 %
NEUTS BAND NFR BLD MANUAL: 13 % (ref 0–6)
NEUTS BAND NFR BLD MANUAL: 19 % (ref 0–6)
NEUTS SEG # BLD: 6.9 K/UL (ref 1.8–8)
NEUTS SEG # BLD: 8.4 K/UL (ref 1.8–8)
NEUTS SEG # BLD: 9.6 K/UL (ref 1.8–8)
NEUTS SEG NFR BLD: 41 % (ref 32–75)
NEUTS SEG NFR BLD: 42 % (ref 32–75)
NEUTS SEG NFR BLD: 62 % (ref 32–75)
NRBC # BLD: 0 K/UL (ref 0–0.01)
NRBC # BLD: 0.02 K/UL (ref 0–0.01)
NRBC # BLD: 0.02 K/UL (ref 0–0.01)
NRBC BLD-RTO: 0 PER 100 WBC
NRBC BLD-RTO: 0.1 PER 100 WBC
NRBC BLD-RTO: 0.2 PER 100 WBC
PLATELET # BLD AUTO: 42 K/UL (ref 150–400)
PLATELET # BLD AUTO: 48 K/UL (ref 150–400)
PLATELET # BLD AUTO: 71 K/UL (ref 150–400)
POTASSIUM SERPL-SCNC: 3.6 MMOL/L (ref 3.5–5.1)
POTASSIUM SERPL-SCNC: 3.7 MMOL/L (ref 3.5–5.1)
RBC # BLD AUTO: 4.27 M/UL (ref 4.1–5.7)
RBC # BLD AUTO: 4.49 M/UL (ref 4.1–5.7)
RBC # BLD AUTO: 4.59 M/UL (ref 4.1–5.7)
RBC MORPH BLD: ABNORMAL
SAMPLES BEING HELD,HOLD: NORMAL
SODIUM SERPL-SCNC: 132 MMOL/L (ref 136–145)
SODIUM SERPL-SCNC: 134 MMOL/L (ref 136–145)
UR CULT HOLD, URHOLD: NORMAL
WBC # BLD AUTO: 12.7 K/UL (ref 4.1–11.1)
WBC # BLD AUTO: 13.8 K/UL (ref 4.1–11.1)
WBC # BLD AUTO: 15.2 K/UL (ref 4.1–11.1)

## 2021-12-07 PROCEDURE — 85379 FIBRIN DEGRADATION QUANT: CPT

## 2021-12-07 PROCEDURE — 74011250637 HC RX REV CODE- 250/637: Performed by: INTERNAL MEDICINE

## 2021-12-07 PROCEDURE — 80048 BASIC METABOLIC PNL TOTAL CA: CPT

## 2021-12-07 PROCEDURE — 86141 C-REACTIVE PROTEIN HS: CPT

## 2021-12-07 PROCEDURE — 86140 C-REACTIVE PROTEIN: CPT

## 2021-12-07 PROCEDURE — XW0DXM6 INTRODUCTION OF BARICITINIB INTO MOUTH AND PHARYNX, EXTERNAL APPROACH, NEW TECHNOLOGY GROUP 6: ICD-10-PCS | Performed by: FAMILY MEDICINE

## 2021-12-07 PROCEDURE — 74011000250 HC RX REV CODE- 250: Performed by: INTERNAL MEDICINE

## 2021-12-07 PROCEDURE — 74011250636 HC RX REV CODE- 250/636: Performed by: INTERNAL MEDICINE

## 2021-12-07 PROCEDURE — 36415 COLL VENOUS BLD VENIPUNCTURE: CPT

## 2021-12-07 PROCEDURE — 87449 NOS EACH ORGANISM AG IA: CPT

## 2021-12-07 PROCEDURE — 85025 COMPLETE CBC W/AUTO DIFF WBC: CPT

## 2021-12-07 PROCEDURE — 65270000029 HC RM PRIVATE

## 2021-12-07 PROCEDURE — 74011250637 HC RX REV CODE- 250/637: Performed by: FAMILY MEDICINE

## 2021-12-07 PROCEDURE — 94640 AIRWAY INHALATION TREATMENT: CPT

## 2021-12-07 RX ORDER — CODEINE PHOSPHATE AND GUAIFENESIN 10; 100 MG/5ML; MG/5ML
5 SOLUTION ORAL
Status: DISCONTINUED | OUTPATIENT
Start: 2021-12-07 | End: 2021-12-13 | Stop reason: HOSPADM

## 2021-12-07 RX ADMIN — IPRATROPIUM BROMIDE AND ALBUTEROL 1 PUFF: 20; 100 SPRAY, METERED RESPIRATORY (INHALATION) at 07:36

## 2021-12-07 RX ADMIN — GUAIFENESIN 600 MG: 600 TABLET ORAL at 20:23

## 2021-12-07 RX ADMIN — WATER 2 G: 1 INJECTION INTRAMUSCULAR; INTRAVENOUS; SUBCUTANEOUS at 08:00

## 2021-12-07 RX ADMIN — BARICITINIB 4 MG: 2 TABLET, FILM COATED ORAL at 15:39

## 2021-12-07 RX ADMIN — GUAIFENESIN AND CODEINE PHOSPHATE 5 ML: 100; 10 SOLUTION ORAL at 23:07

## 2021-12-07 RX ADMIN — ACETAMINOPHEN 650 MG: 325 TABLET ORAL at 09:35

## 2021-12-07 RX ADMIN — DEXAMETHASONE SODIUM PHOSPHATE 6 MG: 10 INJECTION INTRAMUSCULAR; INTRAVENOUS at 09:36

## 2021-12-07 RX ADMIN — AZITHROMYCIN DIHYDRATE 500 MG: 500 INJECTION, POWDER, LYOPHILIZED, FOR SOLUTION INTRAVENOUS at 09:00

## 2021-12-07 RX ADMIN — IPRATROPIUM BROMIDE AND ALBUTEROL 1 PUFF: 20; 100 SPRAY, METERED RESPIRATORY (INHALATION) at 01:28

## 2021-12-07 RX ADMIN — IPRATROPIUM BROMIDE AND ALBUTEROL 1 PUFF: 20; 100 SPRAY, METERED RESPIRATORY (INHALATION) at 13:29

## 2021-12-07 RX ADMIN — Medication 10 ML: at 15:39

## 2021-12-07 RX ADMIN — Medication 10 ML: at 20:24

## 2021-12-07 RX ADMIN — Medication 10 ML: at 05:30

## 2021-12-07 RX ADMIN — ACETAMINOPHEN 650 MG: 325 TABLET ORAL at 01:25

## 2021-12-07 RX ADMIN — IPRATROPIUM BROMIDE AND ALBUTEROL 1 PUFF: 20; 100 SPRAY, METERED RESPIRATORY (INHALATION) at 20:37

## 2021-12-07 RX ADMIN — BENZONATATE 100 MG: 100 CAPSULE ORAL at 20:23

## 2021-12-07 RX ADMIN — GUAIFENESIN 600 MG: 600 TABLET ORAL at 09:35

## 2021-12-07 NOTE — PROGRESS NOTES
Medical Progress Note      NAME: Tanesha Rose   :  1945  MRM:  [id number]    Date/Time: 2021  8:15 AM         Subjective:     Patient seen today. No new complaints. He does not feel any better or worse. He is still febrile. However sating 93%. He now wants the drug baricitinib he initially declined yesterday with the pulmonologist.    Past Medical History reviewed and unchanged from Admission History and Physical    Review of Systems   Constitutional: Positive for fever. Negative for activity change. Respiratory: Positive for cough and shortness of breath. Cardiovascular: Negative. Gastrointestinal: Negative. Genitourinary: Negative. Musculoskeletal: Negative. Neurological: Negative. Psychiatric/Behavioral: Negative. Objective:       Vitals:      Last 24hrs VS reviewed since prior progress note. Most recent are:    Visit Vitals  /72 (BP 1 Location: Left upper arm, BP Patient Position: At rest)   Pulse 79   Temp 97.9 °F (36.6 °C)   Resp 20   SpO2 92%     SpO2 Readings from Last 6 Encounters:   21 92%   21 94%   10/25/21 96%   20 97%   20 97%   19 97%            Intake/Output Summary (Last 24 hours) at 2021 1617  Last data filed at 2021 0108  Gross per 24 hour   Intake    Output 300 ml   Net -300 ml          Exam:      Physical Exam  Constitutional:       General: He is not in acute distress. Appearance: He is ill-appearing. HENT:      Head: Normocephalic. Cardiovascular:      Rate and Rhythm: Normal rate and regular rhythm. Pulses: Normal pulses. Heart sounds: Normal heart sounds. Pulmonary:      Effort: Pulmonary effort is normal.      Breath sounds: Normal breath sounds. Skin:     General: Skin is warm. Neurological:      Mental Status: He is alert. Mental status is at baseline.            Medications:  Current Facility-Administered Medications   Medication Dose Route Frequency    baricitinib (OLUMIANT) tablet 4 mg  4 mg Oral DAILY    0.9% sodium chloride with KCl 20 mEq/L infusion   IntraVENous CONTINUOUS    cefTRIAXone (ROCEPHIN) 2 g in sterile water (preservative free) 20 mL IV syringe  2 g IntraVENous Q24H    guaiFENesin ER (MUCINEX) tablet 600 mg  600 mg Oral Q12H    benzonatate (TESSALON) capsule 100 mg  100 mg Oral TID PRN    dexamethasone (PF) (DECADRON) 10 mg/mL injection 6 mg  6 mg IntraVENous Q24H    azithromycin (ZITHROMAX) 500 mg in 0.9% sodium chloride 250 mL (VIAL-MATE)  500 mg IntraVENous Q24H    albuterol-ipratropium (DUO-NEB) 2.5 MG-0.5 MG/3 ML  3 mL Nebulization Q4H PRN    Or    ipratropium-albuterol (COMBIVENT RESPIMAT) 20 mcg-100 mcg inhalation spray  1 Puff Inhalation Q4H PRN    sodium chloride (NS) flush 5-40 mL  5-40 mL IntraVENous Q8H    sodium chloride (NS) flush 5-40 mL  5-40 mL IntraVENous PRN    acetaminophen (TYLENOL) tablet 650 mg  650 mg Oral Q6H PRN    Or    acetaminophen (TYLENOL) suppository 650 mg  650 mg Rectal Q6H PRN    polyethylene glycol (MIRALAX) packet 17 g  17 g Oral DAILY PRN    ondansetron (ZOFRAN ODT) tablet 4 mg  4 mg Oral Q8H PRN    Or    ondansetron (ZOFRAN) injection 4 mg  4 mg IntraVENous Q6H PRN    ipratropium-albuterol (COMBIVENT RESPIMAT) 20 mcg-100 mcg inhalation spray  1 Puff Inhalation Q6H RT       ______________________________________________________________________      Lab Review:     Recent Labs     12/07/21  1220 12/07/21  0257 12/07/21  0202   WBC 15.2* 13.8* 12.7*   HGB 12.1 13.2 12.8   HCT 35.2* 37.1 36.7   PLT 48* 42* 71*     Recent Labs     12/07/21  1209 12/07/21  0257 12/06/21  0846 12/05/21 2136 12/05/21 2136   * 132* 131*   < > 129*   K 3.7 3.6 3.1*   < > 3.1*    100 94*   < > 92*   CO2 23 24 26   < > 24   * 144* 129*   < > 135*   BUN 27* 28* 22*   < > 26*   CREA 1.02 1.00 1.00   < > 1.25   CA 7.2* 7.7* 7.5*   < > 8.1*   MG  --   --  2.3  --   --    PHOS  --   --  2.7  --   --    ALB  --   -- --   --  3.7   ALT  --   --   --   --  43    < > = values in this interval not displayed. No components found for: GLPOC  No results for input(s): PH, PCO2, PO2, HCO3, FIO2 in the last 72 hours. No results for input(s): INR, INREXT, INREXT, INREXT in the last 72 hours. Other pertinent lab:    Ref Range & Units 12/6/21 0846   Adenovirus NOTD   Not detected    Coronavirus 229E NOTD   Not detected    Coronavirus HKU1 NOTD   Not detected    Coronavirus CVNL63 NOTD   Not detected    Coronavirus OC43 NOTD   Not detected    SARS-CoV-2, PCR NOTD   Detected Panic     Metapneumovirus NOTD   Not detected    Rhinovirus and Enterovirus NOTD   Not detected    Influenza A NOTD   Not detected    Influenza A, subtype H1 NOTD   Not detected    Influenza A, subtype H3 NOTD   Not detected    INFLUENZA A H1N1 PCR NOTD   Not detected    Influenza B NOTD   Not detected    Parainfluenza 1 NOTD   Not detected    Parainfluenza 2 NOTD   Not detected    Parainfluenza 3 NOTD   Not detected    Parainfluenza virus 4 NOTD   Not detected    RSV by PCR NOTD   Not detected    B. parapertussis, PCR NOTD   Not detected    Bordetella pertussis - PCR NOTD   Not detected    Chlamydophila pneumoniae DNA, QL, PCR NOTD   Not detected    Mycoplasma pneumoniae DNA, QL, PCR NOTD   Not detected               Assessment:     Active Problems:    Acute respiratory failure with hypoxia ([facility] [address] (12/5/2021)      follow-up noncontrast chest CT should be considered at 12  months. Plan:     Risk of deterioration: medium               Covid Pneumonia   - Patient diagnosed Covid + on 12/2   - sating 99% on room air   - continue Decadron  - Patient declined baricitinib  - CXR reviewed . Left lobe opacity   - CTA chest reviewed. Diffuse bilateral airspace disease compatible with COVID pneumonia.  No acute pulmonary embolus  - continue IV Rocephin and IV Azithromycin for secondary bacteria infection in viral pneumonia   - Keep sat > 90%  - Albuterol inhaler as needed for bronchospasms  - Droplet and contact Isolation  - Monitor Inflammatory Marker   - Urine Legionella and strep Pneumonia pending   - Blood culture NGTD  - check UA   - Tylenol as needed  for fever  - pulmonology consult     Left Lung Nodule   - CTA chest reviewed.  Reveals a 6 mm nodule in the left lower lobe   - follow-up noncontrast chest CT should be considered at 12  months.     Thrombocytopenia  - Likely 2/2 above   - Monitor platelets closely   - transfuse if < 10K or < 50K with bleeding      Hyponatremia PTA  - Na 129   - Monitor closely q6h   - Likely due to pneumonia   - Check Serum Osm, Urine Osm and Na level      Hypokalemia  -K 3.1 pta  - Replace      Elevated ddimer   - dimer 3.74  - will obtain CTA chest to r/o PE     Elevated LFT's  - repeat Labs in am      Hx HTN  - Resume Norvasc         Code Status: Full code      DVT Prophylaxis: SCD's  GI Prophylaxis: not indicated    Disposition: TBD          Total time spent with patient: [phone] discussed with: Patient    Discussed:  Code Status    Prophylaxis:  Lovenox    Disposition:  Home w/Family           ___________________________________________________    Attending Physician: Nikole Velarde MD

## 2021-12-07 NOTE — PROGRESS NOTES
12/7/2021 2:30 PM  Care Management Assessment      Reason for Admission:Emergency -       ICD-10-CM ICD-9-CM    1. COVID  U07.1 079.89    2. Hyponatremia  E87.1 276.1    3. Abnormal LFTs  R94.5 790.6    4. Hypokalemia  E87.6 276.8        Assessment:   Via phone call with pt. Charted address and phone numbers confirmed. RUR: 13%  Risk Level: [x]Low []Moderate []High  Value-based purchasing: [] Yes [x] No  Bundle patient: [] Yes [x] No   Specify:     Advance Directive: Full Code. [x] No AD on file. Confirmed primary decision maker with pt.    [] AD on file. [] Current AD not on file. Copy requested. [] Requests AD, and referral submitted to [facility]. Healthcare Decision Maker:   Primary Decision Maker: Janice Song - [facility] - [phone]    Assessment:    Age: 77    Sex: [x] Male []Female     Residency: [x]Private residence []Apartment []Assisted Living []LTC []Other:   Exterior Steps: 0  Interior Steps: 0    Lives With: [x]With spouse []Other family members []Underage children []Alone []Care provider []Other:    Prior functioning:  [x]Independent with ADLs and iADLS []Dependent with ADLs and iADLs []Partial dependence, Specify:     Prior DME required:  [x]None []RW []Cane []Crutches []Bedside commode []CPAP []Home O2 (Liter/Provider: ) []Nebulizer   []Shower Chair []Wheelchair []Hospital Bed []Jack []Stair lift []Rollator []Other:    DME available: Pt reported his wife has a nebulizer    Rehab history: [x]None []Outpatient PT []Home Health (Provider/Date: ) []SNF (Provider/Date: ) []IPR (Provider/Date: ) []LTC (Provider/Date: ) []Hospice (Provider/Date: )  []Other:     Discharge Concerns: []Yes [x]No []Unknown   Describe:    Comments: Pt's wife is admitted with COVID to [facility] as well.      Insurer:   Insurance [employer]                [address] [employer] CHOICE PPO/PFJOSSUE Phone: [phone]    Subscriber: Ramon Do Subscriber#: [id number]    Group#: [id number] Precert#: --          PCP: Belén Hernandez   Address: [address] [place] [id number]   Phone number: [phone]   Current patient: [x]Yes []No   Approximate date of last visit: Unable to recall    Access to virtual PCP visits: []Yes [x]No    Pharmacy: [address] Transport: TBD, pt reported if his wife is feeling better she will transport him home. Transition of care plan:    -Unable to determine at this time. Awaiting clinical progress, and disposition recommendations. CM will follow for needs. DG Austin   Care Management Interventions  PCP Verified by CM:  Yes Pablo Bell Signup: No  Discharge Durable Medical Equipment: No  Physical Therapy Consult: No  Occupational Therapy Consult: No  Speech Therapy Consult: No  Support Systems: Spouse/Significant Other  Discharge Location  Discharge Placement: Home with family assistance

## 2021-12-07 NOTE — PROGRESS NOTES
Pulmonary, Critical Care, and Sleep Medicine~Consult Note    Name: Ulysses Fredrickson MRN: [id number]   : 1945 Hospital: [facility]   Date: 2021  Admission: 2021     Impression Plan   1. Acute hypoxemic respiratory failure  2. covid-19 pneumonia  3. Abnormal chest CT  4. Elevated d-dimer  5. Thrombocytopenia  6. hyponatremia 1. Cont supp o2 for goal sats >90%  2. Cont dexamethasone  3. He declines baricitinib  4. Trend ddimer, crp  5. Cont empiric abx for now. F/u cx  6. SCDs (no pharm dvt ppx with thrombocytopenia)  7. Will need repeat chest CT in 6-12 month to f/u lung nodule. He is a nonsmoker. Daily Progression:    HPI: 76 y/o unvaccinated M who was diagnosed with covid-19 in our ED on  presents with worsening shortness of breath. Symptoms began on . Hypoxemic to 81% in the ED. Currently on 2L with sats 99%. Febrile to 102.6    Labs: wbc 7.2, plt 32, ddimer 3.74, Na 131, pct 0.09, crp 5.63    CXR with LLL opacity    ROS: Feeling ok currently. Repeatedly asks if he can get up to go to the bathroom. Denies SOB, cough, chest pain, leg pain/swelling. Social hx: non-smoker    I have reviewed the labs and previous days notes. Interval history  Tmax 103.1  Sats 93% on 2L  WBC 13.8  Plt 42  Na 132  D-dimer 2.98  Blood cx no growth x 16 hrs    Chest CTA (2021), personally visualized and report reviewed. .Diffuse bilateral airspace disease compatible with COVID pneumonia. No acute pulmonary embolus. 6 mm nodule right lower lobe. ROS: Reports hiccups and fever. Denies SOB. Coughing during exam.  Denies CP. Denies abd pain or LE pain/swelling.     Past Medical History:   Diagnosis Date    GERD (gastroesophageal reflux disease)     Hypertension       Past Surgical History:   Procedure Laterality Date    COLONOSCOPY N/A 2017    COLONOSCOPY performed by Hamlet Vaughn MD at [address] HX APPENDECTOMY      HX CATARACT REMOVAL Right       Prior to Admission medications    Medication Sig Start Date End Date Taking? Authorizing Provider   ascorbic acid, vitamin C, (VITAMIN C) 1,000 mg tablet Take 1,000 mg by mouth daily. Yes Provider, Historical   amLODIPine (NORVASC) 5 mg tablet Take 5 mg by mouth daily. Yes Provider, Historical   ergocalciferol (VITAMIN D2) 50,000 unit capsule Take 50,000 Units by mouth. Take every other Monday   Yes Provider, Historical     No Known Allergies   Social History     Tobacco Use    Smoking status: Never Smoker    Smokeless tobacco: Never Used   Substance Use Topics    Alcohol use: No      Family History   Problem Relation Age of Onset    Hypertension Mother      OBJECTIVE:     Vital Signs:       Visit Vitals  /75 (BP 1 Location: Left upper arm, BP Patient Position: At rest)   Pulse 94   Temp (!) 101.5 °F (38.6 °C)   Resp 16   SpO2 93%      Temp (24hrs), Av.8 °F (37.7 °C), Min:97.5 °F (36.4 °C), Max:103.1 °F (39.5 °C)     Intake/Output:     Last shift: No intake/output data recorded.     Last 3 shifts:  1901 -  0700  In: -   Out: 300 [Urine:300]          Intake/Output Summary (Last 24 hours) at 2021 1787  Last data filed at 2021 0108  Gross per 24 hour   Intake    Output 300 ml   Net -300 ml       Physical Exam:                                        Exam Findings Other   General: No resp distress noted, appears stated age, frequent hiccups    HEENT:  No ulcers, JVD not elevated, no cervical LAD    Chest: No pectus deformity, normal chest rise b/l    HEART:  RRR, no murmurs/rubs/gallops    Lungs:  Normal resp effort, CTAB    ABD: Soft/NT, non rigid mildly distended    EXT: No cyanosis/clubbing/edema, normal peripheral pulses    Skin: No rashes or ulcers, no mottling    Neuro: A/O x 3        Medications:  Current Facility-Administered Medications   Medication Dose Route Frequency    0.9% sodium chloride with KCl 20 mEq/L infusion   IntraVENous CONTINUOUS  cefTRIAXone (ROCEPHIN) 2 g in sterile water (preservative free) 20 mL IV syringe  2 g IntraVENous Q24H    guaiFENesin ER (MUCINEX) tablet 600 mg  600 mg Oral Q12H    benzonatate (TESSALON) capsule 100 mg  100 mg Oral TID PRN    dexamethasone (PF) (DECADRON) 10 mg/mL injection 6 mg  6 mg IntraVENous Q24H    azithromycin (ZITHROMAX) 500 mg in 0.9% sodium chloride 250 mL (VIAL-MATE)  500 mg IntraVENous Q24H    albuterol-ipratropium (DUO-NEB) 2.5 MG-0.5 MG/3 ML  3 mL Nebulization Q4H PRN    Or    ipratropium-albuterol (COMBIVENT RESPIMAT) 20 mcg-100 mcg inhalation spray  1 Puff Inhalation Q4H PRN    sodium chloride (NS) flush 5-40 mL  5-40 mL IntraVENous Q8H    sodium chloride (NS) flush 5-40 mL  5-40 mL IntraVENous PRN    acetaminophen (TYLENOL) tablet 650 mg  650 mg Oral Q6H PRN    Or    acetaminophen (TYLENOL) suppository 650 mg  650 mg Rectal Q6H PRN    polyethylene glycol (MIRALAX) packet 17 g  17 g Oral DAILY PRN    ondansetron (ZOFRAN ODT) tablet 4 mg  4 mg Oral Q8H PRN    Or    ondansetron (ZOFRAN) injection 4 mg  4 mg IntraVENous Q6H PRN    ipratropium-albuterol (COMBIVENT RESPIMAT) 20 mcg-100 mcg inhalation spray  1 Puff Inhalation Q6H RT       Labs:  ABG No results for input(s): PHI, PCO2I, PO2I, HCO3I, SO2I, FIO2I in the last 72 hours.      CBC Recent Labs     12/07/21  0257 12/07/21  0202 12/05/21  2136   WBC 13.8* 12.7* 7.2   HGB 13.2 12.8 13.6   HCT 37.1 36.7 39.3   PLT 42* 71* 32*   MCV 80.8 81.7 82.6   MCH 28.8 28.5 51.8        Metabolic  Panel Recent Labs     12/07/21  0257 12/06/21  0846 12/05/21  2136   * 131* 129*   K 3.6 3.1* 3.1*    94* 92*   CO2 24 26 24   * 129* 135*   BUN 28* 22* 26*   CREA 1.00 1.00 1.25   CA 7.7* 7.5* 8.1*   MG  --  2.3  --    PHOS  --  2.7  --    ALB  --   --  3.7   ALT  --   --  [place] [address] [place] [address]  12/7/2021

## 2021-12-07 NOTE — ACP (ADVANCE CARE PLANNING)
Advance Care Planning   Healthcare Decision Maker:       Primary Decision Maker: Lady Baptiste - [phone]    Click here to complete [address] including selection of the Healthcare Decision Maker Relationship (ie \"Primary\")  Today we documented Decision Maker(s) consistent with Legal Next of Kin hierarchy. Conversation completed over the phone with pt. Pt reported he does not have any other family he would want to add as a back up to his wife.    JULIO ShelbyW

## 2021-12-07 NOTE — PROGRESS NOTES
Fall Risk Interventions:                        Medication Interventions: Patient to call before getting OOB                                                                    Most Recent Note                                                                     My Note                                      Note Details                     Date of Service:                          Type:              Service:                                                                                       Insert SmartText                                                                                                                                                                                                                               Pend                                        Sign                                Cancel                                                        Fall Risk Interventions:                        Medication Interventions: Patient to call before getting OOB

## 2021-12-07 NOTE — PROGRESS NOTES
Due to Contact Restrictions did not enter the room. Provided prayer at [facility] patient's doorway. Did not include sacramental care.     Cam Ortiz

## 2021-12-07 NOTE — PROGRESS NOTES
Doctor paged at [address] am for pt of fever 103.1 F. tylenol administered per protocol. Ordered obtained to put cold washcloth on pt head and notify if tylenol doesn't break fever.

## 2021-12-07 NOTE — PROGRESS NOTES
Critical value obtained by Prabhjot Haley. Platelet 42. Doctor notified and no order obtained at time.

## 2021-12-07 NOTE — PROGRESS NOTES
Baricitinib Initiation Note    This patient meets criteria for initiation of baricitinib based on the following:  Confirmed COVID-19 (+) and hospitalized  Requiring oxygen support - specific O2 saturation is not a determinant for baricitinib  Elevated inflammatory markers (CRP, D-dimer, LDH, or ferritin). Concomitant therapy with dexamethasone 6-20 mg IV/PO daily (or equivalent steroid)  Do not give if patient has already received tocilizumab for COVID-19 treatment due to long half-life of tocilizumab (16 days)     Prescribers should weigh risks/benefits before initiating therapy in patients with the following:   Pregnancy  Severe hepatic impairment  Chronic/recurrent infections  Hemoglobin <8.0 g/dL   History/current thrombosis    Plan:  - Baricitinib 4 mg po daily X 14 days or until hospital discharge, whichever is sooner, has been ordered. - Renal function will be monitored daily while on baricitinib. -VTE prophylaxis is recommended unless contraindicated.     Thank you  Gabby Velásquez, PharmD  [phone]

## 2021-12-07 NOTE — PROGRESS NOTES
[address] visit. Mr. Hillary Winkler is [address]. He is unable to receive communion due to medical restrictions. Prayer for spiritual communion offered outside his room.     IGOR French, RN, ACSW, LCSW   Page:  [phone]

## 2021-12-07 NOTE — PROGRESS NOTES
Fall Risk Interventions:  Mobility Interventions: Communicate number of staff needed for ambulation/transfer           Medication Interventions: Patient to call before getting OOB     Elimination Interventions: Call light in reach

## 2021-12-08 PROBLEM — I10 HTN (HYPERTENSION), BENIGN: Status: ACTIVE | Noted: 2021-12-08

## 2021-12-08 PROBLEM — U07.1 PNEUMONIA DUE TO COVID-19 VIRUS: Status: ACTIVE | Noted: 2021-12-08

## 2021-12-08 PROBLEM — D69.6 THROMBOCYTOPENIA (HCC): Status: ACTIVE | Noted: 2021-12-08

## 2021-12-08 PROBLEM — J12.82 PNEUMONIA DUE TO COVID-19 VIRUS: Status: ACTIVE | Noted: 2021-12-08

## 2021-12-08 LAB
ANION GAP SERPL CALC-SCNC: 4 MMOL/L (ref 5–15)
BASOPHILS # BLD: 0 K/UL (ref 0–0.1)
BASOPHILS NFR BLD: 0 % (ref 0–1)
BUN SERPL-MCNC: 28 MG/DL (ref 6–20)
BUN/CREAT SERPL: 29 (ref 12–20)
CALCIUM SERPL-MCNC: 7.5 MG/DL (ref 8.5–10.1)
CHLORIDE SERPL-SCNC: 101 MMOL/L (ref 97–108)
CO2 SERPL-SCNC: 26 MMOL/L (ref 21–32)
CREAT SERPL-MCNC: 0.98 MG/DL (ref 0.7–1.3)
DIFFERENTIAL METHOD BLD: ABNORMAL
EOSINOPHIL # BLD: 0.1 K/UL (ref 0–0.4)
EOSINOPHIL NFR BLD: 1 % (ref 0–7)
ERYTHROCYTE [DISTWIDTH] IN BLOOD BY AUTOMATED COUNT: 15.9 % (ref 11.5–14.5)
GLUCOSE SERPL-MCNC: 118 MG/DL (ref 65–100)
HCT VFR BLD AUTO: 36.6 % (ref 36.6–50.3)
HGB BLD-MCNC: 12.3 G/DL (ref 12.1–17)
IMM GRANULOCYTES # BLD AUTO: 0 K/UL
IMM GRANULOCYTES NFR BLD AUTO: 0 %
L PNEUMO1 AG UR QL IA: NEGATIVE
LYMPHOCYTES # BLD: 1 K/UL (ref 0.8–3.5)
LYMPHOCYTES NFR BLD: 7 % (ref 12–49)
MCH RBC QN AUTO: 29.1 PG (ref 26–34)
MCHC RBC AUTO-ENTMCNC: 33.6 G/DL (ref 30–36.5)
MCV RBC AUTO: 86.5 FL (ref 80–99)
METAMYELOCYTES NFR BLD MANUAL: 1 %
MONOCYTES # BLD: 2.6 K/UL (ref 0–1)
MONOCYTES NFR BLD: 18 % (ref 5–13)
NEUTS SEG # BLD: 10.4 K/UL (ref 1.8–8)
NEUTS SEG NFR BLD: 73 % (ref 32–75)
NRBC # BLD: 0 K/UL (ref 0–0.01)
NRBC BLD-RTO: 0 PER 100 WBC
PLATELET # BLD AUTO: 61 K/UL (ref 150–400)
PMV BLD AUTO: ABNORMAL FL (ref 8.9–12.9)
POTASSIUM SERPL-SCNC: 4.3 MMOL/L (ref 3.5–5.1)
RBC # BLD AUTO: 4.23 M/UL (ref 4.1–5.7)
RBC MORPH BLD: ABNORMAL
SODIUM SERPL-SCNC: 131 MMOL/L (ref 136–145)
SPECIMEN SOURCE: NORMAL
WBC # BLD AUTO: 14.3 K/UL (ref 4.1–11.1)

## 2021-12-08 PROCEDURE — 94640 AIRWAY INHALATION TREATMENT: CPT

## 2021-12-08 PROCEDURE — 94760 N-INVAS EAR/PLS OXIMETRY 1: CPT

## 2021-12-08 PROCEDURE — 74011000250 HC RX REV CODE- 250: Performed by: INTERNAL MEDICINE

## 2021-12-08 PROCEDURE — 77030012794 HC KT NSL CANN/HGH TRAN -A

## 2021-12-08 PROCEDURE — 65660000000 HC RM CCU STEPDOWN

## 2021-12-08 PROCEDURE — 74011250636 HC RX REV CODE- 250/636: Performed by: PHYSICIAN ASSISTANT

## 2021-12-08 PROCEDURE — 80048 BASIC METABOLIC PNL TOTAL CA: CPT

## 2021-12-08 PROCEDURE — 74011250637 HC RX REV CODE- 250/637: Performed by: INTERNAL MEDICINE

## 2021-12-08 PROCEDURE — 77010033678 HC OXYGEN DAILY

## 2021-12-08 PROCEDURE — 74011250637 HC RX REV CODE- 250/637: Performed by: FAMILY MEDICINE

## 2021-12-08 PROCEDURE — 74011250636 HC RX REV CODE- 250/636: Performed by: INTERNAL MEDICINE

## 2021-12-08 PROCEDURE — 85025 COMPLETE CBC W/AUTO DIFF WBC: CPT

## 2021-12-08 RX ORDER — DEXAMETHASONE SODIUM PHOSPHATE 10 MG/ML
6 INJECTION INTRAMUSCULAR; INTRAVENOUS EVERY 12 HOURS
Status: DISCONTINUED | OUTPATIENT
Start: 2021-12-08 | End: 2021-12-13

## 2021-12-08 RX ADMIN — GUAIFENESIN 600 MG: 600 TABLET ORAL at 21:04

## 2021-12-08 RX ADMIN — ACETAMINOPHEN 650 MG: 325 TABLET ORAL at 03:14

## 2021-12-08 RX ADMIN — IPRATROPIUM BROMIDE AND ALBUTEROL 1 PUFF: 20; 100 SPRAY, METERED RESPIRATORY (INHALATION) at 09:56

## 2021-12-08 RX ADMIN — Medication 10 ML: at 21:05

## 2021-12-08 RX ADMIN — Medication 10 ML: at 18:07

## 2021-12-08 RX ADMIN — WATER 2 G: 1 INJECTION INTRAMUSCULAR; INTRAVENOUS; SUBCUTANEOUS at 08:49

## 2021-12-08 RX ADMIN — BARICITINIB 4 MG: 2 TABLET, FILM COATED ORAL at 09:00

## 2021-12-08 RX ADMIN — POTASSIUM CHLORIDE AND SODIUM CHLORIDE: 900; 150 INJECTION, SOLUTION INTRAVENOUS at 06:10

## 2021-12-08 RX ADMIN — IPRATROPIUM BROMIDE AND ALBUTEROL 1 PUFF: 20; 100 SPRAY, METERED RESPIRATORY (INHALATION) at 14:46

## 2021-12-08 RX ADMIN — GUAIFENESIN AND CODEINE PHOSPHATE 5 ML: 100; 10 SOLUTION ORAL at 03:10

## 2021-12-08 RX ADMIN — POTASSIUM CHLORIDE AND SODIUM CHLORIDE: 900; 150 INJECTION, SOLUTION INTRAVENOUS at 21:04

## 2021-12-08 RX ADMIN — AZITHROMYCIN DIHYDRATE 500 MG: 500 INJECTION, POWDER, LYOPHILIZED, FOR SOLUTION INTRAVENOUS at 08:49

## 2021-12-08 RX ADMIN — IPRATROPIUM BROMIDE AND ALBUTEROL 1 PUFF: 20; 100 SPRAY, METERED RESPIRATORY (INHALATION) at 21:05

## 2021-12-08 RX ADMIN — GUAIFENESIN 600 MG: 600 TABLET ORAL at 08:49

## 2021-12-08 RX ADMIN — DEXAMETHASONE SODIUM PHOSPHATE 6 MG: 10 INJECTION INTRAMUSCULAR; INTRAVENOUS at 21:04

## 2021-12-08 RX ADMIN — IPRATROPIUM BROMIDE AND ALBUTEROL 1 PUFF: 20; 100 SPRAY, METERED RESPIRATORY (INHALATION) at 02:01

## 2021-12-08 NOTE — PROGRESS NOTES
12/8/2021  4:23 PM  TRANSFER - IN REPORT:    Verbal report received  From Kaitlin Amador on Ijeoma Adams  being transferred to [facility] Rm 330  for transfer of care  Report consisted of patients Situation, Background, Assessment and   Recommendations(SBAR). Transition of Care Plan from [address] Low Risk of readmission    Is This a Readmission NO  Is this a Bundle NO    Care Management Progress Note         ICD-10-CM ICD-9-CM     1. COVID  U07.1 079.89     2. Hyponatremia  E87.1 276.1     3. Abnormal LFTs  R94.5 790.6     4. Hypokalemia  E87.6 276. 8           RUR:  13%  Risk Level: []? Low [x]? Moderate []? High  Value-based purchasing: []? Yes [x]? No  Bundle patient: []? Yes [x]? No              Specify:      Transition of care plan:  1. Awaiting medical clearance and DC order. Pulm following. Pt requiring more O2 - 8L midflow. Pt likely to be transferred to [facility]. 2. TBD based on medical progression. NOTE: Pt's healthcare decision maker also hospitalized, and pt has no other NOK. 3. Outpatient follow-up. 4. Pt's family to transport.     Severiano Monarch, BS

## 2021-12-08 NOTE — PROGRESS NOTES
1651: TRANSFER - IN REPORT:    Verbal report received from [facility]) on Rowena Childers  being received from 4th floor(unit) for change in patient condition(Increased oxygen demands)      Report consisted of patients Situation, Background, Assessment and   Recommendations(SBAR). Information from the following report(s) SBAR, Kardex, Accordion and Cardiac Rhythm NSR was reviewed with the receiving nurse. Opportunity for questions and clarification was provided. Assessment completed upon patients arrival to unit and care assumed. This patient was assisted with Intentional Toileting every 2 hours during this shift as appropriate. Documentation of ambulation and output reflected on Flowsheet as appropriate. Purposeful hourly rounding was completed using AIDET and 5Ps. Outcomes of PHR documented as they occurred. Bed alarm in use as appropriate. Dual Suction and ambubag in place.

## 2021-12-08 NOTE — PROGRESS NOTES
12/8/2021     4:04 PM  TRANSFER - OUT REPORT:    Verbal report given to Liliana Byrd on Monalisa Conway being transferred to [facility] for change in patient condition(O2)   Report consisted of patients Situation, Background, Assessment and   Recommendations(SBAR). 2:35 PM  Care Management Progress Note      ICD-10-CM ICD-9-CM    1. COVID  U07.1 079.89    2. Hyponatremia  E87.1 276.1    3. Abnormal LFTs  R94.5 790.6    4. Hypokalemia  E87.6 276.8        RUR:  13%  Risk Level: []Low [x]Moderate []High  Value-based purchasing: [] Yes [x] No  Bundle patient: [] Yes [x] No   Specify:     Transition of care plan:  1. Awaiting medical clearance and DC order. Pulm following. Pt requiring more O2 - 8L midflow. Pt likely to be transferred to [facility]. 2. TBD based on medical progression. NOTE: Pt's healthcare decision maker also hospitalized, and pt has no other NOK. 3. Outpatient follow-up. 4. Pt's family to transport.

## 2021-12-08 NOTE — PROGRESS NOTES
Lenin Lujan [facility] 79  [address] [facility], [address] [place] [address]  [phone]      Medical Progress Note      NAME:         Eli Paul   :        1945  MRM:        [id number]    Date of service: 2021      Subjective: Patient has been seen and examined as a follow up for multiple medical issues. Chart, labs, diagnostics reviewed. He seems more hypoxic. Has an intermittent cough. Denies any SOB but now with increased oxygen requirement     Objective:    Vital Signs:    Visit Vitals  /70 (BP 1 Location: Left upper arm, BP Patient Position: At rest)   Pulse (!) 101   Temp 98.1 °F (36.7 °C)   Resp 20   SpO2 96%      No intake or output data in the 24 hours ending 21 1645     Physical Examination:    General:   Weak looking although not in any acute distress   Eyes:   pink conjunctivae, PERRLA with no discharge. ENT:   no ottorrhea or rhinorrhea with dry mucous membranes  Neck: no masses, thyroid non-tender and trachea central.  Pulm:  no accessory muscle use, decreased breath sounds without crackles or wheezes  Card:  no JVD or murmurs, has regular and normal S1, S2 without thrills, bruits or peripheral edema  Abd:  Soft, non-tender, non-distended, normoactive bowel sounds   Musc:  No cyanosis, clubbing, atrophy or deformities. Skin:  No rashes, bruising or ulcers. Neuro: Awake and alert.  Generally a non focal exam. Follows commands appropriately  Psych:  Has a fair insight to his illness     Current Facility-Administered Medications   Medication Dose Route Frequency    dexamethasone (PF) (DECADRON) 10 mg/mL injection 6 mg  6 mg IntraVENous Q12H    baricitinib (OLUMIANT) tablet 4 mg  4 mg Oral DAILY    guaiFENesin-codeine (ROBITUSSIN AC) 100-10 mg/5 mL solution 5 mL  5 mL Oral Q4H PRN    0.9% sodium chloride with KCl 20 mEq/L infusion   IntraVENous CONTINUOUS    cefTRIAXone (ROCEPHIN) 2 g in sterile water (preservative free) 20 mL IV syringe  2 g IntraVENous Q24H    guaiFENesin ER (MUCINEX) tablet 600 mg  600 mg Oral Q12H    benzonatate (TESSALON) capsule 100 mg  100 mg Oral TID PRN    azithromycin (ZITHROMAX) 500 mg in 0.9% sodium chloride 250 mL (VIAL-MATE)  500 mg IntraVENous Q24H    albuterol-ipratropium (DUO-NEB) 2.5 MG-0.5 MG/3 ML  3 mL Nebulization Q4H PRN    Or    ipratropium-albuterol (COMBIVENT RESPIMAT) 20 mcg-100 mcg inhalation spray  1 Puff Inhalation Q4H PRN    sodium chloride (NS) flush 5-40 mL  5-40 mL IntraVENous Q8H    sodium chloride (NS) flush 5-40 mL  5-40 mL IntraVENous PRN    acetaminophen (TYLENOL) tablet 650 mg  650 mg Oral Q6H PRN    Or    acetaminophen (TYLENOL) suppository 650 mg  650 mg Rectal Q6H PRN    polyethylene glycol (MIRALAX) packet 17 g  17 g Oral DAILY PRN    ondansetron (ZOFRAN ODT) tablet 4 mg  4 mg Oral Q8H PRN    Or    ondansetron (ZOFRAN) injection 4 mg  4 mg IntraVENous Q6H PRN    ipratropium-albuterol (COMBIVENT RESPIMAT) 20 mcg-100 mcg inhalation spray  1 Puff Inhalation Q6H RT        Laboratory data and review:    Recent Labs     12/08/21  0140 12/07/21  1220 12/07/21  0257   WBC 14.3* 15.2* 13.8*   HGB 12.3 12.1 13.2   HCT 36.6 35.2* 37.1   PLT 61* 48* 42*     Recent Labs     12/08/21  0140 12/07/21  1209 12/07/21  0257 12/06/21  0846 12/06/21  0846 12/05/21  2136 12/05/21 2136   * 134* 132*   < > 131*   < > 129*   K 4.3 3.7 3.6   < > 3.1*   < > 3.1*    100 100   < > 94*   < > 92*   CO2 26 23 24   < > 26   < > 24   * 147* 144*   < > 129*   < > 135*   BUN 28* 27* 28*   < > 22*   < > 26*   CREA 0.98 1.02 1.00   < > 1.00   < > 1.25   CA 7.5* 7.2* 7.7*   < > 7.5*   < > 8.1*   MG  --   --   --   --  2.3  --   --    PHOS  --   --   --   --  2.7  --   --    ALB  --   --   --   --   --   --  3.7   ALT  --   --   --   --   --   --  43    < > = values in this interval not displayed.      No components found for: [place]    Diagnostics: Imaging studies have been reviewed    Telemetry reviewed by me:   normal sinus rhythm    Assessment and Plan:    Acute respiratory failure with hypoxia ([address] (12/5/2021) POA: now with worsening and increased oxygen requirements. Due to CoV-19 virus infection. CTA showed no PE. Increase to midflow oxygen. Transfer to telemetry and monitor closely. Pneumonia due to COVID-19 virus (12/8/2021) POA:  Diagnosed 12/2. Cultures and serologies neg thus far. Continue Baricitinib, Dexamethasone, empiric IV Ceftriaxone and Azithromycin. No chemical DVT prophylaxis due to the thrombocytopenia    HTN (hypertension), benign (12/8/2021) POA: BP stable. Continue to monitor     Hyponatremia POA: likely from a decreased intake. Monitor    Thrombocytopenia ([facility] [address] [address] (12/8/2021) POA: seems chronic.  Monitor    Total time spent for the patient's care: [address] discussed with: Patient and Nursing Staff    Discussed:  Care Plan and D/C Planning    Prophylaxis:  SCD's    Anticipated Disposition:  Home w/Family           ___________________________________________________    Attending Physician:   Aryan Diaz MD

## 2021-12-08 NOTE — PROGRESS NOTES
Consulted respiratory therapy pat found sitting on the side of the bed in pod position breathing 32 breaths per minute, pt assisted back to bed spo2 82% with pleth on 5 liters at 30 breath per minute on 5 liters pt is in no apparent distress, noted wheezing expiratory at this time. Nurse notified of finding above.

## 2021-12-08 NOTE — PROGRESS NOTES
Pulmonary, Critical Care, and Sleep Medicine~Consult Note    Name: Bautista Wong MRN: [id number]   : 1945 Hospital: [facility]  [place]    Date: 2021  Admission: 2021     Impression Plan   1. Acute hypoxemic respiratory failure  2. covid-19 pneumonia  3. Abnormal chest CT  4. Elevated d-dimer  5. Thrombocytopenia  6. hyponatremia 1. Cont supp o2 for goal sats >90%  2. Cont dexamethasone - increase to q12hr  3. Continue baricitinib, monitor LFTs  4. Trend ddimer, crp  5. Cont empiric abx for now. F/u cx  6. SCDs (no pharm dvt ppx with thrombocytopenia)  7. Will need repeat chest CT in 6-12 month to f/u lung nodule. He is a nonsmoker. Daily Progression:    HPI: 76 y/o unvaccinated M who was diagnosed with covid-19 in our ED on  presents with worsening shortness of breath. Symptoms began on . Hypoxemic to 81% in the ED. Currently on 2L with sats 99%. Febrile to 102.6    Labs: wbc 7.2, plt 32, ddimer 3.74, Na 131, pct 0.09, crp 5.63    CXR with LLL opacity    ROS: Feeling ok currently. Repeatedly asks if he can get up to go to the bathroom. Denies SOB, cough, chest pain, leg pain/swelling. Social hx: non-smoker    I have reviewed the labs and previous days notes. Interval history  Tmax 101  Sats 96% on 5L  WBC 14.3  Plt 61 - better  Na 131  D-dimer 2.25 - better  CRP 8.21 - worse  Blood cx no growth x 2 days    Chest CTA (2021), personally visualized and report reviewed. .Diffuse bilateral airspace disease compatible with COVID pneumonia. No acute pulmonary embolus. 6 mm nodule right lower lobe. ROS: Feeling worse today. Did finally agree to initiate baricitinib,  Continues with fevers, cough,. Denies CP. Denies abd pain or LE pain/swelling.     Past Medical History:   Diagnosis Date    GERD (gastroesophageal reflux disease)     Hypertension       Past Surgical History:   Procedure Laterality Date    COLONOSCOPY N/A 2017    COLONOSCOPY performed by Evan Byrd MD at [address] [place] [address] HX APPENDECTOMY      HX CATARACT REMOVAL Right       Prior to Admission medications    Medication Sig Start Date End Date Taking? Authorizing Provider   ascorbic acid, vitamin C, (VITAMIN C) 1,000 mg tablet Take 1,000 mg by mouth daily. Yes Provider, Historical   amLODIPine (NORVASC) 5 mg tablet Take 5 mg by mouth daily. Yes Provider, Historical   ergocalciferol (VITAMIN D2) 50,000 unit capsule Take 50,000 Units by mouth. Take every other Monday   Yes Provider, Historical     No Known Allergies   Social History     Tobacco Use    Smoking status: Never Smoker    Smokeless tobacco: Never Used   Substance Use Topics    Alcohol use: No      Family History   Problem Relation Age of Onset    Hypertension Mother      OBJECTIVE:     Vital Signs:       Visit Vitals  BP (!) 150/83   Pulse 77   Temp 98.2 °F (36.8 °C)   Resp 18   SpO2 96%      Temp (24hrs), Av.9 °F (37.2 °C), Min:97.9 °F (36.6 °C), Max:101 °F (38.3 °C)     Intake/Output:     Last shift: No intake/output data recorded. Last 3 shifts:  1901 -  0700  In: -   Out: 300 [Urine:300]        No intake or output data in the 24 hours ending 21 0932    Physical Exam:                                        Exam Findings Other   General: No resp distress noted, appears stated age, frequent hiccups    HEENT:  No ulcers, JVD not elevated, no cervical LAD    Chest: No pectus deformity, normal chest rise b/l    HEART:  RRR, no murmurs/rubs/gallops    Lungs:  Crackles in the bases and coughing.     ABD: Soft/NT, non rigid mildly distended    EXT: No cyanosis/clubbing/edema, normal peripheral pulses    Skin: No rashes or ulcers, no mottling    Neuro: A/O x 3        Medications:  Current Facility-Administered Medications   Medication Dose Route Frequency    baricitinib (OLUMIANT) tablet 4 mg  4 mg Oral DAILY    guaiFENesin-codeine (ROBITUSSIN AC) 100-10 mg/5 mL solution 5 mL  5 mL Oral Q4H PRN    0.9% sodium chloride with KCl 20 mEq/L infusion   IntraVENous CONTINUOUS    cefTRIAXone (ROCEPHIN) 2 g in sterile water (preservative free) 20 mL IV syringe  2 g IntraVENous Q24H    guaiFENesin ER (MUCINEX) tablet 600 mg  600 mg Oral Q12H    benzonatate (TESSALON) capsule 100 mg  100 mg Oral TID PRN    dexamethasone (PF) (DECADRON) 10 mg/mL injection 6 mg  6 mg IntraVENous Q24H    azithromycin (ZITHROMAX) 500 mg in 0.9% sodium chloride 250 mL (VIAL-MATE)  500 mg IntraVENous Q24H    albuterol-ipratropium (DUO-NEB) 2.5 MG-0.5 MG/3 ML  3 mL Nebulization Q4H PRN    Or    ipratropium-albuterol (COMBIVENT RESPIMAT) 20 mcg-100 mcg inhalation spray  1 Puff Inhalation Q4H PRN    sodium chloride (NS) flush 5-40 mL  5-40 mL IntraVENous Q8H    sodium chloride (NS) flush 5-40 mL  5-40 mL IntraVENous PRN    acetaminophen (TYLENOL) tablet 650 mg  650 mg Oral Q6H PRN    Or    acetaminophen (TYLENOL) suppository 650 mg  650 mg Rectal Q6H PRN    polyethylene glycol (MIRALAX) packet 17 g  17 g Oral DAILY PRN    ondansetron (ZOFRAN ODT) tablet 4 mg  4 mg Oral Q8H PRN    Or    ondansetron (ZOFRAN) injection 4 mg  4 mg IntraVENous Q6H PRN    ipratropium-albuterol (COMBIVENT RESPIMAT) 20 mcg-100 mcg inhalation spray  1 Puff Inhalation Q6H RT       Labs:  ABG No results for input(s): PHI, PCO2I, PO2I, HCO3I, SO2I, FIO2I in the last 72 hours.      CBC Recent Labs     12/08/21  0140 12/07/21  1220 12/07/21  0257   WBC 14.3* 15.2* 13.8*   HGB 12.3 12.1 13.2   HCT 36.6 35.2* 37.1   PLT 61* 48* 42*   MCV 86.5 82.4 80.8   MCH 29.1 28.3 27.5        Metabolic  Panel Recent Labs     12/08/21  0140 12/07/21  1209 12/07/21  0257 12/06/21  0846 12/06/21  0846 12/05/21  2136 12/05/21 2136   * 134* 132*   < > 131*   < > 129*   K 4.3 3.7 3.6   < > 3.1*   < > 3.1*    100 100   < > 94*   < > 92*   CO2 26 23 24   < > 26   < > 24   * 147* 144*   < > 129*   < > 135*   BUN 28* 27* 28*   < > 22*   < > 26*   CREA 0.98 1.02 1.00   < > 1.00   < > 1.25   CA 7.5* 7.2* 7.7*   < > 7.5*   < > 8.1*   MG  --   --   --   --  2.3  --   --    PHOS  --   --   --   --  2.7  --   --    ALB  --   --   --   --   --   --  3.7   ALT  --   --   --   --   --   --  43    < > = values in this interval not displayed.                     Don Araiza [place]  12/8/2021

## 2021-12-08 NOTE — PROGRESS NOTES
Due to Contact Restrictions did not enter the room. Provided prayer at [facility] 5 patient's doorway. Did not include sacramental care.     Meaghan Verde

## 2021-12-08 NOTE — PROGRESS NOTES
Physician Progress Note      Alphonso Glynn  [facility] #:                  [id number]  :                       1945  ADMIT DATE:       2021 9:07 PM  [address] [place] DATE:  RESPONDING  PROVIDER #:        Saloni Teague MD          QUERY TEXT:    Patient admitted with COVID pneumonia  Noted documentation of acute respiratory failure . Blanchie Bevels In order to support the diagnosis of acute respiratory failure, please include additional clinical indicators in your documentation. Or please document if the diagnosis of acute respiratory failure has been ruled out after further study. The medical record reflects the following:  Risk Factors: SOB  Clinical Indicators:   pulmonary  1. Acute hypoxemic respiratory failure  ED  Pt is brought in by EMS. EMS reports that pt was seen in our facility on the  and was diagnosed Covid-19 positive. Pt complains of SOB. Pt is on room air and 96% however, pt has been having bronchospasms. Pulmonary:  Effort: Pulmonary effort is normal. No respiratory distress. Breath sounds: Normal breath sounds. No wheezing.     Treatment: no supplemental oxygen    Thank you,  Anoop Brewster RN, CDI, CRCR, CCDS  Certified Clinical   [phone]        Acute Respiratory Failure Clinical Indicators per [employer] Training Guide and Quick Reference Guide:  pO2 < 60 mmHg or SpO2 (pulse oximetry) < 91% breathing room air  pCO2 > 50 and pH < 7.35  P/F ratio (pO2 / FIO2) < 300  pO2 decrease or pCO2 increase by 10 mmHg from baseline (if known)  Supplemental oxygen of 40% or more  Presence of respiratory distress, tachypnea, dyspnea, shortness of breath, wheezing  Unable to speak in complete sentences  Use of accessory muscles to breathe  Extreme anxiety and feeling of impending doom  Tripod position  Confusion/altered mental status/obtunded  Options provided:  -- Acute Hypoxic Respiratory Failure as evidenced by, Please document evidence. -- Acute Respiratory Failure ruled out after study  -- Other - I will add my own diagnosis  -- Disagree - Not applicable / Not valid  -- Disagree - Clinically unable to determine / Unknown  -- Refer to Clinical Documentation Reviewer    PROVIDER RESPONSE TEXT:    Acute Respiratory Failure has been ruled out after study.     Query created by: Priscila Johnson on 12/7/2021 11:35 AM      Electronically signed by:  Georges Valverde MD 12/8/2021 1:20 PM

## 2021-12-08 NOTE — PROGRESS NOTES
Patient is being transferred to higher level of care due to increased oxygen needs. Pt 02 saturation drops with exertion, pt is currently on 9L/min per RT. Report called in to receiving Nurse Lencho Judge RN. Kardex and medication reviewed.

## 2021-12-08 NOTE — PROGRESS NOTES
Fall Risk Interventions:              Medication Interventions: Patient to call before getting OOB     Elimination Interventions: Call light in reach     History of Falls Interventions: Bed/chair exit alarm

## 2021-12-09 LAB
ALBUMIN SERPL-MCNC: 2.6 G/DL (ref 3.5–5)
ALBUMIN/GLOB SERPL: 0.7 {RATIO} (ref 1.1–2.2)
ALP SERPL-CCNC: 35 U/L (ref 45–117)
ALT SERPL-CCNC: 43 U/L (ref 12–78)
ANION GAP SERPL CALC-SCNC: 5 MMOL/L (ref 5–15)
AST SERPL-CCNC: 57 U/L (ref 15–37)
BILIRUB DIRECT SERPL-MCNC: 0.4 MG/DL (ref 0–0.2)
BILIRUB SERPL-MCNC: 1.2 MG/DL (ref 0.2–1)
BUN SERPL-MCNC: 24 MG/DL (ref 6–20)
BUN/CREAT SERPL: 30 (ref 12–20)
CALCIUM SERPL-MCNC: 7.1 MG/DL (ref 8.5–10.1)
CHLORIDE SERPL-SCNC: 99 MMOL/L (ref 97–108)
CO2 SERPL-SCNC: 26 MMOL/L (ref 21–32)
COMMENT, HOLDF: NORMAL
CREAT SERPL-MCNC: 0.8 MG/DL (ref 0.7–1.3)
CRP SERPL HS-MCNC: >9.5 MG/L
CRP SERPL-MCNC: 8.38 MG/DL (ref 0–0.6)
D DIMER PPP FEU-MCNC: 2.91 MG/L FEU (ref 0–0.65)
GLOBULIN SER CALC-MCNC: 3.5 G/DL (ref 2–4)
GLUCOSE SERPL-MCNC: 135 MG/DL (ref 65–100)
L PNEUMO IGG TITR SER IF: NORMAL {TITER}
POTASSIUM SERPL-SCNC: 5.2 MMOL/L (ref 3.5–5.1)
PROT SERPL-MCNC: 6.1 G/DL (ref 6.4–8.2)
SAMPLES BEING HELD,HOLD: NORMAL
SODIUM SERPL-SCNC: 130 MMOL/L (ref 136–145)

## 2021-12-09 PROCEDURE — 74011250637 HC RX REV CODE- 250/637: Performed by: INTERNAL MEDICINE

## 2021-12-09 PROCEDURE — 65660000000 HC RM CCU STEPDOWN

## 2021-12-09 PROCEDURE — 94664 DEMO&/EVAL PT USE INHALER: CPT

## 2021-12-09 PROCEDURE — 74011000250 HC RX REV CODE- 250: Performed by: INTERNAL MEDICINE

## 2021-12-09 PROCEDURE — 74011250637 HC RX REV CODE- 250/637: Performed by: FAMILY MEDICINE

## 2021-12-09 PROCEDURE — 94760 N-INVAS EAR/PLS OXIMETRY 1: CPT

## 2021-12-09 PROCEDURE — 86141 C-REACTIVE PROTEIN HS: CPT

## 2021-12-09 PROCEDURE — 94640 AIRWAY INHALATION TREATMENT: CPT

## 2021-12-09 PROCEDURE — 86140 C-REACTIVE PROTEIN: CPT

## 2021-12-09 PROCEDURE — 85379 FIBRIN DEGRADATION QUANT: CPT

## 2021-12-09 PROCEDURE — 80076 HEPATIC FUNCTION PANEL: CPT

## 2021-12-09 PROCEDURE — 36415 COLL VENOUS BLD VENIPUNCTURE: CPT

## 2021-12-09 PROCEDURE — 74011250636 HC RX REV CODE- 250/636: Performed by: PHYSICIAN ASSISTANT

## 2021-12-09 PROCEDURE — 74011250636 HC RX REV CODE- 250/636: Performed by: INTERNAL MEDICINE

## 2021-12-09 PROCEDURE — 77010033711 HC HIGH FLOW OXYGEN

## 2021-12-09 PROCEDURE — 80048 BASIC METABOLIC PNL TOTAL CA: CPT

## 2021-12-09 RX ADMIN — GUAIFENESIN 600 MG: 600 TABLET ORAL at 21:17

## 2021-12-09 RX ADMIN — IPRATROPIUM BROMIDE AND ALBUTEROL 1 PUFF: 20; 100 SPRAY, METERED RESPIRATORY (INHALATION) at 19:42

## 2021-12-09 RX ADMIN — IPRATROPIUM BROMIDE AND ALBUTEROL 1 PUFF: 20; 100 SPRAY, METERED RESPIRATORY (INHALATION) at 02:16

## 2021-12-09 RX ADMIN — GUAIFENESIN 600 MG: 600 TABLET ORAL at 08:20

## 2021-12-09 RX ADMIN — AZITHROMYCIN DIHYDRATE 500 MG: 500 INJECTION, POWDER, LYOPHILIZED, FOR SOLUTION INTRAVENOUS at 08:04

## 2021-12-09 RX ADMIN — DEXAMETHASONE SODIUM PHOSPHATE 6 MG: 10 INJECTION INTRAMUSCULAR; INTRAVENOUS at 21:17

## 2021-12-09 RX ADMIN — Medication 10 ML: at 03:51

## 2021-12-09 RX ADMIN — BENZONATATE 100 MG: 100 CAPSULE ORAL at 03:52

## 2021-12-09 RX ADMIN — IPRATROPIUM BROMIDE AND ALBUTEROL 1 PUFF: 20; 100 SPRAY, METERED RESPIRATORY (INHALATION) at 08:35

## 2021-12-09 RX ADMIN — BENZONATATE 100 MG: 100 CAPSULE ORAL at 21:17

## 2021-12-09 RX ADMIN — Medication 10 ML: at 21:17

## 2021-12-09 RX ADMIN — DEXAMETHASONE SODIUM PHOSPHATE 6 MG: 10 INJECTION INTRAMUSCULAR; INTRAVENOUS at 08:06

## 2021-12-09 RX ADMIN — IPRATROPIUM BROMIDE AND ALBUTEROL 1 PUFF: 20; 100 SPRAY, METERED RESPIRATORY (INHALATION) at 13:28

## 2021-12-09 RX ADMIN — BARICITINIB 4 MG: 2 TABLET, FILM COATED ORAL at 08:03

## 2021-12-09 RX ADMIN — WATER 2 G: 1 INJECTION INTRAMUSCULAR; INTRAVENOUS; SUBCUTANEOUS at 08:03

## 2021-12-09 NOTE — PROGRESS NOTES
Bedside and Verbal shift change report given to Michelle Jain (oncoming nurse) by Mak Kennedy (offgoing nurse). Report included the following information SBAR, Kardex, Intake/Output, MAR, Accordion, Recent Results, Med Rec Status and Cardiac Rhythm NSR.     0353: Morning labs obtained. Lavender tube submitted for hold in case CBC is ordered later. CAREPLAN:  Problem: Gas Exchange - Impaired  Goal: Absence of hypoxia  Outcome: Progressing Towards Goal     Problem: Isolation Precautions - Risk of Spread of Infection  Goal: Prevent transmission of infectious organism to others  Outcome: Progressing Towards Goal     Problem: Falls - Risk of  Goal: *Absence of Falls  Description: Document Delma Fall Risk and appropriate interventions in the flowsheet.   Outcome: Progressing Towards Goal  Note: Fall Risk Interventions:       Medication Interventions: Bed/chair exit alarm, Patient to call before getting OOB, Teach patient to arise slowly    Elimination Interventions: Call light in reach, Bed/chair exit alarm, Patient to call for help with toileting needs    History of Falls Interventions: Bed/chair exit alarm

## 2021-12-09 NOTE — PROGRESS NOTES
Lenin Lujan [address]er[address] [place] [address]  [address] [facility], [address] [place] [address]  [phone]      Medical Progress Note      NAME:         Tasha Brewster   :        1945  MRM:        [id number]    Date of service: 2021      Subjective: Patient has been seen and examined as a follow up for multiple medical issues. Chart, labs, diagnostics reviewed. He remains weak and hypoxic. Not worse. Has an intermittent cough. Denies any SOB but now with increased oxygen requirement     Objective:    Vital Signs:    Visit Vitals  BP (!) 150/84 (BP 1 Location: Left upper arm, BP Patient Position: At rest)   Pulse (!) 59   Temp 97.8 °F (36.6 °C)   Resp 18   SpO2 94%          Intake/Output Summary (Last 24 hours) at 2021 0852  Last data filed at 2021 2189  Gross per 24 hour   Intake 240 ml   Output 1250 ml   Net -1010 ml        Physical Examination:    General:   Weak looking although not in any acute distress   Eyes:   pink conjunctivae, PERRLA with no discharge. ENT:   no ottorrhea or rhinorrhea with dry mucous membranes  Neck: no masses, thyroid non-tender and trachea central.  Pulm:  decreased breath sounds without crackles or wheezes  Card:  no JVD or murmurs, has regular and normal S1, S2 without thrills, bruits or peripheral edema  Abd:  Soft, non-tender, non-distended, normoactive bowel sounds   Musc:  No cyanosis, clubbing, atrophy or deformities. Skin:  No rashes, bruising or ulcers. Neuro: Awake and alert.  Generally a non focal exam. Follows commands appropriately  Psych:  Has a fair insight to his illness     Current Facility-Administered Medications   Medication Dose Route Frequency    dexamethasone (PF) (DECADRON) 10 mg/mL injection 6 mg  6 mg IntraVENous Q12H    baricitinib (OLUMIANT) tablet 4 mg  4 mg Oral DAILY    guaiFENesin-codeine (ROBITUSSIN AC) 100-10 mg/5 mL solution 5 mL  5 mL Oral Q4H PRN    cefTRIAXone (ROCEPHIN) 2 g in sterile water (preservative free) 20 mL IV syringe  2 g IntraVENous Q24H    guaiFENesin ER (MUCINEX) tablet 600 mg  600 mg Oral Q12H    benzonatate (TESSALON) capsule 100 mg  100 mg Oral TID PRN    azithromycin (ZITHROMAX) 500 mg in 0.9% sodium chloride 250 mL (VIAL-MATE)  500 mg IntraVENous Q24H    albuterol-ipratropium (DUO-NEB) 2.5 MG-0.5 MG/3 ML  3 mL Nebulization Q4H PRN    Or    ipratropium-albuterol (COMBIVENT RESPIMAT) 20 mcg-100 mcg inhalation spray  1 Puff Inhalation Q4H PRN    sodium chloride (NS) flush 5-40 mL  5-40 mL IntraVENous Q8H    sodium chloride (NS) flush 5-40 mL  5-40 mL IntraVENous PRN    acetaminophen (TYLENOL) tablet 650 mg  650 mg Oral Q6H PRN    Or    acetaminophen (TYLENOL) suppository 650 mg  650 mg Rectal Q6H PRN    polyethylene glycol (MIRALAX) packet 17 g  17 g Oral DAILY PRN    ondansetron (ZOFRAN ODT) tablet 4 mg  4 mg Oral Q8H PRN    Or    ondansetron (ZOFRAN) injection 4 mg  4 mg IntraVENous Q6H PRN    ipratropium-albuterol (COMBIVENT RESPIMAT) 20 mcg-100 mcg inhalation spray  1 Puff Inhalation Q6H RT        Laboratory data and review:    Recent Labs     12/08/21  0140 12/07/21  1220 12/07/21  0257   WBC 14.3* 15.2* 13.8*   HGB 12.3 12.1 13.2   HCT 36.6 35.2* 37.1   PLT 61* 48* 42*     Recent Labs     12/09/21  0353 12/08/21  0140 12/07/21  1209   * 131* 134*   K 5.2* 4.3 3.7   CL 99 101 100   CO2 26 26 23   * 118* 147*   BUN 24* 28* 27*   CREA 0.80 0.98 1.02   CA 7.1* 7.5* 7.2*   ALB 2.6*  --   --    ALT 43  --   --      No components found for: [place]    Diagnostics: Imaging studies have been reviewed    Telemetry reviewed by me:   normal sinus rhythm    Assessment and Plan:    Acute respiratory failure with hypoxia ([facility] [address] (12/5/2021) POA: now with worsening and increased oxygen requirements. Due to CoV-19 virus infection. CTA showed no PE.  Continue midflow oxygen, treat the COVID, and wean as tolerated to keep sats > 88%      Pneumonia due to COVID-19 virus (12/8/2021) POA:  Diagnosed 12/2. Cultures and serologies neg. Continue Baricitinib, Dexamethasone, empiric IV Ceftriaxone and Azithromycin. Combivent. No chemical DVT prophylaxis due to the thrombocytopenia    HTN (hypertension), benign (12/8/2021) POA: BP variable. Continue to monitor     Hyponatremia POA: likely from a decreased intake. Stable. Has a high K+ today. . DC K+ infusion     Thrombocytopenia (HCC) (12/8/2021) POA: seems chronic. Better today.  Monitor    Total time spent for the patient's care: [address] [facility] discussed with: Patient and Nursing Staff    Discussed:  Care Plan and D/C Planning    Prophylaxis:  SCD's    Anticipated Disposition:  Home w/Family           ___________________________________________________    Attending Physician:   Jesusita Mijares MD

## 2021-12-09 NOTE — PROGRESS NOTES
0700: Verbal shift change report given to Nisha Marie (oncoming nurse) by Mary Carmen Duff RN (offgoing nurse). Report included the following information SBAR, Kardex, Accordion and Cardiac Rhythm NSR. This patient was assisted with Intentional Toileting every 2 hours during this shift as appropriate. Documentation of ambulation and output reflected on Flowsheet as appropriate. Purposeful hourly rounding was completed using AIDET and 5Ps. Outcomes of PHR documented as they occurred. Bed alarm in use as appropriate. Dual Suction and ambubag in place.

## 2021-12-09 NOTE — PROGRESS NOTES
Transition of Care Plan:  13%, covid+  1. CM following for any needs  2. Pt's wife also hospitalized  3. Pt to f/u OP  4. Pulmonary following- pt on 6L mid-darius O2 (weaning as tolerated)  5. Pt's family to transport home at d/c. L. Annamary Curling

## 2021-12-09 NOTE — PROGRESS NOTES
Pulmonary, Critical Care, and Sleep Medicine~Consult Note    Name: Paula Asher MRN: [id number]   : 1945 Hospital: [facility]   Date: 2021  Admission: 2021     Impression Plan   1. Acute hypoxemic respiratory failure  2. covid-19 pneumonia  3. Abnormal chest CT  4. Elevated d-dimer  5. Thrombocytopenia  6. hyponatremia 1. Cont supp o2 for goal sats >90%; on 6L  2. Cont dexamethasone 6mg q12hr  3. Continue baricitinib, monitor LFTs  4. Trend ddimer, crp  5. Cont empiric abx for now. F/u cx  6. SCDs (no pharm dvt ppx with thrombocytopenia)  7. IS, OOB to chair  8. Will need repeat chest CT in 6-12 month to f/u lung nodule. He is a nonsmoker. Daily Progression:    HPI: 78 y/o unvaccinated M who was diagnosed with covid-19 in our ED on  presents with worsening shortness of breath. Symptoms began on . Hypoxemic to 81% in the ED. Currently on 2L with sats 99%. Febrile to 102.6    Labs: wbc 7.2, plt 32, ddimer 3.74, Na 131, pct 0.09, crp 5.63    CXR with LLL opacity    ROS: Feeling ok currently. Repeatedly asks if he can get up to go to the bathroom. Denies SOB, cough, chest pain, leg pain/swelling. Social hx: non-smoker    I have reviewed the labs and previous days notes. Interval history  Afebrile  Sats 94% on 5L  WBC 14.3  Plt 61 - better  Na 130  D-dimer 2.91 - worse  CRP 8.38 - stable  AST slightly elevate at 57  Blood cx no growth x 3 days    Chest CTA (2021), personally visualized and report reviewed. .Diffuse bilateral airspace disease compatible with COVID pneumonia. No acute pulmonary embolus. 6 mm nodule right lower lobe. ROS: Feeling about the same. Denies SOB currently at rest with O2 in place. Continues with cough,. Denies CP. Denies abd pain or LE pain/swelling.     Past Medical History:   Diagnosis Date    GERD (gastroesophageal reflux disease)     Hypertension       Past Surgical History:   Procedure Laterality Date    COLONOSCOPY N/A 2017    COLONOSCOPY performed by Kelly Toledo MD at [facility] HX APPENDECTOMY      HX CATARACT REMOVAL Right       Prior to Admission medications    Medication Sig Start Date End Date Taking? Authorizing Provider   ascorbic acid, vitamin C, (VITAMIN C) 1,000 mg tablet Take 1,000 mg by mouth daily. Yes Provider, Historical   amLODIPine (NORVASC) 5 mg tablet Take 5 mg by mouth daily. Yes Provider, Historical   ergocalciferol (VITAMIN D2) 50,000 unit capsule Take 50,000 Units by mouth. Take every other Monday   Yes Provider, Historical     No Known Allergies   Social History     Tobacco Use    Smoking status: Never Smoker    Smokeless tobacco: Never Used   Substance Use Topics    Alcohol use: No      Family History   Problem Relation Age of Onset    Hypertension Mother      OBJECTIVE:     Vital Signs:       Visit Vitals  BP (!) 150/84 (BP 1 Location: Left upper arm, BP Patient Position: At rest)   Pulse (!) 59   Temp 97.8 °F (36.6 °C)   Resp 18   SpO2 94%      Temp (24hrs), Av.5 °F (36.9 °C), Min:97.6 °F (36.4 °C), Max:99.6 °F (37.6 °C)     Intake/Output:     Last shift: 701 - 1900  In: -   Out: 400 [Urine:400]    Last 3 shifts: 1901 - 700  In: 240 [P.O.:240]  Out: 850 [Urine:850]          Intake/Output Summary (Last 24 hours) at 2021 0949  Last data filed at 2021 1005  Gross per 24 hour   Intake 240 ml   Output 1250 ml   Net -1010 ml       Physical Exam:                                        Exam Findings Other   General: No resp distress noted, appears stated age, frequent hiccups    HEENT:  No ulcers, JVD not elevated, no cervical LAD    Chest: No pectus deformity, normal chest rise b/l    HEART:  RRR, no murmurs/rubs/gallops    Lungs:  Crackles in the bases and coughing.     ABD: Soft/NT, non rigid mildly distended    EXT: No cyanosis/clubbing/edema, normal peripheral pulses    Skin: No rashes or ulcers, no mottling    Neuro: A/O x 3        Medications:  Current Facility-Administered Medications   Medication Dose Route Frequency    dexamethasone (PF) (DECADRON) 10 mg/mL injection 6 mg  6 mg IntraVENous Q12H    baricitinib (OLUMIANT) tablet 4 mg  4 mg Oral DAILY    guaiFENesin-codeine (ROBITUSSIN AC) 100-10 mg/5 mL solution 5 mL  5 mL Oral Q4H PRN    cefTRIAXone (ROCEPHIN) 2 g in sterile water (preservative free) 20 mL IV syringe  2 g IntraVENous Q24H    guaiFENesin ER (MUCINEX) tablet 600 mg  600 mg Oral Q12H    benzonatate (TESSALON) capsule 100 mg  100 mg Oral TID PRN    azithromycin (ZITHROMAX) 500 mg in 0.9% sodium chloride 250 mL (VIAL-MATE)  500 mg IntraVENous Q24H    albuterol-ipratropium (DUO-NEB) 2.5 MG-0.5 MG/3 ML  3 mL Nebulization Q4H PRN    Or    ipratropium-albuterol (COMBIVENT RESPIMAT) 20 mcg-100 mcg inhalation spray  1 Puff Inhalation Q4H PRN    sodium chloride (NS) flush 5-40 mL  5-40 mL IntraVENous Q8H    sodium chloride (NS) flush 5-40 mL  5-40 mL IntraVENous PRN    acetaminophen (TYLENOL) tablet 650 mg  650 mg Oral Q6H PRN    Or    acetaminophen (TYLENOL) suppository 650 mg  650 mg Rectal Q6H PRN    polyethylene glycol (MIRALAX) packet 17 g  17 g Oral DAILY PRN    ondansetron (ZOFRAN ODT) tablet 4 mg  4 mg Oral Q8H PRN    Or    ondansetron (ZOFRAN) injection 4 mg  4 mg IntraVENous Q6H PRN    ipratropium-albuterol (COMBIVENT RESPIMAT) 20 mcg-100 mcg inhalation spray  1 Puff Inhalation Q6H RT       Labs:  ABG No results for input(s): PHI, PCO2I, PO2I, HCO3I, SO2I, FIO2I in the last 72 hours.      CBC Recent Labs     12/08/21  0140 12/07/21  1220 12/07/21  0257   WBC 14.3* 15.2* 13.8*   HGB 12.3 12.1 13.2   HCT 36.6 35.2* 37.1   PLT 61* 48* 42*   MCV 86.5 82.4 80.8   MCH 29.1 28.3 60.6        Metabolic  Panel Recent Labs     12/09/21  0353 12/08/21  0140 12/07/21  1209   * 131* 134*   K 5.2* 4.3 3.7   CL 99 101 100   CO2 26 26 23   * 118* 147*   BUN 24* 28* 27*   CREA 0.80 0. 98 1.02   CA 7.1* 7.5* 7.2*   ALB 2.6*  --   --    ALT 43  --   --                     Neil Soriano  12/9/2021

## 2021-12-09 NOTE — PROGRESS NOTES
[facility] visit. Mr. Tim Krause is unable to receive communion. Prayer for spiritual communion offered outside his room.     IGOR Holliday, RN, ACSW, LCSW   Page:  [phone]

## 2021-12-10 ENCOUNTER — PATIENT OUTREACH (OUTPATIENT)
Dept: CASE MANAGEMENT | Age: 76
End: 2021-12-10

## 2021-12-10 LAB
ALBUMIN SERPL-MCNC: 2.7 G/DL (ref 3.5–5)
ALBUMIN/GLOB SERPL: 0.6 {RATIO} (ref 1.1–2.2)
ALP SERPL-CCNC: 41 U/L (ref 45–117)
ALT SERPL-CCNC: 60 U/L (ref 12–78)
ANION GAP SERPL CALC-SCNC: 7 MMOL/L (ref 5–15)
AST SERPL-CCNC: 56 U/L (ref 15–37)
BILIRUB DIRECT SERPL-MCNC: 0.2 MG/DL (ref 0–0.2)
BILIRUB SERPL-MCNC: 0.8 MG/DL (ref 0.2–1)
BUN SERPL-MCNC: 25 MG/DL (ref 6–20)
BUN/CREAT SERPL: 30 (ref 12–20)
CALCIUM SERPL-MCNC: 8 MG/DL (ref 8.5–10.1)
CHLORIDE SERPL-SCNC: 100 MMOL/L (ref 97–108)
CO2 SERPL-SCNC: 25 MMOL/L (ref 21–32)
COMMENT, HOLDF: NORMAL
CREAT SERPL-MCNC: 0.84 MG/DL (ref 0.7–1.3)
CRP SERPL-MCNC: 4.38 MG/DL (ref 0–0.6)
D DIMER PPP FEU-MCNC: 3.69 MG/L FEU (ref 0–0.65)
GLOBULIN SER CALC-MCNC: 4.2 G/DL (ref 2–4)
GLUCOSE SERPL-MCNC: 140 MG/DL (ref 65–100)
L PNEUMO POOL 1-6 IGM SER QL: ABNORMAL
POTASSIUM SERPL-SCNC: 4.6 MMOL/L (ref 3.5–5.1)
PROT SERPL-MCNC: 6.9 G/DL (ref 6.4–8.2)
SAMPLES BEING HELD,HOLD: NORMAL
SODIUM SERPL-SCNC: 132 MMOL/L (ref 136–145)

## 2021-12-10 PROCEDURE — 74011250636 HC RX REV CODE- 250/636: Performed by: INTERNAL MEDICINE

## 2021-12-10 PROCEDURE — 80076 HEPATIC FUNCTION PANEL: CPT

## 2021-12-10 PROCEDURE — 85379 FIBRIN DEGRADATION QUANT: CPT

## 2021-12-10 PROCEDURE — 74011250637 HC RX REV CODE- 250/637: Performed by: INTERNAL MEDICINE

## 2021-12-10 PROCEDURE — 94664 DEMO&/EVAL PT USE INHALER: CPT

## 2021-12-10 PROCEDURE — 65270000029 HC RM PRIVATE

## 2021-12-10 PROCEDURE — 74011000250 HC RX REV CODE- 250: Performed by: INTERNAL MEDICINE

## 2021-12-10 PROCEDURE — 74011250636 HC RX REV CODE- 250/636: Performed by: PHYSICIAN ASSISTANT

## 2021-12-10 PROCEDURE — 94640 AIRWAY INHALATION TREATMENT: CPT

## 2021-12-10 PROCEDURE — 80048 BASIC METABOLIC PNL TOTAL CA: CPT

## 2021-12-10 PROCEDURE — 86140 C-REACTIVE PROTEIN: CPT

## 2021-12-10 PROCEDURE — 77010033678 HC OXYGEN DAILY

## 2021-12-10 PROCEDURE — 74011250637 HC RX REV CODE- 250/637: Performed by: FAMILY MEDICINE

## 2021-12-10 PROCEDURE — 36415 COLL VENOUS BLD VENIPUNCTURE: CPT

## 2021-12-10 RX ADMIN — Medication 10 ML: at 14:00

## 2021-12-10 RX ADMIN — IPRATROPIUM BROMIDE AND ALBUTEROL 1 PUFF: 20; 100 SPRAY, METERED RESPIRATORY (INHALATION) at 20:34

## 2021-12-10 RX ADMIN — DEXAMETHASONE SODIUM PHOSPHATE 6 MG: 10 INJECTION INTRAMUSCULAR; INTRAVENOUS at 21:23

## 2021-12-10 RX ADMIN — BENZONATATE 100 MG: 100 CAPSULE ORAL at 11:21

## 2021-12-10 RX ADMIN — Medication 10 ML: at 06:47

## 2021-12-10 RX ADMIN — IPRATROPIUM BROMIDE AND ALBUTEROL 1 PUFF: 20; 100 SPRAY, METERED RESPIRATORY (INHALATION) at 01:37

## 2021-12-10 RX ADMIN — IPRATROPIUM BROMIDE AND ALBUTEROL 1 PUFF: 20; 100 SPRAY, METERED RESPIRATORY (INHALATION) at 07:47

## 2021-12-10 RX ADMIN — AZITHROMYCIN DIHYDRATE 500 MG: 500 INJECTION, POWDER, LYOPHILIZED, FOR SOLUTION INTRAVENOUS at 06:47

## 2021-12-10 RX ADMIN — Medication 10 ML: at 21:23

## 2021-12-10 RX ADMIN — GUAIFENESIN 600 MG: 600 TABLET ORAL at 21:23

## 2021-12-10 RX ADMIN — WATER 2 G: 1 INJECTION INTRAMUSCULAR; INTRAVENOUS; SUBCUTANEOUS at 06:47

## 2021-12-10 RX ADMIN — IPRATROPIUM BROMIDE AND ALBUTEROL 1 PUFF: 20; 100 SPRAY, METERED RESPIRATORY (INHALATION) at 15:15

## 2021-12-10 RX ADMIN — DEXAMETHASONE SODIUM PHOSPHATE 6 MG: 10 INJECTION INTRAMUSCULAR; INTRAVENOUS at 10:06

## 2021-12-10 RX ADMIN — BARICITINIB 4 MG: 2 TABLET, FILM COATED ORAL at 10:06

## 2021-12-10 RX ADMIN — GUAIFENESIN 600 MG: 600 TABLET ORAL at 10:06

## 2021-12-10 NOTE — PROGRESS NOTES
Comprehensive Nutrition Assessment      Type and Reason for Visit: Initial, RD nutrition re-screen/LOS    Nutrition Recommendations/Plan:   1. Continue Regular diet  2. Added Ensure Enlive BID for added   3. Daily weights, Document PO. Nutrition Assessment:       Pt [facility] for Acute [facility] with [facility] ([facility] [J96.01]. Pt  has a past medical history of GERD (gastroesophageal reflux disease) and Hypertension. Pt screened for LOS. Noted pt with 10 lbs (6%) wt loss over last 8 days which is significant for time frame. Documented intake from today and yesterday are >75% on average. Encourage continued intake. Added Ensure Enlive BID for increased calories for catabolic illness. KNFA. Pt wt stable prior to admission. No hx of chew/swallow difficulties. BG mildly elevated on steroids. No hx of DM. On 5L NC. Wt Readings from Last 10 Encounters:   12/10/21 70.4 kg (155 lb 1.6 oz)   12/02/21 74.8 kg (165 lb)   10/25/21 74.8 kg (165 lb)   08/19/20 73.5 kg (162 lb)   01/22/20 75 kg (165 lb 6.4 oz)   06/12/19 75 kg (165 lb 6.4 oz)   12/18/18 75.8 kg (167 lb 3.2 oz)   06/05/18 76 kg (167 lb 9.6 oz)   03/07/18 76.6 kg (168 lb 12.8 oz)   11/28/17 77.2 kg (170 lb 3.2 oz)       Malnutrition Assessment:  Malnutrition Status:  Mild malnutrition    Context:  Acute illness     Findings of the 6 clinical characteristics of malnutrition:   Energy Intake:  Unable to assess  Weight Loss:  7 - Greater than 2% over 1 week     Body Fat Loss:  Unable to assess,     Muscle Mass Loss:  Unable to assess,    Fluid Accumulation:  No significant fluid accumulation,     Strength:  Not performed           Estimated Daily Nutrient Needs:  Energy (kcal): 1837; Weight Used for Energy Requirements: Current  Protein (g): 70;  Weight Used for Protein Requirements: Current  Fluid (ml/day): 1850; Method Used for Fluid Requirements: 1 ml/kcal    Documented meal intake:   Patient Vitals for the past 168 hrs:   % Diet Eaten 12/10/21 0900 76 - 100%   12/09/21 1934 51 - 75%       Documented Supplement intake:  No data found. Nutrition Related Findings:           Nutritionally Significant Medications:   Zithromax, Olumiant, Decadron, Mucinex,       Wounds:    None       Current Nutrition Therapies:  ADULT DIET Regular    Anthropometric Measures:  · Height:  5' 7.99\" (172.7 cm)  · Current Body Wt:  70.4 kg (155 lb 3.3 oz)   · Admission Body Wt:       · Usual Body Wt:        · Ideal Body Wt:  154 lbs:  100.8 %   · Adjusted Body Weight:   ; Weight Adjustment for: No adjustment   · Adjusted BMI:       · BMI Category:  Normal weight (BMI 18.5-24. 9)       Nutrition Diagnosis:   · Inadequate oral intake related to catabolic illness as evidenced by weight loss (COVID)      Nutrition Interventions:   Food and/or Nutrient Delivery: Continue current diet, Start oral nutrition supplement  Nutrition Education and Counseling: No recommendations at this time  Coordination of Nutrition Care: Continue to monitor while inpatient, Interdisciplinary rounds    Goals:  PO >75% of meals with wt maintenance within 5-7 days       Nutrition Monitoring and Evaluation:   Behavioral-Environmental Outcomes: None identified  Food/Nutrient Intake Outcomes: Food and nutrient intake, Supplement intake  Physical Signs/Symptoms Outcomes: Biochemical data, Weight    Discharge Planning:    Continue current diet     Electronically signed by Benjamin Flor, [address]     Contact: [phone]

## 2021-12-10 NOTE — PROGRESS NOTES
Transition of Care Plan:  13%, covid+  1. CM following for any needs  2. Pt's wife also hospitalized  3. Pt to f/u OP  4. Pulmonary following- pt on 5L mid-darius O2 (weaning as tolerated)  5. Pt's family to transport home at d/[facility]

## 2021-12-10 NOTE — PROGRESS NOTES
Lenin Lujan [facility] [place] [facility]  [address] [facility], [address] [place] [address]  [phone]      Medical Progress Note      NAME:         Joslyn Randhawa   :        1945  MRM:        [id number]    Date of service: 12/10/2021      Subjective: Patient has been seen and examined as a follow up for multiple medical issues. Chart, labs, diagnostics reviewed. He feels well, Weak. Hypoxic but not in distress. Has an intermittent cough. No fever or chills. Objective:    Vital Signs:    Visit Vitals  BP (!) 141/89 (BP 1 Location: Left upper arm, BP Patient Position: At rest)   Pulse 95   Temp 97.3 °F (36.3 °C)   Resp 16   Wt 70.4 kg (155 lb 1.6 oz)   SpO2 96%   BMI 23.58 kg/m²          Intake/Output Summary (Last 24 hours) at 12/10/2021 1310  Last data filed at 12/10/2021 0900  Gross per 24 hour   Intake 720 ml   Output    Net 720 ml        Physical Examination:    General:   Weak looking although not in any acute distress   Eyes:   pink conjunctivae, PERRLA with no discharge. ENT:   no ottorrhea or rhinorrhea with dry mucous membranes  Neck: no masses, thyroid non-tender and trachea central.  Pulm:  decreased breath sounds without crackles or wheezes  Card: has regular and normal S1, S2 without thrills, bruits or peripheral edema  Abd:  Soft, non-tender, non-distended, normoactive bowel sounds   Musc:  No cyanosis, clubbing, atrophy or deformities. Skin:  No rashes, bruising or ulcers. Neuro: Awake and alert.  Generally a non focal exam.   Psych:  Has a fair insight to his illness     Current Facility-Administered Medications   Medication Dose Route Frequency    dexamethasone (PF) (DECADRON) 10 mg/mL injection 6 mg  6 mg IntraVENous Q12H    baricitinib (OLUMIANT) tablet 4 mg  4 mg Oral DAILY    guaiFENesin-codeine (ROBITUSSIN AC) 100-10 mg/5 mL solution 5 mL  5 mL Oral Q4H PRN    guaiFENesin ER (MUCINEX) tablet 600 mg  600 mg Oral Q12H    benzonatate (TESSALON) capsule 100 mg  100 mg Oral TID PRN    albuterol-ipratropium (DUO-NEB) 2.5 MG-0.5 MG/3 ML  3 mL Nebulization Q4H PRN    Or    ipratropium-albuterol (COMBIVENT RESPIMAT) 20 mcg-100 mcg inhalation spray  1 Puff Inhalation Q4H PRN    sodium chloride (NS) flush 5-40 mL  5-40 mL IntraVENous Q8H    sodium chloride (NS) flush 5-40 mL  5-40 mL IntraVENous PRN    acetaminophen (TYLENOL) tablet 650 mg  650 mg Oral Q6H PRN    Or    acetaminophen (TYLENOL) suppository 650 mg  650 mg Rectal Q6H PRN    polyethylene glycol (MIRALAX) packet 17 g  17 g Oral DAILY PRN    ondansetron (ZOFRAN ODT) tablet 4 mg  4 mg Oral Q8H PRN    Or    ondansetron (ZOFRAN) injection 4 mg  4 mg IntraVENous Q6H PRN    ipratropium-albuterol (COMBIVENT RESPIMAT) 20 mcg-100 mcg inhalation spray  1 Puff Inhalation Q6H RT        Laboratory data and review:    Recent Labs     12/08/21  0140   WBC 14.3*   HGB 12.3   HCT 36.6   PLT 61*     Recent Labs     12/10/21  1050 12/10/21  0044 12/09/21  0353 12/08/21  0140   NA  --  132* 130* 131*   K  --  4.6 5.2* 4.3   CL  --  100 99 101   CO2  --  25 26 26   GLU  --  140* 135* 118*   BUN  --  25* 24* 28*   CREA  --  0.84 0.80 0.98   CA  --  8.0* 7.1* 7.5*   ALB 2.7*  --  2.6*  --    ALT 60  --  43  --      No components found for: [place]    Diagnostics: Imaging studies have been reviewed    Telemetry reviewed by me:   normal sinus rhythm    Assessment and Plan:    Acute respiratory failure with hypoxia ([facility] (12/5/2021) POA: now with worsening and increased oxygen requirements. Due to CoV-19 virus infection. CTA showed no PE. Continue supplemental oxygen, treat the COVID, and wean as tolerated to keep sats > 88%      Pneumonia due to COVID-19 virus (12/8/2021) POA:  Diagnosed 12/2. Cultures and serologies neg. Continue Baricitinib, Dexamethasone, empiric IV Ceftriaxone and Azithromycin through today. Combivent.  No chemical DVT prophylaxis due to the thrombocytopenia    HTN (hypertension), benign (12/8/2021) POA: BP variable. Continue to monitor     Hyponatremia POA: likely from a decreased intake. Stable. Monitor      Thrombocytopenia ([address] (12/8/2021) POA: seems chronic. Better today.  Monitor    Total time spent for the patient's care: [address] discussed with: Patient and Nursing Staff    Discussed:  Care Plan and D/C Planning    Prophylaxis:  SCD's    Anticipated Disposition:  Home w/Family           ___________________________________________________    Attending Physician:   Lurdes Solomon MD

## 2021-12-10 NOTE — PROGRESS NOTES
Pulmonary, Critical Care, and Sleep Medicine~Consult Note    Name: Bautista Wong MRN: [id number]   : 1945 Hospital: [facility]  [address]   Date: 12/10/2021  Admission: 2021     Impression Plan   1. Acute hypoxemic respiratory failure  2. covid-19 pneumonia  3. Abnormal chest CT  4. Elevated d-dimer  5. Thrombocytopenia  6. hyponatremia 1. Cont supp o2 for goal sats >90%; on 6L  2. Cont dexamethasone 6mg q12hr  3. Continue baricitinib, monitor LFTs  4. Trend ddimer, crp  5. Completed 5 day course of empiric abx. F/u cx  6. SCDs (no pharm dvt ppx with thrombocytopenia)  7. IS, OOB to chair  8. Will need repeat chest CT in 6-12 month to f/u lung nodule. He is a nonsmoker. Daily Progression:    HPI: 76 y/o unvaccinated M who was diagnosed with covid-19 in our ED on  presents with worsening shortness of breath. Symptoms began on . Hypoxemic to 81% in the ED. Currently on 2L with sats 99%. Febrile to 102.6    Labs: wbc 7.2, plt 32, ddimer 3.74, Na 131, pct 0.09, crp 5.63    CXR with LLL opacity    ROS: Feeling ok currently. Repeatedly asks if he can get up to go to the bathroom. Denies SOB, cough, chest pain, leg pain/swelling. Social hx: non-smoker    I have reviewed the labs and previous days notes. Interval history  Afebrile  Sats 94% on 5L  Na 132  Blood cx no growth x 4 days    Chest CTA (2021), personally visualized and report reviewed. .Diffuse bilateral airspace disease compatible with COVID pneumonia. No acute pulmonary embolus. 6 mm nodule right lower lobe. ROS: Feeling about the same. Didn't sleep well. Denies SOB currently at rest with O2 in place. Continues with cough,. Denies CP. Denies abd pain or LE pain/swelling.     Past Medical History:   Diagnosis Date    GERD (gastroesophageal reflux disease)     Hypertension       Past Surgical History:   Procedure Laterality Date    COLONOSCOPY N/A 2017    COLONOSCOPY performed by Jennifer Strauss MD at [address] [place] [address] HX APPENDECTOMY      HX CATARACT REMOVAL Right       Prior to Admission medications    Medication Sig Start Date End Date Taking? Authorizing Provider   ascorbic acid, vitamin C, (VITAMIN C) 1,000 mg tablet Take 1,000 mg by mouth daily. Yes Provider, Historical   amLODIPine (NORVASC) 5 mg tablet Take 5 mg by mouth daily. Yes Provider, Historical   ergocalciferol (VITAMIN D2) 50,000 unit capsule Take 50,000 Units by mouth. Take every other Monday   Yes Provider, Historical     No Known Allergies   Social History     Tobacco Use    Smoking status: Never Smoker    Smokeless tobacco: Never Used   Substance Use Topics    Alcohol use: No      Family History   Problem Relation Age of Onset    Hypertension Mother      OBJECTIVE:     Vital Signs:       Visit Vitals  /77 (BP 1 Location: Left upper arm, BP Patient Position: At rest)   Pulse 60   Temp 97.8 °F (36.6 °C)   Resp 16   Wt 70.4 kg (155 lb 1.6 oz)   SpO2 94%   BMI 23.58 kg/m²      Temp (24hrs), Av.8 °F (36.6 °C), Min:97.4 °F (36.3 °C), Max:97.9 °F (36.6 °C)     Intake/Output:     Last shift: No intake/output data recorded. Last 3 shifts:  1901 - 12/10 0700  In: 840 [P.O.:840]  Out: 1250 [Urine:1250]          Intake/Output Summary (Last 24 hours) at 12/10/2021 1027  Last data filed at 12/10/2021 0411  Gross per 24 hour   Intake 600 ml   Output    Net 600 ml       Physical Exam:                                        Exam Findings Other   General: No resp distress noted, appears stated age, frequent hiccups    HEENT:  No ulcers, JVD not elevated, no cervical LAD    Chest: No pectus deformity, normal chest rise b/l    HEART:  RRR, no murmurs/rubs/gallops    Lungs:  Diminished in the bases, occasional cough.     ABD: Soft/NT, non rigid mildly distended    EXT: No cyanosis/clubbing/edema, normal peripheral pulses    Skin: No rashes or ulcers, no mottling    Neuro: A/O x 3 Medications:  Current Facility-Administered Medications   Medication Dose Route Frequency    dexamethasone (PF) (DECADRON) 10 mg/mL injection 6 mg  6 mg IntraVENous Q12H    baricitinib (OLUMIANT) tablet 4 mg  4 mg Oral DAILY    guaiFENesin-codeine (ROBITUSSIN AC) 100-10 mg/5 mL solution 5 mL  5 mL Oral Q4H PRN    guaiFENesin ER (MUCINEX) tablet 600 mg  600 mg Oral Q12H    benzonatate (TESSALON) capsule 100 mg  100 mg Oral TID PRN    albuterol-ipratropium (DUO-NEB) 2.5 MG-0.5 MG/3 ML  3 mL Nebulization Q4H PRN    Or    ipratropium-albuterol (COMBIVENT RESPIMAT) 20 mcg-100 mcg inhalation spray  1 Puff Inhalation Q4H PRN    sodium chloride (NS) flush 5-40 mL  5-40 mL IntraVENous Q8H    sodium chloride (NS) flush 5-40 mL  5-40 mL IntraVENous PRN    acetaminophen (TYLENOL) tablet 650 mg  650 mg Oral Q6H PRN    Or    acetaminophen (TYLENOL) suppository 650 mg  650 mg Rectal Q6H PRN    polyethylene glycol (MIRALAX) packet 17 g  17 g Oral DAILY PRN    ondansetron (ZOFRAN ODT) tablet 4 mg  4 mg Oral Q8H PRN    Or    ondansetron (ZOFRAN) injection 4 mg  4 mg IntraVENous Q6H PRN    ipratropium-albuterol (COMBIVENT RESPIMAT) 20 mcg-100 mcg inhalation spray  1 Puff Inhalation Q6H RT       Labs:  ABG No results for input(s): PHI, PCO2I, PO2I, HCO3I, SO2I, FIO2I in the last 72 hours.      CBC Recent Labs     12/08/21  0140 12/07/21  1220   WBC 14.3* 15.2*   HGB 12.3 12.1   HCT 36.6 35.2*   PLT 61* 48*   MCV 86.5 82.4   MCH 29.1 31.3        Metabolic  Panel Recent Labs     12/10/21  0044 12/09/21  0353 12/08/21  0140   * 130* 131*   K 4.6 5.2* 4.3    99 101   CO2 25 26 26   * 135* 118*   BUN 25* 24* 28*   CREA 0.84 0.80 0.98   CA 8.0* 7.1* 7.5*   ALB  --  2.6*  --    ALT  --  [facility] [address] [place] [address]  12/10/2021

## 2021-12-10 NOTE — PROGRESS NOTES
Bedside and Verbal shift change report given to Christopher Ashton RN (oncoming nurse) by Christopher Ashton RN (offgoing nurse). Report included the following information SBAR, Kardex, Intake/Output, MAR and Recent Results. This patient was assisted with Intentional Toileting every 2 hours during this shift as appropriate. Documentation of ambulation and output reflected on Flowsheet as appropriate. Purposeful hourly rounding was completed using AIDET and 5Ps. Outcomes of PHR documented as they occurred. Bed alarm in use as appropriate. Dual Suction and ambubag in place. Bedside and Verbal shift change report given to Zelda Gibson RN (oncoming nurse) by Christopher Ashton RN (offgoing nurse). Report included the following information SBAR, Kardex, Intake/Output, MAR and Recent Results.

## 2021-12-10 NOTE — PROGRESS NOTES
0700 Bedside and Verbal shift change report given to [facility] (oncoming nurse) by Mason Mccrary (offgoing nurse). Report included the following information SBAR, OR Summary, Procedure Summary and Intake/Output. 1900 Bedside and Verbal shift change report given to  (oncoming nurse) by [facility] (offgoing nurse). Report included the following information SBAR, OR Summary, Procedure Summary, Intake/Output and MAR.

## 2021-12-11 LAB
CRP SERPL HS-MCNC: >9.5 MG/L
D DIMER PPP FEU-MCNC: 5.02 MG/L FEU (ref 0–0.65)

## 2021-12-11 PROCEDURE — 86141 C-REACTIVE PROTEIN HS: CPT

## 2021-12-11 PROCEDURE — 36415 COLL VENOUS BLD VENIPUNCTURE: CPT

## 2021-12-11 PROCEDURE — 74011250637 HC RX REV CODE- 250/637: Performed by: INTERNAL MEDICINE

## 2021-12-11 PROCEDURE — 94640 AIRWAY INHALATION TREATMENT: CPT

## 2021-12-11 PROCEDURE — 94664 DEMO&/EVAL PT USE INHALER: CPT

## 2021-12-11 PROCEDURE — 85379 FIBRIN DEGRADATION QUANT: CPT

## 2021-12-11 PROCEDURE — 74011250636 HC RX REV CODE- 250/636: Performed by: PHYSICIAN ASSISTANT

## 2021-12-11 PROCEDURE — 65270000029 HC RM PRIVATE

## 2021-12-11 PROCEDURE — 74011250637 HC RX REV CODE- 250/637: Performed by: FAMILY MEDICINE

## 2021-12-11 PROCEDURE — 65660000000 HC RM CCU STEPDOWN

## 2021-12-11 RX ADMIN — Medication 10 ML: at 21:38

## 2021-12-11 RX ADMIN — BARICITINIB 4 MG: 2 TABLET, FILM COATED ORAL at 09:27

## 2021-12-11 RX ADMIN — Medication 10 ML: at 09:26

## 2021-12-11 RX ADMIN — BENZONATATE 100 MG: 100 CAPSULE ORAL at 09:27

## 2021-12-11 RX ADMIN — DEXAMETHASONE SODIUM PHOSPHATE 6 MG: 10 INJECTION INTRAMUSCULAR; INTRAVENOUS at 09:27

## 2021-12-11 RX ADMIN — IPRATROPIUM BROMIDE AND ALBUTEROL 1 PUFF: 20; 100 SPRAY, METERED RESPIRATORY (INHALATION) at 02:52

## 2021-12-11 RX ADMIN — GUAIFENESIN 600 MG: 600 TABLET ORAL at 09:27

## 2021-12-11 RX ADMIN — IPRATROPIUM BROMIDE AND ALBUTEROL 1 PUFF: 20; 100 SPRAY, METERED RESPIRATORY (INHALATION) at 09:12

## 2021-12-11 RX ADMIN — GUAIFENESIN 600 MG: 600 TABLET ORAL at 21:37

## 2021-12-11 RX ADMIN — Medication 10 ML: at 13:46

## 2021-12-11 RX ADMIN — IPRATROPIUM BROMIDE AND ALBUTEROL 1 PUFF: 20; 100 SPRAY, METERED RESPIRATORY (INHALATION) at 15:11

## 2021-12-11 RX ADMIN — DEXAMETHASONE SODIUM PHOSPHATE 6 MG: 10 INJECTION INTRAMUSCULAR; INTRAVENOUS at 21:37

## 2021-12-11 RX ADMIN — IPRATROPIUM BROMIDE AND ALBUTEROL 1 PUFF: 20; 100 SPRAY, METERED RESPIRATORY (INHALATION) at 19:44

## 2021-12-11 NOTE — PROGRESS NOTES
Lenin Lujan [facility] [place] [address]  [address] [facility], [address] [place] [address]  [phone]      Medical Progress Note      NAME:         Jennifer Osorio   :        1945  MRM:        [id number]    Date of service: 2021      Subjective: Patient has been seen and examined as a follow up for multiple medical issues. Chart, labs, diagnostics reviewed. He feels well but generally remains weak. Has a persistent cough but no fever or chills. Objective:    Vital Signs:    Visit Vitals  BP (!) 146/69 (BP 1 Location: Left arm, BP Patient Position: At rest)   Pulse (!) 54   Temp 97.6 °F (36.4 °C)   Resp 18   Ht 5' 7.99\" (1.727 m)   Wt 70.4 kg (155 lb 1.6 oz)   SpO2 93%   BMI 23.59 kg/m²          Intake/Output Summary (Last 24 hours) at 2021 0930  Last data filed at 12/10/2021 2015  Gross per 24 hour   Intake 360 ml   Output    Net 360 ml        Physical Examination:    General:   Weak looking although not in any acute distress   Eyes:   pink conjunctivae, PERRLA with no discharge. ENT:   no ottorrhea or rhinorrhea with dry mucous membranes  Pulm:  decreased breath sounds without crackles or wheezes  Card: has regular and normal S1, S2 without thrills, bruits or peripheral edema  Abd:  Soft, non-tender, non-distended, normoactive bowel sounds   Musc:  No cyanosis, clubbing, atrophy or deformities. Skin:  No rashes, bruising or ulcers. Neuro: Awake and alert.  Generally a non focal exam.   Psych:  Has a fair insight to his illness     Current Facility-Administered Medications   Medication Dose Route Frequency    dexamethasone (PF) (DECADRON) 10 mg/mL injection 6 mg  6 mg IntraVENous Q12H    baricitinib (OLUMIANT) tablet 4 mg  4 mg Oral DAILY    guaiFENesin-codeine (ROBITUSSIN AC) 100-10 mg/5 mL solution 5 mL  5 mL Oral Q4H PRN    guaiFENesin ER (MUCINEX) tablet 600 mg  600 mg Oral Q12H    benzonatate (TESSALON) capsule 100 mg  100 mg Oral TID PRN    albuterol-ipratropium (DUO-NEB) 2.5 MG-0.5 MG/3 ML  3 mL Nebulization Q4H PRN    Or    ipratropium-albuterol (COMBIVENT RESPIMAT) 20 mcg-100 mcg inhalation spray  1 Puff Inhalation Q4H PRN    sodium chloride (NS) flush 5-40 mL  5-40 mL IntraVENous Q8H    sodium chloride (NS) flush 5-40 mL  5-40 mL IntraVENous PRN    acetaminophen (TYLENOL) tablet 650 mg  650 mg Oral Q6H PRN    Or    acetaminophen (TYLENOL) suppository 650 mg  650 mg Rectal Q6H PRN    polyethylene glycol (MIRALAX) packet 17 g  17 g Oral DAILY PRN    ondansetron (ZOFRAN ODT) tablet 4 mg  4 mg Oral Q8H PRN    Or    ondansetron (ZOFRAN) injection 4 mg  4 mg IntraVENous Q6H PRN    ipratropium-albuterol (COMBIVENT RESPIMAT) 20 mcg-100 mcg inhalation spray  1 Puff Inhalation Q6H RT        Laboratory data and review:    No results for input(s): WBC, HGB, HCT, PLT, HGBEXT, HCTEXT, PLTEXT, HGBEXT, HCTEXT, PLTEXT in the last 72 hours. Recent Labs     12/10/21  1050 12/10/21  0044 12/09/21  0353   NA  --  132* 130*   K  --  4.6 5.2*   CL  --  100 99   CO2  --  25 26   GLU  --  140* 135*   BUN  --  25* 24*   CREA  --  0.84 0.80   CA  --  8.0* 7.1*   ALB 2.7*  --  2.6*   ALT 60  --  43     No components found for: [place]    Diagnostics: Imaging studies have been reviewed    Telemetry reviewed by me:   normal sinus rhythm    Assessment and Plan:    Acute respiratory failure with hypoxia ([address] (12/5/2021) POA: now with worsening and increased oxygen requirements. Due to CoV-19 virus infection. CTA showed no PE. Continue supplemental oxygen now @ 2L/min, treat the COVID, and wean as tolerated to keep sats > 88%. Ambulate as tolerated    Pneumonia due to COVID-19 virus (12/8/2021) POA:  Diagnosed 12/2. Cultures and serologies neg. Continue Baricitinib, Dexamethasone, Combivent. Has completed a course with IV Ceftriaxone and Azithromycin. No chemical DVT prophylaxis due to the thrombocytopenia.  PT, OT assessment    HTN (hypertension), benign (12/8/2021) POA: BP variable. Continue to monitor     Hyponatremia POA: likely from a decreased intake. Stable. Check CMP in AM       Thrombocytopenia ([address] (12/8/2021) POA: seems chronic. Better today.  CBC in AM     Total time spent for the patient's care: [address] [facility] discussed with: Patient and Nursing Staff    Discussed:  Care Plan and D/C Planning    Prophylaxis:  SCD's    Anticipated Disposition:  Home w/Family           ___________________________________________________    Attending Physician:   Brad Cross MD

## 2021-12-11 NOTE — PROGRESS NOTES
Bedside and Verbal shift change report given to Vinh Kim RN (oncoming nurse) by Delano Ya RN (offgoing nurse). Report included the following information SBAR, Kardex, Intake/Output, MAR and Recent Results. This patient was assisted with Intentional Toileting every 2 hours during this shift as appropriate. Documentation of ambulation and output reflected on Flowsheet as appropriate. Purposeful hourly rounding was completed using AIDET and 5Ps. Outcomes of PHR documented as they occurred. Bed alarm in use as appropriate. Dual Suction and ambubag in place. TRANSFER - OUT REPORT:    Verbal report given to 4th floor RN (name) on [facility] Giselle  being transferred to 4th floor (unit) for routine progression of care       Report consisted of patients Situation, Background, Assessment and   Recommendations(SBAR). Information from the following report(s) SBAR, Kardex, Intake/Output, MAR and Recent Results was reviewed with the receiving nurse. Lines:   Peripheral IV 12/07/21 Distal; Right Cephalic (Active)   Site Assessment Clean, dry, & intact 12/10/21 2015   Phlebitis Assessment 0 12/10/21 2015   Infiltration Assessment 0 12/10/21 2015   Dressing Status Clean, dry, & intact 12/10/21 2015   Dressing Type Transparent 12/10/21 2015   Hub Color/Line Status Pink; Capped; Flushed 12/10/21 2015   Action Taken Open ports on tubing capped 12/10/21 2015   Alcohol Cap Used Yes 12/10/21 2015        Opportunity for questions and clarification was provided.       Patient transported with:   O2 @ 5 liters  Registered Nurse  [facility]

## 2021-12-11 NOTE — PROGRESS NOTES
Problem: Gas Exchange - Impaired  Goal: Absence of hypoxia  Outcome: Progressing Towards Goal  Goal: Promote optimal lung function  Outcome: Progressing Towards Goal     Problem: Breathing Pattern - Ineffective  Goal: Ability to achieve and maintain a regular respiratory rate  Outcome: Progressing Towards Goal     Problem: Body Temperature -  Risk of, Imbalanced  Goal: Ability to maintain a body temperature within defined limits  Outcome: Progressing Towards Goal  Goal: Will regain or maintain usual level of consciousness  Outcome: Progressing Towards Goal  Goal: Complications related to the disease process, condition or treatment will be avoided or minimized  Outcome: Progressing Towards Goal     Problem: Falls - Risk of  Goal: *Absence of Falls  Description: Document Brett López Fall Risk and appropriate interventions in the flowsheet.   Outcome: Progressing Towards Goal  Note: Fall Risk Interventions:            Medication Interventions: Bed/chair exit alarm, Patient to call before getting OOB, Teach patient to arise slowly, Utilize gait belt for transfers/ambulation    Elimination Interventions: Bed/chair exit alarm, Call light in reach, Patient to call for help with toileting needs    History of Falls Interventions: Bed/chair exit alarm, Room close to nurse's station, Utilize gait belt for transfer/ambulation

## 2021-12-12 LAB
ALBUMIN SERPL-MCNC: 3.2 G/DL (ref 3.5–5)
ALBUMIN/GLOB SERPL: 0.7 {RATIO} (ref 1.1–2.2)
ALP SERPL-CCNC: 40 U/L (ref 45–117)
ALT SERPL-CCNC: 95 U/L (ref 12–78)
ANION GAP SERPL CALC-SCNC: 3 MMOL/L (ref 5–15)
AST SERPL-CCNC: 63 U/L (ref 15–37)
BACTERIA SPEC CULT: NORMAL
BASOPHILS # BLD: 0 K/UL (ref 0–0.1)
BASOPHILS NFR BLD: 0 % (ref 0–1)
BILIRUB SERPL-MCNC: 1.2 MG/DL (ref 0.2–1)
BUN SERPL-MCNC: 26 MG/DL (ref 6–20)
BUN/CREAT SERPL: 27 (ref 12–20)
CALCIUM SERPL-MCNC: 8.9 MG/DL (ref 8.5–10.1)
CHLORIDE SERPL-SCNC: 101 MMOL/L (ref 97–108)
CO2 SERPL-SCNC: 29 MMOL/L (ref 21–32)
CREAT SERPL-MCNC: 0.98 MG/DL (ref 0.7–1.3)
DIFFERENTIAL METHOD BLD: ABNORMAL
EOSINOPHIL # BLD: 0 K/UL (ref 0–0.4)
EOSINOPHIL NFR BLD: 0 % (ref 0–7)
ERYTHROCYTE [DISTWIDTH] IN BLOOD BY AUTOMATED COUNT: 16.2 % (ref 11.5–14.5)
GLOBULIN SER CALC-MCNC: 4.3 G/DL (ref 2–4)
GLUCOSE SERPL-MCNC: 144 MG/DL (ref 65–100)
HCT VFR BLD AUTO: 34.3 % (ref 36.6–50.3)
HGB BLD-MCNC: 11.5 G/DL (ref 12.1–17)
IMM GRANULOCYTES # BLD AUTO: 0 K/UL
IMM GRANULOCYTES NFR BLD AUTO: 0 %
LYMPHOCYTES # BLD: 0.8 K/UL (ref 0.8–3.5)
LYMPHOCYTES NFR BLD: 7 % (ref 12–49)
MCH RBC QN AUTO: 28.7 PG (ref 26–34)
MCHC RBC AUTO-ENTMCNC: 33.5 G/DL (ref 30–36.5)
MCV RBC AUTO: 85.5 FL (ref 80–99)
METAMYELOCYTES NFR BLD MANUAL: 6 %
MONOCYTES # BLD: 1.5 K/UL (ref 0–1)
MONOCYTES NFR BLD: 14 % (ref 5–13)
MYELOCYTES NFR BLD MANUAL: 2 %
NEUTS BAND NFR BLD MANUAL: 9 % (ref 0–6)
NEUTS SEG # BLD: 7.6 K/UL (ref 1.8–8)
NEUTS SEG NFR BLD: 62 % (ref 32–75)
NRBC # BLD: 0 K/UL (ref 0–0.01)
NRBC BLD-RTO: 0 PER 100 WBC
PLATELET # BLD AUTO: 162 K/UL (ref 150–400)
PMV BLD AUTO: 12.6 FL (ref 8.9–12.9)
POTASSIUM SERPL-SCNC: 5.7 MMOL/L (ref 3.5–5.1)
PROT SERPL-MCNC: 7.5 G/DL (ref 6.4–8.2)
RBC # BLD AUTO: 4.01 M/UL (ref 4.1–5.7)
RBC MORPH BLD: ABNORMAL
SERVICE CMNT-IMP: NORMAL
SODIUM SERPL-SCNC: 133 MMOL/L (ref 136–145)
WBC # BLD AUTO: 10.7 K/UL (ref 4.1–11.1)

## 2021-12-12 PROCEDURE — 97110 THERAPEUTIC EXERCISES: CPT

## 2021-12-12 PROCEDURE — 74011250636 HC RX REV CODE- 250/636: Performed by: PHYSICIAN ASSISTANT

## 2021-12-12 PROCEDURE — 36415 COLL VENOUS BLD VENIPUNCTURE: CPT

## 2021-12-12 PROCEDURE — 74011250637 HC RX REV CODE- 250/637: Performed by: INTERNAL MEDICINE

## 2021-12-12 PROCEDURE — 74011250637 HC RX REV CODE- 250/637: Performed by: FAMILY MEDICINE

## 2021-12-12 PROCEDURE — 80053 COMPREHEN METABOLIC PANEL: CPT

## 2021-12-12 PROCEDURE — 97535 SELF CARE MNGMENT TRAINING: CPT

## 2021-12-12 PROCEDURE — 2709999900 HC NON-CHARGEABLE SUPPLY

## 2021-12-12 PROCEDURE — 94760 N-INVAS EAR/PLS OXIMETRY 1: CPT

## 2021-12-12 PROCEDURE — 85025 COMPLETE CBC W/AUTO DIFF WBC: CPT

## 2021-12-12 PROCEDURE — 97116 GAIT TRAINING THERAPY: CPT

## 2021-12-12 PROCEDURE — 97165 OT EVAL LOW COMPLEX 30 MIN: CPT

## 2021-12-12 PROCEDURE — 94640 AIRWAY INHALATION TREATMENT: CPT

## 2021-12-12 PROCEDURE — 77010033678 HC OXYGEN DAILY

## 2021-12-12 PROCEDURE — 97161 PT EVAL LOW COMPLEX 20 MIN: CPT

## 2021-12-12 PROCEDURE — 65660000000 HC RM CCU STEPDOWN

## 2021-12-12 RX ORDER — ADHESIVE BANDAGE
30 BANDAGE TOPICAL ONCE
Status: DISPENSED | OUTPATIENT
Start: 2021-12-12 | End: 2021-12-13

## 2021-12-12 RX ORDER — POLYETHYLENE GLYCOL 3350 17 G/17G
17 POWDER, FOR SOLUTION ORAL 2 TIMES DAILY
Status: DISCONTINUED | OUTPATIENT
Start: 2021-12-12 | End: 2021-12-13 | Stop reason: HOSPADM

## 2021-12-12 RX ORDER — SODIUM POLYSTYRENE SULFONATE 15 G/60ML
30 SUSPENSION ORAL; RECTAL
Status: COMPLETED | OUTPATIENT
Start: 2021-12-12 | End: 2021-12-12

## 2021-12-12 RX ADMIN — IPRATROPIUM BROMIDE AND ALBUTEROL 1 PUFF: 20; 100 SPRAY, METERED RESPIRATORY (INHALATION) at 14:54

## 2021-12-12 RX ADMIN — IPRATROPIUM BROMIDE AND ALBUTEROL 1 PUFF: 20; 100 SPRAY, METERED RESPIRATORY (INHALATION) at 08:16

## 2021-12-12 RX ADMIN — DEXAMETHASONE SODIUM PHOSPHATE 6 MG: 10 INJECTION INTRAMUSCULAR; INTRAVENOUS at 21:25

## 2021-12-12 RX ADMIN — BARICITINIB 4 MG: 2 TABLET, FILM COATED ORAL at 08:49

## 2021-12-12 RX ADMIN — GUAIFENESIN 600 MG: 600 TABLET ORAL at 08:49

## 2021-12-12 RX ADMIN — IPRATROPIUM BROMIDE AND ALBUTEROL 1 PUFF: 20; 100 SPRAY, METERED RESPIRATORY (INHALATION) at 18:41

## 2021-12-12 RX ADMIN — POLYETHYLENE GLYCOL 3350 17 G: 17 POWDER, FOR SOLUTION ORAL at 12:44

## 2021-12-12 RX ADMIN — IPRATROPIUM BROMIDE AND ALBUTEROL 1 PUFF: 20; 100 SPRAY, METERED RESPIRATORY (INHALATION) at 02:01

## 2021-12-12 RX ADMIN — DEXAMETHASONE SODIUM PHOSPHATE 6 MG: 10 INJECTION INTRAMUSCULAR; INTRAVENOUS at 08:49

## 2021-12-12 RX ADMIN — IPRATROPIUM BROMIDE AND ALBUTEROL 1 PUFF: 20; 100 SPRAY, METERED RESPIRATORY (INHALATION) at 18:39

## 2021-12-12 RX ADMIN — Medication 10 ML: at 15:59

## 2021-12-12 RX ADMIN — SODIUM POLYSTYRENE SULFONATE 30 G: 15 SUSPENSION ORAL; RECTAL at 12:35

## 2021-12-12 RX ADMIN — GUAIFENESIN 600 MG: 600 TABLET ORAL at 21:24

## 2021-12-12 RX ADMIN — Medication 10 ML: at 21:25

## 2021-12-12 RX ADMIN — Medication 10 ML: at 05:28

## 2021-12-12 NOTE — PROGRESS NOTES
PHYSICAL THERAPY EVALUATION/DISCHARGE  Patient: Ramiro Kelly (43 y.o. male)  Date: 12/12/2021  Primary Diagnosis: Acute respiratory failure with hypoxia (HCC) [J96.01]       Precautions:    (droplet +)      ASSESSMENT  Based on the objective data described below, the patient presents with independent transfers and ambulation, low fall risk, and appropriate insight into safe mobility strategies after admission 12/5/21 with AHRF in setting of covid-19. Pt received using 3LNCO2, weaned to room air at rest and maintains SpO2 90% and greater with ambulation. RN aware, advising PT leave pt using room air +remote pulse ox monitoring in place. Pt educated regarding exercises for respiratory status. He received handout regarding exercises and energy conservation from evaluating OT. Documentation for home O2:     ROOM AIR    AT REST   O2 SATS  97% HR  94   ROOM AIR WITH ACTIVITY 02 SATS  90% HR  105         Room air PATIENT LEFT COMFORTABLY  SITTING/SUPINE 02 SATS  96% HR  90           Functional Outcome Measure: The patient scored 28 on the Tinetti outcome measure which is indicative of low fall risk. Other factors to consider for discharge: none additional     Further skilled acute physical therapy is not indicated at this time. PLAN :  Recommendation for discharge: (in order for the patient to meet his/her long term goals)  No skilled physical therapy/ follow up rehabilitation needs identified at this time. This discharge recommendation:  Has not yet been discussed the attending provider and/or case management    IF patient discharges home will need the following DME: none       SUBJECTIVE:   Patient stated I'm walking around fine.  Pt reports his spouse is also admitted to this hospital due to Covid-19. Pt received seated, agreeable to PT and cleared by RN.       OBJECTIVE DATA SUMMARY:   HISTORY:    Past Medical History:   Diagnosis Date    GERD (gastroesophageal reflux disease)     Hypertension Past Surgical History:   Procedure Laterality Date    COLONOSCOPY N/A 4/12/2017    COLONOSCOPY performed by Miguel Cordero MD at [address] [facility] HX APPENDECTOMY      HX CATARACT REMOVAL Right        Prior level of function: completely independent at community and household levels. Personal factors and/or comorbidities impacting plan of care: as above. Home Situation  Home Environment: Private residence  # Steps to Enter: 3  Rails to Enter: Yes  Hand Rails : Bilateral  One/Two Story Residence: One story  Living Alone: No  Support Systems: Spouse/Significant Other  Patient Expects to be Discharged to[de-identified] House  Current DME Used/Available at Home: None    EXAMINATION/PRESENTATION/DECISION MAKING:   Critical Behavior:  Neurologic State: Alert  Orientation Level: Oriented X4  Cognition: Follows commands     Hearing:   Auditory  Auditory Impairment: None  Skin:  Exposed skin intact  Edema: none noted LEs  Range Of Motion:  AROM: Within functional limits                       Strength:    Strength: Generally decreased, functional                    Tone & Sensation:   Tone: Normal              Sensation: Intact               Coordination:  Coordination: Within functional limits       Functional Mobility:  Bed Mobility:     Supine to Sit:  (states performing independently)     Scooting: Independent  Transfers:  Sit to Stand: Independent  Stand to Sit: Independent                       Balance:   Sitting: Intact  Standing: Intact  Ambulation/Gait Training:  Distance (ft): 70 Feet (ft)     Ambulation - Level of Assistance: Independent                                   Able to negotiate obstacles, manage lines/leads, attend to environment appropriately            Exercises:  Shoulder scaption coordinated with respirations x 5  Goal posts with UEs x 5 coordinated with respirations      Functional Measure:  Tinetti test:    Sitting Balance: 1  Arises: 2  Attempts to Rise: 2  Immediate Standing Balance: 2  Standing Balance: 2  Nudged: 2  Eyes Closed: 1  Turn 360 Degrees - Continuous/Discontinuous: 1  Turn 360 Degrees - Steady/Unsteady: 1  Sitting Down: 2  Balance Score: 16 Balance total score  Indication of Gait: 1  R Step Length/Height: 1  L Step Length/Height: 1  R Foot Clearance: 1  L Foot Clearance: 1  Step Symmetry: 1  Step Continuity: 1  Path: 2  Trunk: 2  Walking Time: 1  Gait Score: 12 Gait total score  Total Score: 28/28 Overall total score         Tinetti Tool Score Risk of Falls  <19 = High Fall Risk  19-24 = Moderate Fall Risk  25-28 = Low Fall Risk  Tinetti ME. Performance-Oriented Assessment of Mobility Problems in Elderly Patients. [place] 66; [id number]. (Scoring Description: PT Bulletin Feb. 10, 1993)    Older adults: mEilie Govea et al, 2009; n = 1000 [facility] elderly evaluated with ABC, STACIA, ADL, and IADL)  · Mean STACIA score for males aged 69-68 years = 26.21(3.40)  · Mean STACIA score for females age 69-68 years = 25.16(4.30)  · Mean STACIA score for males over 80 years = 23.29(6.02)  · Mean STACIA score for females over 80 years = 17.20(8.32)            Physical Therapy Evaluation Charge Determination   History Examination Presentation Decision-Making   HIGH Complexity :3+ comorbidities / personal factors will impact the outcome/ POC  HIGH Complexity : 4+ Standardized tests and measures addressing body structure, function, activity limitation and / or participation in recreation  LOW Complexity : Stable, uncomplicated  Other outcome measures tinetti  LOW       Based on the above components, the patient evaluation is determined to be of the following complexity level: LOW     Pain Rating:  Denies pain    Activity Tolerance:   Good      After treatment patient left in no apparent distress:   Sitting in chair and Call bell within reach    COMMUNICATION/EDUCATION:   The patients plan of care was discussed with: Occupational therapist and Registered nurse.      Fall prevention education was provided and the patient/caregiver indicated understanding., Patient/family have participated as able in goal setting and plan of care. and Patient/family agree to work toward stated goals and plan of care.     Thank you for this referral.  Fernando Denson, PT, DPT   Time Calculation: 25 mins

## 2021-12-12 NOTE — PROGRESS NOTES
Problem: Body Temperature -  Risk of, Imbalanced  Goal: Ability to maintain a body temperature within defined limits  Outcome: Progressing Towards Goal  Goal: Will regain or maintain usual level of consciousness  Outcome: Progressing Towards Goal  Goal: Complications related to the disease process, condition or treatment will be avoided or minimized  Outcome: Progressing Towards Goal     Problem: Falls - Risk of  Goal: *Absence of Falls  Description: Document Blake Conway Fall Risk and appropriate interventions in the flowsheet.   Outcome: Progressing Towards Goal  Note: Fall Risk Interventions:            Medication Interventions: Bed/chair exit alarm, Patient to call before getting OOB, Teach patient to arise slowly    Elimination Interventions: Bed/chair exit alarm, Call light in reach, Patient to call for help with toileting needs    History of Falls Interventions: Bed/chair exit alarm, Room close to nurse's station         Problem: Fatigue  Goal: Verbalize increase energy and improved vitality  Outcome: Progressing Towards Goal     Problem: Risk for Fluid Volume Deficit  Goal: Maintain normal heart rhythm  Outcome: Progressing Towards Goal  Goal: Maintain absence of muscle cramping  Outcome: Progressing Towards Goal  Goal: Maintain normal serum potassium, sodium, calcium, phosphorus, and pH  Outcome: Progressing Towards Goal

## 2021-12-12 NOTE — PROGRESS NOTES
OCCUPATIONAL THERAPY EVALUATION/DISCHARGE  Patient: Tanesha Rose (75 y.o. male)  Date: 12/12/2021  Primary Diagnosis: Acute respiratory failure with hypoxia (HCC) [J96.01]       Precautions:    (droplet +)    ASSESSMENT  Based on the objective data described below, the patient presents with independent ADL task and transfer performance though impacted minimally by general weakness. Patient completed all activity including in room ambulation on room air and maintained SPO2 at 90% or greater, and HR below 106. Patient instructed in (handout provided) on AROM/LE exercises, energy conservation strategies, breathing techniques. Good understanding reported. No DME needs noted. No follow up OT services indicated at this time    Current Level of Function (ADLs/self-care): independent ADL    Functional Outcome Measure: The patient scored 70/100 on the Barthel Index outcome measure     Other factors to consider for discharge: good cognition     PLAN :  Recommend with staff: toileting in bathroom    Recommendation for discharge: (in order for the patient to meet his/her long term goals)  No skilled occupational therapy/ follow up rehabilitation needs identified at this time.     This discharge recommendation:  Has been made in collaboration with the attending provider and/or case management    IF patient discharges home will need the following DME: none       SUBJECTIVE:   Patient pleasant and cooperative    OBJECTIVE DATA SUMMARY:   HISTORY:   Past Medical History:   Diagnosis Date    GERD (gastroesophageal reflux disease)     Hypertension      Past Surgical History:   Procedure Laterality Date    COLONOSCOPY N/A 4/12/2017    COLONOSCOPY performed by Kai Ford MD at [address] [place] [address] HX APPENDECTOMY      HX CATARACT REMOVAL Right        Prior Level of Function/Environment/Context: independent with ADL/IADL  Expanded or extensive additional review of patient history:   Home Situation  Home Environment: Private residence  # Steps to Enter: 3  Rails to Enter: Yes  Hand Rails : Bilateral  One/Two Story Residence: One story  Living Alone: No  Support Systems: Spouse/Significant Other  Patient Expects to be Discharged to[de-identified] House  Current DME Used/Available at Home: None    Hand dominance: Right    EXAMINATION OF PERFORMANCE DEFICITS:  Cognitive/Behavioral Status:  Neurologic State: Alert  Orientation Level: Oriented X4  Cognition: Follows commands        Hearing: Auditory  Auditory Impairment: None    Vision/Perceptual:            intact          Range of Motion:    AROM: Within functional limitsWFL              Strength:    Strength: Generally decreased, functionalgenerally decreased, functional                 Coordination:  Coordination: Within functional limits  Fine Motor Skills-Upper: Left Intact; Right Intact    Gross Motor Skills-Upper: Left Intact; Right Intact    Tone & Sensation:    Tone: Normal  Sensation: Intact                      Balance:  Sitting: Intact  Standing: Intact    Functional Mobility and Transfers for ADLs:  Bed Mobility:  Supine to Sit:  (states performing independently)  Scooting: Independent    Transfers:  Sit to Stand: Independent  Stand to Sit: Independent    ADL Assessment:  Feeding: Independent    Oral Facial Hygiene/Grooming: Independent    Bathing: Independent    Upper Body Dressing: Independent    Lower Body Dressing: Independent    Toileting: Independent                ADL Intervention and task modifications:               Therapeutic Exercise:     Functional Measure:    Barthel Index:  Bathin  Bladder: 10  Bowels: 10  Groomin  Dressing: 10  Feeding: 10  Mobility: 0  Stairs: 5  Toilet Use: 5  Transfer (Bed to Chair and Back): 10  Total: 70/100      The Barthel ADL Index: Guidelines  1. The index should be used as a record of what a patient does, not as a record of what a patient could do.   2. The main aim is to establish degree of independence from any help, physical or verbal, however minor and for whatever reason. 3. The need for supervision renders the patient not independent. 4. A patient's performance should be established using the best available evidence. Asking the patient, friends/relatives and nurses are the usual sources, but direct observation and common sense are also important. However direct testing is not needed. 5. Usually the patient's performance over the preceding 24-48 hours is important, but occasionally longer periods will be relevant. 6. Middle categories imply that the patient supplies over 50 per cent of the effort. 7. Use of aids to be independent is allowed. Score Interpretation (from [facility] 83)    Independent   60-79 Minimally independent   40-59 Partially dependent   20-39 Very dependent   <20 Totally dependent     -Lonny Peters., Barthel, DKrzysztofW. (1965). Functional evaluation: the Barthel Index. [facility] ([address] (1997). The Barthel activities of daily living index: self-reporting versus actual performance in the old (> or = 75 years). Journal [address] 45(7), [facility], ..M.F, Pawel Akhtar., Juni Mosher. (1999). Measuring the change in disability after inpatient rehabilitation; comparison of the responsiveness of the Barthel Index and Functional [place] Measure. Journal of Neurology, Neurosurgery, and Psychiatry, 66(4), 581-613. Sravan Briseno NASHLEY.ALISA, CRISTI Lundy, & Aparna Hendricks M.A. (2004) Assessment of post-stroke quality of life in cost-effectiveness studies: The usefulness of the Barthel Index and the EuroQoL-5D.  Quality of Life Research, 15, 141-99       Occupational Therapy Evaluation Charge Determination   History Examination Decision-Making   LOW Complexity : Brief history review  LOW Complexity : 1-3 performance deficits relating to physical, cognitive , or psychosocial skils that result in activity limitations and / or participation restrictions  LOW Complexity : No comorbidities that affect functional and no verbal or physical assistance needed to complete eval tasks       Based on the above components, the patient evaluation is determined to be of the following complexity level: LOW   Pain Rating:  none    Activity Tolerance:   SpO2 stable on RA    After treatment patient left in no apparent distress:    Sitting in chair, Call bell within reach and Bed / chair alarm activated    COMMUNICATION/EDUCATION:   The patients plan of care was discussed with: Physical therapist and Registered nurse.      Thank you for this referral.  Araceli Calabrese OT  Time Calculation: 29 mins

## 2021-12-12 NOTE — PROGRESS NOTES
Lenin Lu[facility]  [address] [facility], [address] [place] [address]  [phone]      Medical Progress Note      NAME: Bautista Wong   :  1945  MRM:  [id number]    Date of service: 2021  2:39 PM       Assessment and Plan:   1. Acute respiratory failure with hypoxia ([facility] (2021) due to COVID-19 pneumonia.:  CTA of the chest showed no PE. Off O2 currently. 2.  Pneumonia due to COVID-19 virus (2021):  Diagnosed . Cultures and serologies neg. Continue Baricitinib, Dexamethasone, Combivent. Has completed a course with IV Ceftriaxone and Azithromycin.      3. HTN (hypertension), benign (2021). Not on medication. Continue to monitor      4. Hyponatremia, mild. Monitor    5.  ] Hyperkalemia - kayexalate was given. Monitor.      6. Thrombocytopenia ([address] (2021): seems chronic. Improved                Subjective:     Chief Complaint[de-identified] Patient was seen and examined as a follow up for covid pneumonia. Chart was reviewed. feels well      ROS:  (bold if positive, if negative)    Tolerating PT  Tolerating Diet        Objective:     Last 24hrs VS reviewed since prior progress note.  Most recent are:    Visit Vitals  BP (!) 156/77 (BP 1 Location: Left upper arm, BP Patient Position: Lying)   Pulse (!) 110   Temp 97.6 °F (36.4 °C)   Resp 18   Ht 5' 7.99\" (1.727 m)   Wt 70.4 kg (155 lb 1.6 oz)   SpO2 97%   BMI 23.59 kg/m²     SpO2 Readings from Last 6 Encounters:   21 97%   21 94%   10/25/21 96%   20 97%   20 97%   19 97%    O2 Flow Rate (L/min): 3 l/min       Intake/Output Summary (Last 24 hours) at 2021 1439  Last data filed at 2021 0816  Gross per 24 hour   Intake 360 ml   Output    Net 360 ml        Physical Exam:    Gen:  Well-developed, well-nourished, in no acute distress  HEENT:  Pink conjunctivae, PERRL, hearing intact to voice, moist mucous membranes  Neck:  Supple, without masses, thyroid non-tender  Resp:  No accessory muscle use, clear breath sounds without wheezes rales or rhonchi  Card:  No murmurs, normal S1, S2 without thrills, bruits or peripheral edema  Abd:  Soft, non-tender, non-distended, normoactive bowel sounds are present, no palpable organomegaly and no detectable hernias  Lymph:  No cervical or inguinal adenopathy  Musc:  No cyanosis or clubbing  Skin:  No rashes or ulcers, skin turgor is good  Neuro:  Cranial nerves are grossly intact, no focal motor weakness, follows commands appropriately  Psych:  Good insight, oriented to person, place and time, alert  __________________________________________________________________  Medications Reviewed: (see below)  Medications:     Current Facility-Administered Medications   Medication Dose Route Frequency    dexamethasone (PF) (DECADRON) 10 mg/mL injection 6 mg  6 mg IntraVENous Q12H    baricitinib (OLUMIANT) tablet 4 mg  4 mg Oral DAILY    guaiFENesin-codeine (ROBITUSSIN AC) 100-10 mg/5 mL solution 5 mL  5 mL Oral Q4H PRN    guaiFENesin ER (MUCINEX) tablet 600 mg  600 mg Oral Q12H    benzonatate (TESSALON) capsule 100 mg  100 mg Oral TID PRN    albuterol-ipratropium (DUO-NEB) 2.5 MG-0.5 MG/3 ML  3 mL Nebulization Q4H PRN    Or    ipratropium-albuterol (COMBIVENT RESPIMAT) 20 mcg-100 mcg inhalation spray  1 Puff Inhalation Q4H PRN    sodium chloride (NS) flush 5-40 mL  5-40 mL IntraVENous Q8H    sodium chloride (NS) flush 5-40 mL  5-40 mL IntraVENous PRN    acetaminophen (TYLENOL) tablet 650 mg  650 mg Oral Q6H PRN    Or    acetaminophen (TYLENOL) suppository 650 mg  650 mg Rectal Q6H PRN    polyethylene glycol (MIRALAX) packet 17 g  17 g Oral DAILY PRN    ondansetron (ZOFRAN ODT) tablet 4 mg  4 mg Oral Q8H PRN    Or    ondansetron (ZOFRAN) injection 4 mg  4 mg IntraVENous Q6H PRN    ipratropium-albuterol (COMBIVENT RESPIMAT) 20 mcg-100 mcg inhalation spray  1 Puff Inhalation Q6H RT        Lab Data Reviewed: (see below)  Lab Review:     Recent Labs 12/12/21  0040   WBC 10.7   HGB 11.5*   HCT 34.3*        Recent Labs     12/12/21  0040 12/10/21  1050 12/10/21  0044   *  --  132*   K 5.7*  --  4.6     --  100   CO2 29  --  25   *  --  140*   BUN 26*  --  25*   CREA 0.98  --  0.84   CA 8.9  --  8.0*   ALB 3.2* 2.7*  --    TBILI 1.2* 0.8  --    ALT 95* 60  --      No results found for: GLUCPOC  No results for input(s): PH, PCO2, PO2, HCO3, FIO2 in the last 72 hours. No results for input(s): INR, INREXT in the last 72 hours. All Micro Results     Procedure Component Value Units Date/Time    CULTURE, BLOOD, PAIRED [[id number]] Collected: 12/06/21 0846    Order Status: Completed Specimen: Blood Updated: 12/12/21 0624     Special Requests: NO SPECIAL REQUESTS        Culture result: NO GROWTH 6 DAYS       LEGIONELLA PNEUMOPHILA AG, URINE [[id number]] Collected: 12/07/21 1209    Order Status: Completed Specimen: Urine Updated: 12/08/21 1536     Source 2 Hour Urine        L pneumophila S1 Ag, urine Negative        Comment: (NOTE)  Presumptive negative for L. pneumophila serogroup 1 antigen in urine,  suggesting no recent or current infection. Legionnaires' disease  cannot be ruled out since other serogroups and species may also  cause disease. Performed At: [facility] & [place] [id number]  Rosan Bence MD JR:[id number]         URINE CULTURE HOLD SAMPLE [[id number]] Collected: 12/07/21 1220    Order Status: Completed Specimen: Serum Updated: 12/07/21 1359     Urine culture hold       Urine on hold in Microbiology dept for 2 days. If unpreserved urine is submitted, it cannot be used for addtional testing after 24 hours, recollection will be required.           RESPIRATORY VIRUS PANEL W/COVID-19, PCR [[id number]]  (Abnormal) Collected: 12/06/21 0846    Order Status: Completed Specimen: Nasopharyngeal Updated: 12/06/21 1547     Adenovirus Not detected        Coronavirus 229E Not detected        Coronavirus HKU1 Not detected        Coronavirus CVNL63 Not detected        Coronavirus OC43 Not detected        SARS-CoV-2, PCR Detected        Metapneumovirus Not detected        Rhinovirus and Enterovirus Not detected        Influenza A Not detected        Influenza A, subtype H1 Not detected        Influenza A, subtype H3 Not detected        INFLUENZA A H1N1 PCR Not detected        Influenza B Not detected        Parainfluenza 1 Not detected        Parainfluenza 2 Not detected        Parainfluenza 3 Not detected        Parainfluenza virus 4 Not detected        RSV by PCR Not detected        B. parapertussis, PCR Not detected        Bordetella pertussis - PCR Not detected        Chlamydophila pneumoniae DNA, QL, PCR Not detected        Mycoplasma pneumoniae DNA, QL, PCR Not detected       LEGIONELLA PNEUMOPHILA AG, URINE [[id number]]     Order Status: Canceled Specimen: Urine           I have reviewed notes of prior 24hr. Other pertinent lab: Total time spent with patient: 28 I personally reviewed chart, notes, data and current medications in the medical record. I have personally examined and treated the patient at bedside during this period.                  Care Plan discussed with: Patient, Nursing Staff and >50% of time spent in counseling and coordination of care    Discussed:  Care Plan and D/C Planning    Prophylaxis:  Lovenox    Disposition:  Home w/Family           ___________________________________________________    Attending Physician: More Patrick MD

## 2021-12-12 NOTE — PROGRESS NOTES
Lenin [facility]alvoelsen [facility]  [address] [facility] [address] [place] [address]  [phone]      Medical Progress Note      NAME:         Tanesha Rose   :        1945  MRM:        [id number]    Date of service: 2021      Subjective: Patient has been seen and examined as a follow up for multiple medical issues. Chart, labs, diagnostics reviewed. He feels well still weak. Mild cough. No fever or chills. Objective:    Vital Signs:    Visit Vitals  BP (!) 156/77 (BP 1 Location: Left upper arm, BP Patient Position: Lying)   Pulse 60   Temp 97.6 °F (36.4 °C)   Resp 18   Ht 5' 7.99\" (1.727 m)   Wt 70.4 kg (155 lb 1.6 oz)   SpO2 97%   BMI 23.59 kg/m²          Intake/Output Summary (Last 24 hours) at 2021 1145  Last data filed at 2021 0816  Gross per 24 hour   Intake 360 ml   Output    Net 360 ml        Physical Examination:    General:   Weak looking although not in any acute distress   Eyes:   pink conjunctivae, PERRLA with no discharge. Pulm:  decreased breath sounds without crackles or wheezes  Card: has regular and normal S1, S2 without thrills, bruits or peripheral edema  Abd:  Soft, non-tender, non-distended, normoactive bowel sounds   Musc:  No cyanosis, clubbing, atrophy or deformities. Skin:  No rashes, bruising or ulcers. Neuro: Awake and alert.  Generally a non focal exam.   Psych:  Has a fair insight to his illness     Current Facility-Administered Medications   Medication Dose Route Frequency    dexamethasone (PF) (DECADRON) 10 mg/mL injection 6 mg  6 mg IntraVENous Q12H    baricitinib (OLUMIANT) tablet 4 mg  4 mg Oral DAILY    guaiFENesin-codeine (ROBITUSSIN AC) 100-10 mg/5 mL solution 5 mL  5 mL Oral Q4H PRN    guaiFENesin ER (MUCINEX) tablet 600 mg  600 mg Oral Q12H    benzonatate (TESSALON) capsule 100 mg  100 mg Oral TID PRN    albuterol-ipratropium (DUO-NEB) 2.5 MG-0.5 MG/3 ML  3 mL Nebulization Q4H PRN    Or    ipratropium-albuterol (COMBIVENT RESPIMAT) 20 mcg-100 mcg inhalation spray  1 Puff Inhalation Q4H PRN    sodium chloride (NS) flush 5-40 mL  5-40 mL IntraVENous Q8H    sodium chloride (NS) flush 5-40 mL  5-40 mL IntraVENous PRN    acetaminophen (TYLENOL) tablet 650 mg  650 mg Oral Q6H PRN    Or    acetaminophen (TYLENOL) suppository 650 mg  650 mg Rectal Q6H PRN    polyethylene glycol (MIRALAX) packet 17 g  17 g Oral DAILY PRN    ondansetron (ZOFRAN ODT) tablet 4 mg  4 mg Oral Q8H PRN    Or    ondansetron (ZOFRAN) injection 4 mg  4 mg IntraVENous Q6H PRN    ipratropium-albuterol (COMBIVENT RESPIMAT) 20 mcg-100 mcg inhalation spray  1 Puff Inhalation Q6H RT        Laboratory data and review:    Recent Labs     12/12/21  0040   WBC 10.7   HGB 11.5*   HCT 34.3*        Recent Labs     12/12/21  0040 12/10/21  1050 12/10/21  0044   *  --  132*   K 5.7*  --  4.6     --  100   CO2 29  --  25   *  --  140*   BUN 26*  --  25*   CREA 0.98  --  0.84   CA 8.9  --  8.0*   ALB 3.2* 2.7*  --    ALT 95* 60  --      No components found for: [place]    Diagnostics: Imaging studies have been reviewed    Telemetry reviewed by me:   normal sinus rhythm    Assessment and Plan:    Acute respiratory failure with hypoxia ([address] (12/5/2021) POA: now with worsening and increased oxygen requirements. Due to CoV-19 virus infection. CTA showed no PE. Continue supplemental oxygen, treat the COVID, and wean as tolerated to keep sats > 88%. Await a PT, OT review for discharge planning. Pneumonia due to COVID-19 virus (12/8/2021) POA:  Diagnosed 12/2. Cultures and serologies neg. Continue Baricitinib, Dexamethasone, Combivent. Has completed a course with IV Ceftriaxone and Azithromycin. No chemical DVT prophylaxis due to the thrombocytopenia. PT, OT assessment pending    HTN (hypertension), benign (12/8/2021) POA: BP remains variable.  Continue to monitor     Hyponatremia / Hyperkalemia - likely from a decreased intake. Stable. Has an elevated K+. Give kayexalate. Repeat K+ later today and in AM        Thrombocytopenia ([address] (12/8/2021) POA: seems chronic.  Improved      Total time spent for the patient's care: [facility] discussed with: Patient and Nursing Staff    Discussed:  Care Plan and D/C Planning    Prophylaxis:  SCD's    Anticipated Disposition:  Home w/Family           ___________________________________________________    Attending Physician:   Frandy Rodriguez MD

## 2021-12-13 VITALS
DIASTOLIC BLOOD PRESSURE: 74 MMHG | BODY MASS INDEX: 23.51 KG/M2 | HEART RATE: 99 BPM | SYSTOLIC BLOOD PRESSURE: 108 MMHG | WEIGHT: 155.1 LBS | HEIGHT: 68 IN | RESPIRATION RATE: 18 BRPM | OXYGEN SATURATION: 95 % | TEMPERATURE: 97.9 F

## 2021-12-13 PROBLEM — J96.01 ACUTE RESPIRATORY FAILURE WITH HYPOXIA (HCC): Status: RESOLVED | Noted: 2021-12-05 | Resolved: 2021-12-13

## 2021-12-13 LAB
ANION GAP SERPL CALC-SCNC: 6 MMOL/L (ref 5–15)
BUN SERPL-MCNC: 28 MG/DL (ref 6–20)
BUN/CREAT SERPL: 31 (ref 12–20)
CALCIUM SERPL-MCNC: 8.1 MG/DL (ref 8.5–10.1)
CHLORIDE SERPL-SCNC: 100 MMOL/L (ref 97–108)
CO2 SERPL-SCNC: 28 MMOL/L (ref 21–32)
COMMENT, HOLDF: NORMAL
CREAT SERPL-MCNC: 0.91 MG/DL (ref 0.7–1.3)
CRP SERPL HS-MCNC: >9.5 MG/L
D DIMER PPP FEU-MCNC: 11.53 MG/L FEU (ref 0–0.65)
GLUCOSE SERPL-MCNC: 133 MG/DL (ref 65–100)
POTASSIUM SERPL-SCNC: 5 MMOL/L (ref 3.5–5.1)
SAMPLES BEING HELD,HOLD: NORMAL
SODIUM SERPL-SCNC: 134 MMOL/L (ref 136–145)

## 2021-12-13 PROCEDURE — 94760 N-INVAS EAR/PLS OXIMETRY 1: CPT

## 2021-12-13 PROCEDURE — 80048 BASIC METABOLIC PNL TOTAL CA: CPT

## 2021-12-13 PROCEDURE — 85379 FIBRIN DEGRADATION QUANT: CPT

## 2021-12-13 PROCEDURE — 94640 AIRWAY INHALATION TREATMENT: CPT

## 2021-12-13 PROCEDURE — 74011250637 HC RX REV CODE- 250/637: Performed by: INTERNAL MEDICINE

## 2021-12-13 PROCEDURE — 74011250637 HC RX REV CODE- 250/637: Performed by: FAMILY MEDICINE

## 2021-12-13 PROCEDURE — 86141 C-REACTIVE PROTEIN HS: CPT

## 2021-12-13 PROCEDURE — 36415 COLL VENOUS BLD VENIPUNCTURE: CPT

## 2021-12-13 PROCEDURE — 94618 PULMONARY STRESS TESTING: CPT

## 2021-12-13 RX ORDER — DEXAMETHASONE SODIUM PHOSPHATE 10 MG/ML
6 INJECTION INTRAMUSCULAR; INTRAVENOUS DAILY
Status: DISCONTINUED | OUTPATIENT
Start: 2021-12-14 | End: 2021-12-13 | Stop reason: HOSPADM

## 2021-12-13 RX ADMIN — GUAIFENESIN 600 MG: 600 TABLET ORAL at 09:48

## 2021-12-13 RX ADMIN — Medication 10 ML: at 09:49

## 2021-12-13 RX ADMIN — POLYETHYLENE GLYCOL 3350 17 G: 17 POWDER, FOR SOLUTION ORAL at 09:48

## 2021-12-13 RX ADMIN — IPRATROPIUM BROMIDE AND ALBUTEROL 1 PUFF: 20; 100 SPRAY, METERED RESPIRATORY (INHALATION) at 08:10

## 2021-12-13 RX ADMIN — IPRATROPIUM BROMIDE AND ALBUTEROL 1 PUFF: 20; 100 SPRAY, METERED RESPIRATORY (INHALATION) at 12:56

## 2021-12-13 RX ADMIN — BARICITINIB 4 MG: 2 TABLET, FILM COATED ORAL at 09:48

## 2021-12-13 RX ADMIN — IPRATROPIUM BROMIDE AND ALBUTEROL 1 PUFF: 20; 100 SPRAY, METERED RESPIRATORY (INHALATION) at 02:02

## 2021-12-13 NOTE — DISCHARGE SUMMARY
Hospitalist Discharge Summary     Patient ID:    Nilsa Elkins  [id number]  77 y.o.  1945    Admit date of service: 12/5/2021    Discharge date of service: 12/13/2021    Admission Diagnoses: Acute respiratory failure with hypoxia ([facility] [J96.01]    Chronic Diagnoses:    Problem List as of 12/13/2021 Date Reviewed: 10/25/2021          Codes Class Noted - Resolved    Pneumonia due to COVID-19 virus ICD-10-CM: U07.1, J12.82  ICD-9-CM: 480.8, 079.89  12/8/2021 - Present        HTN (hypertension), benign ICD-10-CM: I10  ICD-9-CM: 401.1  12/8/2021 - Present        Thrombocytopenia ([facility] ICD-10-CM: D69.6  ICD-9-CM: 287.5  12/8/2021 - Present        * (Principal) RESOLVED: Acute respiratory failure with hypoxia ([facility] ICD-10-CM: J96.01  ICD-9-CM: 518.81  12/5/2021 - 12/13/2021              Discharge Medications:   Current Discharge Medication List      CONTINUE these medications which have NOT CHANGED    Details   ascorbic acid, vitamin C, (VITAMIN C) 1,000 mg tablet Take 1,000 mg by mouth daily. amLODIPine (NORVASC) 5 mg tablet Take 5 mg by mouth daily. ergocalciferol (VITAMIN D2) 50,000 unit capsule Take 50,000 Units by mouth. Take every other Monday             Follow up Care:    1. Fabiola Call MD in 1-2 weeks  2.  pulmonary    Diet:  Regular Diet    Disposition:  Home. Advanced Directive:    Discharge Exam:  See today's note. CONSULTATIONS: Pulmonary/Intensive care    Significant Diagnostic Studies:   Recent Labs     12/12/21  0040   WBC 10.7   HGB 11.5*   HCT 34.3*        Recent Labs     12/13/21 0045 12/12/21 0040   * 133*   K 5.0 5.7*    101   CO2 28 29   BUN 28* 26*   CREA 0.91 0.98   * 144*   CA 8.1* 8.9     Recent Labs     12/12/21 0040   ALT 95*   AP 40*   TBILI 1.2*   TP 7.5   ALB 3.2*   GLOB 4.3*     No results for input(s): INR, PTP, APTT, INREXT in the last 72 hours. No results for input(s): FE, TIBC, PSAT, FERR in the last 72 hours.    No results for input(s): PH, PCO2, PO2 in the last 72 hours. No results for input(s): CPK, CKMB in the last 72 hours. No lab exists for component: TROPONINI  No results found for: [place] [address]:   1. Acute respiratory failure with hypoxia ([facility] [address] (12/5/2021) due to COVID-19 pneumonia.:  CTA of the chest showed no PE. Off O2 currently and doesn't require home O2. . FU with pulmonary      2. Pneumonia due to COVID-19 virus (12/8/2021):  Diagnosed 12/2. Cultures and serologies neg. Continue Baricitinib, Dexamethasone, Combivent. Has completed a course with IV Ceftriaxone and Azithromycin.      3. HTN (hypertension), benign (12/8/2021). Not on medication. Continue to monitor      4. Hyponatremia, mild. Monitor     5. Hyperkalemia - resolved.       6. Thrombocytopenia ([facility] [facility] (12/8/2021): seems chronic. Improved         Discharged in improved condition.     Spent 35 minutes    Signed:  Leeanna Arrington MD  12/13/2021  12:16 PM

## 2021-12-13 NOTE — PROGRESS NOTES
[facility] Virtual Visit with PCP Zahraa Burgess MD on 12/15/2021 at 5:15pm.  Virtual Visits are Only done in the Evening

## 2021-12-13 NOTE — PROGRESS NOTES
Nurse handed patient a copy of discharge instructions which have been read and explained to patient. New medications were read and explained to patient patient verbalized understanding. Patient aware that prescriptions have been electronically sent to their pharmacy. Opportunity for questions and clarification offered. Removed patient's IV access with no complications. Vital signs stable. Patient sent with all belongings.

## 2021-12-13 NOTE — PROGRESS NOTES
Problem: Falls - Risk of  Goal: *Absence of Falls  Description: Document Nicci Stacy Fall Risk and appropriate interventions in the flowsheet.   Outcome: Progressing Towards Goal  Note: Fall Risk Interventions:       Mentation Interventions: Adequate sleep, hydration, pain control, Bed/chair exit alarm, Increase mobility, More frequent rounding, Room close to nurse's station    Medication Interventions: Bed/chair exit alarm, Patient to call before getting OOB, Teach patient to arise slowly, Utilize gait belt for transfers/ambulation    Elimination Interventions: Bed/chair exit alarm, Call light in reach, Patient to call for help with toileting needs    History of Falls Interventions: Bed/chair exit alarm, Room close to nurse's station         Problem: Isolation Precautions - Risk of Spread of Infection  Goal: Prevent transmission of infectious organism to others  Outcome: Progressing Towards Goal     Problem: Risk for Fluid Volume Deficit  Goal: Maintain normal heart rhythm  Outcome: Progressing Towards Goal  Goal: Maintain absence of muscle cramping  Outcome: Progressing Towards Goal  Goal: Maintain normal serum potassium, sodium, calcium, phosphorus, and pH  Outcome: Progressing Towards Goal     Problem: Loneliness or Risk for Loneliness  Goal: Demonstrate positive use of time alone when socialization is not possible  Outcome: Progressing Towards Goal

## 2021-12-13 NOTE — PROGRESS NOTES
12/13/21 1006   Resting (Room Air)   SpO2 94   HR 94   During Walk (Room Air)   SpO2 92      After Walk   SpO2 95      Comments walked in room. returned to chair on room air.    Does the Patient Qualify for Home O2 No

## 2021-12-13 NOTE — PROGRESS NOTES
1:35 pm: CM s/w patient about transportation home. He states he does not have a ride; his wife is currently in the hospital.  He is agreeable to w/c [facility] transport but does not have his wallet at the hospital.  He is able to pay upon arrival to his home. CM s/w Kristine Qui[facility]onez with [facility] to obtain estimate for transport. Estimated cost is $50.  CM notified Kristine Quiñonez that patient would pay upon arrival to his home since patient did not have his wallet. Patient was notified of the cost of the trip and is in agreement with the transport. Transition of Care Plan - RUR 14%:  1. CM following- no skilled PT/OT needs; patient is on RA  2. Pt's wife is hospitalized  3. Plan to discharge home   4. Outpatient follow-up  5. Transport via [facility] at 4 pm today  6. Patient lives outside the service area for [place] [id number] Management Interventions  PCP Verified by CM:  Yes Dimple Bell Signup: No  Discharge Durable Medical Equipment: No  Physical Therapy Consult: No  Occupational Therapy Consult: No  Speech Therapy Consult: No  Support Systems: Spouse/Significant Other  Discharge Location  Discharge Placement: Home with outpatient services     Barbara Grissom LCSW How Severe Are Your Spot(S)?: moderate What Type Of Note Output Would You Prefer (Optional)?: Standard Output What Is The Reason For Today's Visit?: Full Body Skin Examination What Is The Reason For Today's Visit? (Being Monitored For X): concerning skin lesions on an annual basis

## 2021-12-13 NOTE — DISCHARGE INSTRUCTIONS
ACUTE DIAGNOSES:  Acute respiratory failure with hypoxia ([facility] [J96.01]    CHRONIC MEDICAL DIAGNOSES:  Problem List as of 12/13/2021 Date Reviewed: 10/25/2021          Codes Class Noted - Resolved    Pneumonia due to COVID-19 virus ICD-10-CM: U07.1, J12.82  ICD-9-CM: 480.8, 079.89  12/8/2021 - Present        HTN (hypertension), benign ICD-10-CM: I10  ICD-9-CM: 401.1  12/8/2021 - Present        Thrombocytopenia ([facility] ICD-10-CM: D69.6  ICD-9-CM: 287.5  12/8/2021 - Present        * (Principal) RESOLVED: Acute respiratory failure with hypoxia [facility]) ICD-10-CM: J96.01  ICD-9-CM: 518.81  12/5/2021 - 12/13/2021              DISCHARGE MEDICATIONS:          · It is important that you take the medication exactly as they are prescribed. · Keep your medication in the bottles provided by the pharmacist and keep a list of the medication names, dosages, and times to be taken in your wallet. · Do not take other medications without consulting your doctor. DIET:  Regular Diet    ACTIVITY: Activity as tolerated    ADDITIONAL INFORMATION: If you experience any of the following symptoms then please call your primary care physician or return to the emergency room if you cannot get hold of your doctor: Fever, chills, nausea, vomiting, diarrhea, change in mentation, falling, bleeding, shortness of breath. FOLLOW UP CARE:  Dr. Carolee Maza MD  you are to call and set up an appointment to see them in 5 days. Follow-up with pulmonary      Information obtained by :  I understand that if any problems occur once I am at home I am to contact my physician. I understand and acknowledge receipt of the instructions indicated above.                                                                                                                                            Physician's or R.N.'s Signature                                                                  Date/Time Patient or Representative Signature                                                          Date/Time

## 2021-12-13 NOTE — PROGRESS NOTES
Pulmonary, Critical Care, and Sleep Medicine~Consult Note    Name: Jennifer Osorio MRN: [id number]   : 1945 Hospital: [facility]   Date: 2021  Admission: 2021     Impression Plan   1. Acute hypoxemic respiratory failure  2. covid-19 pneumonia  3. Abnormal chest CT  4. Elevated d-dimer  5. Thrombocytopenia  6. hyponatremia 1. Cont supp o2 for goal sats >90%; on RA. Will have RT walk today  2. Cont dexamethasone 6mg--will switch to daily  3. Continue baricitinib, monitor LFTs  4. Trend ddimer, crp  5. Completed 5 day course of empiric abx. F/u cx  6. SCDs (no pharm dvt ppx with thrombocytopenia)  7. IS, OOB to chair  8. Will need repeat chest CT in 6-12 month to f/u lung nodule. He is a nonsmoker. Mandy Man is stable from a pulmonary standpoint. We will sign off and arrange for outpatient follow-up in 2-3 weeks. Please call with questions. Daily Progression:    HPI: 78 y/o unvaccinated M who was diagnosed with covid-19 in our ED on  presents with worsening shortness of breath. Symptoms began on . Hypoxemic to 81% in the ED. Currently on 2L with sats 99%. Febrile to 102.6    Labs: wbc 7.2, plt 32, ddimer 3.74, Na 131, pct 0.09, crp 5.63    CXR with LLL opacity    Social hx: non-smoker    I have reviewed the labs and previous days notes. Interval history  Afebrile  Sats 92% on RA--better  WBC 10.7--decreased  Plts 162--increased  CRP 11.53--increased  Na 132  Blood cx no growth x 4 days    Chest CTA (2021), personally visualized and report reviewed. .Diffuse bilateral airspace disease compatible with COVID pneumonia. No acute pulmonary embolus. 6 mm nodule right lower lobe. ROS: Reports feeling better. Would like to go home. Walking around room with sats in the 90s when I entered.     Past Medical History:   Diagnosis Date    GERD (gastroesophageal reflux disease)     Hypertension       Past Surgical History: Procedure Laterality Date    COLONOSCOPY N/A 2017    COLONOSCOPY performed by Sun Montague MD at [facility] HX APPENDECTOMY      HX CATARACT REMOVAL Right       Prior to Admission medications    Medication Sig Start Date End Date Taking? Authorizing Provider   ascorbic acid, vitamin C, (VITAMIN C) 1,000 mg tablet Take 1,000 mg by mouth daily. Yes Provider, Historical   amLODIPine (NORVASC) 5 mg tablet Take 5 mg by mouth daily. Yes Provider, Historical   ergocalciferol (VITAMIN D2) 50,000 unit capsule Take 50,000 Units by mouth. Take every other Monday   Yes Provider, Historical     No Known Allergies   Social History     Tobacco Use    Smoking status: Never Smoker    Smokeless tobacco: Never Used   Substance Use Topics    Alcohol use: No      Family History   Problem Relation Age of Onset    Hypertension Mother      OBJECTIVE:     Vital Signs:       Visit Vitals  /83 (BP 1 Location: Right arm, BP Patient Position: At rest)   Pulse 71   Temp 98.4 °F (36.9 °C)   Resp 18   Ht 5' 7.99\" (1.727 m)   Wt 70.4 kg (155 lb 1.6 oz)   SpO2 92%   BMI 23.59 kg/m²      Temp (24hrs), Av.8 °F (36.6 °C), Min:97.4 °F (36.3 °C), Max:98.4 °F (36.9 °C)     Intake/Output:     Last shift: No intake/output data recorded.     Last 3 shifts:  1901 -  0700  In: 360 [P.O.:360]  Out: 1           Intake/Output Summary (Last 24 hours) at 2021 0840  Last data filed at 2021 1659  Gross per 24 hour   Intake    Output 1 ml   Net -1 ml       Physical Exam:                                        Exam Findings Other   General: No resp distress noted, appears stated age, disheveled     HEENT:  No ulcers, JVD not elevated, no cervical LAD    Chest: No pectus deformity, normal chest rise b/l    HEART:  RRR, no murmurs/rubs/gallops    Lungs:  Diminished in the bases,, no distress    ABD: Soft/NT, non rigid mildly distended    EXT: No cyanosis/clubbing/edema, normal peripheral pulses    Skin: No rashes or ulcers, no mottling    Neuro: A/O x 3        Medications:  Current Facility-Administered Medications   Medication Dose Route Frequency    polyethylene glycol (MIRALAX) packet 17 g  17 g Oral BID    dexamethasone (PF) (DECADRON) 10 mg/mL injection 6 mg  6 mg IntraVENous Q12H    baricitinib (OLUMIANT) tablet 4 mg  4 mg Oral DAILY    guaiFENesin-codeine (ROBITUSSIN AC) 100-10 mg/5 mL solution 5 mL  5 mL Oral Q4H PRN    guaiFENesin ER (MUCINEX) tablet 600 mg  600 mg Oral Q12H    benzonatate (TESSALON) capsule 100 mg  100 mg Oral TID PRN    albuterol-ipratropium (DUO-NEB) 2.5 MG-0.5 MG/3 ML  3 mL Nebulization Q4H PRN    Or    ipratropium-albuterol (COMBIVENT RESPIMAT) 20 mcg-100 mcg inhalation spray  1 Puff Inhalation Q4H PRN    sodium chloride (NS) flush 5-40 mL  5-40 mL IntraVENous Q8H    sodium chloride (NS) flush 5-40 mL  5-40 mL IntraVENous PRN    acetaminophen (TYLENOL) tablet 650 mg  650 mg Oral Q6H PRN    Or    acetaminophen (TYLENOL) suppository 650 mg  650 mg Rectal Q6H PRN    ondansetron (ZOFRAN ODT) tablet 4 mg  4 mg Oral Q8H PRN    Or    ondansetron (ZOFRAN) injection 4 mg  4 mg IntraVENous Q6H PRN    ipratropium-albuterol (COMBIVENT RESPIMAT) 20 mcg-100 mcg inhalation spray  1 Puff Inhalation Q6H RT       Labs:  ABG No results for input(s): PHI, PCO2I, PO2I, HCO3I, SO2I, FIO2I in the last 72 hours.      CBC Recent Labs     12/12/21  0040   WBC 10.7   HGB 11.5*   HCT 34.3*      MCV 85.5   MCH 76.7        Metabolic  Panel Recent Labs     12/13/21  0045 12/12/21  0040 12/10/21  1050   * 133*  --    K 5.0 5.7*  --     101  --    CO2 28 29  --    * 144*  --    BUN 28* 26*  --    CREA 0.91 0.98  --    CA 8.1* 8.9  --    ALB  --  3.2* 2.7*   ALT  --  95* 60                    Katy Mcdonald NP  12/13/2021

## 2021-12-13 NOTE — PROGRESS NOTES
Lenin Lujan [facility]  [address] [facility], [address] [place] [address]  [phone]      Medical Progress Note      NAME: Don Rubio   :  1945  MRM:  [id number]    Date of service: 2021  2:39 PM       Assessment and Plan:   1. Acute respiratory failure with hypoxia ([facility] [address] (2021) due to COVID-19 pneumonia.:  CTA of the chest showed no PE. Off O2 currently. 2.  Pneumonia due to COVID-19 virus (2021):  Diagnosed . Cultures and serologies neg. Continue Baricitinib, Dexamethasone, Combivent. Has completed a course with IV Ceftriaxone and Azithromycin.      3. HTN (hypertension), benign (2021). Not on medication. Continue to monitor      4. Hyponatremia, mild. Monitor    5.  ] Hyperkalemia - kayexalate was given. Monitor.      6. Thrombocytopenia ([address] (2021): seems chronic. Improved                Subjective:     Chief Complaint[de-identified] Patient was seen and examined as a follow up for covid pneumonia. Chart was reviewed. feels well      ROS:  (bold if positive, if negative)    Tolerating PT  Tolerating Diet        Objective:     Last 24hrs VS reviewed since prior progress note.  Most recent are:    Visit Vitals  /74 (BP 1 Location: Right arm, BP Patient Position: Sitting)   Pulse 99   Temp 97.9 °F (36.6 °C)   Resp 18   Ht 5' 7.99\" (1.727 m)   Wt 70.4 kg (155 lb 1.6 oz)   SpO2 94%   BMI 23.59 kg/m²     SpO2 Readings from Last 6 Encounters:   21 94%   21 94%   10/25/21 96%   20 97%   20 97%   19 97%    O2 Flow Rate (L/min): 3 l/min       Intake/Output Summary (Last 24 hours) at 2021 1214  Last data filed at 2021 1659  Gross per 24 hour   Intake    Output 1 ml   Net -1 ml        Physical Exam:    Gen:  Well-developed, well-nourished, in no acute distress  HEENT:  Pink conjunctivae, PERRL, hearing intact to voice, moist mucous membranes  Neck:  Supple, without masses, thyroid non-tender  Resp:  No accessory muscle use, clear breath sounds without wheezes rales or rhonchi  Card:  No murmurs, normal S1, S2 without thrills, bruits or peripheral edema  Abd:  Soft, non-tender, non-distended, normoactive bowel sounds are present, no palpable organomegaly and no detectable hernias  Lymph:  No cervical or inguinal adenopathy  Musc:  No cyanosis or clubbing  Skin:  No rashes or ulcers, skin turgor is good  Neuro:  Cranial nerves are grossly intact, no focal motor weakness, follows commands appropriately  Psych:  Good insight, oriented to person, place and time, alert  __________________________________________________________________  Medications Reviewed: (see below)  Medications:     Current Facility-Administered Medications   Medication Dose Route Frequency    [START ON 12/14/2021] dexamethasone (PF) (DECADRON) 10 mg/mL injection 6 mg  6 mg IntraVENous DAILY    polyethylene glycol (MIRALAX) packet 17 g  17 g Oral BID    baricitinib (OLUMIANT) tablet 4 mg  4 mg Oral DAILY    guaiFENesin-codeine (ROBITUSSIN AC) 100-10 mg/5 mL solution 5 mL  5 mL Oral Q4H PRN    guaiFENesin ER (MUCINEX) tablet 600 mg  600 mg Oral Q12H    benzonatate (TESSALON) capsule 100 mg  100 mg Oral TID PRN    albuterol-ipratropium (DUO-NEB) 2.5 MG-0.5 MG/3 ML  3 mL Nebulization Q4H PRN    Or    ipratropium-albuterol (COMBIVENT RESPIMAT) 20 mcg-100 mcg inhalation spray  1 Puff Inhalation Q4H PRN    sodium chloride (NS) flush 5-40 mL  5-40 mL IntraVENous Q8H    sodium chloride (NS) flush 5-40 mL  5-40 mL IntraVENous PRN    acetaminophen (TYLENOL) tablet 650 mg  650 mg Oral Q6H PRN    Or    acetaminophen (TYLENOL) suppository 650 mg  650 mg Rectal Q6H PRN    ondansetron (ZOFRAN ODT) tablet 4 mg  4 mg Oral Q8H PRN    Or    ondansetron (ZOFRAN) injection 4 mg  4 mg IntraVENous Q6H PRN    ipratropium-albuterol (COMBIVENT RESPIMAT) 20 mcg-100 mcg inhalation spray  1 Puff Inhalation Q6H RT        Lab Data Reviewed: (see below)  Lab Review: Recent Labs     12/12/21  0040   WBC 10.7   HGB 11.5*   HCT 34.3*        Recent Labs     12/13/21  0045 12/12/21  0040   * 133*   K 5.0 5.7*    101   CO2 28 29   * 144*   BUN 28* 26*   CREA 0.91 0.98   CA 8.1* 8.9   ALB  --  3.2*   TBILI  --  1.2*   ALT  --  95*     No results found for: GLUCPOC  No results for input(s): PH, PCO2, PO2, HCO3, FIO2 in the last 72 hours. No results for input(s): INR, INREXT, INREXT in the last 72 hours. All Micro Results     Procedure Component Value Units Date/Time    CULTURE, BLOOD, PAIRED [[id number]] Collected: 12/06/21 0846    Order Status: Completed Specimen: Blood Updated: 12/12/21 0624     Special Requests: NO SPECIAL REQUESTS        Culture result: NO GROWTH 6 DAYS       LEGIONELLA PNEUMOPHILA AG, URINE [[id number]] Collected: 12/07/21 1209    Order Status: Completed Specimen: Urine Updated: 12/08/21 1536     Source 2 Hour Urine        L pneumophila S1 Ag, urine Negative        Comment: (NOTE)  Presumptive negative for L. pneumophila serogroup 1 antigen in urine,  suggesting no recent or current infection. Legionnaires' disease  cannot be ruled out since other serogroups and species may also  cause disease. Performed At: [facility] & [address] [id number]  Arthur Dudley MD UE:[id number]         URINE CULTURE HOLD SAMPLE [[id number]] Collected: 12/07/21 1220    Order Status: Completed Specimen: Serum Updated: 12/07/21 1359     Urine culture hold       Urine on hold in Microbiology dept for 2 days. If unpreserved urine is submitted, it cannot be used for addtional testing after 24 hours, recollection will be required.           RESPIRATORY VIRUS PANEL W/COVID-19, PCR [[id number]]  (Abnormal) Collected: 12/06/21 0846    Order Status: Completed Specimen: Nasopharyngeal Updated: 12/06/21 1547     Adenovirus Not detected        Coronavirus 229E Not detected        Coronavirus HKU1 Not detected        Coronavirus CVNL63 Not detected        Coronavirus OC43 Not detected        SARS-CoV-2, PCR Detected        Metapneumovirus Not detected        Rhinovirus and Enterovirus Not detected        Influenza A Not detected        Influenza A, subtype H1 Not detected        Influenza A, subtype H3 Not detected        INFLUENZA A H1N1 PCR Not detected        Influenza B Not detected        Parainfluenza 1 Not detected        Parainfluenza 2 Not detected        Parainfluenza 3 Not detected        Parainfluenza virus 4 Not detected        RSV by PCR Not detected        B. parapertussis, PCR Not detected        Bordetella pertussis - PCR Not detected        Chlamydophila pneumoniae DNA, QL, PCR Not detected        Mycoplasma pneumoniae DNA, QL, PCR Not detected       LEGIONELLA PNEUMOPHILA AG, URINE [[id number]]     Order Status: Canceled Specimen: Urine           I have reviewed notes of prior 24hr. Other pertinent lab: Total time spent with patient: 28 I personally reviewed chart, notes, data and current medications in the medical record. I have personally examined and treated the patient at bedside during this period.                  Care Plan discussed with: Patient, Nursing Staff and >50% of time spent in counseling and coordination of care    Discussed:  Care Plan and D/C Planning    Prophylaxis:  Lovenox    Disposition:  Home w/Family           ___________________________________________________    Attending Physician: Manda Logan MD

## 2021-12-14 ENCOUNTER — PATIENT OUTREACH (OUTPATIENT)
Dept: CASE MANAGEMENT | Age: 76
End: 2021-12-14

## 2021-12-15 NOTE — PROGRESS NOTES
Patient contacted regarding COVID-19 diagnosis. Discussed COVID-19 related testing which was available at this time. Test results were positive. Patient informed of results, if available? yes. Care Transition Nurse contacted the patient by telephone to perform post discharge assessment. Call within 2 business days of discharge: Yes Verified name and  with patient as identifiers. Provided introduction to self, and explanation of the CTN/ACM role, and reason for call due to risk factors for infection and/or exposure to COVID-19. Symptoms reviewed with patient who verbalized the following symptoms: no new symptoms and no worsening symptoms      Due to new onset of symptoms encounter was not routed to provider for escalation. Discussed follow-up appointments. If no appointment was previously scheduled, appointment scheduling offered:  yes. [address] follow up appointment(s): No future appointments. Non-[facility] follow up appointment(s): PCP 12/15    Interventions to address risk factors: Obtained and reviewed discharge summary and/or continuity of care documents     Advance Care Planning:   Does patient have an Advance Directive: not on file; education provided. Educated patient about risk for severe COVID-19 due to risk factors according to CDC guidelines. CTN reviewed discharge instructions, medical action plan and red flag symptoms with the patient who verbalized understanding. Discussed COVID vaccination status: yes. Education provided on COVID-19 vaccination as appropriate. Discussed exposure protocols and quarantine with CDC Guidelines. Patient was given an opportunity to verbalize any questions and concerns and agrees to contact CTN or health care provider for questions related to their healthcare. Reviewed and educated patient on any new and changed medications related to discharge diagnosis     Was patient discharged with a pulse oximeter? no  CTN provided contact information.  Plan for follow-up call in 5-7 days based on severity of symptoms and risk factors. 12/15/21  Spoke to patient today. Patient has PCP appt this evening. Patient repotrs no new or worsening s/s. No med changes, he will take meds as prescribed. Patient will take multiple short walks around the home daily as long as he does not get SOB. Patient will do deep breathing exercises every 1-2 hours. Patients wife is home from the hospital. Reviewed red flag s/s with patient, nausea, vomiting, inability to pass urine/stool, SOB, mental status change, chest pain, fever.  AR

## 2022-01-06 ENCOUNTER — PATIENT OUTREACH (OUTPATIENT)
Dept: CASE MANAGEMENT | Age: 77
End: 2022-01-06

## 2022-01-06 NOTE — PROGRESS NOTES
Patient resolved from Transition of Care episode on 01/06/22    ACM/CTN was unsuccessful at contacting this patient today. Patient/family was provided the following resources and education related to COVID-19 during the initial call:                         Signs, symptoms and red flags related to COVID-19            CDC exposure and quarantine guidelines            [employer] exposure contact - [phone]            Contact for their local Department of Health                 Patient has not had any additional ED or hospital visits. No further outreach scheduled with this CTN/ACM. Episode of Care resolved. Patient has this CTN/ACM contact information if future needs arise.

## 2022-03-18 PROBLEM — D69.6 THROMBOCYTOPENIA (HCC): Status: ACTIVE | Noted: 2021-12-08

## 2022-03-19 PROBLEM — J12.82 PNEUMONIA DUE TO COVID-19 VIRUS: Status: ACTIVE | Noted: 2021-12-08

## 2022-03-19 PROBLEM — I10 HTN (HYPERTENSION), BENIGN: Status: ACTIVE | Noted: 2021-12-08

## 2022-03-19 PROBLEM — U07.1 PNEUMONIA DUE TO COVID-19 VIRUS: Status: ACTIVE | Noted: 2021-12-08

## 2022-10-25 ENCOUNTER — OFFICE VISIT (OUTPATIENT)
Dept: ONCOLOGY | Age: 77
End: 2022-10-25
Payer: MEDICARE

## 2022-10-25 VITALS
OXYGEN SATURATION: 92 % | SYSTOLIC BLOOD PRESSURE: 140 MMHG | DIASTOLIC BLOOD PRESSURE: 85 MMHG | BODY MASS INDEX: 24.29 KG/M2 | RESPIRATION RATE: 18 BRPM | HEIGHT: 67 IN | HEART RATE: 83 BPM | TEMPERATURE: 98 F

## 2022-10-25 DIAGNOSIS — D69.6 THROMBOCYTOPENIA (HCC): Primary | ICD-10-CM

## 2022-10-25 DIAGNOSIS — D70.9 NEUTROPENIA, UNSPECIFIED TYPE (HCC): ICD-10-CM

## 2022-10-25 PROCEDURE — G8420 CALC BMI NORM PARAMETERS: HCPCS | Performed by: INTERNAL MEDICINE

## 2022-10-25 PROCEDURE — 1123F ACP DISCUSS/DSCN MKR DOCD: CPT | Performed by: INTERNAL MEDICINE

## 2022-10-25 PROCEDURE — G8432 DEP SCR NOT DOC, RNG: HCPCS | Performed by: INTERNAL MEDICINE

## 2022-10-25 PROCEDURE — G8427 DOCREV CUR MEDS BY ELIG CLIN: HCPCS | Performed by: INTERNAL MEDICINE

## 2022-10-25 PROCEDURE — G8753 SYS BP > OR = 140: HCPCS | Performed by: INTERNAL MEDICINE

## 2022-10-25 PROCEDURE — G8536 NO DOC ELDER MAL SCRN: HCPCS | Performed by: INTERNAL MEDICINE

## 2022-10-25 PROCEDURE — G8754 DIAS BP LESS 90: HCPCS | Performed by: INTERNAL MEDICINE

## 2022-10-25 PROCEDURE — 99213 OFFICE O/P EST LOW 20 MIN: CPT | Performed by: INTERNAL MEDICINE

## 2022-10-25 PROCEDURE — 1101F PT FALLS ASSESS-DOCD LE1/YR: CPT | Performed by: INTERNAL MEDICINE

## 2022-10-25 NOTE — PROGRESS NOTES
[address]  [address] [facility], [address] [place] [address] [address]  W: [phone]  F: [phone]     f/uHEME/ONC CONSULT      Reason for visit:  Evaluation for low WBC    HPI:   Neftali Faulkner is a 69 y.o.  male who I was asked to see in consultation at the request of Dr. Jessica Whitehead  for evaluation for low WBC.    5/9/17 wbc 2.8, hgb 14.1; plt 124; ANC 1    Denies fevers, no weight loss, no drenching night sweats. Only very occasional etoh use. Interval history:  In today for follow up. DX   Encounter Diagnosis   Name Primary? Thrombocytopenia ([address] Yes              Past Medical History:   Diagnosis Date    GERD (gastroesophageal reflux disease)     Hypertension      Past Surgical History:   Procedure Laterality Date    COLONOSCOPY N/A 4/12/2017    COLONOSCOPY performed by Laura Linares MD at [facility] ENDOSCOPY    HX APPENDECTOMY      HX CATARACT REMOVAL Right      Social History     Socioeconomic History    Marital status:    Tobacco Use    Smoking status: Never    Smokeless tobacco: Never   Substance and Sexual Activity    Alcohol use: No    Drug use: No     Family History   Problem Relation Age of Onset    Hypertension Mother        Current Outpatient Medications   Medication Sig Dispense Refill    ascorbic acid, vitamin C, (VITAMIN C) 1,000 mg tablet Take 1,000 mg by mouth daily. amLODIPine (NORVASC) 5 mg tablet Take 5 mg by mouth daily. ergocalciferol (ERGOCALCIFEROL) 1,250 mcg (50,000 unit) capsule Take 50,000 Units by mouth.  Take every other Monday         No Known Allergies    Review of Systems    A comprehensive review of systems was performed and all systems were negative except for HPI         Objective:  Physical Exam:  Visit Vitals  BP (!) 140/85   Pulse 83   Temp 98 °F (36.7 °C) (Temporal)   Resp 18   Ht 5' 7\" (1.702 m)   SpO2 92%   BMI 24.29 kg/m²       General: No distress  Eyes: Anicteric sclerae  HENT: Atraumatic  Neck: Supple  Respiratory: Normal respiratory effort  CV: No peripheral edema  GI: nondistended  Skin: no rash  Psych: Alert, oriented, appropriate affect, normal judgment/insight                                                                  Diagnostic Imaging   Results for orders placed during the hospital encounter of 12/05/21    XR CHEST PORT    Narrative  EXAM: XR CHEST PORT    INDICATION: Short of breath, Covid    COMPARISON: Dec 2, 2021    FINDINGS: A portable AP radiograph of the chest was obtained at 2134 hours. The  patient is on a cardiac monitor. There is airspace opacification in the left  lower lobe. The cardiac and mediastinal contours and pulmonary vascularity are  normal.  The bones and soft tissues are grossly within normal limits. Impression  Left lower lobe opacification. Results for orders placed during the hospital encounter of 12/05/21    CTA CHEST W OR W WO CONT    Narrative  INDICATION: COVID pneumonia. Question PE    COMPARISON: 12/5/2021    TECHNIQUE:  2.5 mm axial images were obtained from the bases to the lung apices  after the intravenous administration of 100 cc of Isovue-370. Three-dimensional  postprocessing was performed by the technologist with MIP reconstructions. CT  dose reduction was achieved through use of a standardized protocol tailored for  this examination and automatic exposure control for dose modulation. FINDINGS:    THYROID: No nodule. MEDIASTINUM: No mass or lymphadenopathy. WYATT: No mass or lymphadenopathy. THORACIC AORTA: No dissection or aneurysm. MAIN PULMONARY ARTERY: No acute pulmonary embolus  TRACHEA/BRONCHI: Patent. ESOPHAGUS: No wall thickening or dilatation. HEART: Normal in size. PLEURA: No effusion or pneumothorax. LUNGS: Diffuse ill-defined bilateral airspace disease. 6 mm nodule right lower  lobe series 3 image 33. INCIDENTALLY IMAGED UPPER ABDOMEN: No focal abnormality. BONES: No destructive bone lesion. Impression  1. Diffuse bilateral airspace disease compatible with COVID pneumonia. No acute  pulmonary embolus  2. 6 mm nodule right lower lobe    Guidelines by the Fleischner society (Radiology 2017 special report) suggest  that patients with low risk for lung cancer and solid nodule(s) less than or  equal to 6 mm in diameter require no follow-up. In patients with a higher risk,  such as smokers, follow-up noncontrast chest CT should be considered at 12  months. Patients with a known malignancy are at increased risk for metastasis  and should receive a three month follow-up. Lab Results  Lab Results   Component Value Date/Time    WBC 10.7 12/12/2021 12:40 AM    HGB 11.5 (L) 12/12/2021 12:40 AM    HCT 34.3 (L) 12/12/2021 12:40 AM    PLATELET 671 59/46/5070 12:40 AM    MCV 85.5 12/12/2021 12:40 AM       Lab Results   Component Value Date/Time    Sodium 134 (L) 12/13/2021 12:45 AM    Potassium 5.0 12/13/2021 12:45 AM    Chloride 100 12/13/2021 12:45 AM    CO2 28 12/13/2021 12:45 AM    Anion gap 6 12/13/2021 12:45 AM    Glucose 133 (H) 12/13/2021 12:45 AM    BUN 28 (H) 12/13/2021 12:45 AM    Creatinine 0.91 12/13/2021 12:45 AM    BUN/Creatinine ratio 31 (H) 12/13/2021 12:45 AM    GFR est AA >60 12/13/2021 12:45 AM    GFR est non-AA >60 12/13/2021 12:45 AM    Calcium 8.1 (L) 12/13/2021 12:45 AM    Alk. phosphatase 40 (L) 12/12/2021 12:40 AM    Protein, total 7.5 12/12/2021 12:40 AM    Albumin 3.2 (L) 12/12/2021 12:40 AM    Globulin 4.3 (H) 12/12/2021 12:40 AM    A-G Ratio 0.7 (L) 12/12/2021 12:40 AM    ALT (SGPT) 95 (H) 12/12/2021 12:40 AM       Lab Results   Component Value Date/Time    Vitamin B12 348 05/30/2017 02:34 PM    Folate 19.3 05/30/2017 02:34 PM    Homocysteine, plasma 15.1 (H) 05/30/2017 02:34 PM    Methylmalonic acid 185 05/30/2017 02:34 PM    Sed rate, automated 10 05/30/2017 02:34 PM    C-Reactive protein 4.38 (H) 12/10/2021 10:50 AM    M-Aric Not Observed 05/30/2017 02:34 PM    SILVER Direct NEGATIVE 05/30/2017 02:34 PM    Hep C virus Ab Interp. NONREACTIVE 05/30/2017 02:34 PM     Lab Results   Component Value Date/Time    INR 1.1 05/30/2017 02:34 PM    aPTT 26.6 05/30/2017 02:34 PM    D-dimer 11.53 (H) 12/13/2021 12:45 AM    Fibrinogen 259 05/30/2017 02:34 PM     12/26/18 US abd  IMPRESSION:      Less than complete visualization of the liver due to overlying bowel gas. However, heterogeneous increased echogenicity suggest possible fatty  infiltration. No gallstones. Multiple bilateral renal cysts. Slight increase echogenicity of left renal parenchyma may suggest some component  of medical renal disease. Small nonobstructing renal calculus on the left. Assessment/Plan:  69 y.o. male with neutropenia and thrombocytopenia. PS 0    1. Neutropenia:  The most common etiology, especially in patients without recurrent infections, would be benign ethnic neutropenia, as s commonly have a lower WBC than Caucasians. There is no evidence by history of an inherited neutropenia, no history of chronic or recurrent infection, and no medication that is likely to be contributing. Lab work above are unrevealing. ANC is 1.5 on 1/17/20 and 8/11/20    ANC 0.98 on 7/23/21. ANC 7.6 on 12/12/21    Discussed bone marrow bx today, he declines, recommend then getting cbc with diff in 6 months, if worse, will recommend bone marrow bx    2. Thrombocytopenia: goal count > 100; labs as above. See above, will have him return in 12 months for a repeat CBC. 94 8/11/20, but clumping    plt 83 on 7/23/21    Normal 12/12/21    3. Monocytosis: normal, 7/2021; high 12/12/21 at 1.5    4. Occasional metamyelocyte:  Not seen    Will check cbc for 1-3 above today    Thank you for this consult. All of the patient's questions were answered today. He has now received his covid19 vaccine    There are no Patient Instructions on file for this visit.        Terra King MD

## 2022-10-25 NOTE — PROGRESS NOTES
Fauzia Herbert is a 69 y.o. male Follow up for the evaluation of low WBC. 1. Have you been to the ER, urgent care clinic since your last visit? Hospitalized since your last visit? No    2. Have you seen or consulted any other health care providers outside of the [address] [place] [address] since your last visit? Include any pap smears or colon screening.  No

## 2023-05-09 ENCOUNTER — HOSPITAL ENCOUNTER (OUTPATIENT)
Facility: HOSPITAL | Age: 78
Discharge: HOME | End: 2023-05-12
Payer: MEDICARE

## 2023-05-09 DIAGNOSIS — R05.1 ACUTE COUGH: ICD-10-CM

## 2023-05-09 PROCEDURE — 71046 X-RAY EXAM CHEST 2 VIEWS: CPT

## 2023-05-16 ENCOUNTER — TELEPHONE (OUTPATIENT)
Age: 78
End: 2023-05-16

## 2023-05-16 DIAGNOSIS — D69.6 THROMBOCYTOPENIA, UNSPECIFIED (HCC): ICD-10-CM

## 2023-05-16 DIAGNOSIS — D70.9 NEUTROPENIA, UNSPECIFIED TYPE (HCC): Primary | ICD-10-CM

## 2023-05-16 NOTE — TELEPHONE ENCOUNTER
[address] at [facility]  [phone]    05/16/23 5:24 PM -  Called patient's wife back after having Dr. Carli Restrepo review the labs. Advised that he would like to get a bone marrow biopsy done. They were agreeable to that plan. Will start the process for scheduling the biopsy.

## 2023-05-16 NOTE — TELEPHONE ENCOUNTER
Patient spouse called to check in to see if we had gotten lab report; informed spouse that we did and that it would be scanned in. Patient spouse requested a call back once it is reviewed.        # [phone]

## 2023-05-16 NOTE — TELEPHONE ENCOUNTER
Pt wife called in. Stated pt hasn't been feeling well for the past couple weeks. They've been to pcp and discovered white blood was elevated.  Suggested to see Dr Aria Burnette    # [phone]

## 2023-05-16 NOTE — TELEPHONE ENCOUNTER
[address] at [place]  [phone]    05/16/23 9:59 AM - Patient's wife called patient. Advised per Dr. Samy Wilkes that we need his lab results in order to assess when he needs to be seen. She is going to have their PCP fax over the results.

## 2023-05-17 ENCOUNTER — HOSPITAL ENCOUNTER (OUTPATIENT)
Facility: HOSPITAL | Age: 78
Setting detail: OBSERVATION
Discharge: HOME | End: 2023-05-18
Attending: EMERGENCY MEDICINE | Admitting: INTERNAL MEDICINE
Payer: MEDICARE

## 2023-05-17 ENCOUNTER — APPOINTMENT (OUTPATIENT)
Facility: HOSPITAL | Age: 78
End: 2023-05-17
Payer: MEDICARE

## 2023-05-17 ENCOUNTER — TELEPHONE (OUTPATIENT)
Age: 78
End: 2023-05-17

## 2023-05-17 DIAGNOSIS — I48.91 NEW ONSET A-FIB (HCC): ICD-10-CM

## 2023-05-17 DIAGNOSIS — D72.829 LEUKOCYTOSIS, UNSPECIFIED TYPE: ICD-10-CM

## 2023-05-17 DIAGNOSIS — D72.829 LEUKOCYTOSIS, UNSPECIFIED TYPE: Primary | ICD-10-CM

## 2023-05-17 DIAGNOSIS — N17.9 AKI (ACUTE KIDNEY INJURY) (HCC): Primary | ICD-10-CM

## 2023-05-17 DIAGNOSIS — R07.9 CHEST PAIN, UNSPECIFIED TYPE: ICD-10-CM

## 2023-05-17 DIAGNOSIS — R79.89 ELEVATED BRAIN NATRIURETIC PEPTIDE (BNP) LEVEL: ICD-10-CM

## 2023-05-17 PROBLEM — U07.1 PNEUMONIA DUE TO COVID-19 VIRUS: Status: ACTIVE | Noted: 2021-12-08

## 2023-05-17 PROBLEM — I10 HTN (HYPERTENSION), BENIGN: Status: ACTIVE | Noted: 2021-12-08

## 2023-05-17 PROBLEM — J12.82 PNEUMONIA DUE TO COVID-19 VIRUS: Status: ACTIVE | Noted: 2021-12-08

## 2023-05-17 PROBLEM — D69.6 THROMBOCYTOPENIA (HCC): Status: ACTIVE | Noted: 2021-12-08

## 2023-05-17 LAB
ALBUMIN SERPL-MCNC: 3.8 G/DL (ref 3.5–5)
ALBUMIN/GLOB SERPL: 1 (ref 1.1–2.2)
ALP SERPL-CCNC: 56 U/L (ref 45–117)
ALT SERPL-CCNC: 21 U/L (ref 12–78)
ANION GAP SERPL CALC-SCNC: 2 MMOL/L (ref 5–15)
AST SERPL-CCNC: 14 U/L (ref 15–37)
BASOPHILS # BLD: 0 K/UL (ref 0–0.1)
BASOPHILS NFR BLD: 0 % (ref 0–1)
BILIRUB DIRECT SERPL-MCNC: 0.4 MG/DL (ref 0–0.2)
BILIRUB SERPL-MCNC: 1.4 MG/DL (ref 0.2–1)
BUN SERPL-MCNC: 27 MG/DL (ref 6–20)
BUN/CREAT SERPL: 15 (ref 12–20)
CALCIUM SERPL-MCNC: 9.1 MG/DL (ref 8.5–10.1)
CHLORIDE SERPL-SCNC: 106 MMOL/L (ref 97–108)
CO2 SERPL-SCNC: 26 MMOL/L (ref 21–32)
COMMENT:: NORMAL
CREAT SERPL-MCNC: 1.8 MG/DL (ref 0.7–1.3)
D DIMER PPP FEU-MCNC: 1.66 MG/L FEU (ref 0–0.65)
DIFFERENTIAL METHOD BLD: ABNORMAL
EOSINOPHIL # BLD: 0 K/UL (ref 0–0.4)
EOSINOPHIL NFR BLD: 0 % (ref 0–7)
ERYTHROCYTE [DISTWIDTH] IN BLOOD BY AUTOMATED COUNT: 18.1 % (ref 11.5–14.5)
GLOBULIN SER CALC-MCNC: 3.9 G/DL (ref 2–4)
GLUCOSE SERPL-MCNC: 119 MG/DL (ref 65–100)
HCT VFR BLD AUTO: 35.7 % (ref 36.6–50.3)
HGB BLD-MCNC: 12 G/DL (ref 12.1–17)
IMM GRANULOCYTES # BLD AUTO: 0 K/UL
IMM GRANULOCYTES NFR BLD AUTO: 0 %
LACTATE BLD-SCNC: 0.69 MMOL/L (ref 0.4–2)
LACTATE BLD-SCNC: 0.85 MMOL/L (ref 0.4–2)
LYMPHOCYTES # BLD: 6.3 K/UL (ref 0.8–3.5)
LYMPHOCYTES NFR BLD: 21 % (ref 12–49)
MAGNESIUM SERPL-MCNC: 2.1 MG/DL (ref 1.6–2.4)
MCH RBC QN AUTO: 29 PG (ref 26–34)
MCHC RBC AUTO-ENTMCNC: 33.6 G/DL (ref 30–36.5)
MCV RBC AUTO: 86.2 FL (ref 80–99)
METAMYELOCYTES NFR BLD MANUAL: 2 %
MONOCYTES # BLD: 12.8 K/UL (ref 0–1)
MONOCYTES NFR BLD: 43 % (ref 5–13)
MYELOCYTES NFR BLD MANUAL: 2 %
NEUTS BAND NFR BLD MANUAL: 1 % (ref 0–6)
NEUTS SEG # BLD: 9.5 K/UL (ref 1.8–8)
NEUTS SEG NFR BLD: 31 % (ref 32–75)
NRBC # BLD: 0 K/UL (ref 0–0.01)
NRBC BLD-RTO: 0 PER 100 WBC
NT PRO BNP: 803 PG/ML
PATH REV BLD -IMP: NORMAL
PLATELET # BLD AUTO: 108 K/UL (ref 150–400)
PMV BLD AUTO: 11.7 FL (ref 8.9–12.9)
POTASSIUM SERPL-SCNC: 3.7 MMOL/L (ref 3.5–5.1)
PROT SERPL-MCNC: 7.7 G/DL (ref 6.4–8.2)
RBC # BLD AUTO: 4.14 M/UL (ref 4.1–5.7)
RBC MORPH BLD: ABNORMAL
SODIUM SERPL-SCNC: 134 MMOL/L (ref 136–145)
SPECIMEN HOLD: NORMAL
TROPONIN I SERPL HS-MCNC: 7 NG/L (ref 0–76)
TSH SERPL DL<=0.05 MIU/L-ACNC: 1.18 UIU/ML (ref 0.36–3.74)
WBC # BLD AUTO: 29.8 K/UL (ref 4.1–11.1)

## 2023-05-17 PROCEDURE — 86803 HEPATITIS C AB TEST: CPT

## 2023-05-17 PROCEDURE — 76770 US EXAM ABDO BACK WALL COMP: CPT

## 2023-05-17 PROCEDURE — 86706 HEP B SURFACE ANTIBODY: CPT

## 2023-05-17 PROCEDURE — 99222 1ST HOSP IP/OBS MODERATE 55: CPT | Performed by: INTERNAL MEDICINE

## 2023-05-17 PROCEDURE — 82105 ALPHA-FETOPROTEIN SERUM: CPT

## 2023-05-17 PROCEDURE — 83605 ASSAY OF LACTIC ACID: CPT

## 2023-05-17 PROCEDURE — 71275 CT ANGIOGRAPHY CHEST: CPT

## 2023-05-17 PROCEDURE — 99285 EMERGENCY DEPT VISIT HI MDM: CPT

## 2023-05-17 PROCEDURE — G0378 HOSPITAL OBSERVATION PER HR: HCPCS

## 2023-05-17 PROCEDURE — 96372 THER/PROPH/DIAG INJ SC/IM: CPT

## 2023-05-17 PROCEDURE — 80076 HEPATIC FUNCTION PANEL: CPT

## 2023-05-17 PROCEDURE — 36415 COLL VENOUS BLD VENIPUNCTURE: CPT

## 2023-05-17 PROCEDURE — 83735 ASSAY OF MAGNESIUM: CPT

## 2023-05-17 PROCEDURE — 2580000003 HC RX 258: Performed by: EMERGENCY MEDICINE

## 2023-05-17 PROCEDURE — 87340 HEPATITIS B SURFACE AG IA: CPT

## 2023-05-17 PROCEDURE — 84443 ASSAY THYROID STIM HORMONE: CPT

## 2023-05-17 PROCEDURE — 6360000004 HC RX CONTRAST MEDICATION: Performed by: EMERGENCY MEDICINE

## 2023-05-17 PROCEDURE — 86704 HEP B CORE ANTIBODY TOTAL: CPT

## 2023-05-17 PROCEDURE — 85379 FIBRIN DEGRADATION QUANT: CPT

## 2023-05-17 PROCEDURE — 2580000003 HC RX 258: Performed by: INTERNAL MEDICINE

## 2023-05-17 PROCEDURE — 80048 BASIC METABOLIC PNL TOTAL CA: CPT

## 2023-05-17 PROCEDURE — 84484 ASSAY OF TROPONIN QUANT: CPT

## 2023-05-17 PROCEDURE — 6360000002 HC RX W HCPCS: Performed by: INTERNAL MEDICINE

## 2023-05-17 PROCEDURE — 71045 X-RAY EXAM CHEST 1 VIEW: CPT

## 2023-05-17 PROCEDURE — 83880 ASSAY OF NATRIURETIC PEPTIDE: CPT

## 2023-05-17 PROCEDURE — 85025 COMPLETE CBC W/AUTO DIFF WBC: CPT

## 2023-05-17 PROCEDURE — 87040 BLOOD CULTURE FOR BACTERIA: CPT

## 2023-05-17 RX ORDER — SODIUM CHLORIDE 0.9 % (FLUSH) 0.9 %
5-40 SYRINGE (ML) INJECTION PRN
Status: DISCONTINUED | OUTPATIENT
Start: 2023-05-17 | End: 2023-05-19 | Stop reason: HOSPADM

## 2023-05-17 RX ORDER — SODIUM CHLORIDE 9 MG/ML
INJECTION, SOLUTION INTRAVENOUS PRN
Status: DISCONTINUED | OUTPATIENT
Start: 2023-05-17 | End: 2023-05-19 | Stop reason: HOSPADM

## 2023-05-17 RX ORDER — ONDANSETRON 2 MG/ML
4 INJECTION INTRAMUSCULAR; INTRAVENOUS EVERY 6 HOURS PRN
Status: DISCONTINUED | OUTPATIENT
Start: 2023-05-17 | End: 2023-05-19 | Stop reason: HOSPADM

## 2023-05-17 RX ORDER — ACETAMINOPHEN 325 MG/1
650 TABLET ORAL EVERY 6 HOURS PRN
Status: DISCONTINUED | OUTPATIENT
Start: 2023-05-17 | End: 2023-05-19 | Stop reason: HOSPADM

## 2023-05-17 RX ORDER — SODIUM CHLORIDE 9 MG/ML
INJECTION, SOLUTION INTRAVENOUS CONTINUOUS
Status: DISCONTINUED | OUTPATIENT
Start: 2023-05-17 | End: 2023-05-19 | Stop reason: HOSPADM

## 2023-05-17 RX ORDER — ONDANSETRON 4 MG/1
4 TABLET, ORALLY DISINTEGRATING ORAL EVERY 8 HOURS PRN
Status: DISCONTINUED | OUTPATIENT
Start: 2023-05-17 | End: 2023-05-19 | Stop reason: HOSPADM

## 2023-05-17 RX ORDER — ACETAMINOPHEN 650 MG/1
650 SUPPOSITORY RECTAL EVERY 6 HOURS PRN
Status: DISCONTINUED | OUTPATIENT
Start: 2023-05-17 | End: 2023-05-19 | Stop reason: HOSPADM

## 2023-05-17 RX ORDER — ENOXAPARIN SODIUM 100 MG/ML
40 INJECTION SUBCUTANEOUS EVERY 24 HOURS
Status: DISCONTINUED | OUTPATIENT
Start: 2023-05-17 | End: 2023-05-19 | Stop reason: HOSPADM

## 2023-05-17 RX ORDER — SODIUM CHLORIDE 0.9 % (FLUSH) 0.9 %
5-40 SYRINGE (ML) INJECTION EVERY 12 HOURS SCHEDULED
Status: DISCONTINUED | OUTPATIENT
Start: 2023-05-17 | End: 2023-05-19 | Stop reason: HOSPADM

## 2023-05-17 RX ORDER — 0.9 % SODIUM CHLORIDE 0.9 %
500 INTRAVENOUS SOLUTION INTRAVENOUS ONCE
Status: COMPLETED | OUTPATIENT
Start: 2023-05-17 | End: 2023-05-17

## 2023-05-17 RX ADMIN — IOPAMIDOL 80 ML: 755 INJECTION, SOLUTION INTRAVENOUS at 11:34

## 2023-05-17 RX ADMIN — SODIUM CHLORIDE 500 ML: 9 INJECTION, SOLUTION INTRAVENOUS at 10:01

## 2023-05-17 RX ADMIN — SODIUM CHLORIDE: 9 INJECTION, SOLUTION INTRAVENOUS at 15:54

## 2023-05-17 RX ADMIN — ENOXAPARIN SODIUM 40 MG: 100 INJECTION SUBCUTANEOUS at 17:33

## 2023-05-17 ASSESSMENT — ENCOUNTER SYMPTOMS
DIARRHEA: 0
WHEEZING: 0
BLOOD IN STOOL: 0
ABDOMINAL PAIN: 0
NAUSEA: 0
COUGH: 1
ANAL BLEEDING: 0
ABDOMINAL DISTENTION: 0
VOMITING: 0
SHORTNESS OF BREATH: 0

## 2023-05-17 ASSESSMENT — PAIN - FUNCTIONAL ASSESSMENT: PAIN_FUNCTIONAL_ASSESSMENT: NONE - DENIES PAIN

## 2023-05-17 NOTE — H&P
History and Physical    Date of Service:  5/17/2023  Primary Care Provider: Eri Schneider MD  Source of information: patient, electronic medical record    Chief Complaint: Chest Pain and Dizziness      History of Presenting Illness:   Niesha Christie is a 69 y.o. male  with medical history of HTN, recent chest pain , cough and high WBC started on antibiotics by PCP several days ago and referred to see a hematologist, apparently bone marrow biopsy has been scheduled. This morning woke up felt lightheaded, sweaty, dizzy with generalized weakness EMS was called and patient was brought in, EMS report upon arrival patient was diaphoretic and pale, was in A-fib RVR. Patient however denies any pain orthopnea or bipedal edema. In the emergency room patient self converted to normal sinus rhythm,  Chemistries were unremarkable except for creatinine of 1.8, BUN 27, troponin was normal ,   CBC showed WBC of 29.8 was normal at 4.7 October last year, platelets of 782  CT scan of the chest showed  IMPRESSION:  There is no pulmonary embolism. There is no aortic aneurysm or dissection. Cardiomegaly, pulmonary arterial hypertension and small pericardial effusion. Hepatic steatosis nodular hepatic contour and splenomegaly suggests cirrhotic  change. Patient subsequently admitted for MARTITA, leukocytosis, chest pain of A-fib       REVIEW OF SYSTEMS:  A comprehensive review of systems was negative except for that written in the History of Present Illness. Past Medical History:   Diagnosis Date    GERD (gastroesophageal reflux disease)     Hypertension       Past Surgical History:   Procedure Laterality Date    APPENDECTOMY      CATARACT REMOVAL Right     COLONOSCOPY N/A 4/12/2017    COLONOSCOPY performed by Jessica Berry MD at [address]     Prior to Admission medications    Medication Sig Start Date End Date Taking?  Authorizing Provider   amLODIPine (NORVASC) 5 MG tablet Take 5 mg by mouth

## 2023-05-17 NOTE — ED TRIAGE NOTES
Patient to ER via EMS for chest pain, cough, not feeling good for the past week. Patient states he woke up this morning and vomited, felt lightheaded and dizzy. EMS reports upon arrival to home patient was diaphoretic and pale. Patient denies fever, SOB and leg swelling.

## 2023-05-17 NOTE — TELEPHONE ENCOUNTER
[address] at [facility]  [phone]    05/17/23 10:11 AM - Patient's wife called back this morning to let us know that the patient was being taken to [facility] via ambulance as he started having chest pain and was drenched in sweat. Dr. Yoanna Riley was informed.

## 2023-05-17 NOTE — ED NOTES
Verbal shift change report given to Debbie Abraham RN (oncoming nurse) by Asha Gilbert (offgoing nurse). Report included the following information ED Encounter Summary, ED SBAR, MAR, and Recent Results.         Marcia Mora RN  05/17/23 1113

## 2023-05-17 NOTE — ED PROVIDER NOTES
Final    Comment:      NT-proBNP is highly sensitive for the detection of acute congestive heart failure in dyspneic patients. A NT-proBNP result <300 pg/mL has a negative predictive value of 98% for acute heart failure. Marked elevations in NT-proBNP levels may be observed in patients with left ventricular congestive failure, atrial fibrillation without clear heart failure, acute coronary syndromes, right heart strain/failure (including pulmonary embolism and cor pulmonale), critical illness, renal failure or advanced age. Falsely low NT-proBNP may be observed in obese CHF patients. Recent research  suggests the following cutoff values are appropriate based on patient age and clinical setting, reduce false positive (FP) results, and improve positive predictive values for acute heart failure:  Acutely dyspneic patient: age <50, use cutoff of 450 pg/mL  Acutely dyspneic patient: age 54-65, use cutoff of 900 pg/mL  Acutely dyspneic patient: age                            >76, use cutoff of 1800 pg/mL       FDA approved cutpoints for ruling-out heart failure in the office:  Ambulatory patient: age <73, use cutoff of 125 pg/mL  Ambulatory patient: age >76, use cutoff of 450 pg/mL      Specimen HOld 05/17/2023 MEI   Final    Comment: 05/17/2023 Add-on orders for these samples will be processed based on acceptable specimen integrity and analyte stability, which may vary by analyte. Final    D-Dimer, Quant 05/17/2023 1.66 (H)  0.00 - 0.65 mg/L FEU Final    Comment: (NOTE)  The combination of a low pre-test probability based on Wells criteria  and a D-Dimer result below the cutoff value of 0.5 mg/L increases the   negative predictive value for DVT to %. Pathologist Review 05/17/2023 Pathologic examination results can be viewed in Epic Chart Review.     Final    PSF23 268    POC Lactic Acid 05/17/2023 0.85  0.40 - 2.00 mmol/L Final    Total Protein 05/17/2023 7.7  6.4 - 8.2 g/dL Final    Albumin

## 2023-05-18 ENCOUNTER — APPOINTMENT (OUTPATIENT)
Facility: HOSPITAL | Age: 78
End: 2023-05-18
Payer: MEDICARE

## 2023-05-18 PROBLEM — I48.0 PAF (PAROXYSMAL ATRIAL FIBRILLATION) (HCC): Status: ACTIVE | Noted: 2023-05-17

## 2023-05-18 PROBLEM — I48.91 ATRIAL FIBRILLATION WITH RAPID VENTRICULAR RESPONSE (HCC): Status: ACTIVE | Noted: 2023-05-18

## 2023-05-18 PROBLEM — K76.0 HEPATIC STEATOSIS: Status: ACTIVE | Noted: 2023-05-18

## 2023-05-18 LAB
ANION GAP SERPL CALC-SCNC: 3 MMOL/L (ref 5–15)
APPEARANCE UR: ABNORMAL
BACTERIA URNS QL MICRO: NEGATIVE /HPF
BASOPHILS # BLD: 0 K/UL (ref 0–0.1)
BASOPHILS NFR BLD: 0 % (ref 0–1)
BILIRUB UR QL: NEGATIVE
BUN SERPL-MCNC: 23 MG/DL (ref 6–20)
BUN/CREAT SERPL: 17 (ref 12–20)
CALCIUM SERPL-MCNC: 8.8 MG/DL (ref 8.5–10.1)
CHLORIDE SERPL-SCNC: 110 MMOL/L (ref 97–108)
CHOLEST SERPL-MCNC: 74 MG/DL
CO2 SERPL-SCNC: 26 MMOL/L (ref 21–32)
COLOR UR: ABNORMAL
CREAT SERPL-MCNC: 1.35 MG/DL (ref 0.7–1.3)
CREAT UR-MCNC: 92 MG/DL
DIFFERENTIAL METHOD BLD: ABNORMAL
ECHO BSA: 1.88 M2
EKG ATRIAL RATE: 202 BPM
EKG DIAGNOSIS: NORMAL
EKG DIAGNOSIS: NORMAL
EKG Q-T INTERVAL: 314 MS
EKG Q-T INTERVAL: 326 MS
EKG QRS DURATION: 82 MS
EKG QRS DURATION: 82 MS
EKG QTC CALCULATION (BAZETT): 468 MS
EKG QTC CALCULATION (BAZETT): 471 MS
EKG R AXIS: -9 DEGREES
EKG R AXIS: 28 DEGREES
EKG T AXIS: 0 DEGREES
EKG T AXIS: 20 DEGREES
EKG VENTRICULAR RATE: 124 BPM
EKG VENTRICULAR RATE: 135 BPM
EOSINOPHIL # BLD: 0 K/UL (ref 0–0.4)
EOSINOPHIL NFR BLD: 0 % (ref 0–7)
EPITH CASTS URNS QL MICRO: ABNORMAL /LPF
ERYTHROCYTE [DISTWIDTH] IN BLOOD BY AUTOMATED COUNT: 18.1 % (ref 11.5–14.5)
FLOW CYTOMETRY: NORMAL
GLUCOSE SERPL-MCNC: 73 MG/DL (ref 65–100)
GLUCOSE UR STRIP.AUTO-MCNC: NEGATIVE MG/DL
HCT VFR BLD AUTO: 31.8 % (ref 36.6–50.3)
HDLC SERPL-MCNC: 12 MG/DL
HDLC SERPL: 6.2 (ref 0–5)
HGB BLD-MCNC: 10.6 G/DL (ref 12.1–17)
HGB UR QL STRIP: ABNORMAL
HYALINE CASTS URNS QL MICRO: ABNORMAL /LPF (ref 0–5)
IMM GRANULOCYTES # BLD AUTO: 0 K/UL
IMM GRANULOCYTES NFR BLD AUTO: 0 %
KETONES UR QL STRIP.AUTO: NEGATIVE MG/DL
LDLC SERPL CALC-MCNC: 39.2 MG/DL (ref 0–100)
LEUKOCYTE ESTERASE UR QL STRIP.AUTO: NEGATIVE
LYMPHOCYTES # BLD: 2.5 K/UL (ref 0.8–3.5)
LYMPHOCYTES NFR BLD: 14 % (ref 12–49)
MCH RBC QN AUTO: 28.8 PG (ref 26–34)
MCHC RBC AUTO-ENTMCNC: 33.3 G/DL (ref 30–36.5)
MCV RBC AUTO: 86.4 FL (ref 80–99)
MONOCYTES # BLD: 10 K/UL (ref 0–1)
MONOCYTES NFR BLD: 57 % (ref 5–13)
NEUTS SEG # BLD: 5.1 K/UL (ref 1.8–8)
NEUTS SEG NFR BLD: 29 % (ref 32–75)
NITRITE UR QL STRIP.AUTO: NEGATIVE
NRBC # BLD: 0 K/UL (ref 0–0.01)
NRBC BLD-RTO: 0 PER 100 WBC
NUC STRESS EJECTION FRACTION: 58 %
PH UR STRIP: 5.5 (ref 5–8)
PLATELET # BLD AUTO: 107 K/UL (ref 150–400)
PMV BLD AUTO: 10.2 FL (ref 8.9–12.9)
POTASSIUM SERPL-SCNC: 3.8 MMOL/L (ref 3.5–5.1)
PROT UR STRIP-MCNC: ABNORMAL MG/DL
PROT UR-MCNC: 58 MG/DL (ref 0–11.9)
PROT/CREAT UR-RTO: 0.6
RBC # BLD AUTO: 3.68 M/UL (ref 4.1–5.7)
RBC #/AREA URNS HPF: ABNORMAL /HPF (ref 0–5)
RBC MORPH BLD: ABNORMAL
SODIUM SERPL-SCNC: 139 MMOL/L (ref 136–145)
SODIUM UR-SCNC: 106 MMOL/L
SP GR UR REFRACTOMETRY: 1.01 (ref 1–1.03)
SPECIMEN HOLD: NORMAL
STRESS BASELINE DIAS BP: 79 MMHG
STRESS BASELINE HR: 92 BPM
STRESS BASELINE ST DEPRESSION: 0 MM
STRESS BASELINE SYS BP: 128 MMHG
STRESS ESTIMATED WORKLOAD: 1 METS
STRESS PEAK DIAS BP: 79 MMHG
STRESS PEAK SYS BP: 128 MMHG
STRESS PERCENT HR ACHIEVED: 76 %
STRESS POST PEAK HR: 109 BPM
STRESS RATE PRESSURE PRODUCT: NORMAL BPM*MMHG
STRESS TARGET HR: 144 BPM
TRIGL SERPL-MCNC: 114 MG/DL
URATE CRY URNS QL MICRO: ABNORMAL
UROBILINOGEN UR QL STRIP.AUTO: 0.2 EU/DL (ref 0.2–1)
VLDLC SERPL CALC-MCNC: 22.8 MG/DL
WBC # BLD AUTO: 17.6 K/UL (ref 4.1–11.1)
WBC MORPH BLD: ABNORMAL
WBC URNS QL MICRO: ABNORMAL /HPF (ref 0–4)

## 2023-05-18 PROCEDURE — 88341 IMHCHEM/IMCYTCHM EA ADD ANTB: CPT

## 2023-05-18 PROCEDURE — 88311 DECALCIFY TISSUE: CPT

## 2023-05-18 PROCEDURE — 81001 URINALYSIS AUTO W/SCOPE: CPT

## 2023-05-18 PROCEDURE — A9500 TC99M SESTAMIBI: HCPCS | Performed by: SPECIALIST

## 2023-05-18 PROCEDURE — 96372 THER/PROPH/DIAG INJ SC/IM: CPT

## 2023-05-18 PROCEDURE — G0378 HOSPITAL OBSERVATION PER HR: HCPCS

## 2023-05-18 PROCEDURE — 2580000003 HC RX 258: Performed by: INTERNAL MEDICINE

## 2023-05-18 PROCEDURE — 80048 BASIC METABOLIC PNL TOTAL CA: CPT

## 2023-05-18 PROCEDURE — 88342 IMHCHEM/IMCYTCHM 1ST ANTB: CPT

## 2023-05-18 PROCEDURE — 6360000002 HC RX W HCPCS

## 2023-05-18 PROCEDURE — 93018 CV STRESS TEST I&R ONLY: CPT | Performed by: SPECIALIST

## 2023-05-18 PROCEDURE — C1830 POWER BONE MARROW BX NEEDLE: HCPCS

## 2023-05-18 PROCEDURE — 6360000002 HC RX W HCPCS: Performed by: INTERNAL MEDICINE

## 2023-05-18 PROCEDURE — 88313 SPECIAL STAINS GROUP 2: CPT

## 2023-05-18 PROCEDURE — 82570 ASSAY OF URINE CREATININE: CPT

## 2023-05-18 PROCEDURE — 36415 COLL VENOUS BLD VENIPUNCTURE: CPT

## 2023-05-18 PROCEDURE — 88184 FLOWCYTOMETRY/ TC 1 MARKER: CPT

## 2023-05-18 PROCEDURE — 3430000000 HC RX DIAGNOSTIC RADIOPHARMACEUTICAL: Performed by: SPECIALIST

## 2023-05-18 PROCEDURE — 85025 COMPLETE CBC W/AUTO DIFF WBC: CPT

## 2023-05-18 PROCEDURE — 84156 ASSAY OF PROTEIN URINE: CPT

## 2023-05-18 PROCEDURE — 93016 CV STRESS TEST SUPVJ ONLY: CPT | Performed by: SPECIALIST

## 2023-05-18 PROCEDURE — 6370000000 HC RX 637 (ALT 250 FOR IP): Performed by: SPECIALIST

## 2023-05-18 PROCEDURE — 78452 HT MUSCLE IMAGE SPECT MULT: CPT | Performed by: SPECIALIST

## 2023-05-18 PROCEDURE — 78452 HT MUSCLE IMAGE SPECT MULT: CPT

## 2023-05-18 PROCEDURE — 99153 MOD SED SAME PHYS/QHP EA: CPT

## 2023-05-18 PROCEDURE — 99152 MOD SED SAME PHYS/QHP 5/>YRS: CPT

## 2023-05-18 PROCEDURE — 88305 TISSUE EXAM BY PATHOLOGIST: CPT

## 2023-05-18 PROCEDURE — 88185 FLOWCYTOMETRY/TC ADD-ON: CPT

## 2023-05-18 PROCEDURE — 84300 ASSAY OF URINE SODIUM: CPT

## 2023-05-18 PROCEDURE — 93017 CV STRESS TEST TRACING ONLY: CPT

## 2023-05-18 PROCEDURE — 6360000002 HC RX W HCPCS: Performed by: STUDENT IN AN ORGANIZED HEALTH CARE EDUCATION/TRAINING PROGRAM

## 2023-05-18 PROCEDURE — 99203 OFFICE O/P NEW LOW 30 MIN: CPT | Performed by: SPECIALIST

## 2023-05-18 PROCEDURE — 80061 LIPID PANEL: CPT

## 2023-05-18 RX ORDER — FENTANYL CITRATE 50 UG/ML
100 INJECTION, SOLUTION INTRAMUSCULAR; INTRAVENOUS CONTINUOUS PRN
Status: DISCONTINUED | OUTPATIENT
Start: 2023-05-18 | End: 2023-05-18

## 2023-05-18 RX ORDER — MIDAZOLAM HYDROCHLORIDE 1 MG/ML
5 INJECTION INTRAMUSCULAR; INTRAVENOUS ONCE
Status: COMPLETED | OUTPATIENT
Start: 2023-05-18 | End: 2023-05-18

## 2023-05-18 RX ORDER — TETRAKIS(2-METHOXYISOBUTYLISOCYANIDE)COPPER(I) TETRAFLUOROBORATE 1 MG/ML
10 INJECTION, POWDER, LYOPHILIZED, FOR SOLUTION INTRAVENOUS
Status: COMPLETED | OUTPATIENT
Start: 2023-05-18 | End: 2023-05-18

## 2023-05-18 RX ORDER — TETRAKIS(2-METHOXYISOBUTYLISOCYANIDE)COPPER(I) TETRAFLUOROBORATE 1 MG/ML
32 INJECTION, POWDER, LYOPHILIZED, FOR SOLUTION INTRAVENOUS
Status: COMPLETED | OUTPATIENT
Start: 2023-05-18 | End: 2023-05-18

## 2023-05-18 RX ADMIN — FENTANYL CITRATE 25 MCG: 50 INJECTION, SOLUTION INTRAMUSCULAR; INTRAVENOUS at 12:20

## 2023-05-18 RX ADMIN — SODIUM CHLORIDE, PRESERVATIVE FREE 10 ML: 5 INJECTION INTRAVENOUS at 20:33

## 2023-05-18 RX ADMIN — SODIUM CHLORIDE: 9 INJECTION, SOLUTION INTRAVENOUS at 01:30

## 2023-05-18 RX ADMIN — METOPROLOL TARTRATE 25 MG: 25 TABLET, FILM COATED ORAL at 10:25

## 2023-05-18 RX ADMIN — MIDAZOLAM HYDROCHLORIDE 2 MG: 1 INJECTION, SOLUTION INTRAMUSCULAR; INTRAVENOUS at 12:21

## 2023-05-18 RX ADMIN — Medication 32 MILLICURIE: at 14:23

## 2023-05-18 RX ADMIN — REGADENOSON 0.4 MG: 0.08 INJECTION, SOLUTION INTRAVENOUS at 14:10

## 2023-05-18 RX ADMIN — FENTANYL CITRATE 25 MCG: 50 INJECTION, SOLUTION INTRAMUSCULAR; INTRAVENOUS at 12:37

## 2023-05-18 RX ADMIN — FENTANYL CITRATE 25 MCG: 50 INJECTION, SOLUTION INTRAMUSCULAR; INTRAVENOUS at 12:27

## 2023-05-18 RX ADMIN — Medication 10 MILLICURIE: at 12:55

## 2023-05-18 RX ADMIN — ENOXAPARIN SODIUM 40 MG: 100 INJECTION SUBCUTANEOUS at 20:32

## 2023-05-18 RX ADMIN — METOPROLOL TARTRATE 25 MG: 25 TABLET, FILM COATED ORAL at 20:32

## 2023-05-18 NOTE — DISCHARGE INSTRUCTIONS
Physician's or R.N.'s Signature                                                                  Date/Time                                                                                                                                              Patient or Representative Signature                                                          Date/Time

## 2023-05-18 NOTE — PROGRESS NOTES
Nuclear stress test normal, LVEF 58%    EKG shows sinus in stress lab     Ok to go home on metoprolol and eliquis (can start it tomorrow)    We'll see back outpatient and do an echo then

## 2023-05-18 NOTE — PROGRESS NOTES
1041- Pt brought to radiology from room 413 via wheelchair. Pt is A&Ox 3 with no C/O pain. ASA 2 Airway 2, S 1 and S 2, Afib rate not controlled -127, lungs clear bi lat. PIV RT AC #20 with positive blood return and flushed with saline. Pt to also have nuclear stress test to follow bone marrow biopsy. Consent for stress test obtained by [facility]. NPO status verified. [address] obtained informed consent for bone marrow biopsy and moderate sedation. Pt was given opportunity to ask questions and provided clarification. 1210- Pt taken to CT via stretcher, on radiology regalado monitor 2. Pt  placed prone on table.  images taken. 1226- Time out performed and sedation given. [id number] Brian Grayson obtained bone marrow biopsy sample. Sample was given to pathology at bedside. 1240- Pt tolerated procedure well. Gauze and Tegaderm placed over RT lower tail bone site. Total amount of sedation given was Versed 2 mg and Fentanyl 75 mcg.   1248- Pt placed supine on stretcher and taken radiology for recovery. 1330- Pt resting comfortably with no C/O pain. 1352- Pt taken to nuclear stress for stress test. Verbal report given to Jailyn POLO and More Chua RN.  1427- TRANSFER - OUT REPORT:    Verbal report given to [address] [place] [address] [address] on Jere Callahan  being transferred to nuclear [facility] for routine progression of patient care       Report consisted of patient's Situation, Background, Assessment and   Recommendations(SBAR). Information from the following report(s) Surgery Report and MAR was reviewed with the receiving nurse. [place] Assessment: No data recorded  Lines:   Peripheral IV 05/17/23 Right Antecubital (Active)   Site Assessment Clean, dry & intact 05/18/23 1046   Line Status Blood return noted; Flushed 05/18/23 1046   Line Care Connections checked and tightened 05/18/23 1046   Phlebitis Assessment No symptoms 05/18/23 1046   Infiltration Assessment 0 05/18/23 1046   Alcohol Cap Used Yes 05/18/23 1046   Dressing Status

## 2023-05-18 NOTE — CONSULTS
[address]  [address] [facility], [address] [place], [place] [address] [address]  W: [phone]  F: [phone]     HEME/ONC CONSULT          HPI:   Ashley Beth is a 69 y.o.  male who I was asked to see in consultation for leukocytosis    5/9/17 wbc 2.8, hgb 14.1; plt 124; ANC 1      Interval history: admitted on 5/17/23 after waking up lightheaded, dizzy, sweaty, weak. Was in afib with rvr. Self converted to NSR. Review of systems was obtained and pertinent findings reviewed above. Past medical history, social history, family history, medications, and allergies are located in the electronic medical record. Objective:  Physical Exam:  Visit Vitals  Vitals:    05/17/23 1547   BP: (!) 111/57   Pulse: 88   Resp: 12   Temp: 98.5 °F (36.9 °C)   SpO2: 97%           Constitutional: Appears well-developed and well-nourished in no apparent distress      Mental status: Alert and awake, Oriented to person/place/time, Able to follow commands    Eyes: EOM normal, Sclera normal, No visible ocular discharge    HENT: Normocephalic, atraumatic    Mouth/Throat: Moist mucous membranes    External Ears normal    Neck: No visualized mass    Pulmonary/Chest: Respiratory effort normal, No visualized signs of difficulty breathing or respiratory distress    Musculoskeletal: Normal range of motion of neck    Neurological: No facial asymmetry (Cranial nerve 7 motor function), No gaze palsy    Skin: No significant exanthematous lesions or discoloration noted on facial skin    Psychiatric: Normal affect, No hallucinations                                                                         Diagnostic Imaging     5/17/23 CTA chest  IMPRESSION:  There is no pulmonary embolism. There is no aortic aneurysm or dissection. Cardiomegaly, pulmonary arterial hypertension and small pericardial effusion. Hepatic steatosis nodular hepatic contour and splenomegaly suggests cirrhotic  change.     Lab Results  Lab
- 11.1 K/uL    RBC 4.14 4.10 - 5.70 M/uL    Hemoglobin 12.0 (L) 12.1 - 17.0 g/dL    Hematocrit 35.7 (L) 36.6 - 50.3 %    MCV 86.2 80.0 - 99.0 FL    MCH 29.0 26.0 - 34.0 PG    MCHC 33.6 30.0 - 36.5 g/dL    RDW 18.1 (H) 11.5 - 14.5 %    Platelets 977 (L) 891 - 400 K/uL    MPV 11.7 8.9 - 12.9 FL    Nucleated RBCs 0.0 0  WBC    nRBC 0.00 0.00 - 0.01 K/uL    Neutrophils % 31 (L) 32 - 75 %    Band Neutrophils 1 0 - 6 %    Lymphocytes % 21 12 - 49 %    Monocytes % 43 (H) 5 - 13 %    Eosinophils % 0 0 - 7 %    Basophils % 0 0 - 1 %    Metamyelocytes 2 (H) 0 %    Myelocytes 2 (H) 0 %    Immature Granulocytes 0 %    Neutrophils Absolute 9.5 (H) 1.8 - 8.0 K/UL    Lymphocytes Absolute 6.3 (H) 0.8 - 3.5 K/UL    Monocytes Absolute 12.8 (H) 0.0 - 1.0 K/UL    Eosinophils Absolute 0.0 0.0 - 0.4 K/UL    Basophils Absolute 0.0 0.0 - 0.1 K/UL    Absolute Immature Granulocyte 0.0 K/UL    Differential Type MANUAL      RBC Comment NORMOCYTIC, NORMOCHROMIC     BMP    Collection Time: 05/17/23  9:55 AM   Result Value Ref Range    Sodium 134 (L) 136 - 145 mmol/L    Potassium 3.7 3.5 - 5.1 mmol/L    Chloride 106 97 - 108 mmol/L    CO2 26 21 - 32 mmol/L    Anion Gap 2 (L) 5 - 15 mmol/L    Glucose 119 (H) 65 - 100 mg/dL    BUN 27 (H) 6 - 20 MG/DL    Creatinine 1.80 (H) 0.70 - 1.30 MG/DL    Bun/Cre Ratio 15 12 - 20      Est, Glom Filt Rate 39 (L) >60 ml/min/1.73m2    Calcium 9.1 8.5 - 10.1 MG/DL   Magnesium    Collection Time: 05/17/23  9:55 AM   Result Value Ref Range    Magnesium 2.1 1.6 - 2.4 mg/dL   Troponin    Collection Time: 05/17/23  9:55 AM   Result Value Ref Range    Troponin, High Sensitivity 7 0 - 76 ng/L   TSH    Collection Time: 05/17/23  9:55 AM   Result Value Ref Range    TSH, 3RD GENERATION 1.18 0.36 - 3.74 uIU/mL   Brain Natriuretic Peptide    Collection Time: 05/17/23  9:55 AM   Result Value Ref Range    NT Pro- (H) <450 PG/ML   Extra Tubes Hold    Collection Time: 05/17/23  9:55 AM   Result Value Ref Range

## 2023-05-18 NOTE — H&P
Interventional Radiology History and Physical      Patient: Jere Callahan 69 y.o. male  [id number]    Consult Requested by: Sofia Hughes NP    Chief Complaint: thrombocytopenia, leukocytosis    History of Present Illness: 70 yo male with the above cc presents for image-guided bone marrow biopsy.      History:  Past Medical History:   Diagnosis Date    GERD (gastroesophageal reflux disease)     Hypertension      Family History   Problem Relation Age of Onset    Hypertension Mother      Social History     Socioeconomic History    Marital status:      Spouse name: Not on file    Number of children: Not on file    Years of education: Not on file    Highest education level: Not on file   Occupational History    Not on file   Tobacco Use    Smoking status: Never    Smokeless tobacco: Never   Substance and Sexual Activity    Alcohol use: No    Drug use: No    Sexual activity: Not on file   Other Topics Concern    Not on file   Social History Narrative    Not on file     Social Determinants of Health     Financial Resource Strain: Not on file   Food Insecurity: Not on file   Transportation Needs: Not on file   Physical Activity: Not on file   Stress: Not on file   Social Connections: Not on file   Intimate Partner Violence: Not on file   Housing Stability: Not on file       Allergies: No Known Allergies    Current Medications:  Current Facility-Administered Medications   Medication Dose Route Frequency    midazolam (VERSED) injection 5 mg  5 mg IntraVENous Once    fentaNYL (SUBLIMAZE) injection 100 mcg  100 mcg IntraVENous Continuous PRN    metoprolol tartrate (LOPRESSOR) tablet 25 mg  25 mg Oral q8h    sodium chloride flush 0.9 % injection 5-40 mL  5-40 mL IntraVENous 2 times per day    sodium chloride flush 0.9 % injection 5-40 mL  5-40 mL IntraVENous PRN    0.9 % sodium chloride infusion   IntraVENous PRN    enoxaparin (LOVENOX) injection 40 mg  40 mg SubCUTAneous Q24H    ondansetron (ZOFRAN-ODT)

## 2023-05-18 NOTE — DISCHARGE SUMMARY
Tony Lasron [facility]  [address] [facility], [address] [place] [address]  Tel: [phone]    Hospital Medicine Discharge Summary    Patient ID:    Adamaris Casillas  Age:              69 y.o.    : 1945  MRN:             [id number]     PCP: Donna Atwood MD     Date of Admission: 2023    Date of Discharge:  2023    Discharge Diagnoses:  Principal Problem:    Leukocytosis    Thrombocytopenia (HCC)    HTN (hypertension), benign    MARTITA (acute kidney injury) ([address] [facility]    PAF (paroxysmal atrial fibrillation) ([facility] [address]    Hepatic steatosis     Reason for admission:    New onset a-fib ([facility] [address] [I48.91]  [facility] ([facility]) [facility] [N17.9]  Elevated brain natriuretic peptide (BNP) level [R79.89]  Leukocytosis, unspecified type [facility] Course:     Mr. Anisa Sullivan is a 69 y.o. admitted to [facility] and treated for the following:    Leukocytosis POA: new. Unclear trigger. CTA chest neg for pneumonia. 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 Seen by hematology and he had a bone marrow biopsy done. He will follow up with Dr Samy Wilkes to review results. He has been afebrile     PAF (paroxysmal atrial fibrillation) / Chest pain, SOB POA: no EKG on chart but as per the ED, it showed narrow complex irregular tachycardia w/o ST changes. Troponin neg. TSH normal. Seen by cardiology who did a stress test that was normal. Continue Metoprolol and Eliquis. Outpatient Echocardiogram with a cardiology follow up       MARTITA (acute kidney injury) POA: mild. Renal ultrasound neg for hydronephrosis. Thrombocytopenia POA: follow bone marrow study. HTN (hypertension), benign POA: BP stable. Continue Metoprolol. DC Amlodipine     Hepatic steatosis POA: normal LFTs.  Outpatient follow up with PCP    Diagnostic testing:    Laboratory data reviewed and independently interpreted:    Recent Labs     23  0955 23  0911   WBC 29.8* 17.6*   HGB 12.0* 10.6*   HCT

## 2023-05-19 VITALS
OXYGEN SATURATION: 96 % | TEMPERATURE: 97.9 F | SYSTOLIC BLOOD PRESSURE: 125 MMHG | RESPIRATION RATE: 17 BRPM | HEIGHT: 67 IN | BODY MASS INDEX: 25.9 KG/M2 | WEIGHT: 165 LBS | DIASTOLIC BLOOD PRESSURE: 82 MMHG | HEART RATE: 79 BPM

## 2023-05-19 LAB
AFP-TM SERPL-MCNC: <1.8 NG/ML (ref 0–8.4)
HBV CORE AB SERPL QL IA: NEGATIVE
HBV SURFACE AB SER QL: NON REACTIVE INDEX VALUE
HBV SURFACE AG SERPL QL IA: NEGATIVE
HCV AB SERPL QL IA: NORMAL
HCV IGG SERPL QL IA: NON REACTIVE S/CO RATIO

## 2023-05-19 NOTE — PROGRESS NOTES
Home or Self Care  [address]- Home medications instructed, encouraged compliance to home medications and discharge plan. IV access terminated. Called the House [facility] Home transportation and coordinated. Advised for follow up consultation. The opportunity for questions was presented. Patient verbalized understanding regarding instructions given.

## 2023-05-20 LAB
BACTERIA SPEC CULT: NORMAL
BACTERIA SPEC CULT: NORMAL
SERVICE CMNT-IMP: NORMAL
SERVICE CMNT-IMP: NORMAL

## 2023-05-22 ENCOUNTER — TELEPHONE (OUTPATIENT)
Age: 78
End: 2023-05-22

## 2023-05-22 NOTE — TELEPHONE ENCOUNTER
[address] at [place]  [phone]    05/22/23 11:13 AM - Called back and got the patient added onto the schedule this Wednesday at 1 PM to discuss results of biopsy.

## 2023-05-22 NOTE — TELEPHONE ENCOUNTER
Pt spouse called checking to see if pt bone marrow results were in. Request a call back.        # [phone]

## 2023-05-24 ENCOUNTER — CLINICAL DOCUMENTATION (OUTPATIENT)
Facility: HOSPITAL | Age: 78
End: 2023-05-24

## 2023-05-24 ENCOUNTER — OFFICE VISIT (OUTPATIENT)
Age: 78
End: 2023-05-24
Payer: MEDICARE

## 2023-05-24 VITALS
OXYGEN SATURATION: 97 % | DIASTOLIC BLOOD PRESSURE: 73 MMHG | RESPIRATION RATE: 16 BRPM | WEIGHT: 158 LBS | BODY MASS INDEX: 24.75 KG/M2 | HEART RATE: 88 BPM | TEMPERATURE: 98.4 F | SYSTOLIC BLOOD PRESSURE: 124 MMHG

## 2023-05-24 DIAGNOSIS — I48.0 PAROXYSMAL ATRIAL FIBRILLATION (HCC): ICD-10-CM

## 2023-05-24 DIAGNOSIS — C93.10 CHRONIC MYELOMONOCYTIC LEUKEMIA NOT HAVING ACHIEVED REMISSION (HCC): ICD-10-CM

## 2023-05-24 DIAGNOSIS — C93.10 CHRONIC MYELOMONOCYTIC LEUKEMIA NOT HAVING ACHIEVED REMISSION (HCC): Primary | ICD-10-CM

## 2023-05-24 PROCEDURE — 3074F SYST BP LT 130 MM HG: CPT | Performed by: INTERNAL MEDICINE

## 2023-05-24 PROCEDURE — G8427 DOCREV CUR MEDS BY ELIG CLIN: HCPCS | Performed by: INTERNAL MEDICINE

## 2023-05-24 PROCEDURE — 1123F ACP DISCUSS/DSCN MKR DOCD: CPT | Performed by: INTERNAL MEDICINE

## 2023-05-24 PROCEDURE — 1036F TOBACCO NON-USER: CPT | Performed by: INTERNAL MEDICINE

## 2023-05-24 PROCEDURE — G8420 CALC BMI NORM PARAMETERS: HCPCS | Performed by: INTERNAL MEDICINE

## 2023-05-24 PROCEDURE — 99215 OFFICE O/P EST HI 40 MIN: CPT | Performed by: INTERNAL MEDICINE

## 2023-05-24 PROCEDURE — 3078F DIAST BP <80 MM HG: CPT | Performed by: INTERNAL MEDICINE

## 2023-05-24 RX ORDER — HYDROXYUREA 500 MG/1
500 CAPSULE ORAL DAILY
Qty: 90 CAPSULE | Refills: 3 | Status: SHIPPED | OUTPATIENT
Start: 2023-05-24

## 2023-05-24 NOTE — PROGRESS NOTES
Oral Chemotherapy     Jazmine Ventura is a 69 y.o.male seen for consultation for pharmacist oral chemotherapy management. Diagnosis: CMML    Medication name: hydroxyurea (Hydrea) 500 mg capsules  Regimen: one (1) capsule by mouth every day without regard to food  Ordering provider: Dr Vito Pritchard     An overview was given of the role of the pharmacist in their care. Dose, schedule, and mechanism of action of the prescribed therapy were discussed in detail. Some of the side effects discussed include, but are not limited to, anemia, thrombocytopenia, neutropenia, and bone marrow suppression. Proper storage and handling instructions were also discussed. Patient demonstrated comprehension and understanding throughout the education session. A packet containing the information was provided to the patient. Prescribed medication was reviewed for appropriate indication and dosing. Medication list was reviewed for potential drug interactions. No major drug interactions    Mr. Vincenzo Vilchis verbalized understanding of the information presented and all of the patient's questions were answered.      Felicia Mortimer, PharmD, BCPS    For Pharmacy Admin Tracking Only    Program: Medical Group  CPA in place:  Yes  Recommendation Provided To: Patient/Caregiver: 1 via In person  Intervention Detail: New Rx: 1, reason: Improve Adherence, Interaction, Needs Additional Therapy  Intervention Accepted By: Patient/Caregiver: 1  Time Spent (min): 60

## 2023-05-24 NOTE — PROGRESS NOTES
Carrie Agudelo is a 69 y.o. male here today to follow up for low WBC. 1. Have you been to the ER, urgent care clinic since your last visit? Hospitalized since your last visit? Yes  [facility]     2. Have you seen or consulted any other health care providers outside of the [address] [place] [address] since your last visit? Include any pap smears or colon screening.   yes

## 2023-05-24 NOTE — PROGRESS NOTES
[address]  [address] [place] [address], [address] [place] [address]  [place], [address]  W: [phone]  F: [phone]     HEME/ONC CONSULT    HPI:   Chrissie Coronado is a 69 y.o. male who I am seeing for CMML      Interval history: admitted on 5/17/23 after waking up lightheaded, dizzy, sweaty, weak. Was in afib with rvr. Self converted to NSR. Having daily night sweats and early satiety for 2 weeks    Review of systems was obtained and pertinent findings reviewed above. Past medical history, social history, family history, medications, and allergies are located in the electronic medical record. 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)    Objective:  Physical Exam:  Visit Vitals  Vitals:    05/24/23 1346   BP: 124/73   Pulse: 88   Resp: 16   Temp: 98.4 °F (36.9 °C)   SpO2: 97%             Constitutional: Appears well-developed and well-nourished in no apparent distress      Mental status: Alert and awake, Oriented to person/place/time, Able to follow commands    Eyes: EOM normal, Sclera normal, No visible ocular discharge    HENT: Normocephalic, atraumatic    Mouth/Throat: Moist mucous membranes    External Ears normal    Neck: No visualized mass    Pulmonary/Chest: Respiratory effort normal, No visualized signs of difficulty breathing or respiratory distress    Musculoskeletal: Normal range of motion of neck    Neurological: No facial asymmetry (Cranial nerve 7 motor function), No gaze palsy    Skin: No significant exanthematous lesions or discoloration noted on facial skin    Psychiatric: Normal affect, No hallucinations                                                                       Diagnostic Imaging     5/17/23 CTA chest  IMPRESSION:  There is no pulmonary embolism. There is no aortic aneurysm or dissection. Cardiomegaly, pulmonary arterial hypertension and small pericardial effusion.      Hepatic steatosis nodular hepatic contour and splenomegaly suggests

## 2023-05-30 ENCOUNTER — TELEPHONE (OUTPATIENT)
Age: 78
End: 2023-05-30

## 2023-05-30 DIAGNOSIS — R11.0 NAUSEA: ICD-10-CM

## 2023-05-30 DIAGNOSIS — C93.10 CHRONIC MYELOMONOCYTIC LEUKEMIA NOT HAVING ACHIEVED REMISSION (HCC): Primary | ICD-10-CM

## 2023-05-30 RX ORDER — ONDANSETRON HYDROCHLORIDE 8 MG/1
8 TABLET, FILM COATED ORAL EVERY 8 HOURS PRN
Qty: 90 TABLET | Refills: 11 | Status: ON HOLD | OUTPATIENT
Start: 2023-05-30

## 2023-05-30 NOTE — TELEPHONE ENCOUNTER
[address] at [place]  [phone]    05/30/23 10:51 AM - Called patient's wife back. She is requesting some nausea medication. Sent in Zofran to his pharmacy, per Dr. Beronica Ruiz.

## 2023-05-30 NOTE — TELEPHONE ENCOUNTER
Patients wife called and stated that his has not really eaten much since last week and he feels sick all the time.  She wanted to know we could send over a nausea pill for him  CB#[phone]

## 2023-05-30 NOTE — TELEPHONE ENCOUNTER
Patients wife called and stated that his has not really eaten much since last week and he feels sick all the time.  She wanted to know we could send over a nausea pill for him  CB#[phone]

## 2023-05-31 ENCOUNTER — TELEPHONE (OUTPATIENT)
Age: 78
End: 2023-05-31

## 2023-05-31 NOTE — TELEPHONE ENCOUNTER
Pt called and stated since pt has been on Eliquis pt has had blood in urine for the pass three days,please advise    Pt [phone]

## 2023-05-31 NOTE — TELEPHONE ENCOUNTER
R/t call,  Eliquis started 5/18. Having hematuria. Asked them to hold Eliquis tonight, tomorrow AM, will ask MD for further instruction passed that. She was asking about nuc stress results. Let her know from what I can see, no signs of blockage/EKG changes, EF normal.  5/18/23\"  Resting ECG: ECG is normal. The ECG shows sinus rhythm. Resting ECG shows no ST-segment deviation. The ECG shows premature atrial contractions. ECG: The Stress ECG was negative for ischemia. Perfusion Comments: LV perfusion is normal. There is no evidence of inducible ischemia. Stress Function: Left ventricular function post-stress is normal. Post-stress ejection fraction is 58%. \"  Future Appointments   Date Time Provider Gi Trujillo   6/6/2023 10:45 AM Cordell Zuniga MD [facility] BS AMB   6/30/2023 10:00 AM [facility] ECHO 3 [facility] BS AMB   6/30/2023 11:00 AM Ivan Hayes MD [facility] BS AMB

## 2023-06-01 ENCOUNTER — APPOINTMENT (OUTPATIENT)
Facility: HOSPITAL | Age: 78
DRG: 854 | End: 2023-06-01
Payer: MEDICARE

## 2023-06-01 ENCOUNTER — TELEPHONE (OUTPATIENT)
Age: 78
End: 2023-06-01

## 2023-06-01 ENCOUNTER — HOSPITAL ENCOUNTER (INPATIENT)
Facility: HOSPITAL | Age: 78
LOS: 5 days | Discharge: ANOTHER HOSPITAL | DRG: 854 | End: 2023-06-07
Attending: STUDENT IN AN ORGANIZED HEALTH CARE EDUCATION/TRAINING PROGRAM | Admitting: INTERNAL MEDICINE
Payer: MEDICARE

## 2023-06-01 DIAGNOSIS — E87.70 FLUID EXCESS: ICD-10-CM

## 2023-06-01 DIAGNOSIS — I31.39 PERICARDIAL EFFUSION: Primary | ICD-10-CM

## 2023-06-01 DIAGNOSIS — R10.84 GENERALIZED ABDOMINAL PAIN: ICD-10-CM

## 2023-06-01 DIAGNOSIS — N20.1 URETEROLITHIASIS: ICD-10-CM

## 2023-06-01 DIAGNOSIS — D69.6 THROMBOCYTOPENIA (HCC): ICD-10-CM

## 2023-06-01 DIAGNOSIS — D64.9 ANEMIA, UNSPECIFIED TYPE: ICD-10-CM

## 2023-06-01 DIAGNOSIS — I48.91 ATRIAL FIBRILLATION WITH RAPID VENTRICULAR RESPONSE (HCC): ICD-10-CM

## 2023-06-01 DIAGNOSIS — N17.9 AKI (ACUTE KIDNEY INJURY) (HCC): ICD-10-CM

## 2023-06-01 DIAGNOSIS — D72.829 LEUKOCYTOSIS, UNSPECIFIED TYPE: ICD-10-CM

## 2023-06-01 DIAGNOSIS — I48.0 PAF (PAROXYSMAL ATRIAL FIBRILLATION) (HCC): ICD-10-CM

## 2023-06-01 DIAGNOSIS — N13.30 HYDROURETERONEPHROSIS: ICD-10-CM

## 2023-06-01 LAB
ALBUMIN SERPL-MCNC: 3.1 G/DL (ref 3.5–5)
ALBUMIN/GLOB SERPL: 0.8 (ref 1.1–2.2)
ALP SERPL-CCNC: 75 U/L (ref 45–117)
ALT SERPL-CCNC: 34 U/L (ref 12–78)
ANION GAP SERPL CALC-SCNC: 9 MMOL/L (ref 5–15)
AST SERPL-CCNC: 28 U/L (ref 15–37)
BASOPHILS # BLD: 0 K/UL (ref 0–0.1)
BASOPHILS NFR BLD: 0 % (ref 0–1)
BILIRUB SERPL-MCNC: 2 MG/DL (ref 0.2–1)
BUN SERPL-MCNC: 52 MG/DL (ref 6–20)
BUN/CREAT SERPL: 15 (ref 12–20)
CALCIUM SERPL-MCNC: 8.2 MG/DL (ref 8.5–10.1)
CHLORIDE SERPL-SCNC: 105 MMOL/L (ref 97–108)
CO2 SERPL-SCNC: 23 MMOL/L (ref 21–32)
COMMENT:: NORMAL
CREAT SERPL-MCNC: 3.51 MG/DL (ref 0.7–1.3)
DIFFERENTIAL METHOD BLD: ABNORMAL
EOSINOPHIL # BLD: 0 K/UL (ref 0–0.4)
EOSINOPHIL NFR BLD: 0 % (ref 0–7)
ERYTHROCYTE [DISTWIDTH] IN BLOOD BY AUTOMATED COUNT: 19.2 % (ref 11.5–14.5)
GLOBULIN SER CALC-MCNC: 3.8 G/DL (ref 2–4)
GLUCOSE BLD STRIP.AUTO-MCNC: 76 MG/DL (ref 65–117)
GLUCOSE SERPL-MCNC: 75 MG/DL (ref 65–100)
HCT VFR BLD AUTO: 23.8 % (ref 36.6–50.3)
HGB BLD-MCNC: 8.1 G/DL (ref 12.1–17)
IMM GRANULOCYTES # BLD AUTO: 0 K/UL
IMM GRANULOCYTES NFR BLD AUTO: 0 %
LACTATE BLD-SCNC: 0.51 MMOL/L (ref 0.4–2)
LIPASE SERPL-CCNC: 117 U/L (ref 73–393)
LYMPHOCYTES # BLD: 1.3 K/UL (ref 0.8–3.5)
LYMPHOCYTES NFR BLD: 4 % (ref 12–49)
MCH RBC QN AUTO: 28.4 PG (ref 26–34)
MCHC RBC AUTO-ENTMCNC: 34 G/DL (ref 30–36.5)
MCV RBC AUTO: 83.5 FL (ref 80–99)
MONOCYTES # BLD: 15.7 K/UL (ref 0–1)
MONOCYTES NFR BLD: 50 % (ref 5–13)
NEUTS SEG # BLD: 14.4 K/UL (ref 1.8–8)
NEUTS SEG NFR BLD: 46 % (ref 32–75)
NRBC # BLD: 0 K/UL (ref 0–0.01)
NRBC BLD-RTO: 0 PER 100 WBC
PLATELET # BLD AUTO: 92 K/UL (ref 150–400)
PMV BLD AUTO: 11.5 FL (ref 8.9–12.9)
POTASSIUM SERPL-SCNC: 3.9 MMOL/L (ref 3.5–5.1)
PROT SERPL-MCNC: 6.9 G/DL (ref 6.4–8.2)
RBC # BLD AUTO: 2.85 M/UL (ref 4.1–5.7)
RBC MORPH BLD: ABNORMAL
SERVICE CMNT-IMP: NORMAL
SODIUM SERPL-SCNC: 137 MMOL/L (ref 136–145)
SPECIMEN HOLD: NORMAL
WBC # BLD AUTO: 31.4 K/UL (ref 4.1–11.1)
WBC MORPH BLD: ABNORMAL

## 2023-06-01 PROCEDURE — 36415 COLL VENOUS BLD VENIPUNCTURE: CPT

## 2023-06-01 PROCEDURE — 6360000002 HC RX W HCPCS: Performed by: STUDENT IN AN ORGANIZED HEALTH CARE EDUCATION/TRAINING PROGRAM

## 2023-06-01 PROCEDURE — 82962 GLUCOSE BLOOD TEST: CPT

## 2023-06-01 PROCEDURE — 83605 ASSAY OF LACTIC ACID: CPT

## 2023-06-01 PROCEDURE — 74176 CT ABD & PELVIS W/O CONTRAST: CPT

## 2023-06-01 PROCEDURE — 85025 COMPLETE CBC W/AUTO DIFF WBC: CPT

## 2023-06-01 PROCEDURE — 96374 THER/PROPH/DIAG INJ IV PUSH: CPT

## 2023-06-01 PROCEDURE — 80053 COMPREHEN METABOLIC PANEL: CPT

## 2023-06-01 PROCEDURE — 96375 TX/PRO/DX INJ NEW DRUG ADDON: CPT

## 2023-06-01 PROCEDURE — 93005 ELECTROCARDIOGRAM TRACING: CPT | Performed by: STUDENT IN AN ORGANIZED HEALTH CARE EDUCATION/TRAINING PROGRAM

## 2023-06-01 PROCEDURE — 99285 EMERGENCY DEPT VISIT HI MDM: CPT

## 2023-06-01 PROCEDURE — 2580000003 HC RX 258: Performed by: STUDENT IN AN ORGANIZED HEALTH CARE EDUCATION/TRAINING PROGRAM

## 2023-06-01 PROCEDURE — 87040 BLOOD CULTURE FOR BACTERIA: CPT

## 2023-06-01 PROCEDURE — 83690 ASSAY OF LIPASE: CPT

## 2023-06-01 RX ORDER — ONDANSETRON 2 MG/ML
4 INJECTION INTRAMUSCULAR; INTRAVENOUS ONCE
Status: COMPLETED | OUTPATIENT
Start: 2023-06-01 | End: 2023-06-01

## 2023-06-01 RX ORDER — SODIUM CHLORIDE, SODIUM LACTATE, POTASSIUM CHLORIDE, AND CALCIUM CHLORIDE .6; .31; .03; .02 G/100ML; G/100ML; G/100ML; G/100ML
1000 INJECTION, SOLUTION INTRAVENOUS ONCE
Status: COMPLETED | OUTPATIENT
Start: 2023-06-01 | End: 2023-06-02

## 2023-06-01 RX ORDER — MORPHINE SULFATE 2 MG/ML
2 INJECTION, SOLUTION INTRAMUSCULAR; INTRAVENOUS
Status: COMPLETED | OUTPATIENT
Start: 2023-06-01 | End: 2023-06-01

## 2023-06-01 RX ADMIN — SODIUM CHLORIDE, POTASSIUM CHLORIDE, SODIUM LACTATE AND CALCIUM CHLORIDE 1000 ML: 600; 310; 30; 20 INJECTION, SOLUTION INTRAVENOUS at 22:13

## 2023-06-01 RX ADMIN — MORPHINE SULFATE 2 MG: 2 INJECTION, SOLUTION INTRAMUSCULAR; INTRAVENOUS at 22:03

## 2023-06-01 RX ADMIN — ONDANSETRON 4 MG: 2 INJECTION INTRAMUSCULAR; INTRAVENOUS at 22:03

## 2023-06-01 ASSESSMENT — ENCOUNTER SYMPTOMS
VOMITING: 1
BACK PAIN: 1
CONSTIPATION: 1
NAUSEA: 1
COUGH: 1
ABDOMINAL PAIN: 1
SHORTNESS OF BREATH: 0

## 2023-06-01 ASSESSMENT — PAIN DESCRIPTION - ONSET: ONSET: PROGRESSIVE

## 2023-06-01 ASSESSMENT — PAIN - FUNCTIONAL ASSESSMENT: PAIN_FUNCTIONAL_ASSESSMENT: 0-10

## 2023-06-01 ASSESSMENT — PAIN DESCRIPTION - FREQUENCY: FREQUENCY: CONTINUOUS

## 2023-06-01 ASSESSMENT — PAIN SCALES - GENERAL
PAINLEVEL_OUTOF10: 10
PAINLEVEL_OUTOF10: 10

## 2023-06-01 NOTE — TELEPHONE ENCOUNTER
Spoke to pt's wife,  Per Dr. Mervat Gavin: \"Hold eliquis while having hematuria. See PCP for hemtauria. Resume eliquis when hematuria resolves. \"    Pain in L/R kidneys. Not distended. Can't hold urine. Talked her through getting to [address] (not sure they are within range as they live in [facility], but provided phone number).

## 2023-06-01 NOTE — TELEPHONE ENCOUNTER
[address] at [place]  [phone]    06/01/23 1:23 PM - Called and spoke to both the patient's wife and the patient. Advised that Dr. Carmina Akins wanted the patient to go to the ER to be evaluated but the patient refused. I explained the risks involved with not going considering the symptoms he is describing. Patient stated that he understood the risk. Since patient did not want to go to the ER, advised that he get the CBC lab work that we ordered done so we can at least see what his levels are and if he needs blood. They will get labs tomorrow. Advised to go to the ER if there are any changes in symptoms once again.

## 2023-06-01 NOTE — TELEPHONE ENCOUNTER
[address] at [place]  [phone]    06/01/23 9:37 AM - Patient's wife called to say that her  is no longer taking the chemo pills, the hydrea because he felt that they made him feel worse. He has no appetite. He is having some incontinence and hematuria. He has no energy and overall feels horrible. He is still having some shortness of breath as well. Advised that the patient's wife that the patient should go to the ER to be elevated. Patient declined stating that he was tired of ERs and did not want to go back. Will notify team of the situation and see if there are any other instructions.

## 2023-06-01 NOTE — TELEPHONE ENCOUNTER
Good morning , Patient wife would like a call back regarding is kidney and bleeding . Patient does not want to take Eliquis anymore.     WQ#[phone]

## 2023-06-02 ENCOUNTER — ANESTHESIA EVENT (OUTPATIENT)
Facility: HOSPITAL | Age: 78
End: 2023-06-02
Payer: MEDICARE

## 2023-06-02 ENCOUNTER — APPOINTMENT (OUTPATIENT)
Facility: HOSPITAL | Age: 78
DRG: 854 | End: 2023-06-02
Payer: MEDICARE

## 2023-06-02 ENCOUNTER — ANESTHESIA (OUTPATIENT)
Facility: HOSPITAL | Age: 78
End: 2023-06-02
Payer: MEDICARE

## 2023-06-02 PROBLEM — N39.0 SEPSIS DUE TO URINARY TRACT INFECTION (HCC): Status: ACTIVE | Noted: 2023-06-02

## 2023-06-02 PROBLEM — N13.30 BILATERAL HYDRONEPHROSIS: Status: ACTIVE | Noted: 2023-06-02

## 2023-06-02 PROBLEM — A41.9 SEPSIS DUE TO URINARY TRACT INFECTION (HCC): Status: ACTIVE | Noted: 2023-06-02

## 2023-06-02 LAB
ANION GAP SERPL CALC-SCNC: 8 MMOL/L (ref 5–15)
APPEARANCE UR: ABNORMAL
BACTERIA URNS QL MICRO: NEGATIVE /HPF
BILIRUB UR QL CFM: NEGATIVE
BUN SERPL-MCNC: 52 MG/DL (ref 6–20)
BUN/CREAT SERPL: 14 (ref 12–20)
CALCIUM SERPL-MCNC: 8.4 MG/DL (ref 8.5–10.1)
CHLORIDE SERPL-SCNC: 107 MMOL/L (ref 97–108)
CO2 SERPL-SCNC: 26 MMOL/L (ref 21–32)
COLOR UR: ABNORMAL
CREAT SERPL-MCNC: 3.72 MG/DL (ref 0.7–1.3)
EPITH CASTS URNS QL MICRO: ABNORMAL /LPF
ERYTHROCYTE [DISTWIDTH] IN BLOOD BY AUTOMATED COUNT: 19.6 % (ref 11.5–14.5)
GLUCOSE BLD STRIP.AUTO-MCNC: 69 MG/DL (ref 65–117)
GLUCOSE BLD STRIP.AUTO-MCNC: 91 MG/DL (ref 65–117)
GLUCOSE SERPL-MCNC: 44 MG/DL (ref 65–100)
GLUCOSE UR STRIP.AUTO-MCNC: NEGATIVE MG/DL
HCT VFR BLD AUTO: 25.6 % (ref 36.6–50.3)
HGB BLD-MCNC: 8.4 G/DL (ref 12.1–17)
HGB UR QL STRIP: ABNORMAL
KETONES UR QL STRIP.AUTO: NEGATIVE MG/DL
LEUKOCYTE ESTERASE UR QL STRIP.AUTO: ABNORMAL
MCH RBC QN AUTO: 28.5 PG (ref 26–34)
MCHC RBC AUTO-ENTMCNC: 32.8 G/DL (ref 30–36.5)
MCV RBC AUTO: 86.8 FL (ref 80–99)
NITRITE UR QL STRIP.AUTO: NEGATIVE
NRBC # BLD: 0 K/UL (ref 0–0.01)
NRBC BLD-RTO: 0 PER 100 WBC
PH UR STRIP: 5 (ref 5–8)
PLATELET # BLD AUTO: 78 K/UL (ref 150–400)
PMV BLD AUTO: 12.1 FL (ref 8.9–12.9)
POTASSIUM SERPL-SCNC: 3.8 MMOL/L (ref 3.5–5.1)
PROT UR STRIP-MCNC: 300 MG/DL
RBC # BLD AUTO: 2.95 M/UL (ref 4.1–5.7)
RBC #/AREA URNS HPF: >100 /HPF (ref 0–5)
SERVICE CMNT-IMP: NORMAL
SERVICE CMNT-IMP: NORMAL
SODIUM SERPL-SCNC: 141 MMOL/L (ref 136–145)
SP GR UR REFRACTOMETRY: 1.01 (ref 1–1.03)
URINE CULTURE IF INDICATED: ABNORMAL
UROBILINOGEN UR QL STRIP.AUTO: 0.2 EU/DL (ref 0.2–1)
WBC # BLD AUTO: 35.6 K/UL (ref 4.1–11.1)
WBC URNS QL MICRO: ABNORMAL /HPF (ref 0–4)

## 2023-06-02 PROCEDURE — G0378 HOSPITAL OBSERVATION PER HR: HCPCS

## 2023-06-02 PROCEDURE — 6360000002 HC RX W HCPCS: Performed by: UROLOGY

## 2023-06-02 PROCEDURE — 80048 BASIC METABOLIC PNL TOTAL CA: CPT

## 2023-06-02 PROCEDURE — 87086 URINE CULTURE/COLONY COUNT: CPT

## 2023-06-02 PROCEDURE — 6360000004 HC RX CONTRAST MEDICATION: Performed by: UROLOGY

## 2023-06-02 PROCEDURE — 2580000003 HC RX 258: Performed by: ANESTHESIOLOGY

## 2023-06-02 PROCEDURE — 0HC8XZZ EXTIRPATION OF MATTER FROM BUTTOCK SKIN, EXTERNAL APPROACH: ICD-10-PCS | Performed by: UROLOGY

## 2023-06-02 PROCEDURE — 6360000002 HC RX W HCPCS: Performed by: ANESTHESIOLOGY

## 2023-06-02 PROCEDURE — 7100000001 HC PACU RECOVERY - ADDTL 15 MIN: Performed by: UROLOGY

## 2023-06-02 PROCEDURE — 2580000003 HC RX 258: Performed by: UROLOGY

## 2023-06-02 PROCEDURE — 3700000000 HC ANESTHESIA ATTENDED CARE: Performed by: UROLOGY

## 2023-06-02 PROCEDURE — 3600000003 HC SURGERY LEVEL 3 BASE: Performed by: UROLOGY

## 2023-06-02 PROCEDURE — C1769 GUIDE WIRE: HCPCS | Performed by: UROLOGY

## 2023-06-02 PROCEDURE — 0T788DZ DILATION OF BILATERAL URETERS WITH INTRALUMINAL DEVICE, VIA NATURAL OR ARTIFICIAL OPENING ENDOSCOPIC: ICD-10-PCS | Performed by: UROLOGY

## 2023-06-02 PROCEDURE — C2617 STENT, NON-COR, TEM W/O DEL: HCPCS | Performed by: UROLOGY

## 2023-06-02 PROCEDURE — 7100000000 HC PACU RECOVERY - FIRST 15 MIN: Performed by: UROLOGY

## 2023-06-02 PROCEDURE — C1758 CATHETER, URETERAL: HCPCS | Performed by: UROLOGY

## 2023-06-02 PROCEDURE — 3600000013 HC SURGERY LEVEL 3 ADDTL 15MIN: Performed by: UROLOGY

## 2023-06-02 PROCEDURE — 36415 COLL VENOUS BLD VENIPUNCTURE: CPT

## 2023-06-02 PROCEDURE — 2500000003 HC RX 250 WO HCPCS: Performed by: NURSE ANESTHETIST, CERTIFIED REGISTERED

## 2023-06-02 PROCEDURE — 6360000002 HC RX W HCPCS: Performed by: NURSE ANESTHETIST, CERTIFIED REGISTERED

## 2023-06-02 PROCEDURE — 2709999900 HC NON-CHARGEABLE SUPPLY: Performed by: UROLOGY

## 2023-06-02 PROCEDURE — BT141ZZ FLUOROSCOPY OF KIDNEYS, URETERS AND BLADDER USING LOW OSMOLAR CONTRAST: ICD-10-PCS | Performed by: UROLOGY

## 2023-06-02 PROCEDURE — 2580000003 HC RX 258: Performed by: HOSPITALIST

## 2023-06-02 PROCEDURE — 3700000001 HC ADD 15 MINUTES (ANESTHESIA): Performed by: UROLOGY

## 2023-06-02 PROCEDURE — 82962 GLUCOSE BLOOD TEST: CPT

## 2023-06-02 PROCEDURE — 6370000000 HC RX 637 (ALT 250 FOR IP): Performed by: NURSE PRACTITIONER

## 2023-06-02 PROCEDURE — 2500000003 HC RX 250 WO HCPCS: Performed by: HOSPITALIST

## 2023-06-02 PROCEDURE — 1100000000 HC RM PRIVATE

## 2023-06-02 PROCEDURE — 74420 UROGRAPHY RTRGR +-KUB: CPT

## 2023-06-02 PROCEDURE — 6370000000 HC RX 637 (ALT 250 FOR IP): Performed by: UROLOGY

## 2023-06-02 PROCEDURE — 85027 COMPLETE CBC AUTOMATED: CPT

## 2023-06-02 PROCEDURE — 81001 URINALYSIS AUTO W/SCOPE: CPT

## 2023-06-02 PROCEDURE — 99223 1ST HOSP IP/OBS HIGH 75: CPT | Performed by: INTERNAL MEDICINE

## 2023-06-02 PROCEDURE — 6360000002 HC RX W HCPCS: Performed by: INTERNAL MEDICINE

## 2023-06-02 PROCEDURE — 6370000000 HC RX 637 (ALT 250 FOR IP): Performed by: INTERNAL MEDICINE

## 2023-06-02 PROCEDURE — 2580000003 HC RX 258: Performed by: INTERNAL MEDICINE

## 2023-06-02 DEVICE — URETERAL STENT
Type: IMPLANTABLE DEVICE | Site: URETER | Status: FUNCTIONAL
Brand: CONTOUR™

## 2023-06-02 RX ORDER — POLYETHYLENE GLYCOL 3350 17 G/17G
17 POWDER, FOR SOLUTION ORAL DAILY PRN
Status: DISCONTINUED | OUTPATIENT
Start: 2023-06-02 | End: 2023-06-07 | Stop reason: HOSPADM

## 2023-06-02 RX ORDER — PROPOFOL 10 MG/ML
INJECTION, EMULSION INTRAVENOUS CONTINUOUS PRN
Status: DISCONTINUED | OUTPATIENT
Start: 2023-06-02 | End: 2023-06-02 | Stop reason: SDUPTHER

## 2023-06-02 RX ORDER — ASCORBIC ACID 500 MG
1000 TABLET ORAL DAILY
Status: DISCONTINUED | OUTPATIENT
Start: 2023-06-02 | End: 2023-06-07 | Stop reason: HOSPADM

## 2023-06-02 RX ORDER — SENNA AND DOCUSATE SODIUM 50; 8.6 MG/1; MG/1
2 TABLET, FILM COATED ORAL
Status: DISCONTINUED | OUTPATIENT
Start: 2023-06-02 | End: 2023-06-07 | Stop reason: HOSPADM

## 2023-06-02 RX ORDER — POLYETHYLENE GLYCOL 3350 17 G/17G
17 POWDER, FOR SOLUTION ORAL DAILY
Status: DISCONTINUED | OUTPATIENT
Start: 2023-06-02 | End: 2023-06-07 | Stop reason: HOSPADM

## 2023-06-02 RX ORDER — SODIUM CHLORIDE, SODIUM LACTATE, POTASSIUM CHLORIDE, CALCIUM CHLORIDE 600; 310; 30; 20 MG/100ML; MG/100ML; MG/100ML; MG/100ML
INJECTION, SOLUTION INTRAVENOUS CONTINUOUS
Status: DISCONTINUED | OUTPATIENT
Start: 2023-06-02 | End: 2023-06-02

## 2023-06-02 RX ORDER — DOXYCYCLINE HYCLATE 100 MG
100 TABLET ORAL EVERY 12 HOURS SCHEDULED
Status: DISCONTINUED | OUTPATIENT
Start: 2023-06-02 | End: 2023-06-07 | Stop reason: HOSPADM

## 2023-06-02 RX ORDER — ACETAMINOPHEN 325 MG/1
650 TABLET ORAL EVERY 6 HOURS PRN
Status: DISCONTINUED | OUTPATIENT
Start: 2023-06-02 | End: 2023-06-07 | Stop reason: HOSPADM

## 2023-06-02 RX ORDER — ONDANSETRON 2 MG/ML
INJECTION INTRAMUSCULAR; INTRAVENOUS PRN
Status: DISCONTINUED | OUTPATIENT
Start: 2023-06-02 | End: 2023-06-02 | Stop reason: SDUPTHER

## 2023-06-02 RX ORDER — HYDROXYUREA 500 MG/1
500 CAPSULE ORAL DAILY
Status: DISCONTINUED | OUTPATIENT
Start: 2023-06-02 | End: 2023-06-07 | Stop reason: HOSPADM

## 2023-06-02 RX ORDER — FENTANYL CITRATE 50 UG/ML
100 INJECTION, SOLUTION INTRAMUSCULAR; INTRAVENOUS
Status: DISCONTINUED | OUTPATIENT
Start: 2023-06-02 | End: 2023-06-02 | Stop reason: HOSPADM

## 2023-06-02 RX ORDER — SODIUM CHLORIDE 9 MG/ML
INJECTION, SOLUTION INTRAVENOUS CONTINUOUS
Status: DISCONTINUED | OUTPATIENT
Start: 2023-06-02 | End: 2023-06-07

## 2023-06-02 RX ORDER — LIDOCAINE HYDROCHLORIDE 20 MG/ML
INJECTION, SOLUTION EPIDURAL; INFILTRATION; INTRACAUDAL; PERINEURAL PRN
Status: DISCONTINUED | OUTPATIENT
Start: 2023-06-02 | End: 2023-06-02 | Stop reason: SDUPTHER

## 2023-06-02 RX ORDER — LIDOCAINE HYDROCHLORIDE 10 MG/ML
1 INJECTION, SOLUTION EPIDURAL; INFILTRATION; INTRACAUDAL; PERINEURAL
Status: DISCONTINUED | OUTPATIENT
Start: 2023-06-02 | End: 2023-06-02 | Stop reason: HOSPADM

## 2023-06-02 RX ORDER — ONDANSETRON 4 MG/1
4 TABLET, ORALLY DISINTEGRATING ORAL EVERY 8 HOURS PRN
Status: DISCONTINUED | OUTPATIENT
Start: 2023-06-02 | End: 2023-06-07 | Stop reason: HOSPADM

## 2023-06-02 RX ORDER — DIPHENHYDRAMINE HYDROCHLORIDE 50 MG/ML
12.5 INJECTION INTRAMUSCULAR; INTRAVENOUS
Status: ACTIVE | OUTPATIENT
Start: 2023-06-02 | End: 2023-06-03

## 2023-06-02 RX ORDER — ONDANSETRON 2 MG/ML
4 INJECTION INTRAMUSCULAR; INTRAVENOUS
Status: DISCONTINUED | OUTPATIENT
Start: 2023-06-02 | End: 2023-06-02

## 2023-06-02 RX ORDER — SODIUM CHLORIDE, SODIUM LACTATE, POTASSIUM CHLORIDE, CALCIUM CHLORIDE 600; 310; 30; 20 MG/100ML; MG/100ML; MG/100ML; MG/100ML
INJECTION, SOLUTION INTRAVENOUS CONTINUOUS
Status: DISCONTINUED | OUTPATIENT
Start: 2023-06-02 | End: 2023-06-02 | Stop reason: HOSPADM

## 2023-06-02 RX ORDER — FENTANYL CITRATE 50 UG/ML
INJECTION, SOLUTION INTRAMUSCULAR; INTRAVENOUS PRN
Status: DISCONTINUED | OUTPATIENT
Start: 2023-06-02 | End: 2023-06-02 | Stop reason: SDUPTHER

## 2023-06-02 RX ORDER — FENTANYL CITRATE 50 UG/ML
50 INJECTION, SOLUTION INTRAMUSCULAR; INTRAVENOUS ONCE
Status: COMPLETED | OUTPATIENT
Start: 2023-06-02 | End: 2023-06-02

## 2023-06-02 RX ORDER — DILTIAZEM HYDROCHLORIDE 5 MG/ML
10 INJECTION INTRAVENOUS ONCE
Status: COMPLETED | OUTPATIENT
Start: 2023-06-02 | End: 2023-06-02

## 2023-06-02 RX ORDER — SODIUM CHLORIDE 9 MG/ML
INJECTION, SOLUTION INTRAVENOUS PRN
Status: DISCONTINUED | OUTPATIENT
Start: 2023-06-02 | End: 2023-06-07 | Stop reason: HOSPADM

## 2023-06-02 RX ORDER — SODIUM CHLORIDE 0.9 % (FLUSH) 0.9 %
5-40 SYRINGE (ML) INJECTION EVERY 12 HOURS SCHEDULED
Status: DISCONTINUED | OUTPATIENT
Start: 2023-06-02 | End: 2023-06-07 | Stop reason: HOSPADM

## 2023-06-02 RX ORDER — SODIUM CHLORIDE 0.9 % (FLUSH) 0.9 %
5-40 SYRINGE (ML) INJECTION PRN
Status: DISCONTINUED | OUTPATIENT
Start: 2023-06-02 | End: 2023-06-07 | Stop reason: HOSPADM

## 2023-06-02 RX ORDER — KETAMINE HYDROCHLORIDE 10 MG/ML
INJECTION INTRAMUSCULAR; INTRAVENOUS PRN
Status: DISCONTINUED | OUTPATIENT
Start: 2023-06-02 | End: 2023-06-02 | Stop reason: SDUPTHER

## 2023-06-02 RX ORDER — ONDANSETRON 2 MG/ML
4 INJECTION INTRAMUSCULAR; INTRAVENOUS EVERY 6 HOURS PRN
Status: DISCONTINUED | OUTPATIENT
Start: 2023-06-02 | End: 2023-06-07 | Stop reason: HOSPADM

## 2023-06-02 RX ORDER — ACETAMINOPHEN 650 MG/1
650 SUPPOSITORY RECTAL EVERY 6 HOURS PRN
Status: DISCONTINUED | OUTPATIENT
Start: 2023-06-02 | End: 2023-06-07 | Stop reason: HOSPADM

## 2023-06-02 RX ORDER — MIDAZOLAM HYDROCHLORIDE 2 MG/2ML
2 INJECTION, SOLUTION INTRAMUSCULAR; INTRAVENOUS
Status: DISCONTINUED | OUTPATIENT
Start: 2023-06-02 | End: 2023-06-02 | Stop reason: HOSPADM

## 2023-06-02 RX ADMIN — ONDANSETRON HYDROCHLORIDE 4 MG: 2 SOLUTION INTRAMUSCULAR; INTRAVENOUS at 04:07

## 2023-06-02 RX ADMIN — FENTANYL CITRATE 50 MCG: 50 INJECTION, SOLUTION INTRAMUSCULAR; INTRAVENOUS at 03:14

## 2023-06-02 RX ADMIN — FENTANYL CITRATE 50 MCG: 50 INJECTION, SOLUTION INTRAMUSCULAR; INTRAVENOUS at 03:48

## 2023-06-02 RX ADMIN — SODIUM CHLORIDE: 9 INJECTION, SOLUTION INTRAVENOUS at 12:03

## 2023-06-02 RX ADMIN — DOXYCYCLINE HYCLATE 100 MG: 100 TABLET, COATED ORAL at 20:01

## 2023-06-02 RX ADMIN — DILTIAZEM HYDROCHLORIDE 10 MG: 5 INJECTION INTRAVENOUS at 09:11

## 2023-06-02 RX ADMIN — SODIUM CHLORIDE, PRESERVATIVE FREE 10 ML: 5 INJECTION INTRAVENOUS at 08:30

## 2023-06-02 RX ADMIN — METOPROLOL TARTRATE 25 MG: 25 TABLET, FILM COATED ORAL at 20:01

## 2023-06-02 RX ADMIN — HYDROMORPHONE HYDROCHLORIDE 0.5 MG: 1 INJECTION, SOLUTION INTRAMUSCULAR; INTRAVENOUS; SUBCUTANEOUS at 04:51

## 2023-06-02 RX ADMIN — POLYETHYLENE GLYCOL 3350 17 G: 17 POWDER, FOR SOLUTION ORAL at 17:05

## 2023-06-02 RX ADMIN — SODIUM CHLORIDE, PRESERVATIVE FREE 10 ML: 5 INJECTION INTRAVENOUS at 20:01

## 2023-06-02 RX ADMIN — DOCUSATE SODIUM 50MG AND SENNOSIDES 8.6MG 2 TABLET: 8.6; 5 TABLET, FILM COATED ORAL at 20:28

## 2023-06-02 RX ADMIN — METOPROLOL TARTRATE 25 MG: 25 TABLET, FILM COATED ORAL at 08:30

## 2023-06-02 RX ADMIN — PROPOFOL 30 MG: 10 INJECTION, EMULSION INTRAVENOUS at 03:51

## 2023-06-02 RX ADMIN — LIDOCAINE HYDROCHLORIDE 60 MG: 20 INJECTION, SOLUTION EPIDURAL; INFILTRATION; INTRACAUDAL; PERINEURAL at 03:45

## 2023-06-02 RX ADMIN — SODIUM CHLORIDE, POTASSIUM CHLORIDE, SODIUM LACTATE AND CALCIUM CHLORIDE: 600; 310; 30; 20 INJECTION, SOLUTION INTRAVENOUS at 05:52

## 2023-06-02 RX ADMIN — DOXYCYCLINE HYCLATE 100 MG: 100 TABLET, COATED ORAL at 08:30

## 2023-06-02 RX ADMIN — KETAMINE HYDROCHLORIDE 10 MG: 10 INJECTION INTRAMUSCULAR; INTRAVENOUS at 03:45

## 2023-06-02 RX ADMIN — PROPOFOL 75 MCG/KG/MIN: 10 INJECTION, EMULSION INTRAVENOUS at 03:45

## 2023-06-02 RX ADMIN — CEFAZOLIN SODIUM 2000 MG: 1 POWDER, FOR SOLUTION INTRAMUSCULAR; INTRAVENOUS at 03:47

## 2023-06-02 RX ADMIN — SODIUM CHLORIDE, POTASSIUM CHLORIDE, SODIUM LACTATE AND CALCIUM CHLORIDE: 600; 310; 30; 20 INJECTION, SOLUTION INTRAVENOUS at 04:11

## 2023-06-02 RX ADMIN — KETAMINE HYDROCHLORIDE 5 MG: 10 INJECTION INTRAMUSCULAR; INTRAVENOUS at 03:58

## 2023-06-02 RX ADMIN — OXYCODONE HYDROCHLORIDE AND ACETAMINOPHEN 1000 MG: 500 TABLET ORAL at 08:29

## 2023-06-02 RX ADMIN — SODIUM CHLORIDE, POTASSIUM CHLORIDE, SODIUM LACTATE AND CALCIUM CHLORIDE: 600; 310; 30; 20 INJECTION, SOLUTION INTRAVENOUS at 02:37

## 2023-06-02 RX ADMIN — ACETAMINOPHEN 650 MG: 325 TABLET ORAL at 20:01

## 2023-06-02 RX ADMIN — CEFEPIME 1000 MG: 1 INJECTION, POWDER, FOR SOLUTION INTRAMUSCULAR; INTRAVENOUS at 11:04

## 2023-06-02 ASSESSMENT — PAIN DESCRIPTION - FREQUENCY
FREQUENCY: CONTINUOUS

## 2023-06-02 ASSESSMENT — PAIN DESCRIPTION - ONSET
ONSET: ON-GOING
ONSET: SUDDEN
ONSET: ON-GOING

## 2023-06-02 ASSESSMENT — PAIN DESCRIPTION - LOCATION
LOCATION: BACK;ABDOMEN
LOCATION: PENIS
LOCATION: PENIS
LOCATION: ABDOMEN;BACK
LOCATION: ABDOMEN;BACK
LOCATION: PENIS

## 2023-06-02 ASSESSMENT — PAIN DESCRIPTION - DESCRIPTORS
DESCRIPTORS: ACHING;DULL

## 2023-06-02 ASSESSMENT — PAIN SCALES - GENERAL
PAINLEVEL_OUTOF10: 4
PAINLEVEL_OUTOF10: 4
PAINLEVEL_OUTOF10: 0
PAINLEVEL_OUTOF10: 2
PAINLEVEL_OUTOF10: 4
PAINLEVEL_OUTOF10: 3
PAINLEVEL_OUTOF10: 1

## 2023-06-02 ASSESSMENT — PAIN DESCRIPTION - PAIN TYPE
TYPE: ACUTE PAIN

## 2023-06-02 ASSESSMENT — PAIN - FUNCTIONAL ASSESSMENT
PAIN_FUNCTIONAL_ASSESSMENT: NONE - DENIES PAIN
PAIN_FUNCTIONAL_ASSESSMENT: PREVENTS OR INTERFERES SOME ACTIVE ACTIVITIES AND ADLS
PAIN_FUNCTIONAL_ASSESSMENT: PREVENTS OR INTERFERES SOME ACTIVE ACTIVITIES AND ADLS

## 2023-06-02 ASSESSMENT — PAIN DESCRIPTION - ORIENTATION
ORIENTATION: ANTERIOR;POSTERIOR

## 2023-06-02 NOTE — ANESTHESIA PRE PROCEDURE
Plan      general     ASA 3 - emergent       Induction: intravenous and rapid sequence. Anesthetic plan and risks discussed with patient. Plan discussed with CRNA.     Attending anesthesiologist reviewed and agrees with Preprocedure content                Marck Juan MD   6/2/2023

## 2023-06-02 NOTE — ANESTHESIA POSTPROCEDURE EVALUATION
Department of Anesthesiology  Postprocedure Note    Patient: Madai Upton  MRN: [id number]  YOB: 1945  Date of evaluation: 6/2/2023      Procedure Summary     Date: 06/02/23 Room / Location: [facility] MAIN OR F5 / M MAIN OR    Anesthesia Start: 9284 Anesthesia Stop: 9623    Procedure: CYSTOSCOPY, BILATERAL  STENTS, RETROGRADE (Bilateral: Ureter) Diagnosis:       Acute renal failure, unspecified acute renal failure type ([address]      (Acute renal failure, unspecified acute renal failure type ([address] [N17.9])    Surgeons: Kiersten Shen MD Responsible Provider: José Miguel Dove MD    Anesthesia Type: MAC ASA Status: 3 - Emergent          Anesthesia Type: MAC    Lavinia Phase I: Lavinia Score: 6    Lavinia Phase II:        Anesthesia Post Evaluation    Patient location during evaluation: PACU  Patient participation: complete - patient participated  Airway patency: patent  Nausea & Vomiting: no nausea  Complications: no  Cardiovascular status: blood pressure returned to baseline  Respiratory status: acceptable and nasal cannula  Hydration status: euvolemic  Multimodal analgesia pain management approach

## 2023-06-03 ENCOUNTER — APPOINTMENT (OUTPATIENT)
Facility: HOSPITAL | Age: 78
DRG: 854 | End: 2023-06-03
Attending: HOSPITALIST
Payer: MEDICARE

## 2023-06-03 LAB
ABO + RH BLD: NORMAL
ANION GAP SERPL CALC-SCNC: 6 MMOL/L (ref 5–15)
BACTERIA SPEC CULT: NORMAL
BLOOD GROUP ANTIBODIES SERPL: NORMAL
BUN SERPL-MCNC: 46 MG/DL (ref 6–20)
BUN/CREAT SERPL: 19 (ref 12–20)
CALCIUM SERPL-MCNC: 6.9 MG/DL (ref 8.5–10.1)
CHLORIDE SERPL-SCNC: 114 MMOL/L (ref 97–108)
CO2 SERPL-SCNC: 24 MMOL/L (ref 21–32)
CREAT SERPL-MCNC: 2.37 MG/DL (ref 0.7–1.3)
ECHO AO ARCH DIAM: 2.7 CM
ECHO AO ASC DIAM: 3.6 CM
ECHO AO ASCENDING AORTA INDEX: 1.96 CM/M2
ECHO AR MAX VEL PISA: 2.6 M/S
ECHO AV AREA PEAK VELOCITY: 2.8 CM2
ECHO AV AREA VTI: 2.9 CM2
ECHO AV AREA/BSA PEAK VELOCITY: 1.5 CM2/M2
ECHO AV AREA/BSA VTI: 1.6 CM2/M2
ECHO AV MEAN GRADIENT: 4 MMHG
ECHO AV MEAN VELOCITY: 0.9 M/S
ECHO AV PEAK GRADIENT: 9 MMHG
ECHO AV PEAK VELOCITY: 1.5 M/S
ECHO AV REGURGITANT PHT: 687.2 MILLISECOND
ECHO AV VELOCITY RATIO: 0.73
ECHO AV VTI: 19.5 CM
ECHO BSA: 1.85 M2
ECHO LA DIAMETER INDEX: 1.03 CM/M2
ECHO LA DIAMETER: 1.9 CM
ECHO LA VOL 2C: 50 ML (ref 18–58)
ECHO LA VOL 2C: 51 ML (ref 18–58)
ECHO LA VOL 4C: 30 ML (ref 18–58)
ECHO LA VOL 4C: 34 ML (ref 18–58)
ECHO LA VOL BP: 39 ML (ref 18–58)
ECHO LA VOL/BSA BIPLANE: 21 ML/M2 (ref 16–34)
ECHO LA VOLUME AREA LENGTH: 42 ML
ECHO LA VOLUME INDEX AREA LENGTH: 23 ML/M2 (ref 16–34)
ECHO LV E' LATERAL VELOCITY: 9 CM/S
ECHO LV E' SEPTAL VELOCITY: 9 CM/S
ECHO LV EDV A2C: 39 ML
ECHO LV EDV A4C: 48 ML
ECHO LV EDV BP: 44 ML (ref 67–155)
ECHO LV EDV INDEX A4C: 26 ML/M2
ECHO LV EDV INDEX BP: 24 ML/M2
ECHO LV EDV NDEX A2C: 21 ML/M2
ECHO LV EJECTION FRACTION A2C: 19 %
ECHO LV EJECTION FRACTION A4C: 24 %
ECHO LV EJECTION FRACTION BIPLANE: 22 % (ref 55–100)
ECHO LV ESV A2C: 32 ML
ECHO LV ESV A4C: 37 ML
ECHO LV ESV BP: 34 ML (ref 22–58)
ECHO LV ESV INDEX A2C: 17 ML/M2
ECHO LV ESV INDEX A4C: 20 ML/M2
ECHO LV ESV INDEX BP: 18 ML/M2
ECHO LV FRACTIONAL SHORTENING: 32 % (ref 28–44)
ECHO LV INTERNAL DIMENSION DIASTOLE INDEX: 2.23 CM/M2
ECHO LV INTERNAL DIMENSION DIASTOLIC: 4.1 CM (ref 4.2–5.9)
ECHO LV INTERNAL DIMENSION SYSTOLIC INDEX: 1.52 CM/M2
ECHO LV INTERNAL DIMENSION SYSTOLIC: 2.8 CM
ECHO LV IVSD: 0.8 CM (ref 0.6–1)
ECHO LV MASS 2D: 89.4 G (ref 88–224)
ECHO LV MASS INDEX 2D: 48.6 G/M2 (ref 49–115)
ECHO LV POSTERIOR WALL DIASTOLIC: 0.7 CM (ref 0.6–1)
ECHO LV RELATIVE WALL THICKNESS RATIO: 0.34
ECHO LVOT AREA: 3.8 CM2
ECHO LVOT AV VTI INDEX: 0.76
ECHO LVOT DIAM: 2.2 CM
ECHO LVOT MEAN GRADIENT: 2 MMHG
ECHO LVOT PEAK GRADIENT: 5 MMHG
ECHO LVOT PEAK VELOCITY: 1.1 M/S
ECHO LVOT STROKE VOLUME INDEX: 30.8 ML/M2
ECHO LVOT SV: 56.6 ML
ECHO LVOT VTI: 14.9 CM
ECHO MV E DECELERATION TIME (DT): 114.8 MS
ECHO MV E VELOCITY: 0.76 M/S
ECHO MV E/E' LATERAL: 8.44
ECHO MV E/E' RATIO (AVERAGED): 8.44
ECHO MV E/E' SEPTAL: 8.44
ECHO MV REGURGITANT PEAK GRADIENT: 81 MMHG
ECHO MV REGURGITANT PEAK VELOCITY: 4.5 M/S
ECHO PV MAX VELOCITY: 0.9 M/S
ECHO PV PEAK GRADIENT: 3 MMHG
ECHO RV FREE WALL PEAK S': 16 CM/S
ECHO RV INTERNAL DIMENSION: 3.7 CM
ECHO RV TAPSE: 1.8 CM (ref 1.7–?)
ECHO TV REGURGITANT MAX VELOCITY: 2.34 M/S
ECHO TV REGURGITANT PEAK GRADIENT: 22 MMHG
ERYTHROCYTE [DISTWIDTH] IN BLOOD BY AUTOMATED COUNT: 19.8 % (ref 11.5–14.5)
GLUCOSE BLD STRIP.AUTO-MCNC: 113 MG/DL (ref 65–117)
GLUCOSE BLD STRIP.AUTO-MCNC: 173 MG/DL (ref 65–117)
GLUCOSE BLD STRIP.AUTO-MCNC: 45 MG/DL (ref 65–117)
GLUCOSE BLD STRIP.AUTO-MCNC: 91 MG/DL (ref 65–117)
GLUCOSE SERPL-MCNC: 39 MG/DL (ref 65–100)
HCT VFR BLD AUTO: 20.4 % (ref 36.6–50.3)
HCT VFR BLD AUTO: 23.7 % (ref 36.6–50.3)
HGB BLD-MCNC: 6.9 G/DL (ref 12.1–17)
HGB BLD-MCNC: 8.2 G/DL (ref 12.1–17)
MCH RBC QN AUTO: 28.9 PG (ref 26–34)
MCHC RBC AUTO-ENTMCNC: 33.8 G/DL (ref 30–36.5)
MCV RBC AUTO: 85.4 FL (ref 80–99)
NRBC # BLD: 0 K/UL (ref 0–0.01)
NRBC BLD-RTO: 0 PER 100 WBC
PLATELET # BLD AUTO: 81 K/UL (ref 150–400)
PMV BLD AUTO: 11.9 FL (ref 8.9–12.9)
POTASSIUM SERPL-SCNC: 3.5 MMOL/L (ref 3.5–5.1)
RBC # BLD AUTO: 2.39 M/UL (ref 4.1–5.7)
SERVICE CMNT-IMP: ABNORMAL
SERVICE CMNT-IMP: ABNORMAL
SERVICE CMNT-IMP: NORMAL
SODIUM SERPL-SCNC: 144 MMOL/L (ref 136–145)
SPECIMEN EXP DATE BLD: NORMAL
WBC # BLD AUTO: 21.5 K/UL (ref 4.1–11.1)

## 2023-06-03 PROCEDURE — 86850 RBC ANTIBODY SCREEN: CPT

## 2023-06-03 PROCEDURE — 86901 BLOOD TYPING SEROLOGIC RH(D): CPT

## 2023-06-03 PROCEDURE — 2500000003 HC RX 250 WO HCPCS: Performed by: INTERNAL MEDICINE

## 2023-06-03 PROCEDURE — 2580000003 HC RX 258: Performed by: INTERNAL MEDICINE

## 2023-06-03 PROCEDURE — 94761 N-INVAS EAR/PLS OXIMETRY MLT: CPT

## 2023-06-03 PROCEDURE — 86618 LYME DISEASE ANTIBODY: CPT

## 2023-06-03 PROCEDURE — 6370000000 HC RX 637 (ALT 250 FOR IP): Performed by: INTERNAL MEDICINE

## 2023-06-03 PROCEDURE — 85027 COMPLETE CBC AUTOMATED: CPT

## 2023-06-03 PROCEDURE — 85018 HEMOGLOBIN: CPT

## 2023-06-03 PROCEDURE — 80048 BASIC METABOLIC PNL TOTAL CA: CPT

## 2023-06-03 PROCEDURE — 99223 1ST HOSP IP/OBS HIGH 75: CPT | Performed by: INTERNAL MEDICINE

## 2023-06-03 PROCEDURE — 1100000000 HC RM PRIVATE

## 2023-06-03 PROCEDURE — 2580000003 HC RX 258

## 2023-06-03 PROCEDURE — 93306 TTE W/DOPPLER COMPLETE: CPT

## 2023-06-03 PROCEDURE — 2700000000 HC OXYGEN THERAPY PER DAY

## 2023-06-03 PROCEDURE — 86900 BLOOD TYPING SEROLOGIC ABO: CPT

## 2023-06-03 PROCEDURE — 6360000002 HC RX W HCPCS: Performed by: INTERNAL MEDICINE

## 2023-06-03 PROCEDURE — 85014 HEMATOCRIT: CPT

## 2023-06-03 PROCEDURE — 6370000000 HC RX 637 (ALT 250 FOR IP): Performed by: NURSE PRACTITIONER

## 2023-06-03 PROCEDURE — 82962 GLUCOSE BLOOD TEST: CPT

## 2023-06-03 PROCEDURE — 93306 TTE W/DOPPLER COMPLETE: CPT | Performed by: INTERNAL MEDICINE

## 2023-06-03 PROCEDURE — 6370000000 HC RX 637 (ALT 250 FOR IP): Performed by: UROLOGY

## 2023-06-03 PROCEDURE — 2580000003 HC RX 258: Performed by: HOSPITALIST

## 2023-06-03 PROCEDURE — 36415 COLL VENOUS BLD VENIPUNCTURE: CPT

## 2023-06-03 PROCEDURE — 98960 EDU&TRN PT SELF-MGMT NQHP 1: CPT

## 2023-06-03 RX ORDER — DEXTROSE MONOHYDRATE 100 MG/ML
INJECTION, SOLUTION INTRAVENOUS
Status: COMPLETED
Start: 2023-06-03 | End: 2023-06-03

## 2023-06-03 RX ORDER — DILTIAZEM HYDROCHLORIDE 5 MG/ML
10 INJECTION INTRAVENOUS ONCE
Status: COMPLETED | OUTPATIENT
Start: 2023-06-03 | End: 2023-06-03

## 2023-06-03 RX ORDER — DILTIAZEM HYDROCHLORIDE 5 MG/ML
INJECTION INTRAVENOUS
Status: DISPENSED
Start: 2023-06-03 | End: 2023-06-03

## 2023-06-03 RX ADMIN — SODIUM CHLORIDE: 9 INJECTION, SOLUTION INTRAVENOUS at 00:27

## 2023-06-03 RX ADMIN — POLYETHYLENE GLYCOL 3350 17 G: 17 POWDER, FOR SOLUTION ORAL at 09:00

## 2023-06-03 RX ADMIN — SODIUM CHLORIDE: 9 INJECTION, SOLUTION INTRAVENOUS at 09:44

## 2023-06-03 RX ADMIN — DEXTROSE MONOHYDRATE 250 ML: 100 INJECTION, SOLUTION INTRAVENOUS at 01:47

## 2023-06-03 RX ADMIN — SODIUM CHLORIDE, PRESERVATIVE FREE 10 ML: 5 INJECTION INTRAVENOUS at 20:06

## 2023-06-03 RX ADMIN — DOXYCYCLINE HYCLATE 100 MG: 100 TABLET, COATED ORAL at 20:06

## 2023-06-03 RX ADMIN — METOPROLOL TARTRATE 25 MG: 25 TABLET, FILM COATED ORAL at 20:06

## 2023-06-03 RX ADMIN — SODIUM CHLORIDE, PRESERVATIVE FREE 10 ML: 5 INJECTION INTRAVENOUS at 08:59

## 2023-06-03 RX ADMIN — DILTIAZEM HYDROCHLORIDE 10 MG: 5 INJECTION INTRAVENOUS at 09:55

## 2023-06-03 RX ADMIN — SODIUM CHLORIDE: 9 INJECTION, SOLUTION INTRAVENOUS at 20:10

## 2023-06-03 RX ADMIN — DOCUSATE SODIUM 50MG AND SENNOSIDES 8.6MG 2 TABLET: 8.6; 5 TABLET, FILM COATED ORAL at 20:06

## 2023-06-03 RX ADMIN — DOXYCYCLINE HYCLATE 100 MG: 100 TABLET, COATED ORAL at 09:00

## 2023-06-03 RX ADMIN — OXYCODONE HYDROCHLORIDE AND ACETAMINOPHEN 1000 MG: 500 TABLET ORAL at 08:59

## 2023-06-03 RX ADMIN — CEFEPIME 1000 MG: 1 INJECTION, POWDER, FOR SOLUTION INTRAMUSCULAR; INTRAVENOUS at 09:00

## 2023-06-03 RX ADMIN — METOPROLOL TARTRATE 25 MG: 25 TABLET, FILM COATED ORAL at 09:00

## 2023-06-03 ASSESSMENT — PAIN DESCRIPTION - ORIENTATION: ORIENTATION: LOWER

## 2023-06-03 ASSESSMENT — PAIN SCALES - GENERAL: PAINLEVEL_OUTOF10: 2

## 2023-06-03 ASSESSMENT — PAIN DESCRIPTION - LOCATION: LOCATION: BACK

## 2023-06-03 ASSESSMENT — PAIN DESCRIPTION - DESCRIPTORS: DESCRIPTORS: ACHING

## 2023-06-04 LAB
ANION GAP SERPL CALC-SCNC: 4 MMOL/L (ref 5–15)
BUN SERPL-MCNC: 32 MG/DL (ref 6–20)
BUN/CREAT SERPL: 20 (ref 12–20)
CALCIUM SERPL-MCNC: 7.6 MG/DL (ref 8.5–10.1)
CHLORIDE SERPL-SCNC: 110 MMOL/L (ref 97–108)
CO2 SERPL-SCNC: 27 MMOL/L (ref 21–32)
CREAT SERPL-MCNC: 1.62 MG/DL (ref 0.7–1.3)
ERYTHROCYTE [DISTWIDTH] IN BLOOD BY AUTOMATED COUNT: 19.5 % (ref 11.5–14.5)
GLUCOSE SERPL-MCNC: 76 MG/DL (ref 65–100)
HCT VFR BLD AUTO: 22.4 % (ref 36.6–50.3)
HGB BLD-MCNC: 7.5 G/DL (ref 12.1–17)
MCH RBC QN AUTO: 28.5 PG (ref 26–34)
MCHC RBC AUTO-ENTMCNC: 33.5 G/DL (ref 30–36.5)
MCV RBC AUTO: 85.2 FL (ref 80–99)
NRBC # BLD: 0 K/UL (ref 0–0.01)
NRBC BLD-RTO: 0 PER 100 WBC
PLATELET # BLD AUTO: 81 K/UL (ref 150–400)
PMV BLD AUTO: 11.4 FL (ref 8.9–12.9)
POTASSIUM SERPL-SCNC: 4 MMOL/L (ref 3.5–5.1)
RBC # BLD AUTO: 2.63 M/UL (ref 4.1–5.7)
SODIUM SERPL-SCNC: 141 MMOL/L (ref 136–145)
WBC # BLD AUTO: 16.1 K/UL (ref 4.1–11.1)

## 2023-06-04 PROCEDURE — 6370000000 HC RX 637 (ALT 250 FOR IP): Performed by: UROLOGY

## 2023-06-04 PROCEDURE — 6370000000 HC RX 637 (ALT 250 FOR IP): Performed by: NURSE PRACTITIONER

## 2023-06-04 PROCEDURE — 94761 N-INVAS EAR/PLS OXIMETRY MLT: CPT

## 2023-06-04 PROCEDURE — 85027 COMPLETE CBC AUTOMATED: CPT

## 2023-06-04 PROCEDURE — 6370000000 HC RX 637 (ALT 250 FOR IP): Performed by: INTERNAL MEDICINE

## 2023-06-04 PROCEDURE — 2700000000 HC OXYGEN THERAPY PER DAY

## 2023-06-04 PROCEDURE — 2580000003 HC RX 258: Performed by: HOSPITALIST

## 2023-06-04 PROCEDURE — 80048 BASIC METABOLIC PNL TOTAL CA: CPT

## 2023-06-04 PROCEDURE — 2580000003 HC RX 258: Performed by: INTERNAL MEDICINE

## 2023-06-04 PROCEDURE — 99232 SBSQ HOSP IP/OBS MODERATE 35: CPT | Performed by: INTERNAL MEDICINE

## 2023-06-04 PROCEDURE — 6360000002 HC RX W HCPCS: Performed by: INTERNAL MEDICINE

## 2023-06-04 PROCEDURE — 1100000000 HC RM PRIVATE

## 2023-06-04 PROCEDURE — 36415 COLL VENOUS BLD VENIPUNCTURE: CPT

## 2023-06-04 RX ADMIN — POLYETHYLENE GLYCOL 3350 17 G: 17 POWDER, FOR SOLUTION ORAL at 08:09

## 2023-06-04 RX ADMIN — SODIUM CHLORIDE: 9 INJECTION, SOLUTION INTRAVENOUS at 04:08

## 2023-06-04 RX ADMIN — METOPROLOL TARTRATE 25 MG: 25 TABLET, FILM COATED ORAL at 08:09

## 2023-06-04 RX ADMIN — SODIUM CHLORIDE, PRESERVATIVE FREE 10 ML: 5 INJECTION INTRAVENOUS at 20:32

## 2023-06-04 RX ADMIN — DOXYCYCLINE HYCLATE 100 MG: 100 TABLET, COATED ORAL at 20:31

## 2023-06-04 RX ADMIN — DOXYCYCLINE HYCLATE 100 MG: 100 TABLET, COATED ORAL at 08:09

## 2023-06-04 RX ADMIN — CEFEPIME 1000 MG: 1 INJECTION, POWDER, FOR SOLUTION INTRAMUSCULAR; INTRAVENOUS at 08:09

## 2023-06-04 RX ADMIN — METOPROLOL TARTRATE 25 MG: 25 TABLET, FILM COATED ORAL at 20:31

## 2023-06-04 RX ADMIN — OXYCODONE HYDROCHLORIDE AND ACETAMINOPHEN 1000 MG: 500 TABLET ORAL at 08:09

## 2023-06-04 RX ADMIN — SODIUM CHLORIDE, PRESERVATIVE FREE 10 ML: 5 INJECTION INTRAVENOUS at 08:12

## 2023-06-05 PROBLEM — C93.10 CHRONIC MYELOMONOCYTIC LEUKEMIA NOT HAVING ACHIEVED REMISSION (HCC): Status: ACTIVE | Noted: 2023-06-05

## 2023-06-05 PROBLEM — I31.39 PERICARDIAL EFFUSION: Status: ACTIVE | Noted: 2023-06-05

## 2023-06-05 LAB
ANION GAP SERPL CALC-SCNC: 5 MMOL/L (ref 5–15)
BUN SERPL-MCNC: 20 MG/DL (ref 6–20)
BUN/CREAT SERPL: 16 (ref 12–20)
CALCIUM SERPL-MCNC: 7.5 MG/DL (ref 8.5–10.1)
CHLORIDE SERPL-SCNC: 109 MMOL/L (ref 97–108)
CO2 SERPL-SCNC: 27 MMOL/L (ref 21–32)
COMMENT:: NORMAL
CREAT SERPL-MCNC: 1.22 MG/DL (ref 0.7–1.3)
EKG ATRIAL RATE: 100 BPM
EKG DIAGNOSIS: NORMAL
EKG P AXIS: 43 DEGREES
EKG P-R INTERVAL: 148 MS
EKG Q-T INTERVAL: 356 MS
EKG QRS DURATION: 88 MS
EKG QTC CALCULATION (BAZETT): 459 MS
EKG R AXIS: 1 DEGREES
EKG T AXIS: 1 DEGREES
EKG VENTRICULAR RATE: 100 BPM
ERYTHROCYTE [DISTWIDTH] IN BLOOD BY AUTOMATED COUNT: 19.5 % (ref 11.5–14.5)
GLUCOSE BLD STRIP.AUTO-MCNC: 105 MG/DL (ref 65–117)
GLUCOSE SERPL-MCNC: 106 MG/DL (ref 65–100)
HCT VFR BLD AUTO: 22.2 % (ref 36.6–50.3)
HGB BLD-MCNC: 7.4 G/DL (ref 12.1–17)
LYME ANTIBODY: NEGATIVE
MAGNESIUM SERPL-MCNC: 1.2 MG/DL (ref 1.6–2.4)
MAGNESIUM SERPL-MCNC: 1.2 MG/DL (ref 1.6–2.4)
MCH RBC QN AUTO: 28.6 PG (ref 26–34)
MCHC RBC AUTO-ENTMCNC: 33.3 G/DL (ref 30–36.5)
MCV RBC AUTO: 85.7 FL (ref 80–99)
NRBC # BLD: 0 K/UL (ref 0–0.01)
NRBC BLD-RTO: 0 PER 100 WBC
NT PRO BNP: 1274 PG/ML
PLATELET # BLD AUTO: 89 K/UL (ref 150–400)
PMV BLD AUTO: 11.1 FL (ref 8.9–12.9)
POTASSIUM SERPL-SCNC: 3.2 MMOL/L (ref 3.5–5.1)
RBC # BLD AUTO: 2.59 M/UL (ref 4.1–5.7)
SERVICE CMNT-IMP: NORMAL
SODIUM SERPL-SCNC: 141 MMOL/L (ref 136–145)
SPECIMEN HOLD: NORMAL
TROPONIN I SERPL HS-MCNC: 8 NG/L (ref 0–76)
WBC # BLD AUTO: 19.9 K/UL (ref 4.1–11.1)

## 2023-06-05 PROCEDURE — 6370000000 HC RX 637 (ALT 250 FOR IP): Performed by: UROLOGY

## 2023-06-05 PROCEDURE — 83735 ASSAY OF MAGNESIUM: CPT

## 2023-06-05 PROCEDURE — 82962 GLUCOSE BLOOD TEST: CPT

## 2023-06-05 PROCEDURE — 85027 COMPLETE CBC AUTOMATED: CPT

## 2023-06-05 PROCEDURE — APPSS30 APP SPLIT SHARED TIME 16-30 MINUTES: Performed by: NURSE PRACTITIONER

## 2023-06-05 PROCEDURE — 80048 BASIC METABOLIC PNL TOTAL CA: CPT

## 2023-06-05 PROCEDURE — 6360000002 HC RX W HCPCS: Performed by: INTERNAL MEDICINE

## 2023-06-05 PROCEDURE — 2580000003 HC RX 258: Performed by: HOSPITALIST

## 2023-06-05 PROCEDURE — 99232 SBSQ HOSP IP/OBS MODERATE 35: CPT | Performed by: INTERNAL MEDICINE

## 2023-06-05 PROCEDURE — 83880 ASSAY OF NATRIURETIC PEPTIDE: CPT

## 2023-06-05 PROCEDURE — 36415 COLL VENOUS BLD VENIPUNCTURE: CPT

## 2023-06-05 PROCEDURE — 6370000000 HC RX 637 (ALT 250 FOR IP): Performed by: NURSE PRACTITIONER

## 2023-06-05 PROCEDURE — 2500000003 HC RX 250 WO HCPCS: Performed by: STUDENT IN AN ORGANIZED HEALTH CARE EDUCATION/TRAINING PROGRAM

## 2023-06-05 PROCEDURE — 2500000003 HC RX 250 WO HCPCS

## 2023-06-05 PROCEDURE — 2580000003 HC RX 258: Performed by: INTERNAL MEDICINE

## 2023-06-05 PROCEDURE — 2580000003 HC RX 258: Performed by: STUDENT IN AN ORGANIZED HEALTH CARE EDUCATION/TRAINING PROGRAM

## 2023-06-05 PROCEDURE — 84484 ASSAY OF TROPONIN QUANT: CPT

## 2023-06-05 PROCEDURE — 93010 ELECTROCARDIOGRAM REPORT: CPT | Performed by: SPECIALIST

## 2023-06-05 PROCEDURE — 6370000000 HC RX 637 (ALT 250 FOR IP): Performed by: INTERNAL MEDICINE

## 2023-06-05 PROCEDURE — 1100000003 HC PRIVATE W/ TELEMETRY

## 2023-06-05 PROCEDURE — 99231 SBSQ HOSP IP/OBS SF/LOW 25: CPT | Performed by: INTERNAL MEDICINE

## 2023-06-05 RX ORDER — DILTIAZEM HYDROCHLORIDE 5 MG/ML
5 INJECTION INTRAVENOUS ONCE
Status: COMPLETED | OUTPATIENT
Start: 2023-06-05 | End: 2023-06-05

## 2023-06-05 RX ORDER — DILTIAZEM HYDROCHLORIDE 5 MG/ML
INJECTION INTRAVENOUS
Status: COMPLETED
Start: 2023-06-05 | End: 2023-06-05

## 2023-06-05 RX ORDER — DILTIAZEM HYDROCHLORIDE 5 MG/ML
10 INJECTION INTRAVENOUS ONCE
Status: COMPLETED | OUTPATIENT
Start: 2023-06-05 | End: 2023-06-05

## 2023-06-05 RX ADMIN — DOXYCYCLINE HYCLATE 100 MG: 100 TABLET, COATED ORAL at 08:34

## 2023-06-05 RX ADMIN — DILTIAZEM HYDROCHLORIDE 5 MG: 5 INJECTION INTRAVENOUS at 19:58

## 2023-06-05 RX ADMIN — METOPROLOL TARTRATE 25 MG: 25 TABLET, FILM COATED ORAL at 21:50

## 2023-06-05 RX ADMIN — SODIUM CHLORIDE 5 MG/HR: 900 INJECTION, SOLUTION INTRAVENOUS at 20:51

## 2023-06-05 RX ADMIN — SODIUM CHLORIDE, PRESERVATIVE FREE 10 ML: 5 INJECTION INTRAVENOUS at 21:49

## 2023-06-05 RX ADMIN — DILTIAZEM HYDROCHLORIDE 10 MG: 5 INJECTION INTRAVENOUS at 20:08

## 2023-06-05 RX ADMIN — POLYETHYLENE GLYCOL 3350 17 G: 17 POWDER, FOR SOLUTION ORAL at 08:36

## 2023-06-05 RX ADMIN — SODIUM CHLORIDE: 9 INJECTION, SOLUTION INTRAVENOUS at 08:34

## 2023-06-05 RX ADMIN — SODIUM CHLORIDE: 9 INJECTION, SOLUTION INTRAVENOUS at 04:09

## 2023-06-05 RX ADMIN — CEFEPIME 2000 MG: 2 INJECTION, POWDER, FOR SOLUTION INTRAVENOUS at 08:36

## 2023-06-05 RX ADMIN — ONDANSETRON 4 MG: 2 INJECTION INTRAMUSCULAR; INTRAVENOUS at 23:12

## 2023-06-05 RX ADMIN — METOPROLOL TARTRATE 25 MG: 25 TABLET, FILM COATED ORAL at 08:35

## 2023-06-05 RX ADMIN — OXYCODONE HYDROCHLORIDE AND ACETAMINOPHEN 1000 MG: 500 TABLET ORAL at 08:35

## 2023-06-05 RX ADMIN — SODIUM CHLORIDE 10 MG/HR: 900 INJECTION, SOLUTION INTRAVENOUS at 21:37

## 2023-06-05 RX ADMIN — DOXYCYCLINE HYCLATE 100 MG: 100 TABLET, COATED ORAL at 21:49

## 2023-06-05 NOTE — CARE COORDINATION
6/5/2023  11:42 AM  Care Management Progress Note      ICD-10-CM    1. Pericardial effusion  I31.39 Transthoracic echocardiogram (TTE) limited with contrast, bubble, strain, and 3D PRN     Transthoracic echocardiogram (TTE) limited with contrast, bubble, strain, and 3D PRN      2. Generalized abdominal pain  R10.84       3. MARTITA (acute kidney injury) ([facility]  N17.9       4. Leukocytosis, unspecified type  D72.829       5. Anemia, unspecified type  D64.9       6. Thrombocytopenia ([facility]  D69.6       7. Hydroureteronephrosis  N13.30       8. Ureterolithiasis  N20.1       9. Atrial fibrillation with rapid ventricular response (HCC)  I48.91 Transthoracic echocardiogram (TTE) complete with contrast, bubble, strain, and 3D PRN     Transthoracic echocardiogram (TTE) complete with contrast, bubble, strain, and 3D PRN     Transthoracic echocardiogram (TTE) limited with contrast, bubble, strain, and 3D PRN     Transthoracic echocardiogram (TTE) limited with contrast, bubble, strain, and 3D PRN      10. PAF (paroxysmal atrial fibrillation) ([facility])  I48.0           RUR:  14%  Risk Level: [x]Low []Moderate []High  Value-based purchasing: [] Yes [x] No  Bundle patient: [] Yes [x] No   Specify:     Transition of care plan:  Awaiting medical clearance and DC order. Cards consulted. Urology following. Pt up ad aurora in room. Home with family assistance as needed. Outpatient follow-up. Pt's wife requests a wheelchair Magali Almaguer at discharge, and is agreeable to provide payment.

## 2023-06-06 ENCOUNTER — APPOINTMENT (OUTPATIENT)
Facility: HOSPITAL | Age: 78
DRG: 854 | End: 2023-06-06
Attending: INTERNAL MEDICINE
Payer: MEDICARE

## 2023-06-06 LAB
ANION GAP SERPL CALC-SCNC: 4 MMOL/L (ref 5–15)
BASOPHILS # BLD: 0 K/UL (ref 0–0.1)
BASOPHILS NFR BLD: 0 % (ref 0–1)
BUN SERPL-MCNC: 18 MG/DL (ref 6–20)
BUN/CREAT SERPL: 15 (ref 12–20)
CALCIUM SERPL-MCNC: 7.2 MG/DL (ref 8.5–10.1)
CHLORIDE SERPL-SCNC: 110 MMOL/L (ref 97–108)
CO2 SERPL-SCNC: 26 MMOL/L (ref 21–32)
CREAT SERPL-MCNC: 1.18 MG/DL (ref 0.7–1.3)
DIFFERENTIAL METHOD BLD: ABNORMAL
ECHO BSA: 1.77 M2
EOSINOPHIL # BLD: 0.2 K/UL (ref 0–0.4)
EOSINOPHIL NFR BLD: 1 % (ref 0–7)
ERYTHROCYTE [DISTWIDTH] IN BLOOD BY AUTOMATED COUNT: 19.8 % (ref 11.5–14.5)
GLUCOSE SERPL-MCNC: 89 MG/DL (ref 65–100)
HCT VFR BLD AUTO: 22.6 % (ref 36.6–50.3)
HGB BLD-MCNC: 7.5 G/DL (ref 12.1–17)
IMM GRANULOCYTES # BLD AUTO: 0 K/UL
IMM GRANULOCYTES NFR BLD AUTO: 0 %
LYMPHOCYTES # BLD: 1.9 K/UL (ref 0.8–3.5)
LYMPHOCYTES NFR BLD: 8 % (ref 12–49)
MAGNESIUM SERPL-MCNC: 2.5 MG/DL (ref 1.6–2.4)
MCH RBC QN AUTO: 28.7 PG (ref 26–34)
MCHC RBC AUTO-ENTMCNC: 33.2 G/DL (ref 30–36.5)
MCV RBC AUTO: 86.6 FL (ref 80–99)
MONOCYTES # BLD: 6.5 K/UL (ref 0–1)
MONOCYTES NFR BLD: 27 % (ref 5–13)
NEUTS BAND NFR BLD MANUAL: 2 % (ref 0–6)
NEUTS SEG # BLD: 15.3 K/UL (ref 1.8–8)
NEUTS SEG NFR BLD: 62 % (ref 32–75)
NRBC # BLD: 0 K/UL (ref 0–0.01)
NRBC BLD-RTO: 0 PER 100 WBC
PLATELET # BLD AUTO: 92 K/UL (ref 150–400)
PMV BLD AUTO: 11.6 FL (ref 8.9–12.9)
POTASSIUM SERPL-SCNC: 3.4 MMOL/L (ref 3.5–5.1)
RBC # BLD AUTO: 2.61 M/UL (ref 4.1–5.7)
RBC MORPH BLD: ABNORMAL
SODIUM SERPL-SCNC: 140 MMOL/L (ref 136–145)
TROPONIN I SERPL HS-MCNC: 8 NG/L (ref 0–76)
WBC # BLD AUTO: 23.9 K/UL (ref 4.1–11.1)

## 2023-06-06 PROCEDURE — 6360000002 HC RX W HCPCS: Performed by: INTERNAL MEDICINE

## 2023-06-06 PROCEDURE — 2700000000 HC OXYGEN THERAPY PER DAY

## 2023-06-06 PROCEDURE — 6370000000 HC RX 637 (ALT 250 FOR IP): Performed by: NURSE PRACTITIONER

## 2023-06-06 PROCEDURE — 80048 BASIC METABOLIC PNL TOTAL CA: CPT

## 2023-06-06 PROCEDURE — 2580000003 HC RX 258: Performed by: HOSPITALIST

## 2023-06-06 PROCEDURE — 93308 TTE F-UP OR LMTD: CPT

## 2023-06-06 PROCEDURE — 6360000002 HC RX W HCPCS: Performed by: STUDENT IN AN ORGANIZED HEALTH CARE EDUCATION/TRAINING PROGRAM

## 2023-06-06 PROCEDURE — 36415 COLL VENOUS BLD VENIPUNCTURE: CPT

## 2023-06-06 PROCEDURE — 6370000000 HC RX 637 (ALT 250 FOR IP): Performed by: STUDENT IN AN ORGANIZED HEALTH CARE EDUCATION/TRAINING PROGRAM

## 2023-06-06 PROCEDURE — 99232 SBSQ HOSP IP/OBS MODERATE 35: CPT | Performed by: INTERNAL MEDICINE

## 2023-06-06 PROCEDURE — 6370000000 HC RX 637 (ALT 250 FOR IP): Performed by: UROLOGY

## 2023-06-06 PROCEDURE — 85025 COMPLETE CBC W/AUTO DIFF WBC: CPT

## 2023-06-06 PROCEDURE — 93308 TTE F-UP OR LMTD: CPT | Performed by: INTERNAL MEDICINE

## 2023-06-06 PROCEDURE — 84484 ASSAY OF TROPONIN QUANT: CPT

## 2023-06-06 PROCEDURE — 1100000003 HC PRIVATE W/ TELEMETRY

## 2023-06-06 PROCEDURE — 6370000000 HC RX 637 (ALT 250 FOR IP): Performed by: INTERNAL MEDICINE

## 2023-06-06 PROCEDURE — 2580000003 HC RX 258: Performed by: INTERNAL MEDICINE

## 2023-06-06 PROCEDURE — 83735 ASSAY OF MAGNESIUM: CPT

## 2023-06-06 PROCEDURE — APPSS30 APP SPLIT SHARED TIME 16-30 MINUTES: Performed by: NURSE PRACTITIONER

## 2023-06-06 RX ORDER — MAGNESIUM SULFATE 1 G/100ML
1000 INJECTION INTRAVENOUS
Status: COMPLETED | OUTPATIENT
Start: 2023-06-06 | End: 2023-06-06

## 2023-06-06 RX ORDER — POTASSIUM CHLORIDE 750 MG/1
40 TABLET, FILM COATED, EXTENDED RELEASE ORAL ONCE
Status: COMPLETED | OUTPATIENT
Start: 2023-06-06 | End: 2023-06-06

## 2023-06-06 RX ORDER — POTASSIUM CHLORIDE 750 MG/1
20 TABLET, FILM COATED, EXTENDED RELEASE ORAL DAILY
Status: DISCONTINUED | OUTPATIENT
Start: 2023-06-06 | End: 2023-06-07 | Stop reason: HOSPADM

## 2023-06-06 RX ADMIN — POTASSIUM CHLORIDE 20 MEQ: 750 TABLET, FILM COATED, EXTENDED RELEASE ORAL at 10:25

## 2023-06-06 RX ADMIN — DOXYCYCLINE HYCLATE 100 MG: 100 TABLET, COATED ORAL at 09:10

## 2023-06-06 RX ADMIN — MAGNESIUM SULFATE HEPTAHYDRATE 1000 MG: 1 INJECTION, SOLUTION INTRAVENOUS at 03:24

## 2023-06-06 RX ADMIN — SODIUM CHLORIDE, PRESERVATIVE FREE 10 ML: 5 INJECTION INTRAVENOUS at 21:20

## 2023-06-06 RX ADMIN — SODIUM CHLORIDE, PRESERVATIVE FREE 10 ML: 5 INJECTION INTRAVENOUS at 09:13

## 2023-06-06 RX ADMIN — METOPROLOL TARTRATE 25 MG: 25 TABLET, FILM COATED ORAL at 21:19

## 2023-06-06 RX ADMIN — SODIUM CHLORIDE: 9 INJECTION, SOLUTION INTRAVENOUS at 03:23

## 2023-06-06 RX ADMIN — POTASSIUM CHLORIDE 40 MEQ: 750 TABLET, FILM COATED, EXTENDED RELEASE ORAL at 02:19

## 2023-06-06 RX ADMIN — MAGNESIUM SULFATE HEPTAHYDRATE 1000 MG: 1 INJECTION, SOLUTION INTRAVENOUS at 04:29

## 2023-06-06 RX ADMIN — CEFEPIME 2000 MG: 2 INJECTION, POWDER, FOR SOLUTION INTRAVENOUS at 09:09

## 2023-06-06 RX ADMIN — SODIUM CHLORIDE: 9 INJECTION, SOLUTION INTRAVENOUS at 11:23

## 2023-06-06 RX ADMIN — OXYCODONE HYDROCHLORIDE AND ACETAMINOPHEN 1000 MG: 500 TABLET ORAL at 09:12

## 2023-06-06 RX ADMIN — METOPROLOL TARTRATE 25 MG: 25 TABLET, FILM COATED ORAL at 09:09

## 2023-06-06 RX ADMIN — DOXYCYCLINE HYCLATE 100 MG: 100 TABLET, COATED ORAL at 21:19

## 2023-06-06 RX ADMIN — MAGNESIUM SULFATE HEPTAHYDRATE 1000 MG: 1 INJECTION, SOLUTION INTRAVENOUS at 02:18

## 2023-06-06 NOTE — PLAN OF CARE
Problem: Pain  Goal: Verbalizes/displays adequate comfort level or baseline comfort level  Outcome: Progressing     Problem: Skin/Tissue Integrity  Goal: Absence of new skin breakdown  Description: 1. Monitor for areas of redness and/or skin breakdown  2. Assess vascular access sites hourly  3. Every 4-6 hours minimum:  Change oxygen saturation probe site  4. Every 4-6 hours:  If on nasal continuous positive airway pressure, respiratory therapy assess nares and determine need for appliance change or resting period.   Outcome: Progressing     Problem: ABCDS Injury Assessment  Goal: Absence of physical injury  Outcome: Progressing     Problem: Pain  Goal: Verbalizes/displays adequate comfort level or baseline comfort level  Outcome: Progressing     Problem: Discharge Planning  Goal: Discharge to home or other facility with appropriate resources  Outcome: Progressing     Problem: Safety - Adult  Goal: Free from fall injury  Outcome: Progressing     Problem: Respiratory - Adult  Goal: Achieves optimal ventilation and oxygenation  Outcome: Progressing

## 2023-06-07 ENCOUNTER — APPOINTMENT (OUTPATIENT)
Facility: HOSPITAL | Age: 78
DRG: 854 | End: 2023-06-07
Attending: INTERNAL MEDICINE
Payer: MEDICARE

## 2023-06-07 ENCOUNTER — HOSPITAL ENCOUNTER (INPATIENT)
Facility: HOSPITAL | Age: 78
LOS: 5 days | Discharge: HOME | DRG: 314 | End: 2023-06-12
Attending: STUDENT IN AN ORGANIZED HEALTH CARE EDUCATION/TRAINING PROGRAM | Admitting: STUDENT IN AN ORGANIZED HEALTH CARE EDUCATION/TRAINING PROGRAM
Payer: MEDICARE

## 2023-06-07 ENCOUNTER — APPOINTMENT (OUTPATIENT)
Facility: HOSPITAL | Age: 78
DRG: 854 | End: 2023-06-07
Payer: MEDICARE

## 2023-06-07 VITALS
HEART RATE: 93 BPM | WEIGHT: 171 LBS | RESPIRATION RATE: 27 BRPM | HEIGHT: 67 IN | OXYGEN SATURATION: 93 % | BODY MASS INDEX: 26.84 KG/M2 | TEMPERATURE: 98 F | DIASTOLIC BLOOD PRESSURE: 84 MMHG | SYSTOLIC BLOOD PRESSURE: 123 MMHG

## 2023-06-07 DIAGNOSIS — I31.39 PERICARDIAL EFFUSION: Primary | ICD-10-CM

## 2023-06-07 LAB
ANION GAP SERPL CALC-SCNC: 7 MMOL/L (ref 5–15)
BACTERIA SPEC CULT: NORMAL
BACTERIA SPEC CULT: NORMAL
BASOPHILS # BLD: 0 K/UL (ref 0–0.1)
BASOPHILS NFR BLD: 0 % (ref 0–1)
BUN SERPL-MCNC: 16 MG/DL (ref 6–20)
BUN/CREAT SERPL: 17 (ref 12–20)
CALCIUM SERPL-MCNC: 7.3 MG/DL (ref 8.5–10.1)
CHLORIDE SERPL-SCNC: 112 MMOL/L (ref 97–108)
CO2 SERPL-SCNC: 23 MMOL/L (ref 21–32)
CREAT SERPL-MCNC: 0.95 MG/DL (ref 0.7–1.3)
DIFFERENTIAL METHOD BLD: ABNORMAL
ECHO BSA: 1.91 M2
EOSINOPHIL # BLD: 0 K/UL (ref 0–0.4)
EOSINOPHIL NFR BLD: 0 % (ref 0–7)
ERYTHROCYTE [DISTWIDTH] IN BLOOD BY AUTOMATED COUNT: 19.9 % (ref 11.5–14.5)
GLUCOSE BLD STRIP.AUTO-MCNC: 101 MG/DL (ref 65–117)
GLUCOSE BLD STRIP.AUTO-MCNC: 101 MG/DL (ref 65–117)
GLUCOSE BLD STRIP.AUTO-MCNC: 113 MG/DL (ref 65–117)
GLUCOSE BLD STRIP.AUTO-MCNC: 120 MG/DL (ref 65–117)
GLUCOSE BLD STRIP.AUTO-MCNC: 49 MG/DL (ref 65–117)
GLUCOSE BLD STRIP.AUTO-MCNC: 62 MG/DL (ref 65–117)
GLUCOSE BLD STRIP.AUTO-MCNC: 62 MG/DL (ref 65–117)
GLUCOSE BLD STRIP.AUTO-MCNC: 78 MG/DL (ref 65–117)
GLUCOSE BLD STRIP.AUTO-MCNC: 93 MG/DL (ref 65–117)
GLUCOSE SERPL-MCNC: 47 MG/DL (ref 65–100)
HCT VFR BLD AUTO: 22.3 % (ref 36.6–50.3)
HGB BLD-MCNC: 7.5 G/DL (ref 12.1–17)
IMM GRANULOCYTES # BLD AUTO: 0 K/UL
IMM GRANULOCYTES NFR BLD AUTO: 0 %
LYMPHOCYTES # BLD: 1.6 K/UL (ref 0.8–3.5)
LYMPHOCYTES NFR BLD: 7 % (ref 12–49)
MAGNESIUM SERPL-MCNC: 2 MG/DL (ref 1.6–2.4)
MCH RBC QN AUTO: 29 PG (ref 26–34)
MCHC RBC AUTO-ENTMCNC: 33.6 G/DL (ref 30–36.5)
MCV RBC AUTO: 86.1 FL (ref 80–99)
METAMYELOCYTES NFR BLD MANUAL: 2 %
MONOCYTES # BLD: 4 K/UL (ref 0–1)
MONOCYTES NFR BLD: 18 % (ref 5–13)
MYELOCYTES NFR BLD MANUAL: 2 %
NEUTS BAND NFR BLD MANUAL: 5 % (ref 0–6)
NEUTS SEG # BLD: 15.8 K/UL (ref 1.8–8)
NEUTS SEG NFR BLD: 66 % (ref 32–75)
NRBC # BLD: 0 K/UL (ref 0–0.01)
NRBC BLD-RTO: 0 PER 100 WBC
PLATELET # BLD AUTO: 87 K/UL (ref 150–400)
PMV BLD AUTO: 10.5 FL (ref 8.9–12.9)
POTASSIUM SERPL-SCNC: 3.7 MMOL/L (ref 3.5–5.1)
RBC # BLD AUTO: 2.59 M/UL (ref 4.1–5.7)
RBC MORPH BLD: ABNORMAL
SERVICE CMNT-IMP: ABNORMAL
SERVICE CMNT-IMP: NORMAL
SODIUM SERPL-SCNC: 142 MMOL/L (ref 136–145)
WBC # BLD AUTO: 22.2 K/UL (ref 4.1–11.1)
WBC MORPH BLD: ABNORMAL

## 2023-06-07 PROCEDURE — 80048 BASIC METABOLIC PNL TOTAL CA: CPT

## 2023-06-07 PROCEDURE — 2709999900 HC NON-CHARGEABLE SUPPLY: Performed by: INTERNAL MEDICINE

## 2023-06-07 PROCEDURE — 6370000000 HC RX 637 (ALT 250 FOR IP)

## 2023-06-07 PROCEDURE — 2500000003 HC RX 250 WO HCPCS

## 2023-06-07 PROCEDURE — 6360000002 HC RX W HCPCS: Performed by: INTERNAL MEDICINE

## 2023-06-07 PROCEDURE — C1769 GUIDE WIRE: HCPCS | Performed by: INTERNAL MEDICINE

## 2023-06-07 PROCEDURE — APPSS30 APP SPLIT SHARED TIME 16-30 MINUTES: Performed by: NURSE PRACTITIONER

## 2023-06-07 PROCEDURE — 82962 GLUCOSE BLOOD TEST: CPT

## 2023-06-07 PROCEDURE — 6370000000 HC RX 637 (ALT 250 FOR IP): Performed by: INTERNAL MEDICINE

## 2023-06-07 PROCEDURE — 2000000000 HC ICU R&B

## 2023-06-07 PROCEDURE — 2580000003 HC RX 258: Performed by: INTERNAL MEDICINE

## 2023-06-07 PROCEDURE — 0W9D3ZZ DRAINAGE OF PERICARDIAL CAVITY, PERCUTANEOUS APPROACH: ICD-10-PCS | Performed by: INTERNAL MEDICINE

## 2023-06-07 PROCEDURE — 2580000003 HC RX 258: Performed by: STUDENT IN AN ORGANIZED HEALTH CARE EDUCATION/TRAINING PROGRAM

## 2023-06-07 PROCEDURE — C1894 INTRO/SHEATH, NON-LASER: HCPCS | Performed by: INTERNAL MEDICINE

## 2023-06-07 PROCEDURE — 93308 TTE F-UP OR LMTD: CPT

## 2023-06-07 PROCEDURE — 93308 TTE F-UP OR LMTD: CPT | Performed by: INTERNAL MEDICINE

## 2023-06-07 PROCEDURE — 83735 ASSAY OF MAGNESIUM: CPT

## 2023-06-07 PROCEDURE — 6370000000 HC RX 637 (ALT 250 FOR IP): Performed by: UROLOGY

## 2023-06-07 PROCEDURE — 6370000000 HC RX 637 (ALT 250 FOR IP): Performed by: STUDENT IN AN ORGANIZED HEALTH CARE EDUCATION/TRAINING PROGRAM

## 2023-06-07 PROCEDURE — 6370000000 HC RX 637 (ALT 250 FOR IP): Performed by: NURSE PRACTITIONER

## 2023-06-07 PROCEDURE — 99153 MOD SED SAME PHYS/QHP EA: CPT | Performed by: INTERNAL MEDICINE

## 2023-06-07 PROCEDURE — 99152 MOD SED SAME PHYS/QHP 5/>YRS: CPT | Performed by: INTERNAL MEDICINE

## 2023-06-07 PROCEDURE — 36415 COLL VENOUS BLD VENIPUNCTURE: CPT

## 2023-06-07 PROCEDURE — 2580000003 HC RX 258

## 2023-06-07 PROCEDURE — C1729 CATH, DRAINAGE: HCPCS | Performed by: INTERNAL MEDICINE

## 2023-06-07 PROCEDURE — 85025 COMPLETE CBC W/AUTO DIFF WBC: CPT

## 2023-06-07 PROCEDURE — 33016 PERICARDIOCENTESIS W/IMAGING: CPT | Performed by: INTERNAL MEDICINE

## 2023-06-07 RX ORDER — DEXTROSE MONOHYDRATE 100 MG/ML
INJECTION, SOLUTION INTRAVENOUS CONTINUOUS PRN
Status: DISCONTINUED | OUTPATIENT
Start: 2023-06-07 | End: 2023-06-07 | Stop reason: HOSPADM

## 2023-06-07 RX ORDER — ONDANSETRON 4 MG/1
4 TABLET, ORALLY DISINTEGRATING ORAL EVERY 8 HOURS PRN
Status: DISCONTINUED | OUTPATIENT
Start: 2023-06-07 | End: 2023-06-08

## 2023-06-07 RX ORDER — METOPROLOL TARTRATE 5 MG/5ML
5 INJECTION INTRAVENOUS ONCE
Status: COMPLETED | OUTPATIENT
Start: 2023-06-07 | End: 2023-06-07

## 2023-06-07 RX ORDER — ONDANSETRON 2 MG/ML
4 INJECTION INTRAMUSCULAR; INTRAVENOUS EVERY 6 HOURS PRN
Status: DISCONTINUED | OUTPATIENT
Start: 2023-06-07 | End: 2023-06-08

## 2023-06-07 RX ORDER — DEXTROSE MONOHYDRATE 100 MG/ML
INJECTION, SOLUTION INTRAVENOUS CONTINUOUS PRN
Status: DISCONTINUED | OUTPATIENT
Start: 2023-06-07 | End: 2023-06-08

## 2023-06-07 RX ORDER — POLYETHYLENE GLYCOL 3350 17 G/17G
17 POWDER, FOR SOLUTION ORAL DAILY PRN
Status: DISCONTINUED | OUTPATIENT
Start: 2023-06-07 | End: 2023-06-08

## 2023-06-07 RX ORDER — SODIUM CHLORIDE 0.9 % (FLUSH) 0.9 %
5-40 SYRINGE (ML) INJECTION PRN
Status: DISCONTINUED | OUTPATIENT
Start: 2023-06-07 | End: 2023-06-08

## 2023-06-07 RX ORDER — DEXTROSE MONOHYDRATE 25 G/50ML
12.5 INJECTION, SOLUTION INTRAVENOUS PRN
Status: DISCONTINUED | OUTPATIENT
Start: 2023-06-07 | End: 2023-06-07

## 2023-06-07 RX ORDER — ACETAMINOPHEN 325 MG/1
650 TABLET ORAL EVERY 6 HOURS PRN
Status: DISCONTINUED | OUTPATIENT
Start: 2023-06-07 | End: 2023-06-08

## 2023-06-07 RX ORDER — SODIUM CHLORIDE 9 MG/ML
INJECTION, SOLUTION INTRAVENOUS PRN
Status: DISCONTINUED | OUTPATIENT
Start: 2023-06-07 | End: 2023-06-08

## 2023-06-07 RX ORDER — FENTANYL CITRATE 50 UG/ML
INJECTION, SOLUTION INTRAMUSCULAR; INTRAVENOUS PRN
Status: DISCONTINUED | OUTPATIENT
Start: 2023-06-07 | End: 2023-06-07 | Stop reason: HOSPADM

## 2023-06-07 RX ORDER — SODIUM CHLORIDE 0.9 % (FLUSH) 0.9 %
5-40 SYRINGE (ML) INJECTION EVERY 12 HOURS SCHEDULED
Status: DISCONTINUED | OUTPATIENT
Start: 2023-06-07 | End: 2023-06-08

## 2023-06-07 RX ORDER — DOXYCYCLINE HYCLATE 100 MG
100 TABLET ORAL EVERY 12 HOURS SCHEDULED
Status: COMPLETED | OUTPATIENT
Start: 2023-06-07 | End: 2023-06-08

## 2023-06-07 RX ORDER — DEXTROSE MONOHYDRATE 100 MG/ML
INJECTION, SOLUTION INTRAVENOUS CONTINUOUS PRN
Status: COMPLETED | OUTPATIENT
Start: 2023-06-07 | End: 2023-06-07

## 2023-06-07 RX ORDER — ONDANSETRON 2 MG/ML
INJECTION INTRAMUSCULAR; INTRAVENOUS PRN
Status: DISCONTINUED | OUTPATIENT
Start: 2023-06-07 | End: 2023-06-07 | Stop reason: HOSPADM

## 2023-06-07 RX ORDER — ACETAMINOPHEN 650 MG/1
650 SUPPOSITORY RECTAL EVERY 6 HOURS PRN
Status: DISCONTINUED | OUTPATIENT
Start: 2023-06-07 | End: 2023-06-08

## 2023-06-07 RX ORDER — DEXTROSE AND SODIUM CHLORIDE 5; .45 G/100ML; G/100ML
INJECTION, SOLUTION INTRAVENOUS CONTINUOUS
Status: DISCONTINUED | OUTPATIENT
Start: 2023-06-07 | End: 2023-06-08

## 2023-06-07 RX ADMIN — METOPROLOL TARTRATE 25 MG: 25 TABLET, FILM COATED ORAL at 20:37

## 2023-06-07 RX ADMIN — CEFEPIME 2000 MG: 2 INJECTION, POWDER, FOR SOLUTION INTRAVENOUS at 08:37

## 2023-06-07 RX ADMIN — POTASSIUM CHLORIDE 20 MEQ: 750 TABLET, FILM COATED, EXTENDED RELEASE ORAL at 10:43

## 2023-06-07 RX ADMIN — DOXYCYCLINE HYCLATE 100 MG: 100 TABLET, COATED ORAL at 10:43

## 2023-06-07 RX ADMIN — METOPROLOL TARTRATE 25 MG: 25 TABLET, FILM COATED ORAL at 10:43

## 2023-06-07 RX ADMIN — DOXYCYCLINE HYCLATE 100 MG: 100 TABLET, COATED ORAL at 20:37

## 2023-06-07 RX ADMIN — DEXTROSE AND SODIUM CHLORIDE: 5; 450 INJECTION, SOLUTION INTRAVENOUS at 13:34

## 2023-06-07 RX ADMIN — METOPROLOL TARTRATE 5 MG: 5 INJECTION INTRAVENOUS at 07:06

## 2023-06-07 RX ADMIN — SODIUM CHLORIDE, PRESERVATIVE FREE 10 ML: 5 INJECTION INTRAVENOUS at 10:48

## 2023-06-07 RX ADMIN — METOPROLOL TARTRATE 5 MG: 5 INJECTION INTRAVENOUS at 05:58

## 2023-06-07 RX ADMIN — SODIUM CHLORIDE, PRESERVATIVE FREE 10 ML: 5 INJECTION INTRAVENOUS at 20:38

## 2023-06-07 RX ADMIN — OXYCODONE HYDROCHLORIDE AND ACETAMINOPHEN 1000 MG: 500 TABLET ORAL at 10:44

## 2023-06-07 RX ADMIN — Medication 16 G: at 07:01

## 2023-06-07 ASSESSMENT — ENCOUNTER SYMPTOMS
CHEST TIGHTNESS: 0
EYE PAIN: 0
SORE THROAT: 0
VOMITING: 0
ABDOMINAL PAIN: 0
SHORTNESS OF BREATH: 0
CONSTIPATION: 0
EYE REDNESS: 0
NAUSEA: 0
COLOR CHANGE: 0
TROUBLE SWALLOWING: 0
WHEEZING: 0
DIARRHEA: 0

## 2023-06-07 ASSESSMENT — PAIN SCALES - GENERAL
PAINLEVEL_OUTOF10: 0
PAINLEVEL_OUTOF10: 0

## 2023-06-07 NOTE — CARE COORDINATION
Pt will be going to CVICU 4332. Per charge nurse,access center will call back with ETA for ALS  and number to call report nurse to nurse. I updated attending.     Camille Conrad

## 2023-06-07 NOTE — FLOWSHEET NOTE
Called to bedside to eval elevated HR - 150's EKG demonstrates afib RVR. Patient is asymptomatic. Order for IVP metoprolol placed. Nursing to continue to monitor. V - 27, 141/80, 95% RA, 130    0647: Patient remains in afib RVR - 130s. Second dose IVP metoprolol ordered. BS 49 - hypoglycemia protocol initiated. Nursing to continue to monitor.

## 2023-06-07 NOTE — DISCHARGE SUMMARY
apixaban 5 MG Tabs tablet  Commonly known as: Eliquis     ondansetron 8 MG tablet  Commonly known as: Shabnam Duncan               I have reviewed discharge instructions with the *** , and they verbalized understanding.      Approximate time spent in patient care on day of discharge: ***    Signed:  Betsey Uriostegui MD  6/7/2023  11:51 AM

## 2023-06-07 NOTE — PROCEDURES
BRIEF PROCEDURE NOTE    Date of Procedure: 6/7/2023   Preoperative Diagnosis: Pericardial Effusion  Postoperative Diagnosis: Pericardial Effusion  Procedure: Pericardiocentesis  Interventional Cardiologist: Brenda Sloan MD  Assistant : none  Anesthesia: local + IV sedation  I administered moderate sedation throughout this procedure. An independent trained observer pushed medications at my direction, and monitored the patients level of consciousness and physiological status throughout. Estimated Blood Loss: Minimal    Pt was prepped and draped in sterile manner. Time out was done. Subcostal area was anesthetized with lidocaine. Under u/s and fluoroscopic guidance,  pericardiocentesis was attempted initially with micropuncture needle. However, needle was not long enough and needle changed to pericardiocentesis needle from pericardiocentesis kit. Was able to access the pericardial space and approx 10cc of serosanguinous fluid drained   via the pericardiocentesis needle. After ensuring needle was in pericardial space by doing bubble study, wire was inserted and after using the dilator, pig tail catheter was inserted but cather would not track over the wire and wire came back. Pericardial space was again accessed in similar manner as mentioned above and long wire was inserted into pericardial space and confirmed by fluoroscopy. After dilating with dilator again pig tail catheter was inserted but would not track over wire. Attempted to insert straight catheter over wire but would not track. Wire removed. U/s showed persistent large pericardial effusion without evidence of tamponade. Pt's VS stable at end of procedure/transfer.   Had nausea towards end of procedure which resolved with zofran    Specimens Removed : None    Devices implanted : None    Complications: None      Brenda Sloan MD     Rec: D/w Dr Dave Moon at [address] [place] [address] who will do a pericardial window  D/w Dr Li Szymanski  - intensivist at [address] [place] [address]    D/w

## 2023-06-07 NOTE — CARE COORDINATION
Transition of Care Plan:    RUR: 14%    Pt was urgently transferred to ICU :pericardial effusion and atrial fibrillation    Past medical history:  Paroxsysmal atrial fib  Chronic myeloid leukemia  HTN  GERD  Hypokalemic-repleting     Eliquis is on hold pending plans from surgical team.      Cardiology NP notified me that pt will be transferring to [facility] ICU for cardiothoracic surgical services:pericardiocentesis. Per cardiology,pt is in agreement to have the procedure.-see cardiologist's notes. Wife is NOK and decision-maker  Her name is Frankey Howells and her number is [phone]. In preparation of pt being discharged to [facility]TH[facility] have prepared the PCS for ALS (cardiac monitoring,2 liters oxygen,no gtts)    Dr Nadia Gilliam is the accepting physician @ [facility].     Pt states he will notify his wife about the upcoming transfer to [facility] ICU as his wife will likely be upset as she anticipated him discharging home soon,I offered to have his attending call his wife but he prefers to call her first.    Mega Balbuena

## 2023-06-08 ENCOUNTER — APPOINTMENT (OUTPATIENT)
Facility: HOSPITAL | Age: 78
DRG: 314 | End: 2023-06-08
Attending: THORACIC SURGERY (CARDIOTHORACIC VASCULAR SURGERY)
Payer: MEDICARE

## 2023-06-08 ENCOUNTER — ANESTHESIA EVENT (OUTPATIENT)
Facility: HOSPITAL | Age: 78
End: 2023-06-08
Payer: MEDICARE

## 2023-06-08 ENCOUNTER — ANESTHESIA (OUTPATIENT)
Facility: HOSPITAL | Age: 78
End: 2023-06-08
Payer: MEDICARE

## 2023-06-08 PROBLEM — E16.2 HYPOGLYCEMIA: Status: ACTIVE | Noted: 2023-06-08

## 2023-06-08 LAB
ACUTE KIDNEY INJURY RISK NEPHROCHECK: 0.12 (ref 0–0.3)
ALBUMIN SERPL-MCNC: 2.8 G/DL (ref 3.5–5)
ALBUMIN SERPL-MCNC: 3 G/DL (ref 3.5–5)
ALBUMIN/GLOB SERPL: 0.8 (ref 1.1–2.2)
ALBUMIN/GLOB SERPL: 0.9 (ref 1.1–2.2)
ALP SERPL-CCNC: 103 U/L (ref 45–117)
ALP SERPL-CCNC: 88 U/L (ref 45–117)
ALT SERPL-CCNC: 52 U/L (ref 12–78)
ALT SERPL-CCNC: 65 U/L (ref 12–78)
ANION GAP SERPL CALC-SCNC: 4 MMOL/L (ref 5–15)
ANION GAP SERPL CALC-SCNC: 6 MMOL/L (ref 5–15)
APTT PPP: 30.3 SEC (ref 22.1–31)
AST SERPL-CCNC: 41 U/L (ref 15–37)
AST SERPL-CCNC: 62 U/L (ref 15–37)
BASOPHILS # BLD: 0 K/UL (ref 0–0.1)
BASOPHILS NFR BLD: 0 % (ref 0–1)
BILIRUB SERPL-MCNC: 1.1 MG/DL (ref 0.2–1)
BILIRUB SERPL-MCNC: 2 MG/DL (ref 0.2–1)
BUN SERPL-MCNC: 17 MG/DL (ref 6–20)
BUN SERPL-MCNC: 19 MG/DL (ref 6–20)
BUN/CREAT SERPL: 18 (ref 12–20)
BUN/CREAT SERPL: 19 (ref 12–20)
CALCIUM SERPL-MCNC: 7.7 MG/DL (ref 8.5–10.1)
CALCIUM SERPL-MCNC: 7.8 MG/DL (ref 8.5–10.1)
CHLORIDE SERPL-SCNC: 109 MMOL/L (ref 97–108)
CHLORIDE SERPL-SCNC: 111 MMOL/L (ref 97–108)
CO2 SERPL-SCNC: 23 MMOL/L (ref 21–32)
CO2 SERPL-SCNC: 24 MMOL/L (ref 21–32)
CREAT SERPL-MCNC: 0.96 MG/DL (ref 0.7–1.3)
CREAT SERPL-MCNC: 0.99 MG/DL (ref 0.7–1.3)
DIFFERENTIAL METHOD BLD: ABNORMAL
EKG DIAGNOSIS: NORMAL
EKG Q-T INTERVAL: 266 MS
EKG QRS DURATION: 80 MS
EKG QTC CALCULATION (BAZETT): 404 MS
EKG R AXIS: 16 DEGREES
EKG T AXIS: -83 DEGREES
EKG VENTRICULAR RATE: 139 BPM
EOSINOPHIL # BLD: 0 K/UL (ref 0–0.4)
EOSINOPHIL NFR BLD: 0 % (ref 0–7)
ERYTHROCYTE [DISTWIDTH] IN BLOOD BY AUTOMATED COUNT: 18.7 % (ref 11.5–14.5)
ERYTHROCYTE [DISTWIDTH] IN BLOOD BY AUTOMATED COUNT: 19.5 % (ref 11.5–14.5)
GLOBULIN SER CALC-MCNC: 3.4 G/DL (ref 2–4)
GLOBULIN SER CALC-MCNC: 3.7 G/DL (ref 2–4)
GLUCOSE BLD STRIP.AUTO-MCNC: 102 MG/DL (ref 65–117)
GLUCOSE BLD STRIP.AUTO-MCNC: 111 MG/DL (ref 65–117)
GLUCOSE BLD STRIP.AUTO-MCNC: 221 MG/DL (ref 65–117)
GLUCOSE BLD STRIP.AUTO-MCNC: 261 MG/DL (ref 65–117)
GLUCOSE BLD STRIP.AUTO-MCNC: 57 MG/DL (ref 65–117)
GLUCOSE BLD STRIP.AUTO-MCNC: 77 MG/DL (ref 65–117)
GLUCOSE SERPL-MCNC: 205 MG/DL (ref 65–100)
GLUCOSE SERPL-MCNC: 48 MG/DL (ref 65–100)
HCT VFR BLD AUTO: 22.4 % (ref 36.6–50.3)
HCT VFR BLD AUTO: 25.6 % (ref 36.6–50.3)
HGB BLD-MCNC: 7.2 G/DL (ref 12.1–17)
HGB BLD-MCNC: 8.1 G/DL (ref 12.1–17)
HISTORY CHECK: NORMAL
HISTORY CHECK: NORMAL
IMM GRANULOCYTES # BLD AUTO: 0 K/UL
IMM GRANULOCYTES NFR BLD AUTO: 0 %
INR PPP: 1.4 (ref 0.9–1.1)
LYMPHOCYTES # BLD: 2.8 K/UL (ref 0.8–3.5)
LYMPHOCYTES NFR BLD: 11 % (ref 12–49)
MAGNESIUM SERPL-MCNC: 1.7 MG/DL (ref 1.6–2.4)
MCH RBC QN AUTO: 28.5 PG (ref 26–34)
MCH RBC QN AUTO: 29 PG (ref 26–34)
MCHC RBC AUTO-ENTMCNC: 31.6 G/DL (ref 30–36.5)
MCHC RBC AUTO-ENTMCNC: 32.1 G/DL (ref 30–36.5)
MCV RBC AUTO: 90.1 FL (ref 80–99)
MCV RBC AUTO: 90.3 FL (ref 80–99)
MONOCYTES # BLD: 7.2 K/UL (ref 0–1)
MONOCYTES NFR BLD: 28 % (ref 5–13)
NEUTS BAND NFR BLD MANUAL: 5 % (ref 0–6)
NEUTS SEG # BLD: 15.7 K/UL (ref 1.8–8)
NEUTS SEG NFR BLD: 56 % (ref 32–75)
NRBC # BLD: 0 K/UL (ref 0–0.01)
NRBC # BLD: 0 K/UL (ref 0–0.01)
NRBC BLD-RTO: 0 PER 100 WBC
NRBC BLD-RTO: 0 PER 100 WBC
PHOSPHATE SERPL-MCNC: 2.6 MG/DL (ref 2.6–4.7)
PLATELET # BLD AUTO: 64 K/UL (ref 150–400)
PLATELET # BLD AUTO: 81 K/UL (ref 150–400)
PMV BLD AUTO: 11.3 FL (ref 8.9–12.9)
PMV BLD AUTO: 11.9 FL (ref 8.9–12.9)
POTASSIUM SERPL-SCNC: 3.7 MMOL/L (ref 3.5–5.1)
POTASSIUM SERPL-SCNC: 3.7 MMOL/L (ref 3.5–5.1)
PROT SERPL-MCNC: 6.4 G/DL (ref 6.4–8.2)
PROT SERPL-MCNC: 6.5 G/DL (ref 6.4–8.2)
PROTHROMBIN TIME: 14.8 SEC (ref 9–11.1)
RBC # BLD AUTO: 2.48 M/UL (ref 4.1–5.7)
RBC # BLD AUTO: 2.84 M/UL (ref 4.1–5.7)
RBC MORPH BLD: ABNORMAL
SERVICE CMNT-IMP: ABNORMAL
SERVICE CMNT-IMP: NORMAL
SODIUM SERPL-SCNC: 138 MMOL/L (ref 136–145)
SODIUM SERPL-SCNC: 139 MMOL/L (ref 136–145)
THERAPEUTIC RANGE: NORMAL SECS (ref 58–77)
WBC # BLD AUTO: 17.2 K/UL (ref 4.1–11.1)
WBC # BLD AUTO: 25.7 K/UL (ref 4.1–11.1)

## 2023-06-08 PROCEDURE — 2000000000 HC ICU R&B

## 2023-06-08 PROCEDURE — 2500000003 HC RX 250 WO HCPCS: Performed by: STUDENT IN AN ORGANIZED HEALTH CARE EDUCATION/TRAINING PROGRAM

## 2023-06-08 PROCEDURE — 6360000002 HC RX W HCPCS: Performed by: STUDENT IN AN ORGANIZED HEALTH CARE EDUCATION/TRAINING PROGRAM

## 2023-06-08 PROCEDURE — P9045 ALBUMIN (HUMAN), 5%, 250 ML: HCPCS | Performed by: STUDENT IN AN ORGANIZED HEALTH CARE EDUCATION/TRAINING PROGRAM

## 2023-06-08 PROCEDURE — 86900 BLOOD TYPING SEROLOGIC ABO: CPT

## 2023-06-08 PROCEDURE — 2580000003 HC RX 258: Performed by: STUDENT IN AN ORGANIZED HEALTH CARE EDUCATION/TRAINING PROGRAM

## 2023-06-08 PROCEDURE — 80053 COMPREHEN METABOLIC PANEL: CPT

## 2023-06-08 PROCEDURE — 83735 ASSAY OF MAGNESIUM: CPT

## 2023-06-08 PROCEDURE — C1713 ANCHOR/SCREW BN/BN,TIS/BN: HCPCS | Performed by: THORACIC SURGERY (CARDIOTHORACIC VASCULAR SURGERY)

## 2023-06-08 PROCEDURE — 84100 ASSAY OF PHOSPHORUS: CPT

## 2023-06-08 PROCEDURE — 86901 BLOOD TYPING SEROLOGIC RH(D): CPT

## 2023-06-08 PROCEDURE — 86850 RBC ANTIBODY SCREEN: CPT

## 2023-06-08 PROCEDURE — 88305 TISSUE EXAM BY PATHOLOGIST: CPT

## 2023-06-08 PROCEDURE — 6360000002 HC RX W HCPCS: Performed by: PHYSICIAN ASSISTANT

## 2023-06-08 PROCEDURE — 6370000000 HC RX 637 (ALT 250 FOR IP): Performed by: STUDENT IN AN ORGANIZED HEALTH CARE EDUCATION/TRAINING PROGRAM

## 2023-06-08 PROCEDURE — 82962 GLUCOSE BLOOD TEST: CPT

## 2023-06-08 PROCEDURE — 0W9D3ZZ DRAINAGE OF PERICARDIAL CAVITY, PERCUTANEOUS APPROACH: ICD-10-PCS | Performed by: THORACIC SURGERY (CARDIOTHORACIC VASCULAR SURGERY)

## 2023-06-08 PROCEDURE — 88112 CYTOPATH CELL ENHANCE TECH: CPT

## 2023-06-08 PROCEDURE — 6360000002 HC RX W HCPCS: Performed by: THORACIC SURGERY (CARDIOTHORACIC VASCULAR SURGERY)

## 2023-06-08 PROCEDURE — 33025 INCISION OF HEART SAC: CPT | Performed by: PHYSICIAN ASSISTANT

## 2023-06-08 PROCEDURE — 85027 COMPLETE CBC AUTOMATED: CPT

## 2023-06-08 PROCEDURE — 36415 COLL VENOUS BLD VENIPUNCTURE: CPT

## 2023-06-08 PROCEDURE — 87205 SMEAR GRAM STAIN: CPT

## 2023-06-08 PROCEDURE — 85610 PROTHROMBIN TIME: CPT

## 2023-06-08 PROCEDURE — 85730 THROMBOPLASTIN TIME PARTIAL: CPT

## 2023-06-08 PROCEDURE — 33025 INCISION OF HEART SAC: CPT | Performed by: THORACIC SURGERY (CARDIOTHORACIC VASCULAR SURGERY)

## 2023-06-08 PROCEDURE — 88307 TISSUE EXAM BY PATHOLOGIST: CPT

## 2023-06-08 PROCEDURE — 6370000000 HC RX 637 (ALT 250 FOR IP): Performed by: PHYSICIAN ASSISTANT

## 2023-06-08 PROCEDURE — P9016 RBC LEUKOCYTES REDUCED: HCPCS

## 2023-06-08 PROCEDURE — 3700000001 HC ADD 15 MINUTES (ANESTHESIA): Performed by: THORACIC SURGERY (CARDIOTHORACIC VASCULAR SURGERY)

## 2023-06-08 PROCEDURE — 87102 FUNGUS ISOLATION CULTURE: CPT

## 2023-06-08 PROCEDURE — 36430 TRANSFUSION BLD/BLD COMPNT: CPT

## 2023-06-08 PROCEDURE — 86923 COMPATIBILITY TEST ELECTRIC: CPT

## 2023-06-08 PROCEDURE — 85025 COMPLETE CBC W/AUTO DIFF WBC: CPT

## 2023-06-08 PROCEDURE — 2709999900 HC NON-CHARGEABLE SUPPLY: Performed by: THORACIC SURGERY (CARDIOTHORACIC VASCULAR SURGERY)

## 2023-06-08 PROCEDURE — 3600000008 HC SURGERY OHS BASE: Performed by: THORACIC SURGERY (CARDIOTHORACIC VASCULAR SURGERY)

## 2023-06-08 PROCEDURE — 87070 CULTURE OTHR SPECIMN AEROBIC: CPT

## 2023-06-08 PROCEDURE — 2580000003 HC RX 258: Performed by: PHYSICIAN ASSISTANT

## 2023-06-08 PROCEDURE — 3600000018 HC SURGERY OHS ADDTL 15MIN: Performed by: THORACIC SURGERY (CARDIOTHORACIC VASCULAR SURGERY)

## 2023-06-08 PROCEDURE — C1729 CATH, DRAINAGE: HCPCS | Performed by: THORACIC SURGERY (CARDIOTHORACIC VASCULAR SURGERY)

## 2023-06-08 PROCEDURE — 99222 1ST HOSP IP/OBS MODERATE 55: CPT | Performed by: CLINICAL NURSE SPECIALIST

## 2023-06-08 PROCEDURE — 2700000000 HC OXYGEN THERAPY PER DAY

## 2023-06-08 PROCEDURE — 3700000000 HC ANESTHESIA ATTENDED CARE: Performed by: THORACIC SURGERY (CARDIOTHORACIC VASCULAR SURGERY)

## 2023-06-08 RX ORDER — CEFAZOLIN SODIUM 1 G/3ML
INJECTION, POWDER, FOR SOLUTION INTRAMUSCULAR; INTRAVENOUS PRN
Status: DISCONTINUED | OUTPATIENT
Start: 2023-06-08 | End: 2023-06-08 | Stop reason: ALTCHOICE

## 2023-06-08 RX ORDER — METOPROLOL TARTRATE 5 MG/5ML
5 INJECTION INTRAVENOUS EVERY 6 HOURS
Status: DISCONTINUED | OUTPATIENT
Start: 2023-06-08 | End: 2023-06-08

## 2023-06-08 RX ORDER — POTASSIUM CHLORIDE 29.8 MG/ML
20 INJECTION INTRAVENOUS PRN
Status: DISCONTINUED | OUTPATIENT
Start: 2023-06-08 | End: 2023-06-10

## 2023-06-08 RX ORDER — ROPIVACAINE HYDROCHLORIDE 5 MG/ML
INJECTION, SOLUTION EPIDURAL; INFILTRATION; PERINEURAL PRN
Status: DISCONTINUED | OUTPATIENT
Start: 2023-06-08 | End: 2023-06-08 | Stop reason: ALTCHOICE

## 2023-06-08 RX ORDER — AMIODARONE HYDROCHLORIDE 200 MG/1
400 TABLET ORAL 2 TIMES DAILY
Status: DISCONTINUED | OUTPATIENT
Start: 2023-06-09 | End: 2023-06-08

## 2023-06-08 RX ORDER — POTASSIUM CHLORIDE 750 MG/1
40 TABLET, FILM COATED, EXTENDED RELEASE ORAL ONCE
Status: COMPLETED | OUTPATIENT
Start: 2023-06-08 | End: 2023-06-08

## 2023-06-08 RX ORDER — SUCCINYLCHOLINE/SOD CL,ISO/PF 200MG/10ML
SYRINGE (ML) INTRAVENOUS PRN
Status: DISCONTINUED | OUTPATIENT
Start: 2023-06-08 | End: 2023-06-08 | Stop reason: SDUPTHER

## 2023-06-08 RX ORDER — FENTANYL CITRATE 50 UG/ML
100 INJECTION, SOLUTION INTRAMUSCULAR; INTRAVENOUS
Status: DISCONTINUED | OUTPATIENT
Start: 2023-06-08 | End: 2023-06-08 | Stop reason: HOSPADM

## 2023-06-08 RX ORDER — SENNA AND DOCUSATE SODIUM 50; 8.6 MG/1; MG/1
1 TABLET, FILM COATED ORAL 2 TIMES DAILY
Status: DISCONTINUED | OUTPATIENT
Start: 2023-06-09 | End: 2023-06-12 | Stop reason: HOSPADM

## 2023-06-08 RX ORDER — DEXTROSE MONOHYDRATE 25 G/50ML
25 INJECTION, SOLUTION INTRAVENOUS PRN
Status: DISCONTINUED | OUTPATIENT
Start: 2023-06-08 | End: 2023-06-08

## 2023-06-08 RX ORDER — LIDOCAINE 4 G/G
1 PATCH TOPICAL DAILY
Status: DISCONTINUED | OUTPATIENT
Start: 2023-06-08 | End: 2023-06-08

## 2023-06-08 RX ORDER — SODIUM CHLORIDE, SODIUM LACTATE, POTASSIUM CHLORIDE, CALCIUM CHLORIDE 600; 310; 30; 20 MG/100ML; MG/100ML; MG/100ML; MG/100ML
INJECTION, SOLUTION INTRAVENOUS CONTINUOUS
Status: DISCONTINUED | OUTPATIENT
Start: 2023-06-08 | End: 2023-06-08

## 2023-06-08 RX ORDER — SODIUM CHLORIDE 450 MG/100ML
INJECTION, SOLUTION INTRAVENOUS CONTINUOUS
Status: DISCONTINUED | OUTPATIENT
Start: 2023-06-08 | End: 2023-06-10

## 2023-06-08 RX ORDER — MAGNESIUM SULFATE 1 G/100ML
1000 INJECTION INTRAVENOUS ONCE
Status: COMPLETED | OUTPATIENT
Start: 2023-06-08 | End: 2023-06-08

## 2023-06-08 RX ORDER — OXYCODONE HYDROCHLORIDE 5 MG/1
5 TABLET ORAL EVERY 4 HOURS PRN
Status: DISCONTINUED | OUTPATIENT
Start: 2023-06-08 | End: 2023-06-12 | Stop reason: HOSPADM

## 2023-06-08 RX ORDER — CHLORHEXIDINE GLUCONATE 0.12 MG/ML
15 RINSE ORAL 2 TIMES DAILY
Status: DISCONTINUED | OUTPATIENT
Start: 2023-06-08 | End: 2023-06-12 | Stop reason: HOSPADM

## 2023-06-08 RX ORDER — ALBUMIN, HUMAN INJ 5% 5 %
12.5 SOLUTION INTRAVENOUS
Status: DISCONTINUED | OUTPATIENT
Start: 2023-06-08 | End: 2023-06-08

## 2023-06-08 RX ORDER — ALBUMIN, HUMAN INJ 5% 5 %
SOLUTION INTRAVENOUS PRN
Status: DISCONTINUED | OUTPATIENT
Start: 2023-06-08 | End: 2023-06-08 | Stop reason: SDUPTHER

## 2023-06-08 RX ORDER — ONDANSETRON 2 MG/ML
INJECTION INTRAMUSCULAR; INTRAVENOUS PRN
Status: DISCONTINUED | OUTPATIENT
Start: 2023-06-08 | End: 2023-06-08 | Stop reason: SDUPTHER

## 2023-06-08 RX ORDER — ATORVASTATIN CALCIUM 40 MG/1
40 TABLET, FILM COATED ORAL NIGHTLY
Status: DISCONTINUED | OUTPATIENT
Start: 2023-06-08 | End: 2023-06-08

## 2023-06-08 RX ORDER — SODIUM CHLORIDE 9 MG/ML
INJECTION, SOLUTION INTRAVENOUS PRN
Status: DISCONTINUED | OUTPATIENT
Start: 2023-06-08 | End: 2023-06-08

## 2023-06-08 RX ORDER — SODIUM CHLORIDE 0.9 % (FLUSH) 0.9 %
5-40 SYRINGE (ML) INJECTION PRN
Status: DISCONTINUED | OUTPATIENT
Start: 2023-06-08 | End: 2023-06-08 | Stop reason: HOSPADM

## 2023-06-08 RX ORDER — PHENYLEPHRINE HYDROCHLORIDE 10 MG/ML
INJECTION INTRAVENOUS PRN
Status: DISCONTINUED | OUTPATIENT
Start: 2023-06-08 | End: 2023-06-08 | Stop reason: SDUPTHER

## 2023-06-08 RX ORDER — INSULIN LISPRO 100 [IU]/ML
0-12 INJECTION, SOLUTION INTRAVENOUS; SUBCUTANEOUS
Status: DISCONTINUED | OUTPATIENT
Start: 2023-06-08 | End: 2023-06-08

## 2023-06-08 RX ORDER — LIDOCAINE HYDROCHLORIDE 20 MG/ML
INJECTION, SOLUTION EPIDURAL; INFILTRATION; INTRACAUDAL; PERINEURAL PRN
Status: DISCONTINUED | OUTPATIENT
Start: 2023-06-08 | End: 2023-06-08 | Stop reason: SDUPTHER

## 2023-06-08 RX ORDER — LANOLIN ALCOHOL/MO/W.PET/CERES
400 CREAM (GRAM) TOPICAL 2 TIMES DAILY
Status: DISCONTINUED | OUTPATIENT
Start: 2023-06-09 | End: 2023-06-08

## 2023-06-08 RX ORDER — ACETAMINOPHEN 500 MG
1000 TABLET ORAL EVERY 6 HOURS PRN
Status: DISCONTINUED | OUTPATIENT
Start: 2023-06-08 | End: 2023-06-12 | Stop reason: HOSPADM

## 2023-06-08 RX ORDER — ONDANSETRON 2 MG/ML
4 INJECTION INTRAMUSCULAR; INTRAVENOUS EVERY 4 HOURS PRN
Status: DISCONTINUED | OUTPATIENT
Start: 2023-06-08 | End: 2023-06-12 | Stop reason: HOSPADM

## 2023-06-08 RX ORDER — POLYETHYLENE GLYCOL 3350 17 G/17G
17 POWDER, FOR SOLUTION ORAL DAILY
Status: DISCONTINUED | OUTPATIENT
Start: 2023-06-09 | End: 2023-06-12 | Stop reason: HOSPADM

## 2023-06-08 RX ORDER — ACETAMINOPHEN 500 MG
1000 TABLET ORAL ONCE
Status: DISCONTINUED | OUTPATIENT
Start: 2023-06-08 | End: 2023-06-08 | Stop reason: HOSPADM

## 2023-06-08 RX ORDER — FENTANYL CITRATE 50 UG/ML
INJECTION, SOLUTION INTRAMUSCULAR; INTRAVENOUS PRN
Status: DISCONTINUED | OUTPATIENT
Start: 2023-06-08 | End: 2023-06-08 | Stop reason: SDUPTHER

## 2023-06-08 RX ORDER — OXYCODONE HYDROCHLORIDE 5 MG/1
10 TABLET ORAL EVERY 4 HOURS PRN
Status: DISCONTINUED | OUTPATIENT
Start: 2023-06-08 | End: 2023-06-12 | Stop reason: HOSPADM

## 2023-06-08 RX ORDER — HYDRALAZINE HYDROCHLORIDE 20 MG/ML
10 INJECTION INTRAMUSCULAR; INTRAVENOUS EVERY 6 HOURS PRN
Status: DISCONTINUED | OUTPATIENT
Start: 2023-06-08 | End: 2023-06-12 | Stop reason: HOSPADM

## 2023-06-08 RX ORDER — DIPHENHYDRAMINE HCL 25 MG
25 CAPSULE ORAL NIGHTLY PRN
Status: DISCONTINUED | OUTPATIENT
Start: 2023-06-09 | End: 2023-06-12 | Stop reason: HOSPADM

## 2023-06-08 RX ORDER — SODIUM CHLORIDE, SODIUM LACTATE, POTASSIUM CHLORIDE, CALCIUM CHLORIDE 600; 310; 30; 20 MG/100ML; MG/100ML; MG/100ML; MG/100ML
INJECTION, SOLUTION INTRAVENOUS CONTINUOUS PRN
Status: DISCONTINUED | OUTPATIENT
Start: 2023-06-08 | End: 2023-06-08 | Stop reason: SDUPTHER

## 2023-06-08 RX ORDER — SODIUM CHLORIDE 9 MG/ML
INJECTION, SOLUTION INTRAVENOUS CONTINUOUS
Status: DISCONTINUED | OUTPATIENT
Start: 2023-06-08 | End: 2023-06-10

## 2023-06-08 RX ORDER — ROCURONIUM BROMIDE 10 MG/ML
INJECTION, SOLUTION INTRAVENOUS PRN
Status: DISCONTINUED | OUTPATIENT
Start: 2023-06-08 | End: 2023-06-08 | Stop reason: SDUPTHER

## 2023-06-08 RX ORDER — MIDAZOLAM HYDROCHLORIDE 2 MG/2ML
2 INJECTION, SOLUTION INTRAMUSCULAR; INTRAVENOUS
Status: DISCONTINUED | OUTPATIENT
Start: 2023-06-08 | End: 2023-06-08 | Stop reason: HOSPADM

## 2023-06-08 RX ORDER — MIDAZOLAM HYDROCHLORIDE 2 MG/2ML
1 INJECTION, SOLUTION INTRAMUSCULAR; INTRAVENOUS
Status: DISCONTINUED | OUTPATIENT
Start: 2023-06-08 | End: 2023-06-10

## 2023-06-08 RX ORDER — FAMOTIDINE 20 MG/1
20 TABLET, FILM COATED ORAL 2 TIMES DAILY
Status: COMPLETED | OUTPATIENT
Start: 2023-06-08 | End: 2023-06-09

## 2023-06-08 RX ORDER — SODIUM CHLORIDE 0.9 % (FLUSH) 0.9 %
5-40 SYRINGE (ML) INJECTION EVERY 12 HOURS SCHEDULED
Status: DISCONTINUED | OUTPATIENT
Start: 2023-06-08 | End: 2023-06-08 | Stop reason: HOSPADM

## 2023-06-08 RX ORDER — MAGNESIUM SULFATE IN WATER 40 MG/ML
2000 INJECTION, SOLUTION INTRAVENOUS PRN
Status: DISCONTINUED | OUTPATIENT
Start: 2023-06-08 | End: 2023-06-12 | Stop reason: HOSPADM

## 2023-06-08 RX ORDER — SODIUM CHLORIDE 9 MG/ML
INJECTION, SOLUTION INTRAVENOUS PRN
Status: DISCONTINUED | OUTPATIENT
Start: 2023-06-08 | End: 2023-06-10

## 2023-06-08 RX ORDER — LIDOCAINE HYDROCHLORIDE 10 MG/ML
1 INJECTION, SOLUTION EPIDURAL; INFILTRATION; INTRACAUDAL; PERINEURAL
Status: DISCONTINUED | OUTPATIENT
Start: 2023-06-08 | End: 2023-06-08 | Stop reason: HOSPADM

## 2023-06-08 RX ORDER — SODIUM CHLORIDE 0.9 % (FLUSH) 0.9 %
5-40 SYRINGE (ML) INJECTION PRN
Status: DISCONTINUED | OUTPATIENT
Start: 2023-06-08 | End: 2023-06-10

## 2023-06-08 RX ORDER — HYDROMORPHONE HYDROCHLORIDE 1 MG/ML
0.5 INJECTION, SOLUTION INTRAMUSCULAR; INTRAVENOUS; SUBCUTANEOUS EVERY 4 HOURS PRN
Status: DISCONTINUED | OUTPATIENT
Start: 2023-06-08 | End: 2023-06-10

## 2023-06-08 RX ORDER — ASPIRIN 81 MG/1
81 TABLET ORAL DAILY
Status: DISCONTINUED | OUTPATIENT
Start: 2023-06-08 | End: 2023-06-08

## 2023-06-08 RX ORDER — DEXTROSE MONOHYDRATE 100 MG/ML
INJECTION, SOLUTION INTRAVENOUS CONTINUOUS PRN
Status: DISCONTINUED | OUTPATIENT
Start: 2023-06-08 | End: 2023-06-12 | Stop reason: HOSPADM

## 2023-06-08 RX ORDER — INSULIN LISPRO 100 [IU]/ML
0-6 INJECTION, SOLUTION INTRAVENOUS; SUBCUTANEOUS NIGHTLY
Status: DISCONTINUED | OUTPATIENT
Start: 2023-06-08 | End: 2023-06-08

## 2023-06-08 RX ORDER — HYDROXYUREA 500 MG/1
500 CAPSULE ORAL DAILY
Status: DISCONTINUED | OUTPATIENT
Start: 2023-06-09 | End: 2023-06-12 | Stop reason: HOSPADM

## 2023-06-08 RX ORDER — SODIUM CHLORIDE 9 MG/ML
INJECTION, SOLUTION INTRAVENOUS PRN
Status: DISCONTINUED | OUTPATIENT
Start: 2023-06-08 | End: 2023-06-08 | Stop reason: HOSPADM

## 2023-06-08 RX ORDER — INSULIN LISPRO 100 [IU]/ML
1-20 INJECTION, SOLUTION INTRAVENOUS; SUBCUTANEOUS
Status: DISCONTINUED | OUTPATIENT
Start: 2023-06-10 | End: 2023-06-08

## 2023-06-08 RX ORDER — SODIUM CHLORIDE 0.9 % (FLUSH) 0.9 %
5-40 SYRINGE (ML) INJECTION EVERY 12 HOURS SCHEDULED
Status: DISCONTINUED | OUTPATIENT
Start: 2023-06-08 | End: 2023-06-12 | Stop reason: HOSPADM

## 2023-06-08 RX ORDER — ALBUTEROL SULFATE 2.5 MG/3ML
2.5 SOLUTION RESPIRATORY (INHALATION) EVERY 4 HOURS PRN
Status: DISCONTINUED | OUTPATIENT
Start: 2023-06-08 | End: 2023-06-12 | Stop reason: HOSPADM

## 2023-06-08 RX ORDER — DEXAMETHASONE SODIUM PHOSPHATE 4 MG/ML
INJECTION, SOLUTION INTRA-ARTICULAR; INTRALESIONAL; INTRAMUSCULAR; INTRAVENOUS; SOFT TISSUE PRN
Status: DISCONTINUED | OUTPATIENT
Start: 2023-06-08 | End: 2023-06-08 | Stop reason: SDUPTHER

## 2023-06-08 RX ORDER — LANOLIN ALCOHOL/MO/W.PET/CERES
6 CREAM (GRAM) TOPICAL NIGHTLY PRN
Status: DISCONTINUED | OUTPATIENT
Start: 2023-06-08 | End: 2023-06-12 | Stop reason: HOSPADM

## 2023-06-08 RX ORDER — BISACODYL 10 MG
10 SUPPOSITORY, RECTAL RECTAL DAILY PRN
Status: DISCONTINUED | OUTPATIENT
Start: 2023-06-08 | End: 2023-06-12 | Stop reason: HOSPADM

## 2023-06-08 RX ORDER — INSULIN GLARGINE 100 [IU]/ML
1-50 INJECTION, SOLUTION SUBCUTANEOUS
Status: ACTIVE | OUTPATIENT
Start: 2023-06-08 | End: 2023-06-09

## 2023-06-08 RX ADMIN — SUGAMMADEX 200 MG: 100 INJECTION, SOLUTION INTRAVENOUS at 13:10

## 2023-06-08 RX ADMIN — DEXTROSE MONOHYDRATE 25 G: 25 INJECTION, SOLUTION INTRAVENOUS at 10:35

## 2023-06-08 RX ADMIN — METOPROLOL TARTRATE 5 MG: 5 INJECTION INTRAVENOUS at 17:50

## 2023-06-08 RX ADMIN — PHENYLEPHRINE HYDROCHLORIDE 120 MCG: 10 INJECTION INTRAVENOUS at 12:17

## 2023-06-08 RX ADMIN — ROCURONIUM BROMIDE 5 MG: 10 SOLUTION INTRAVENOUS at 12:17

## 2023-06-08 RX ADMIN — FAMOTIDINE 20 MG: 20 TABLET, FILM COATED ORAL at 17:50

## 2023-06-08 RX ADMIN — SODIUM CHLORIDE, PRESERVATIVE FREE 10 ML: 5 INJECTION INTRAVENOUS at 08:54

## 2023-06-08 RX ADMIN — CEFAZOLIN 2000 MG: 1 INJECTION, POWDER, FOR SOLUTION INTRAMUSCULAR; INTRAVENOUS at 18:40

## 2023-06-08 RX ADMIN — ALBUMIN (HUMAN) 250 ML: 12.5 INJECTION, SOLUTION INTRAVENOUS at 12:36

## 2023-06-08 RX ADMIN — MAGNESIUM SULFATE HEPTAHYDRATE 1000 MG: 1 INJECTION, SOLUTION INTRAVENOUS at 21:01

## 2023-06-08 RX ADMIN — PHENYLEPHRINE HYDROCHLORIDE 80 MCG/MIN: 10 INJECTION INTRAVENOUS at 12:29

## 2023-06-08 RX ADMIN — DEXAMETHASONE SODIUM PHOSPHATE 4 MG: 4 INJECTION, SOLUTION INTRAMUSCULAR; INTRAVENOUS at 12:23

## 2023-06-08 RX ADMIN — SUGAMMADEX 200 MG: 100 INJECTION, SOLUTION INTRAVENOUS at 13:14

## 2023-06-08 RX ADMIN — DEXTROSE MONOHYDRATE 125 ML: 100 INJECTION, SOLUTION INTRAVENOUS at 06:00

## 2023-06-08 RX ADMIN — SODIUM CHLORIDE, PRESERVATIVE FREE 10 ML: 5 INJECTION INTRAVENOUS at 21:07

## 2023-06-08 RX ADMIN — ALBUMIN (HUMAN) 250 ML: 12.5 INJECTION, SOLUTION INTRAVENOUS at 12:57

## 2023-06-08 RX ADMIN — PROPOFOL 100 MG: 10 INJECTION, EMULSION INTRAVENOUS at 12:17

## 2023-06-08 RX ADMIN — DOXYCYCLINE HYCLATE 100 MG: 100 TABLET, COATED ORAL at 08:48

## 2023-06-08 RX ADMIN — FENTANYL CITRATE 150 MCG: 50 INJECTION, SOLUTION INTRAMUSCULAR; INTRAVENOUS at 12:42

## 2023-06-08 RX ADMIN — ROCURONIUM BROMIDE 35 MG: 10 SOLUTION INTRAVENOUS at 12:39

## 2023-06-08 RX ADMIN — SODIUM CHLORIDE: 4.5 INJECTION, SOLUTION INTRAVENOUS at 13:50

## 2023-06-08 RX ADMIN — FENTANYL CITRATE 100 MCG: 50 INJECTION, SOLUTION INTRAMUSCULAR; INTRAVENOUS at 12:17

## 2023-06-08 RX ADMIN — CHLORHEXIDINE GLUCONATE 15 ML: 1.2 RINSE ORAL at 21:09

## 2023-06-08 RX ADMIN — DOXYCYCLINE HYCLATE 100 MG: 100 TABLET, COATED ORAL at 21:01

## 2023-06-08 RX ADMIN — SODIUM CHLORIDE 2000 MG: 9 INJECTION, SOLUTION INTRAVENOUS at 12:22

## 2023-06-08 RX ADMIN — METOPROLOL TARTRATE 25 MG: 25 TABLET, FILM COATED ORAL at 21:01

## 2023-06-08 RX ADMIN — ONDANSETRON HYDROCHLORIDE 4 MG: 2 INJECTION, SOLUTION INTRAMUSCULAR; INTRAVENOUS at 12:59

## 2023-06-08 RX ADMIN — METOPROLOL TARTRATE 25 MG: 25 TABLET, FILM COATED ORAL at 08:48

## 2023-06-08 RX ADMIN — DEXTROSE MONOHYDRATE 12.5 G: 25 INJECTION, SOLUTION INTRAVENOUS at 12:40

## 2023-06-08 RX ADMIN — LIDOCAINE HYDROCHLORIDE 80 MG: 20 INJECTION, SOLUTION EPIDURAL; INFILTRATION; INTRACAUDAL; PERINEURAL at 12:17

## 2023-06-08 RX ADMIN — SODIUM CHLORIDE, POTASSIUM CHLORIDE, SODIUM LACTATE AND CALCIUM CHLORIDE: 600; 310; 30; 20 INJECTION, SOLUTION INTRAVENOUS at 12:10

## 2023-06-08 RX ADMIN — POTASSIUM CHLORIDE 40 MEQ: 750 TABLET, FILM COATED, EXTENDED RELEASE ORAL at 21:01

## 2023-06-08 RX ADMIN — MUPIROCIN: 20 OINTMENT TOPICAL at 21:09

## 2023-06-08 RX ADMIN — Medication 160 MG: at 12:17

## 2023-06-08 ASSESSMENT — PAIN SCALES - GENERAL
PAINLEVEL_OUTOF10: 0
PAINLEVEL_OUTOF10: 2
PAINLEVEL_OUTOF10: 2
PAINLEVEL_OUTOF10: 0

## 2023-06-08 ASSESSMENT — ENCOUNTER SYMPTOMS
EYE REDNESS: 0
WHEEZING: 0
TROUBLE SWALLOWING: 0
CHEST TIGHTNESS: 0
ABDOMINAL PAIN: 0
CONSTIPATION: 0
SORE THROAT: 0
EYE PAIN: 0
SHORTNESS OF BREATH: 0
NAUSEA: 0
DIARRHEA: 0
COLOR CHANGE: 0
VOMITING: 0

## 2023-06-08 ASSESSMENT — PAIN DESCRIPTION - LOCATION: LOCATION: CHEST

## 2023-06-08 NOTE — ANESTHESIA POSTPROCEDURE EVALUATION
Department of Anesthesiology  Postprocedure Note    Patient: Jordi Quinn  MRN: [id number]  YOB: 1945  Date of evaluation: 6/8/2023      Procedure Summary     Date: 06/08/23 Room / Location: [facility] HEART OR [address] [place] [address] OPEN HEART    Anesthesia Start: 1210 Anesthesia Stop: 1330    Procedure: PERICARDIAL WINDOW (Chest) Diagnosis:       Pericardial effusion      (Pericardial effusion [I31.39])    Providers: Cynthia Pavon MD Responsible Provider: Sina Amanda DO    Anesthesia Type: general ASA Status: 4          Anesthesia Type: No value filed.     Lavinia Phase I:      Lavinia Phase II:        Anesthesia Post Evaluation    Patient location during evaluation: ICU  Patient participation: complete - patient participated  Level of consciousness: sleepy but conscious  Airway patency: patent  Nausea & Vomiting: no nausea and no vomiting  Complications: no  Cardiovascular status: hemodynamically stable  Respiratory status: acceptable and nasal cannula  Hydration status: stable  Comments: Handoff and report given to ICU team at bedside

## 2023-06-08 NOTE — ANESTHESIA PROCEDURE NOTES
Arterial Line:    An arterial line was placed using surface landmarks, in the pre-op for the following indication(s): continuous blood pressure monitoring and blood sampling needed. A 20 gauge (size) (length), Arrow (type) catheter was placed, Seldinger technique used, into the right radial artery, secured by Tegaderm. Anesthesia type: Local  Local infiltration: Injection    Events:  patient tolerated procedure well with no complications.     Additional notes:  Ultrasound guided 6/8/2023 12:05 PM6/8/2023 12:07 PM  Anesthesiologist: Vishal Arango DO  Performed: Anesthesiologist   Preanesthetic Checklist  Completed: patient identified, IV checked, site marked, risks and benefits discussed, surgical/procedural consents, equipment checked, pre-op evaluation, timeout performed, anesthesia consent given, oxygen available, monitors applied/VS acknowledged and fire risk safety assessment completed and verbalized
normal.  Stenosis not present. Regurgitation none. Aorta    Ascending Aorta:  Size normal.  Dissection not present. Aortic Arch:  Dissection not present. Descending Aorta:  Dissection not present. Atria    Right Atrium:  Size dilated. Spontaneous echo contrast not present. Thrombus not present. Tumor not present. Device not present. Left Atrium:  Size dilated. Spontaneous echo contrast not present. Thrombus not present. Tumor not present. Device not present.     Left atrial appendage normal.      Septa    Atrial Septum:  Intra-atrial septal morphology normal.      Ventricular Septum:  Intra-ventricular septum morphology normal.          Other Findings  Pericardium:  pericardial effusion  Pleural Effusion:  bilateral  Pulmonary Arteries:  normal      Anesthesia Information  Performed Personally  Anesthesiologist:  Unique Sanford, DO    Post Intervention Follow-up Study  Aortic Function: [id number] Function: [id number] Function: unchangedNoneComments: Large circumferential pericardial effusion that was reduced to minimal with pericardial window

## 2023-06-09 ENCOUNTER — APPOINTMENT (OUTPATIENT)
Facility: HOSPITAL | Age: 78
DRG: 314 | End: 2023-06-09
Attending: STUDENT IN AN ORGANIZED HEALTH CARE EDUCATION/TRAINING PROGRAM
Payer: MEDICARE

## 2023-06-09 LAB
ABO + RH BLD: NORMAL
ANION GAP SERPL CALC-SCNC: 6 MMOL/L (ref 5–15)
BLD PROD TYP BPU: NORMAL
BLD PROD TYP BPU: NORMAL
BLOOD BANK DISPENSE STATUS: NORMAL
BLOOD BANK DISPENSE STATUS: NORMAL
BLOOD GROUP ANTIBODIES SERPL: NORMAL
BPU ID: NORMAL
BPU ID: NORMAL
BUN SERPL-MCNC: 15 MG/DL (ref 6–20)
BUN/CREAT SERPL: 15 (ref 12–20)
CALCIUM SERPL-MCNC: 7.9 MG/DL (ref 8.5–10.1)
CHLORIDE SERPL-SCNC: 108 MMOL/L (ref 97–108)
CO2 SERPL-SCNC: 24 MMOL/L (ref 21–32)
CREAT SERPL-MCNC: 1.02 MG/DL (ref 0.7–1.3)
CROSSMATCH RESULT: NORMAL
CROSSMATCH RESULT: NORMAL
EKG ATRIAL RATE: 80 BPM
EKG DIAGNOSIS: NORMAL
EKG P AXIS: 29 DEGREES
EKG P-R INTERVAL: 150 MS
EKG Q-T INTERVAL: 402 MS
EKG QRS DURATION: 92 MS
EKG QTC CALCULATION (BAZETT): 463 MS
EKG R AXIS: -5 DEGREES
EKG T AXIS: 20 DEGREES
EKG VENTRICULAR RATE: 80 BPM
ERYTHROCYTE [DISTWIDTH] IN BLOOD BY AUTOMATED COUNT: 18.3 % (ref 11.5–14.5)
GLUCOSE BLD STRIP.AUTO-MCNC: 124 MG/DL (ref 65–117)
GLUCOSE BLD STRIP.AUTO-MCNC: 55 MG/DL (ref 65–117)
GLUCOSE BLD STRIP.AUTO-MCNC: 57 MG/DL (ref 65–117)
GLUCOSE BLD STRIP.AUTO-MCNC: 73 MG/DL (ref 65–117)
GLUCOSE BLD STRIP.AUTO-MCNC: 82 MG/DL (ref 65–117)
GLUCOSE BLD STRIP.AUTO-MCNC: 88 MG/DL (ref 65–117)
GLUCOSE SERPL-MCNC: 120 MG/DL (ref 65–100)
HCT VFR BLD AUTO: 25.3 % (ref 36.6–50.3)
HGB BLD-MCNC: 8.3 G/DL (ref 12.1–17)
MAGNESIUM SERPL-MCNC: 2.2 MG/DL (ref 1.6–2.4)
MCH RBC QN AUTO: 29.3 PG (ref 26–34)
MCHC RBC AUTO-ENTMCNC: 32.8 G/DL (ref 30–36.5)
MCV RBC AUTO: 89.4 FL (ref 80–99)
NRBC # BLD: 0.02 K/UL (ref 0–0.01)
NRBC BLD-RTO: 0.1 PER 100 WBC
PLATELET # BLD AUTO: 80 K/UL (ref 150–400)
PMV BLD AUTO: 11.3 FL (ref 8.9–12.9)
POTASSIUM SERPL-SCNC: 4.2 MMOL/L (ref 3.5–5.1)
RBC # BLD AUTO: 2.83 M/UL (ref 4.1–5.7)
SERVICE CMNT-IMP: ABNORMAL
SERVICE CMNT-IMP: NORMAL
SODIUM SERPL-SCNC: 138 MMOL/L (ref 136–145)
SPECIMEN EXP DATE BLD: NORMAL
UNIT DIVISION: 0
UNIT DIVISION: 0
WBC # BLD AUTO: 34.1 K/UL (ref 4.1–11.1)

## 2023-06-09 PROCEDURE — 36415 COLL VENOUS BLD VENIPUNCTURE: CPT

## 2023-06-09 PROCEDURE — 82962 GLUCOSE BLOOD TEST: CPT

## 2023-06-09 PROCEDURE — 2580000003 HC RX 258: Performed by: STUDENT IN AN ORGANIZED HEALTH CARE EDUCATION/TRAINING PROGRAM

## 2023-06-09 PROCEDURE — 97161 PT EVAL LOW COMPLEX 20 MIN: CPT

## 2023-06-09 PROCEDURE — 71045 X-RAY EXAM CHEST 1 VIEW: CPT

## 2023-06-09 PROCEDURE — 84305 ASSAY OF SOMATOMEDIN: CPT

## 2023-06-09 PROCEDURE — 2000000000 HC ICU R&B

## 2023-06-09 PROCEDURE — 97116 GAIT TRAINING THERAPY: CPT

## 2023-06-09 PROCEDURE — 84681 ASSAY OF C-PEPTIDE: CPT

## 2023-06-09 PROCEDURE — 2700000000 HC OXYGEN THERAPY PER DAY

## 2023-06-09 PROCEDURE — 6360000002 HC RX W HCPCS: Performed by: PHYSICIAN ASSISTANT

## 2023-06-09 PROCEDURE — APPSS45 APP SPLIT SHARED TIME 31-45 MINUTES: Performed by: NURSE PRACTITIONER

## 2023-06-09 PROCEDURE — 2580000003 HC RX 258: Performed by: PHYSICIAN ASSISTANT

## 2023-06-09 PROCEDURE — 83735 ASSAY OF MAGNESIUM: CPT

## 2023-06-09 PROCEDURE — 80048 BASIC METABOLIC PNL TOTAL CA: CPT

## 2023-06-09 PROCEDURE — 6370000000 HC RX 637 (ALT 250 FOR IP): Performed by: STUDENT IN AN ORGANIZED HEALTH CARE EDUCATION/TRAINING PROGRAM

## 2023-06-09 PROCEDURE — 6370000000 HC RX 637 (ALT 250 FOR IP): Performed by: PHYSICIAN ASSISTANT

## 2023-06-09 PROCEDURE — 85027 COMPLETE CBC AUTOMATED: CPT

## 2023-06-09 PROCEDURE — 97530 THERAPEUTIC ACTIVITIES: CPT

## 2023-06-09 PROCEDURE — 97165 OT EVAL LOW COMPLEX 30 MIN: CPT

## 2023-06-09 PROCEDURE — 86337 INSULIN ANTIBODIES: CPT

## 2023-06-09 RX ORDER — INSULIN LISPRO 100 [IU]/ML
0-4 INJECTION, SOLUTION INTRAVENOUS; SUBCUTANEOUS NIGHTLY
Status: DISCONTINUED | OUTPATIENT
Start: 2023-06-09 | End: 2023-06-12 | Stop reason: HOSPADM

## 2023-06-09 RX ORDER — DEXTROSE, SODIUM CHLORIDE, SODIUM LACTATE, POTASSIUM CHLORIDE, AND CALCIUM CHLORIDE 5; .6; .31; .03; .02 G/100ML; G/100ML; G/100ML; G/100ML; G/100ML
INJECTION, SOLUTION INTRAVENOUS CONTINUOUS
Status: DISCONTINUED | OUTPATIENT
Start: 2023-06-09 | End: 2023-06-12 | Stop reason: HOSPADM

## 2023-06-09 RX ORDER — INSULIN LISPRO 100 [IU]/ML
0-4 INJECTION, SOLUTION INTRAVENOUS; SUBCUTANEOUS
Status: DISCONTINUED | OUTPATIENT
Start: 2023-06-09 | End: 2023-06-12 | Stop reason: HOSPADM

## 2023-06-09 RX ADMIN — CEFAZOLIN 2000 MG: 1 INJECTION, POWDER, FOR SOLUTION INTRAMUSCULAR; INTRAVENOUS at 13:19

## 2023-06-09 RX ADMIN — ONDANSETRON 4 MG: 2 INJECTION INTRAMUSCULAR; INTRAVENOUS at 14:11

## 2023-06-09 RX ADMIN — CEFAZOLIN 2000 MG: 1 INJECTION, POWDER, FOR SOLUTION INTRAMUSCULAR; INTRAVENOUS at 01:13

## 2023-06-09 RX ADMIN — ACETAMINOPHEN 1000 MG: 500 TABLET ORAL at 03:05

## 2023-06-09 RX ADMIN — METOPROLOL TARTRATE 25 MG: 25 TABLET, FILM COATED ORAL at 21:02

## 2023-06-09 RX ADMIN — SODIUM CHLORIDE, PRESERVATIVE FREE 10 ML: 5 INJECTION INTRAVENOUS at 21:03

## 2023-06-09 RX ADMIN — FAMOTIDINE 20 MG: 20 TABLET, FILM COATED ORAL at 09:54

## 2023-06-09 RX ADMIN — CEFAZOLIN 2000 MG: 1 INJECTION, POWDER, FOR SOLUTION INTRAMUSCULAR; INTRAVENOUS at 06:49

## 2023-06-09 RX ADMIN — SODIUM CHLORIDE, PRESERVATIVE FREE 10 ML: 5 INJECTION INTRAVENOUS at 09:55

## 2023-06-09 RX ADMIN — SODIUM CHLORIDE, SODIUM LACTATE, POTASSIUM CHLORIDE, CALCIUM CHLORIDE AND DEXTROSE MONOHYDRATE: 5; 600; 310; 30; 20 INJECTION, SOLUTION INTRAVENOUS at 19:00

## 2023-06-09 RX ADMIN — MUPIROCIN: 20 OINTMENT TOPICAL at 21:03

## 2023-06-09 RX ADMIN — CHLORHEXIDINE GLUCONATE 15 ML: 1.2 RINSE ORAL at 21:03

## 2023-06-09 RX ADMIN — METOPROLOL TARTRATE 25 MG: 25 TABLET, FILM COATED ORAL at 09:54

## 2023-06-09 RX ADMIN — HYDROXYUREA 500 MG: 500 CAPSULE ORAL at 09:54

## 2023-06-09 RX ADMIN — CHLORHEXIDINE GLUCONATE 15 ML: 1.2 RINSE ORAL at 09:56

## 2023-06-09 RX ADMIN — MUPIROCIN: 20 OINTMENT TOPICAL at 09:55

## 2023-06-09 RX ADMIN — CEFAZOLIN 2000 MG: 1 INJECTION, POWDER, FOR SOLUTION INTRAMUSCULAR; INTRAVENOUS at 18:59

## 2023-06-09 RX ADMIN — OXYCODONE HYDROCHLORIDE 5 MG: 5 TABLET ORAL at 03:05

## 2023-06-09 ASSESSMENT — ENCOUNTER SYMPTOMS
TROUBLE SWALLOWING: 0
CONSTIPATION: 0
VOMITING: 0
NAUSEA: 0
SORE THROAT: 0
DIARRHEA: 0
CHEST TIGHTNESS: 0
WHEEZING: 0
EYE PAIN: 0
COLOR CHANGE: 0
ABDOMINAL PAIN: 0
EYE REDNESS: 0
SHORTNESS OF BREATH: 0

## 2023-06-09 ASSESSMENT — PAIN SCALES - GENERAL
PAINLEVEL_OUTOF10: 2
PAINLEVEL_OUTOF10: 6
PAINLEVEL_OUTOF10: 2

## 2023-06-09 ASSESSMENT — PAIN DESCRIPTION - DESCRIPTORS: DESCRIPTORS: ACHING

## 2023-06-09 ASSESSMENT — PAIN DESCRIPTION - LOCATION: LOCATION: CHEST

## 2023-06-09 ASSESSMENT — PAIN DESCRIPTION - ORIENTATION: ORIENTATION: ANTERIOR

## 2023-06-10 LAB
ANION GAP SERPL CALC-SCNC: 6 MMOL/L (ref 5–15)
APTT PPP: 32.2 SEC (ref 22.1–31)
BUN SERPL-MCNC: 14 MG/DL (ref 6–20)
BUN/CREAT SERPL: 13 (ref 12–20)
C PEPTIDE SERPL-MCNC: 5.3 NG/ML (ref 1.1–4.4)
CALCIUM SERPL-MCNC: 8 MG/DL (ref 8.5–10.1)
CHLORIDE SERPL-SCNC: 106 MMOL/L (ref 97–108)
CO2 SERPL-SCNC: 26 MMOL/L (ref 21–32)
CREAT SERPL-MCNC: 1.08 MG/DL (ref 0.7–1.3)
ERYTHROCYTE [DISTWIDTH] IN BLOOD BY AUTOMATED COUNT: 18.7 % (ref 11.5–14.5)
GLUCOSE BLD STRIP.AUTO-MCNC: 103 MG/DL (ref 65–117)
GLUCOSE BLD STRIP.AUTO-MCNC: 105 MG/DL (ref 65–117)
GLUCOSE BLD STRIP.AUTO-MCNC: 116 MG/DL (ref 65–117)
GLUCOSE SERPL-MCNC: 84 MG/DL (ref 65–100)
HCT VFR BLD AUTO: 26.2 % (ref 36.6–50.3)
HGB BLD-MCNC: 8.5 G/DL (ref 12.1–17)
INR PPP: 1.5 (ref 0.9–1.1)
MAGNESIUM SERPL-MCNC: 2 MG/DL (ref 1.6–2.4)
MCH RBC QN AUTO: 29.1 PG (ref 26–34)
MCHC RBC AUTO-ENTMCNC: 32.4 G/DL (ref 30–36.5)
MCV RBC AUTO: 89.7 FL (ref 80–99)
NRBC # BLD: 0 K/UL (ref 0–0.01)
NRBC BLD-RTO: 0 PER 100 WBC
PLATELET # BLD AUTO: 84 K/UL (ref 150–400)
PMV BLD AUTO: 11.3 FL (ref 8.9–12.9)
POTASSIUM SERPL-SCNC: 3.9 MMOL/L (ref 3.5–5.1)
PROTHROMBIN TIME: 15 SEC (ref 9–11.1)
RBC # BLD AUTO: 2.92 M/UL (ref 4.1–5.7)
SERVICE CMNT-IMP: NORMAL
SODIUM SERPL-SCNC: 138 MMOL/L (ref 136–145)
THERAPEUTIC RANGE: ABNORMAL SECS (ref 58–77)
UFH PPP CHRO-ACNC: <0.1 IU/ML
UFH PPP CHRO-ACNC: <0.1 IU/ML
WBC # BLD AUTO: 26.4 K/UL (ref 4.1–11.1)

## 2023-06-10 PROCEDURE — 36415 COLL VENOUS BLD VENIPUNCTURE: CPT

## 2023-06-10 PROCEDURE — 6370000000 HC RX 637 (ALT 250 FOR IP): Performed by: STUDENT IN AN ORGANIZED HEALTH CARE EDUCATION/TRAINING PROGRAM

## 2023-06-10 PROCEDURE — 85520 HEPARIN ASSAY: CPT

## 2023-06-10 PROCEDURE — 82962 GLUCOSE BLOOD TEST: CPT

## 2023-06-10 PROCEDURE — 2580000003 HC RX 258: Performed by: STUDENT IN AN ORGANIZED HEALTH CARE EDUCATION/TRAINING PROGRAM

## 2023-06-10 PROCEDURE — 85730 THROMBOPLASTIN TIME PARTIAL: CPT

## 2023-06-10 PROCEDURE — 80048 BASIC METABOLIC PNL TOTAL CA: CPT

## 2023-06-10 PROCEDURE — 2580000003 HC RX 258: Performed by: PHYSICIAN ASSISTANT

## 2023-06-10 PROCEDURE — 83735 ASSAY OF MAGNESIUM: CPT

## 2023-06-10 PROCEDURE — 85610 PROTHROMBIN TIME: CPT

## 2023-06-10 PROCEDURE — 6370000000 HC RX 637 (ALT 250 FOR IP): Performed by: PHYSICIAN ASSISTANT

## 2023-06-10 PROCEDURE — 6360000002 HC RX W HCPCS: Performed by: STUDENT IN AN ORGANIZED HEALTH CARE EDUCATION/TRAINING PROGRAM

## 2023-06-10 PROCEDURE — 85027 COMPLETE CBC AUTOMATED: CPT

## 2023-06-10 PROCEDURE — 6360000002 HC RX W HCPCS: Performed by: PHYSICIAN ASSISTANT

## 2023-06-10 PROCEDURE — 2000000000 HC ICU R&B

## 2023-06-10 RX ORDER — HEPARIN SODIUM 1000 [USP'U]/ML
2000 INJECTION, SOLUTION INTRAVENOUS; SUBCUTANEOUS PRN
Status: DISCONTINUED | OUTPATIENT
Start: 2023-06-10 | End: 2023-06-11

## 2023-06-10 RX ORDER — HEPARIN SODIUM 10000 [USP'U]/100ML
5-30 INJECTION, SOLUTION INTRAVENOUS CONTINUOUS
Status: DISCONTINUED | OUTPATIENT
Start: 2023-06-10 | End: 2023-06-11

## 2023-06-10 RX ORDER — HEPARIN SODIUM 1000 [USP'U]/ML
4000 INJECTION, SOLUTION INTRAVENOUS; SUBCUTANEOUS PRN
Status: DISCONTINUED | OUTPATIENT
Start: 2023-06-10 | End: 2023-06-11

## 2023-06-10 RX ORDER — HEPARIN SODIUM 1000 [USP'U]/ML
60 INJECTION, SOLUTION INTRAVENOUS; SUBCUTANEOUS ONCE
Status: COMPLETED | OUTPATIENT
Start: 2023-06-10 | End: 2023-06-10

## 2023-06-10 RX ADMIN — OXYCODONE HYDROCHLORIDE 5 MG: 5 TABLET ORAL at 01:10

## 2023-06-10 RX ADMIN — HEPARIN SODIUM 4000 UNITS: 1000 INJECTION INTRAVENOUS; SUBCUTANEOUS at 18:50

## 2023-06-10 RX ADMIN — MUPIROCIN: 20 OINTMENT TOPICAL at 21:04

## 2023-06-10 RX ADMIN — METOPROLOL TARTRATE 25 MG: 25 TABLET, FILM COATED ORAL at 08:39

## 2023-06-10 RX ADMIN — SODIUM CHLORIDE, PRESERVATIVE FREE 10 ML: 5 INJECTION INTRAVENOUS at 09:00

## 2023-06-10 RX ADMIN — METOPROLOL TARTRATE 25 MG: 25 TABLET, FILM COATED ORAL at 21:00

## 2023-06-10 RX ADMIN — HEPARIN SODIUM 12 UNITS/KG/HR: 10000 INJECTION, SOLUTION INTRAVENOUS at 10:53

## 2023-06-10 RX ADMIN — CHLORHEXIDINE GLUCONATE 15 ML: 1.2 RINSE ORAL at 21:03

## 2023-06-10 RX ADMIN — CHLORHEXIDINE GLUCONATE 15 ML: 1.2 RINSE ORAL at 08:55

## 2023-06-10 RX ADMIN — HEPARIN SODIUM 4190 UNITS: 1000 INJECTION INTRAVENOUS; SUBCUTANEOUS at 10:57

## 2023-06-10 RX ADMIN — ACETAMINOPHEN 1000 MG: 500 TABLET ORAL at 12:03

## 2023-06-10 RX ADMIN — SODIUM CHLORIDE, SODIUM LACTATE, POTASSIUM CHLORIDE, CALCIUM CHLORIDE AND DEXTROSE MONOHYDRATE: 5; 600; 310; 30; 20 INJECTION, SOLUTION INTRAVENOUS at 08:39

## 2023-06-10 RX ADMIN — SODIUM CHLORIDE, PRESERVATIVE FREE 10 ML: 5 INJECTION INTRAVENOUS at 21:04

## 2023-06-10 RX ADMIN — MUPIROCIN: 20 OINTMENT TOPICAL at 08:55

## 2023-06-10 RX ADMIN — CEFAZOLIN 2000 MG: 1 INJECTION, POWDER, FOR SOLUTION INTRAMUSCULAR; INTRAVENOUS at 01:11

## 2023-06-10 RX ADMIN — HYDROXYUREA 500 MG: 500 CAPSULE ORAL at 09:07

## 2023-06-10 ASSESSMENT — PAIN DESCRIPTION - DESCRIPTORS
DESCRIPTORS: ACHING
DESCRIPTORS: ACHING

## 2023-06-10 ASSESSMENT — PAIN DESCRIPTION - FREQUENCY
FREQUENCY: INTERMITTENT
FREQUENCY: INTERMITTENT

## 2023-06-10 ASSESSMENT — PAIN - FUNCTIONAL ASSESSMENT
PAIN_FUNCTIONAL_ASSESSMENT: ACTIVITIES ARE NOT PREVENTED

## 2023-06-10 ASSESSMENT — PAIN DESCRIPTION - ORIENTATION
ORIENTATION: ANTERIOR

## 2023-06-10 ASSESSMENT — PAIN SCALES - GENERAL
PAINLEVEL_OUTOF10: 2
PAINLEVEL_OUTOF10: 3
PAINLEVEL_OUTOF10: 2
PAINLEVEL_OUTOF10: 0
PAINLEVEL_OUTOF10: 3
PAINLEVEL_OUTOF10: 3
PAINLEVEL_OUTOF10: 2

## 2023-06-10 ASSESSMENT — ENCOUNTER SYMPTOMS
VOMITING: 0
COLOR CHANGE: 0
SORE THROAT: 0
EYE REDNESS: 0
ABDOMINAL PAIN: 0
SHORTNESS OF BREATH: 0
CONSTIPATION: 0
WHEEZING: 0
CHEST TIGHTNESS: 0
TROUBLE SWALLOWING: 0
EYE PAIN: 0
NAUSEA: 0
DIARRHEA: 0

## 2023-06-10 ASSESSMENT — PAIN DESCRIPTION - LOCATION
LOCATION: CHEST

## 2023-06-11 LAB
ANION GAP SERPL CALC-SCNC: 6 MMOL/L (ref 5–15)
BUN SERPL-MCNC: 15 MG/DL (ref 6–20)
BUN/CREAT SERPL: 14 (ref 12–20)
CALCIUM SERPL-MCNC: 8.3 MG/DL (ref 8.5–10.1)
CHLORIDE SERPL-SCNC: 103 MMOL/L (ref 97–108)
CO2 SERPL-SCNC: 29 MMOL/L (ref 21–32)
CREAT SERPL-MCNC: 1.1 MG/DL (ref 0.7–1.3)
ECHO BSA: 1.77 M2
ERYTHROCYTE [DISTWIDTH] IN BLOOD BY AUTOMATED COUNT: 19.1 % (ref 11.5–14.5)
EST. AVERAGE GLUCOSE BLD GHB EST-MCNC: 94 MG/DL
GLUCOSE BLD STRIP.AUTO-MCNC: 125 MG/DL (ref 65–117)
GLUCOSE BLD STRIP.AUTO-MCNC: 130 MG/DL (ref 65–117)
GLUCOSE BLD STRIP.AUTO-MCNC: 137 MG/DL (ref 65–117)
GLUCOSE BLD STRIP.AUTO-MCNC: 164 MG/DL (ref 65–117)
GLUCOSE SERPL-MCNC: 91 MG/DL (ref 65–100)
HBA1C MFR BLD: 4.9 % (ref 4–5.6)
HCT VFR BLD AUTO: 26.9 % (ref 36.6–50.3)
HGB BLD-MCNC: 8.8 G/DL (ref 12.1–17)
IGF-I SERPL-MCNC: 71 NG/ML (ref 45–207)
MAGNESIUM SERPL-MCNC: 1.8 MG/DL (ref 1.6–2.4)
MCH RBC QN AUTO: 29.8 PG (ref 26–34)
MCHC RBC AUTO-ENTMCNC: 32.7 G/DL (ref 30–36.5)
MCV RBC AUTO: 91.2 FL (ref 80–99)
NRBC # BLD: 0 K/UL (ref 0–0.01)
NRBC BLD-RTO: 0 PER 100 WBC
PLATELET # BLD AUTO: 96 K/UL (ref 150–400)
PMV BLD AUTO: 11.6 FL (ref 8.9–12.9)
POTASSIUM SERPL-SCNC: 3.6 MMOL/L (ref 3.5–5.1)
RBC # BLD AUTO: 2.95 M/UL (ref 4.1–5.7)
SERVICE CMNT-IMP: ABNORMAL
SODIUM SERPL-SCNC: 138 MMOL/L (ref 136–145)
UFH PPP CHRO-ACNC: 0.24 IU/ML
WBC # BLD AUTO: 25.4 K/UL (ref 4.1–11.1)

## 2023-06-11 PROCEDURE — 6360000002 HC RX W HCPCS: Performed by: STUDENT IN AN ORGANIZED HEALTH CARE EDUCATION/TRAINING PROGRAM

## 2023-06-11 PROCEDURE — 83735 ASSAY OF MAGNESIUM: CPT

## 2023-06-11 PROCEDURE — 6370000000 HC RX 637 (ALT 250 FOR IP): Performed by: PHYSICIAN ASSISTANT

## 2023-06-11 PROCEDURE — 2580000003 HC RX 258: Performed by: PHYSICIAN ASSISTANT

## 2023-06-11 PROCEDURE — 94640 AIRWAY INHALATION TREATMENT: CPT

## 2023-06-11 PROCEDURE — 85520 HEPARIN ASSAY: CPT

## 2023-06-11 PROCEDURE — 85027 COMPLETE CBC AUTOMATED: CPT

## 2023-06-11 PROCEDURE — 2580000003 HC RX 258: Performed by: STUDENT IN AN ORGANIZED HEALTH CARE EDUCATION/TRAINING PROGRAM

## 2023-06-11 PROCEDURE — 6370000000 HC RX 637 (ALT 250 FOR IP): Performed by: STUDENT IN AN ORGANIZED HEALTH CARE EDUCATION/TRAINING PROGRAM

## 2023-06-11 PROCEDURE — 80048 BASIC METABOLIC PNL TOTAL CA: CPT

## 2023-06-11 PROCEDURE — 36415 COLL VENOUS BLD VENIPUNCTURE: CPT

## 2023-06-11 PROCEDURE — 2060000000 HC ICU INTERMEDIATE R&B

## 2023-06-11 PROCEDURE — 6360000002 HC RX W HCPCS: Performed by: PHYSICIAN ASSISTANT

## 2023-06-11 PROCEDURE — 83036 HEMOGLOBIN GLYCOSYLATED A1C: CPT

## 2023-06-11 PROCEDURE — 82962 GLUCOSE BLOOD TEST: CPT

## 2023-06-11 RX ADMIN — CHLORHEXIDINE GLUCONATE 15 ML: 1.2 RINSE ORAL at 09:02

## 2023-06-11 RX ADMIN — ALBUTEROL SULFATE 2.5 MG: 2.5 SOLUTION RESPIRATORY (INHALATION) at 16:19

## 2023-06-11 RX ADMIN — HEPARIN SODIUM 2000 UNITS: 1000 INJECTION INTRAVENOUS; SUBCUTANEOUS at 02:23

## 2023-06-11 RX ADMIN — CHLORHEXIDINE GLUCONATE 15 ML: 1.2 RINSE ORAL at 21:07

## 2023-06-11 RX ADMIN — HEPARIN SODIUM 18 UNITS/KG/HR: 10000 INJECTION, SOLUTION INTRAVENOUS at 07:53

## 2023-06-11 RX ADMIN — SODIUM CHLORIDE, PRESERVATIVE FREE 10 ML: 5 INJECTION INTRAVENOUS at 09:03

## 2023-06-11 RX ADMIN — METOPROLOL TARTRATE 25 MG: 25 TABLET, FILM COATED ORAL at 08:59

## 2023-06-11 RX ADMIN — MUPIROCIN: 20 OINTMENT TOPICAL at 09:02

## 2023-06-11 RX ADMIN — HYDROXYUREA 500 MG: 500 CAPSULE ORAL at 08:59

## 2023-06-11 RX ADMIN — METOPROLOL TARTRATE 25 MG: 25 TABLET, FILM COATED ORAL at 21:07

## 2023-06-11 RX ADMIN — SODIUM CHLORIDE, SODIUM LACTATE, POTASSIUM CHLORIDE, CALCIUM CHLORIDE AND DEXTROSE MONOHYDRATE: 5; 600; 310; 30; 20 INJECTION, SOLUTION INTRAVENOUS at 01:19

## 2023-06-11 RX ADMIN — SODIUM CHLORIDE, PRESERVATIVE FREE 10 ML: 5 INJECTION INTRAVENOUS at 21:07

## 2023-06-11 RX ADMIN — ALBUTEROL SULFATE 2.5 MG: 2.5 SOLUTION RESPIRATORY (INHALATION) at 22:08

## 2023-06-11 RX ADMIN — SENNOSIDES AND DOCUSATE SODIUM 1 TABLET: 8.6; 5 TABLET ORAL at 22:14

## 2023-06-11 RX ADMIN — MUPIROCIN: 20 OINTMENT TOPICAL at 21:07

## 2023-06-12 ENCOUNTER — APPOINTMENT (OUTPATIENT)
Facility: HOSPITAL | Age: 78
DRG: 314 | End: 2023-06-12
Attending: STUDENT IN AN ORGANIZED HEALTH CARE EDUCATION/TRAINING PROGRAM
Payer: MEDICARE

## 2023-06-12 ENCOUNTER — APPOINTMENT (OUTPATIENT)
Facility: HOSPITAL | Age: 78
DRG: 314 | End: 2023-06-12
Attending: ANESTHESIOLOGY
Payer: MEDICARE

## 2023-06-12 VITALS
HEART RATE: 87 BPM | OXYGEN SATURATION: 94 % | RESPIRATION RATE: 26 BRPM | HEIGHT: 67 IN | TEMPERATURE: 98.6 F | DIASTOLIC BLOOD PRESSURE: 73 MMHG | BODY MASS INDEX: 22.35 KG/M2 | WEIGHT: 142.42 LBS | SYSTOLIC BLOOD PRESSURE: 126 MMHG

## 2023-06-12 PROBLEM — E16.2 HYPOGLYCEMIA: Status: RESOLVED | Noted: 2023-06-08 | Resolved: 2023-06-12

## 2023-06-12 PROBLEM — I31.39 PERICARDIAL EFFUSION: Status: RESOLVED | Noted: 2023-06-05 | Resolved: 2023-06-12

## 2023-06-12 LAB
ANION GAP SERPL CALC-SCNC: 5 MMOL/L (ref 5–15)
APTT PPP: 30.5 SEC (ref 22.1–31)
BACTERIA SPEC CULT: NORMAL
BASOPHILS # BLD: 0.3 K/UL (ref 0–0.1)
BASOPHILS NFR BLD: 1 % (ref 0–1)
BUN SERPL-MCNC: 12 MG/DL (ref 6–20)
BUN/CREAT SERPL: 10 (ref 12–20)
CALCIUM SERPL-MCNC: 8.7 MG/DL (ref 8.5–10.1)
CHLORIDE SERPL-SCNC: 102 MMOL/L (ref 97–108)
CO2 SERPL-SCNC: 27 MMOL/L (ref 21–32)
CREAT SERPL-MCNC: 1.2 MG/DL (ref 0.7–1.3)
DIFFERENTIAL METHOD BLD: ABNORMAL
ECHO BSA: 1.75 M2
ECHO LV FRACTIONAL SHORTENING: 29 % (ref 28–44)
ECHO LV INTERNAL DIMENSION DIASTOLE INDEX: 1.94 CM/M2
ECHO LV INTERNAL DIMENSION DIASTOLIC: 3.4 CM (ref 4.2–5.9)
ECHO LV INTERNAL DIMENSION SYSTOLIC INDEX: 1.37 CM/M2
ECHO LV INTERNAL DIMENSION SYSTOLIC: 2.4 CM
ECHO LV IVSD: 0.9 CM (ref 0.6–1)
ECHO LV MASS 2D: 84.9 G (ref 88–224)
ECHO LV MASS INDEX 2D: 48.5 G/M2 (ref 49–115)
ECHO LV POSTERIOR WALL DIASTOLIC: 0.9 CM (ref 0.6–1)
ECHO LV RELATIVE WALL THICKNESS RATIO: 0.53
ECHO MV A VELOCITY: 0.84 M/S
ECHO MV E VELOCITY: 0.72 M/S
ECHO MV E/A RATIO: 0.86
EKG ATRIAL RATE: 288 BPM
EKG DIAGNOSIS: NORMAL
EKG Q-T INTERVAL: 322 MS
EKG QRS DURATION: 80 MS
EKG QTC CALCULATION (BAZETT): 487 MS
EKG R AXIS: -4 DEGREES
EKG T AXIS: 184 DEGREES
EKG VENTRICULAR RATE: 138 BPM
EOSINOPHIL # BLD: 0 K/UL (ref 0–0.4)
EOSINOPHIL NFR BLD: 0 % (ref 0–7)
ERYTHROCYTE [DISTWIDTH] IN BLOOD BY AUTOMATED COUNT: 19.2 % (ref 11.5–14.5)
GLUCOSE BLD STRIP.AUTO-MCNC: 108 MG/DL (ref 65–117)
GLUCOSE BLD STRIP.AUTO-MCNC: 147 MG/DL (ref 65–117)
GLUCOSE SERPL-MCNC: 149 MG/DL (ref 65–100)
GRAM STN SPEC: NORMAL
GRAM STN SPEC: NORMAL
HCT VFR BLD AUTO: 29.4 % (ref 36.6–50.3)
HGB BLD-MCNC: 9.7 G/DL (ref 12.1–17)
IMM GRANULOCYTES # BLD AUTO: 0 K/UL
IMM GRANULOCYTES NFR BLD AUTO: 0 %
INR PPP: 1.3 (ref 0.9–1.1)
LYMPHOCYTES # BLD: 3.6 K/UL (ref 0.8–3.5)
LYMPHOCYTES NFR BLD: 13 % (ref 12–49)
MAGNESIUM SERPL-MCNC: 2.6 MG/DL (ref 1.6–2.4)
MCH RBC QN AUTO: 29 PG (ref 26–34)
MCHC RBC AUTO-ENTMCNC: 33 G/DL (ref 30–36.5)
MCV RBC AUTO: 88 FL (ref 80–99)
METAMYELOCYTES NFR BLD MANUAL: 1 %
MONOCYTES # BLD: 7.7 K/UL (ref 0–1)
MONOCYTES NFR BLD: 28 % (ref 5–13)
MYELOCYTES NFR BLD MANUAL: 1 %
NEUTS SEG # BLD: 15.3 K/UL (ref 1.8–8)
NEUTS SEG NFR BLD: 56 % (ref 32–75)
NRBC # BLD: 0 K/UL (ref 0–0.01)
NRBC BLD-RTO: 0 PER 100 WBC
PLATELET # BLD AUTO: 99 K/UL (ref 150–400)
PMV BLD AUTO: 10.8 FL (ref 8.9–12.9)
POTASSIUM SERPL-SCNC: 3.4 MMOL/L (ref 3.5–5.1)
PROTHROMBIN TIME: 13.5 SEC (ref 9–11.1)
RBC # BLD AUTO: 3.34 M/UL (ref 4.1–5.7)
RBC MORPH BLD: ABNORMAL
RBC MORPH BLD: ABNORMAL
SERVICE CMNT-IMP: ABNORMAL
SERVICE CMNT-IMP: NORMAL
SERVICE CMNT-IMP: NORMAL
SODIUM SERPL-SCNC: 134 MMOL/L (ref 136–145)
THERAPEUTIC RANGE: NORMAL SECS (ref 58–77)
WBC # BLD AUTO: 27.4 K/UL (ref 4.1–11.1)

## 2023-06-12 PROCEDURE — 80048 BASIC METABOLIC PNL TOTAL CA: CPT

## 2023-06-12 PROCEDURE — 6370000000 HC RX 637 (ALT 250 FOR IP): Performed by: PHYSICIAN ASSISTANT

## 2023-06-12 PROCEDURE — 2500000003 HC RX 250 WO HCPCS: Performed by: NURSE PRACTITIONER

## 2023-06-12 PROCEDURE — 6360000002 HC RX W HCPCS: Performed by: NURSE PRACTITIONER

## 2023-06-12 PROCEDURE — 93308 TTE F-UP OR LMTD: CPT

## 2023-06-12 PROCEDURE — 99223 1ST HOSP IP/OBS HIGH 75: CPT | Performed by: INTERNAL MEDICINE

## 2023-06-12 PROCEDURE — 82962 GLUCOSE BLOOD TEST: CPT

## 2023-06-12 PROCEDURE — 6370000000 HC RX 637 (ALT 250 FOR IP): Performed by: STUDENT IN AN ORGANIZED HEALTH CARE EDUCATION/TRAINING PROGRAM

## 2023-06-12 PROCEDURE — 85730 THROMBOPLASTIN TIME PARTIAL: CPT

## 2023-06-12 PROCEDURE — 93308 TTE F-UP OR LMTD: CPT | Performed by: INTERNAL MEDICINE

## 2023-06-12 PROCEDURE — 36415 COLL VENOUS BLD VENIPUNCTURE: CPT

## 2023-06-12 PROCEDURE — 71045 X-RAY EXAM CHEST 1 VIEW: CPT

## 2023-06-12 PROCEDURE — 83735 ASSAY OF MAGNESIUM: CPT

## 2023-06-12 PROCEDURE — 2500000003 HC RX 250 WO HCPCS: Performed by: FAMILY MEDICINE

## 2023-06-12 PROCEDURE — 85610 PROTHROMBIN TIME: CPT

## 2023-06-12 PROCEDURE — 93005 ELECTROCARDIOGRAM TRACING: CPT | Performed by: FAMILY MEDICINE

## 2023-06-12 PROCEDURE — 97116 GAIT TRAINING THERAPY: CPT

## 2023-06-12 PROCEDURE — 85025 COMPLETE CBC W/AUTO DIFF WBC: CPT

## 2023-06-12 PROCEDURE — 6370000000 HC RX 637 (ALT 250 FOR IP): Performed by: NURSE PRACTITIONER

## 2023-06-12 PROCEDURE — 2580000003 HC RX 258: Performed by: FAMILY MEDICINE

## 2023-06-12 PROCEDURE — 6370000000 HC RX 637 (ALT 250 FOR IP): Performed by: HOSPITALIST

## 2023-06-12 RX ORDER — DILTIAZEM HYDROCHLORIDE 5 MG/ML
10 INJECTION INTRAVENOUS ONCE
Status: DISCONTINUED | OUTPATIENT
Start: 2023-06-12 | End: 2023-06-12 | Stop reason: HOSPADM

## 2023-06-12 RX ORDER — AMIODARONE HYDROCHLORIDE 400 MG/1
400 TABLET ORAL DAILY
Qty: 15 TABLET | Refills: 0 | Status: SHIPPED | OUTPATIENT
Start: 2023-06-13 | End: 2023-06-28

## 2023-06-12 RX ORDER — METOPROLOL TARTRATE 5 MG/5ML
2.5 INJECTION INTRAVENOUS ONCE
Status: COMPLETED | OUTPATIENT
Start: 2023-06-12 | End: 2023-06-12

## 2023-06-12 RX ORDER — POTASSIUM CHLORIDE 750 MG/1
20 TABLET, FILM COATED, EXTENDED RELEASE ORAL ONCE
Status: COMPLETED | OUTPATIENT
Start: 2023-06-12 | End: 2023-06-12

## 2023-06-12 RX ORDER — MAGNESIUM SULFATE IN WATER 40 MG/ML
2000 INJECTION, SOLUTION INTRAVENOUS ONCE
Status: COMPLETED | OUTPATIENT
Start: 2023-06-12 | End: 2023-06-12

## 2023-06-12 RX ORDER — AMIODARONE HYDROCHLORIDE 200 MG/1
400 TABLET ORAL DAILY
Status: DISCONTINUED | OUTPATIENT
Start: 2023-06-12 | End: 2023-06-12 | Stop reason: HOSPADM

## 2023-06-12 RX ADMIN — DILTIAZEM HYDROCHLORIDE 5 MG/HR: 5 INJECTION INTRAVENOUS at 02:20

## 2023-06-12 RX ADMIN — POTASSIUM CHLORIDE 20 MEQ: 750 TABLET, FILM COATED, EXTENDED RELEASE ORAL at 00:40

## 2023-06-12 RX ADMIN — METOPROLOL TARTRATE 2.5 MG: 5 INJECTION, SOLUTION INTRAVENOUS at 00:40

## 2023-06-12 RX ADMIN — METOPROLOL TARTRATE 25 MG: 25 TABLET, FILM COATED ORAL at 10:29

## 2023-06-12 RX ADMIN — SENNOSIDES AND DOCUSATE SODIUM 1 TABLET: 8.6; 5 TABLET ORAL at 10:29

## 2023-06-12 RX ADMIN — CHLORHEXIDINE GLUCONATE 15 ML: 1.2 RINSE ORAL at 10:31

## 2023-06-12 RX ADMIN — POTASSIUM BICARBONATE 50 MEQ: 977.5 TABLET, EFFERVESCENT ORAL at 12:42

## 2023-06-12 RX ADMIN — AMIODARONE HYDROCHLORIDE 400 MG: 200 TABLET ORAL at 13:24

## 2023-06-12 RX ADMIN — MUPIROCIN: 20 OINTMENT TOPICAL at 10:31

## 2023-06-12 RX ADMIN — MAGNESIUM SULFATE HEPTAHYDRATE 2000 MG: 40 INJECTION, SOLUTION INTRAVENOUS at 00:41

## 2023-06-12 RX ADMIN — HYDROXYUREA 500 MG: 500 CAPSULE ORAL at 10:29

## 2023-06-12 NOTE — PROGRESS NOTES
Patient is seen and evaluated for recurrent Afib with RVR    Chart is reviewed. Patient is afib/ CML/ GERD. Patient was admitted on 6/2/2023 with diagnosis of pericardial effusion, pericarditis, Afib, leukocytosis. Pericardiocentesis was unsuccessfully attempted at [facility]. Patient was transferred to [facility]. On 6/7/2023, 2d echo showed EF= 45%-50% with large > 2 cm pericardial effusion. On 6/8/2023, patient underwent pericardial window. Drains remained in place until removal yesterday. Tonight, nurse called hospitalist NP notifying of recurrence of Afib RVR with HR to 150s (sustained). Patient was given Metoprolol earlier tonight and dose of Metoprolol 2.5 mg IV x 1. On arrival at bedside, patient denies chest pain or other complaints. When asked if he is SOB, he states, \"maybe a little\". Otherwise he is asymptomatic. BP = 104/71   HR= 142  RR = 25  O2sat= 92% on room air. General: Patient in no acute respiratory distress. HEENT: Normocephalic, atraumatic, no otorrhea, no rhinorrhea, nares patent, PEERL, EOMI, moist mucus membranes. Tongue midline/nonedematous. Neck: Supple, no JVD, no meningeal signs. No carotid bruits. Trachea midline. Respiratory/ Chest: Clear to auscultation bilaterally   CVS: tachycardic, irregular rhythm. no murmurs/rubs/gallops. GI/ Abd: Soft, non-tender, non-distended, +bowel sounds. Midline epigastric surgical incisional wound intact with dressing. Ext: No clubbing, no cyanosis, no edema  Neuro: GCS 15.  sensory grossly within normal limit, Strength 5/5 in all extremities. No slurred speech. No facial droop. Psych: A&Ox4. Flat affect with limited response to questions.   Cap refill: Brisk, less than 3 seconds  Vascular/ Pulses: 2+ radial, 1+ dorsalis pedis pulse bilateral.  Integument/ Skin: Warm, dry, without rashes or lesions     A/P:  Atrial fibrillation with rapid ventricular response  -due to low borderline BP, started Diltiazem IV titrated infusion without

## 2023-06-12 NOTE — PLAN OF CARE
Problem: Physical Therapy - Adult  Goal: By Discharge: Performs mobility at highest level of function for planned discharge setting. See evaluation for individualized goals. Description: FUNCTIONAL STATUS PRIOR TO ADMISSION: Patient was independent and active without use of DME. Denies hx falls. Drives. HOME SUPPORT PRIOR TO ADMISSION: The patient lived with wife but did not require assistance. Physical Therapy Goals  Initiated 6/9/2023  1. Patient will perform sit to stand with independence within 7 day(s). 2.  Patient will transfer from bed to chair and chair to bed with independence using the least restrictive device within 7 day(s). 3.  Patient will ambulate with independence for 150 feet with the least restrictive device within 7 day(s). 4.  Patient will ascend/descend 3 stairs with one handrail(s) with independence within 7 day(s).     6/12/2023 1400 by Ashanti Botello PTA  Outcome: Progressing
Mobility Training:  Bed Mobility:  Bed Mobility Training  Supine to Sit: Independent  Transfers:  Transfer Training  Sit to Stand: Modified independent  Stand to Sit: Modified independent  Balance:  Balance  Sitting: Intact  Standing: Intact   Ambulation/Gait Training:     Gait  Overall Level of Assistance: Stand-by assistance;Contact-guard assistance  Interventions: Verbal cues  Base of Support: Narrowed  Speed/Destiney: Pace decreased (< 100 feet/min)  Step Length: Right shortened;Left shortened  Gait Abnormalities: Decreased step clearance  Distance (ft): 200 Feet  Neuro Re-Education:                Pain Rating:  none  Pain Intervention(s):        Activity Tolerance:   requires rest breaks    After treatment:   Call bell within reach and sitting on commode nursing aware       COMMUNICATION/EDUCATION:   The patient's plan of care was discussed with: registered nurse    Patient Education  Education Given To: Patient  Education Provided: Energy Conservation;Role of Therapy  Education Method: Verbal  Barriers to Learning: None  Education Outcome: Verbalized understanding      Kamila Bose PTA  Minutes: 15

## 2023-06-12 NOTE — PROGRESS NOTES
0800: Bedside and Verbal shift change report given to [facility] (oncoming nurse) by Keara Levin (offgoing nurse). Report included the following information Nurse Handoff Report, Index, Adult Overview, Surgery Report, Intake/Output, MAR, Recent Results, Cardiac Rhythm NSR, and Alarm Parameters. 1205Ceclia Shabbir MD notified of patient's potassium of 3.4. PO K+ ordered by MD. Orders also received to titrate cardizem off if patient tolerates. 1215: O2 titrated off. Patient tolerated well. 1229Larenard Gardner MD at bedside. Plans to send patient home on PO amiodarone. Felipe Delcid MD at bedside discussing plan of care. Plans to discharge patient this afternoon. 1400: PT at bedside, patient ambulating in hallway. 1437: Diltiazem gtt titrated off.     1545: Patient's wife updated via telephone of discharge orders and instructions. 1615: Discharge instructions reviewed thoroughly with patient. Medication list reviewed. Patient's wife confirmed patient had eliquis dose at home for PM dose. PIV's removed, Patient wheeled down with RN, discharged with ride share home.

## 2023-06-12 NOTE — PROGRESS NOTES
1015 Pt arrived from PACU after pericardialcentesis was unsuccessful. Plan to transfer to [facility] for Pericardial Window. Accepted by CVICU. [facility] sending to room 4338.  1030 . Assessment completed. Meds given with sips of water. 1120 Report called to CVICU [phone]. [facility] ETA is 30 minutes. 1210 BS Critical Care transport here for patient. Called and Notified Karli Cárdenas RN at [facility] CVICU.
1900  Bedside shift change report given to Alice Guerra (oncoming nurse) by Racheal Simons RN (offgoing nurse). Report included the following information SBAR, Kardex, ED Summary, Intake/Output, and Recent Results. 0630  Notified by lab of critical BG of 47 on morning labs. Finger stick obtained and BG was 49. Notified provider and received orders. Glucose tabs given and patient BG came up to 78.         0700  Bedside shift change report given to Racheal Simons RN (oncoming nurse) by Alice Guerra (offgoing nurse). Report included the following information SBAR, Kardex, ED Summary, Intake/Output, and Recent Results. This patient was assisted with Intentional Toileting every 2 hours during this shift as appropriate. Documentation of ambulation and output reflected on Flowsheet as appropriate. Purposeful hourly rounding was completed using AIDET and 5Ps. Outcomes of PHR documented as they occurred. Bed alarm in use as appropriate. Dual Suction and ambubag in place.
[facility]                    Cardiology Care Note     []Initial Encounter     [x]Follow-up    Patient Name: Mili Burr - :1945 - UMX:[id number]  Primary Cardiologist: Andrew Weller MD  Consulting Cardiologist: Brenda Sloan MD     Reason for encounter: Pericardial Effusion/ Afib with RVR    HPI:       Mili Burr is a 69 y.o. male with PMH significant for PAF, chronic myeloid leukemia, HTN, and GERD. Admitted for abdominal pain and was found to have bilateral hydronephrosis/acute renal failure-s/p bilateral ureteral stents. He also went into A-fib with RVR. Patient states he is unaware of an Afib diagnosis. Echocardiogram was done which showed moderate to large pericardial effusion. No complaints of chest pain, dyspnea, or palpitations. Subjective:      Mili Burr reports palpitations/abdominal pain. Assessment and Plan     A-fib with RVR: ( hx of PAF - initially diagnosed 23). Metoprolol 25 mg BID. Currently rate controlled. Eliquis on hold for now sec to urorlogical issues/hematuria    2. Pericardial effusion: Repeat ECHO this am. May need pericardiocentesis, pt hesitant to proceed w/ procedure     3. hydronephrosis/acute renal failure: S/p bilat ureteral stents. Creatinine: 1.22    4. HTN:  BP controlled. Metoprolol 25 mg BID         ____________________________________________________________    Cardiac testing  23    TRANSTHORACIC ECHOCARDIOGRAM (TTE) COMPLETE (CONTRAST/BUBBLE/3D PRN) 2023  2:00 PM (Final)    Interpretation Summary    Left Ventricle: Moderately reduced left ventricular systolic function with a visually estimated EF of 40 - 45%. Left ventricle size is normal. Normal wall thickness. Normal wall motion. Pericardium: Moderate to large circumferential ( larger around LV than RV) pericardial effusion present. No indication of cardiac tamponade.  Evidence includes no
[facility]                    Cardiology Care Note     []Initial Encounter     [x]Follow-up    Patient Name: Dread Zuniga - :1945 - [id number]  Primary Cardiologist: Hilaria Mancuso MD  Consulting Cardiologist: Shannon Guaman MD     Reason for encounter: Pericardial Effusion/ Afib with RVR    HPI:       Dread Zuniga is a 69 y.o. male with PMH significant for PAF, chronic myeloid leukemia, HTN, and GERD. Admitted for abdominal pain and was found to have bilateral hydronephrosis/acute renal failure-s/p bilateral ureteral stents. He also went into A-fib with RVR. Patient states he is unaware of an Afib diagnosis. Echocardiogram was done which showed moderate to large pericardial effusion. No complaints of chest pain, dyspnea, or palpitations. Subjective:      Dread Zuniga returned from cath lab, mainly has questions regarding his [place]. Assessment and Plan     A-fib with RVR: ( x of PAF - initially diagnosed 23). Recurrent AF w/ RVR, on/off dilt gtt. Cont metoprolol 25 mg BID. Eliquis on hold for now sec to urorlogical issues/hematuria/need for pericardiocentesis - cont to hold pending surgical plans     2. Pericardial effusion, large:  s/p attempt at pericardiocentesis unsuccessful this morning, will transfer to [facility] for pericardial window by Dr. Windy Truong    3. Hydronephrosis/acute renal failure: S/p bilat ureteral stents. Cr normalized, urology following     4. HTN:  BP controlled. Metoprolol 25 mg BID    5. Hypokalemia: replete prn     6. Hypoglycemia: per primary        ____________________________________________________________    Cardiac testing  23    TRANSTHORACIC ECHOCARDIOGRAM (TTE) COMPLETE (CONTRAST/BUBBLE/3D PRN) 2023  2:00 PM (Final)    Interpretation Summary    Left Ventricle: Moderately reduced left ventricular systolic function with a visually estimated EF of 40 - 45%.  Left
Bedside and Verbal shift change report given to Bernard Hendrix RN (oncoming nurse) by Leah Pereira RN (offgoing nurse). Report included the following information Nurse Handoff Report, Index, Adult Overview, Intake/Output, MAR, and Recent Results. 6102 - At bedside to administer am meds. Pt afib on monitor. PO metoprolol scheduled. RNs at bedside to take patient down to procedure, per RN MD ordered to hold PO meds. Meds to be given for afib/hypoglycemia as needed prior to and in procedure. Will resume care on return to floor. Pt transferred to ICU bed 13, report given to Fartun barraza RN.
Bedside and Verbal shift change report given to Myranda Dawkins RN (oncoming nurse) by Briseyda Michel RN (offgoing nurse). Report included the following information Nurse Handoff Report, Index, Adult Overview, Intake/Output, MAR, and Recent Results. 0786 - Pt back in afib on monitor. Rona Trilla with cards notified. PO metoprolol to be administered. Bedside and Verbal shift change report given to [facility] (oncoming nurse) by Myranda Dawkins RN (offgoing nurse). Report included the following information Nurse Handoff Report, Index, Adult Overview, Intake/Output, MAR, and Recent Results.
[facility] [place] at [place] [address] [facility] Gun: [phone]  F: [phone]      Reason for Visit:   Cheryle Cure is a 69 y.o. 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 male who was admitted on 6/1/23  for abd pain / nausea and poor po intake. ED imaging with noted bilateral hydronephrosis with bilateral ureteral stones. ED labs with 6/2/23 Underwent jolene stent placement . Today feeling some better; pain controlled. Expressed a little overwhelmed with al that's going on. Reports that the had stopped hydrea. No family at bedside. Interval history:  6/5/23:  feeling better    Review of systems was obtained and pertinent findings reviewed above. Past medical history, social history, family history, medications, and allergies are located in the electronic medical record. Physical Exam:   Visit Vitals  BP (!) 143/77   Pulse 79   Temp 98 °F (36.7 °C) (Oral)   Resp 16   Ht 5' 7\" (1.702 m)   Wt 160 lb (72.6 kg)   SpO2 95%   BMI 25.06 kg/m²     ECOG PS: 1  General: no distress  Respiratory: normal respiratory effort  CV: no peripheral edema  Skin: no rashes; no ecchymoses; no petechiae    Pulmonary: Normal Respiratory Effort. Abdominal: Normal bowel sounds. Soft Abdomen to palpation. No abdominal tenderness. Neurological: Alert and oriented. No tremor on inspection.     Psychiatric: mood normal. normal speech rate normal affect    Results:     Lab Results   Component Value Date    WBC 19.9 (H) 06/05/2023    HGB 7.4 (L) 06/05/2023    HCT 22.2 (L) 06/05/2023    PLT 89 (L) 06/05/2023    MCV 85.7 06/05/2023    NEUTROABS 14.4 (H) 06/01/2023     Lab Results   Component Value Date     06/05/2023    K 3.2 (L) 06/05/2023     (H) 06/05/2023    CO2 27 06/05/2023    GLUCOSE 106 (H) 06/05/2023    BUN 20
D/w Pt - Reviewed echo findings again  Pt would like to proceed with pericardiocentesis    Consent for Pericardiocentesis. RIBA of procedure explained to pt and family in detail. Risks include but not limted to air accumulation around the lung (pneumothorax), irregular heart rhythms,cardiac arrest, perforation of the heart requiring surgery, bleeding from injury to blood vessels/ organs, infection, nerye injury, death. Patient  also informed that if patient needs surgery  - it will be at [facility] as [facility] does not have onsite cardiac/thoracic surgery. All questions answered to patient's satisfaction. Patient agrees to proceed with the procedure and  informed consent was obtained.         ASA 3     AW  2     Kristan Rosales MD., [facility]
Hospitalist Progress Note      NAME: Jaime Angel   :  1945  MRM:  [id number]    Date/Time: 2023  6:11 PM             Subjective:     Chief Complaint: Hypoglycemia    Echo was not performed this morning. Patient is hungry. Will allow to eat. Possible pericardiocentesis tomorrow pending echo results. When completed. Discussed with cardiology. Objective:       Vitals:          Last 24hrs VS reviewed since prior progress note. Most recent are:    Vitals:    23 1611   BP: (!) 142/78   Pulse: 81   Resp: 16   Temp: 98.6 °F (37 °C)   SpO2: 92%     SpO2 Readings from Last 6 Encounters:   23 92%   23 97%   23 96%    PreHospital Care  Medications Given: NaCl     Intake/Output Summary (Last 24 hours) at 2023 1811  Last data filed at 2023 1611  Gross per 24 hour   Intake 650 ml   Output 2575 ml   Net -1925 ml            Exam:     Physical Exam:    General:   No acute distress, resting comfortably in bed. Heart:  RRR, No MRG. Distant heart sounds. Lungs:  CTAB. Non-labored breathing. Abdomen:  Soft . Nondistended. NTTP. BS present. Extremities:  No edema. : [place], no tenderness  Neuro:  Alert and interactive. No focal deficits. Psych:  Mood stable.         Medications Reviewed: (see below)    Lab Data Reviewed: (see below)    ______________________________________________________________________    Medications:     Current Facility-Administered Medications   Medication Dose Route Frequency    cefepime (MAXIPIME) 2,000 mg in sodium chloride 0.9 % 50 mL IVPB (mini-bag)  2,000 mg IntraVENous Q24H    sodium chloride flush 0.9 % injection 5-40 mL  5-40 mL IntraVENous 2 times per day    sodium chloride flush 0.9 % injection 5-40 mL  5-40 mL IntraVENous PRN    0.9 % sodium chloride infusion   IntraVENous PRN    ondansetron (ZOFRAN-ODT) disintegrating tablet 4 mg  4 mg Oral Q8H PRN    Or    ondansetron (ZOFRAN) injection 4 mg  4 mg IntraVENous Q6H PRN
Hospitalist Progress Note      NAME: Sally Weber   :  1945  MRM:  [id number]    Date/Time: 2023  7:02 PM             Subjective:     Chief Complaint: Pericardial effusion    Repeat echo showing persistent effusion, plan for pericardiocentesis tomorrow. No chest pain or palpitations. No lower extremity edema. Will allow to eat today. Objective:       Vitals:          Last 24hrs VS reviewed since prior progress note. Most recent are:    Vitals:    23 1600   BP:    Pulse: 80   Resp:    Temp:    SpO2:      SpO2 Readings from Last 6 Encounters:   23 92%   23 97%   23 96%    PreHospital Care  Medications Given: NaCl     Intake/Output Summary (Last 24 hours) at 2023 1902  Last data filed at 2023 1322  Gross per 24 hour   Intake 2882.8 ml   Output 1500 ml   Net 1382.8 ml            Exam:     Physical Exam:    General:   No acute distress, resting comfortably in bed. Heart:  RRR, No MRG. Distant heart sounds. Lungs:  CTAB. Non-labored breathing. Abdomen:  Soft . Nondistended. NTTP. BS present. Extremities:  No edema. : [place], no tenderness  Neuro:  Alert and interactive. No focal deficits. Psych:  Mood stable.         Medications Reviewed: (see below)    Lab Data Reviewed: (see below)    ______________________________________________________________________    Medications:     Current Facility-Administered Medications   Medication Dose Route Frequency    potassium chloride (KLOR-CON) extended release tablet 20 mEq  20 mEq Oral Daily    dilTIAZem 100 mg in sodium chloride 0.9 % 100 mL infusion (ADD-[place])  2.5-15 mg/hr IntraVENous Continuous    cefepime (MAXIPIME) 2,000 mg in sodium chloride 0.9 % 50 mL IVPB (mini-bag)  2,000 mg IntraVENous Q24H    sodium chloride flush 0.9 % injection 5-40 mL  5-40 mL IntraVENous 2 times per day    sodium chloride flush 0.9 % injection 5-40 mL  5-40 mL IntraVENous PRN    0.9 % sodium chloride infusion   IntraVENous
Physician Progress Note      Chandana Bolivar  [facility] #:                  [id number]  :                       1945  ADMIT DATE:       2023 8:28 PM  [address] [place] DATE:        2023 12:20 PM  RESPONDING  PROVIDER #:        Anisa Quinn MD          QUERY TEXT:    Good afternoon. Patient was admitted on 23-23 with bilateral hydronephrosis. Noted   documentation of sepsis in  H&P and PNs dated -. Urine and   blood cx were without growth. Patient was found to have large pericardial   effusion and he was ultimately transferred to [facility] for pericardial window. In order to support the diagnosis of sepsis, please include additional   clinical indicators in your documentation. Or please document if the   diagnosis of sepsis has been ruled out after further study. The medical record reflects the following:    Risk Factors: Chronic myelomonocytic leukemia, atrial fibrillation, HTN,   recent tick bite    Clinical Indicators: from  H&P: \" Sepsis. Patient meets sepsis criteria   with pulse rate > 90, fever at presentation and obviously has leukocytosis due   to his CMML. Doxycycline has already been started by urology team, and   patient got 1 dose of Ancef prior to the operating room procedure. We will   escalate to cefepime. Awaiting blood culture results and urine culture   results. IV fluids with normal saline at 100 cc/h.\"; urine cx: No growth;   Blood cx: NGTD; from PNs dated  Urology PN: \"Tick bite noted in OR- doxy   ordered, primary adjusted management measures\"; Tmax 100.3 on ;    vitals signs: , 125, 146, 112; WBC 31.4, 35.6, 21.5, 16.1, 19.9, 23.9,   22.2    Treatment: IV fluids, IV Cefepime, PO Doxycycline, Ancef, labs, ICU level care        Thank you,  Tabitha Stoner RN, CDI  Options provided:  -- Sepsis present as evidenced by, Please document evidence.   -- Sepsis was ruled out after study  -- Other - I will add my own
Provided pastoral care visit to [facility] 5 patient. Did not include sacramental care.      Aman Rousseau
Rapid Called at [address]  to RRT at 1947 for Elevated Heart Rate    Provider at bedside: YES  Interventions ordered: Labs, EKG  Sepsis Suspected: No  Transfer to Higher Level of Care: YES    Vitals:    06/05/23 2045   BP: (!) 144/87   Pulse: (!) 127   Resp: 20   Temp: 99.5 °F (37.5 °C)   SpO2:       PT ALERT, ORIENTED, SKIN WARM A DRY , GOOD COLOR, PT HAD VOMITING EPISODE PRIOR TO RAPID. PT NOTED IN AFIB RVR, PT DENIES CP AND SOB. EKG DONE, IV STARTED AND LABS COLLECTED. DILTIAZEM BOLUS AND DRIP STARTED. VITALS COLLECTED AS DOCUMENTED. PT UPGRADED TO STEPDOWN. Rapid Ended at 2115  RRT RN assisted with transport to accepting unit Yes.     Sophy Glass RN
Spiritual Care Assessment/Progress Note  [address]    Name: Marsha Burris MRN: [id number]    Age: 69 y.o. Sex: male   Language: English     Date: 6/6/2023            Total Time Calculated: 5 min              Spiritual Assessment begun in SF 3 PROG CARE TELE 2  Service Provided For[de-identified] Patient  Referral/Consult From[de-identified] Clergy/  Encounter Overview/Reason : Rituals, Rites and Sacraments    Spiritual beliefs:      [x] Involved in a jamari tradition/spiritual practice: Katelynn Godinez     [] Supported by a jamari community:      [] Claims no spiritual orientation:      [] Seeking spiritual identity:           [] Adheres to an individual form of spirituality:      [] Not able to assess:                Identified resources for coping and support system:           [x] Prayer                  [] Devotional reading               [x] Music                  [] Guided Imagery     [] Pet visits                                        [] Other: (COMMENT)     Specific area/focus of visit   Encounter: Type: Initial Screen/Assessment  Crisis:    Spiritual/Emotional needs: Type: Spiritual Support  Ritual, Rites and Sacraments: Type: Jewish Communion  Grief, Loss, and Adjustments:    Ethics/Mediation:    Behavioral Health:    Palliative Care: Advance Care Planning:      Plan/Referrals: Continue to visit, (comment)    Narrative: Mr. Stevie Mcduffie was in bed. He is soft spoken. Prayer and communion offered. Let him know about the Mass times at [facility].       ELDA Dobbins RN, ACSW, LCSW   Page:  [phone]
Summary:    Rapid response called on the patient due to atrial fibrillation with RVR. EKG was obtained at bedside, I reviewed it, it showed atrial fibrillation with RVR. Vital signs were completely stable with the exception of the patient's heart rate. Patient does not have a history of CHF. I give the patient a liter bolus of fluid, this did not touch his atrial fibrillation with RVR. I ordered a BMP, magnesium, troponin. I also ordered 5 mg of Cardizem x3, spaced about 10 minutes apart, none of these touch the patient's RVR. At this time, I decided to put the patient on a Cardizem drip and transfer the patient to higher level of care, namely stepdown unit. Will check a troponin now and one in the AM.  Will check a TSH    Will give Mg and K --> goal of Mg >2; K>4    Critical Care Attestation: This patient is unstable and critically ill. Due to a high probability of clinically significant, life threatening deterioration, the patient required my highest level of preparedness to intervene emergently and I personally spent this critical care time directly and personally managing the patient. This critical care time included obtaining a history; examining the patient; pulse oximetry; ordering and review of studies; arranging urgent treatment with development of a management plan; evaluation of patient's response to treatment; frequent reassessment; and, discussions with other providers and/or family. This critical care time was performed to assess and manage the high probability of imminent, life-threatening deterioration that could result in multi-organ failure and death. It was exclusive of separately billable procedures, treating other patients, and teaching time. Time Spent:     I personally spent 45 minutes in providing critical care time.
Urology Progress Note    Subjective:     Daily Progress Note: 2023 8:41 AM    Blinda Given is 3 Days Post-Op and doing excellent. He reports pain is absent. He has no complaints. Walton draining thania urine in proximal tubing. Cr 1.22 (1.62)   WBC 19.9 (16.1)   Hgb 7.4 (7.5)   UCX negative, [facility]     Cardiology following, repeat ECHO this AM for possible pericardiocentesis. Objective:     /74   Pulse 84   Temp 98.6 °F (37 °C) (Oral)   Resp 16   Ht 1.702 m (5' 7\")   Wt 72.6 kg (160 lb)   SpO2 93%   BMI 25.06 kg/m²      Temp (24hrs), Av.9 °F (37.2 °C), Min:98.6 °F (37 °C), Max:99.7 °F (37.6 °C)      Intake and Output:   1901 -  0700  In: 770   Out: 4200 [Urine:4200]  No intake/output data recorded.     Physical Exam:  General appearance: alert, cooperative, no distress, appears stated age  Respiratory: no distress, room air   Abdomen: not distended, nontender   : walton draining thania urine in proximal tubing   Extremities: moves all     Data Review:    Recent Results (from the past 24 hour(s))   CBC    Collection Time: 23  4:02 AM   Result Value Ref Range    WBC 19.9 (H) 4.1 - 11.1 K/uL    RBC 2.59 (L) 4.10 - 5.70 M/uL    Hemoglobin 7.4 (L) 12.1 - 17.0 g/dL    Hematocrit 22.2 (L) 36.6 - 50.3 %    MCV 85.7 80.0 - 99.0 FL    MCH 28.6 26.0 - 34.0 PG    MCHC 33.3 30.0 - 36.5 g/dL    RDW 19.5 (H) 11.5 - 14.5 %    Platelets 89 (L) 684 - 400 K/uL    MPV 11.1 8.9 - 12.9 FL    Nucleated RBCs 0.0 0  WBC    nRBC 0.00 0.00 - 0.01 K/uL   Basic Metabolic Panel    Collection Time: 23  4:02 AM   Result Value Ref Range    Sodium 141 136 - 145 mmol/L    Potassium 3.2 (L) 3.5 - 5.1 mmol/L    Chloride 109 (H) 97 - 108 mmol/L    CO2 27 21 - 32 mmol/L    Anion Gap 5 5 - 15 mmol/L    Glucose 106 (H) 65 - 100 mg/dL    BUN 20 6 - 20 MG/DL    Creatinine 1.22 0.70 - 1.30 MG/DL    Bun/Cre Ratio 16 12 - 20      Est, Glom Filt Rate >60 >60 ml/min/1.73m2    Calcium 7.5 (L) 8.5
136 - 145 mmol/L    Potassium 3.4 (L) 3.5 - 5.1 mmol/L    Chloride 110 (H) 97 - 108 mmol/L    CO2 26 21 - 32 mmol/L    Anion Gap 4 (L) 5 - 15 mmol/L    Glucose 89 65 - 100 mg/dL    BUN 18 6 - 20 MG/DL    Creatinine 1.18 0.70 - 1.30 MG/DL    Bun/Cre Ratio 15 12 - 20      Est, Glom Filt Rate >60 >60 ml/min/1.73m2    Calcium 7.2 (L) 8.5 - 10.1 MG/DL   CBC with Auto Differential    Collection Time: 06/06/23  5:40 AM   Result Value Ref Range    WBC 23.9 (H) 4.1 - 11.1 K/uL    RBC 2.61 (L) 4.10 - 5.70 M/uL    Hemoglobin 7.5 (L) 12.1 - 17.0 g/dL    Hematocrit 22.6 (L) 36.6 - 50.3 %    MCV 86.6 80.0 - 99.0 FL    MCH 28.7 26.0 - 34.0 PG    MCHC 33.2 30.0 - 36.5 g/dL    RDW 19.8 (H) 11.5 - 14.5 %    Platelets 92 (L) 316 - 400 K/uL    MPV 11.6 8.9 - 12.9 FL    Nucleated RBCs 0.0 0  WBC    nRBC 0.00 0.00 - 0.01 K/uL    Neutrophils % 62 32 - 75 %    Band Neutrophils 2 0 - 6 %    Lymphocytes % 8 (L) 12 - 49 %    Monocytes % 27 (H) 5 - 13 %    Eosinophils % 1 0 - 7 %    Basophils % 0 0 - 1 %    Immature Granulocytes 0 %    Neutrophils Absolute 15.3 (H) 1.8 - 8.0 K/UL    Lymphocytes Absolute 1.9 0.8 - 3.5 K/UL    Monocytes Absolute 6.5 (H) 0.0 - 1.0 K/UL    Eosinophils Absolute 0.2 0.0 - 0.4 K/UL    Basophils Absolute 0.0 0.0 - 0.1 K/UL    Absolute Immature Granulocyte 0.0 K/UL    Differential Type MANUAL      RBC Comment ANISOCYTOSIS  1+       Magnesium    Collection Time: 06/06/23  5:40 AM   Result Value Ref Range    Magnesium 2.5 (H) 1.6 - 2.4 mg/dL   Troponin    Collection Time: 06/06/23  5:40 AM   Result Value Ref Range    Troponin, High Sensitivity 8 0 - 76 ng/L       Urine/Blood Cultures:  Blood cultures No results found for: [facility]  Urine No results found for: LABURIN    I/Os    Intake/Output Summary (Last 24 hours) at 6/6/2023 0722  Last data filed at 6/6/2023 8492  Gross per 24 hour   Intake 2522.8 ml   Output 2100 ml   Net 422.8 ml       Assessment/Plan:     Principal Problem:    Bilateral hydronephrosis  Active
indication of cardiac tamponade. Evidence includes no chamber collapse. Signed by: Arpita Salgado MD on 6/3/2023  2:00 PM    05/17/23    NM STRESS TEST WITH MYOCARDIAL PERFUSION 05/18/2023  4:18 PM (Final)    Interpretation Summary    Resting ECG: ECG is normal. The ECG shows sinus rhythm. Resting ECG shows no ST-segment deviation. The ECG shows premature atrial contractions. ECG: The Stress ECG was negative for ischemia. Perfusion Comments: LV perfusion is normal. There is no evidence of inducible ischemia. Stress Function: Left ventricular function post-stress is normal. Post-stress ejection fraction is 58%. Signed by: Arlyn Crockett MD on 5/18/2023  4:18 PM    No results found for this or any previous visit. Most recent HS troponins:  Recent Labs     06/05/23 2011 06/06/23  0540   TROPHS 8 8       ECG: SR/Nml axis/Nnml intervals    Review of Systems:    [x]All other systems reviewed and all negative except as written in HPI    [] Patient unable to provide secondary to condition    Past Medical History:   Diagnosis Date    Atrial fibrillation ([facility] [address]     Chronic myelomonocytic leukemia ([facility] [address]     Confirmed per bone marrow biopsy of 5/18/2023. Was referred to [facility] for transplant, but may not be candidate due to age, was started on chemotherapy. GERD (gastroesophageal reflux disease)     Hypertension      Past Surgical History:   Procedure Laterality Date    APPENDECTOMY      BONE MARROW BIOPSY  05/18/2023    negative for BCR/ABL/ASS1; CMML-2 (14% blasts)    CATARACT REMOVAL Right     COLONOSCOPY N/A 4/12/2017    COLONOSCOPY performed by Nabil Abernathy MD at [address] [place] [address] Bilateral 6/2/2023    CYSTOSCOPY, BILATERAL  STENTS, RETROGRADE performed by Tor Burns MD at [address] [facility]     Social Hx:  reports that he has quit smoking. His smoking use included cigarettes.  He has never used smokeless tobacco. He reports that he does not currently use drugs after having used the
BILIRUBINUR Negative 2023 07:57 PM     23 TTE EF 45-50%, mod to large pericardial effusion    Test results above have been reviewed. 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 Leukocytosis if from this    High Risk Medication Use  --Hydrea on hold for now especially with renal failure; will resume as outpatient at prior dose 500 mg daily once acute condition as resolved -- I discussed this with him today and he will restart at home; discussed with pt symptoms feeling more than likely related to current medical condition than medication. Patient agreed with this possibility. Did note that night sweats had improved on the medication. Thrombocytopenia: hx of plt clumping   -cont to monitor, improved    Acute renal failure/bilateral hydronephrosis.   - urology followin23 underwent cystoscopy, bilateral stents/ Dr Jimmy Du in place/ manual irrigation prn      Afib RVR: per cardiology; eliquis being held, will need a decision on this at discharge    Tick removal during cystoscopy :  on doxycycline    Pericardial effusion:  pericardiocentesis tomorrow    Anemia:  due to CMML and hematuria, transfuse for hgb < 7    MARTITA:  improved    Will sign off, please call with questions

## 2023-06-12 NOTE — PROGRESS NOTES
Hospital follow-up PCP transitional care appointment has been scheduled with Dr. Mel Valentine for Thursday Shirley 15, 2023 at 1:45 PM.? Pending patient discharge.   Winsome Guerin, Care Management Assistant

## 2023-06-12 NOTE — PROGRESS NOTES
[facility] ICU Progress Note    Admit Date: 2023  POD:  4 Days Post-Op    Today: 23    Procedure:  Procedure(s): 23  PERICARDIAL WINDOW        Subjective:   Patient seen with Dr. Evelin Chaudhry. CT out, incision stable, follow up in chart. Afib with rvr overnight, electrolytes optimized, started on Cardizem, echo pending this am    Not currently on anticoagulation, heparin drip stopped yesterday     Objective:   Vitals:  Blood pressure 103/71, pulse 83, temperature 98.6 °F (37 °C), temperature source Oral, resp. rate 24, height 5' 7.01\" (1.702 m), weight 142 lb 6.7 oz (64.6 kg), SpO2 95 %. Temp (24hrs), Av.4 °F (36.9 °C), Min:97.4 °F (36.3 °C), Max:98.8 °F (37.1 °C)     EKG/Rhythm:  sinus rhythm in 90's    CT Output: d/c     CXR: 23   Xray Result (most recent):  XR CHEST LIMITED ONE VIEW 2023    Narrative  INDICATION: PERICARDIAL EFFUSION Other pericardial effusion (noninflammatory) /  Reason for exam:->PERICARDIAL EFFUSION    EXAMINATION:  AP CHEST, PORTABLE    COMPARISON: 2023    FINDINGS: Single AP portable view of the chest demonstrates removal of  pericardial drains. The cardiomediastinal silhouette is unchanged. Left pleural  effusion and basilar atelectasis, unchanged. Impression  Removal of pericardial drains, otherwise unchanged. Admission Weight: Last Weight   Weight - Scale: 160 lb (72.6 kg) Weight - Scale: 142 lb 6.7 oz (64.6 kg)     Intake / Output / Drain:  Current Shift:  0701 -  1900  In: 30 [I.V.:30]  Out: 100 [Urine:100]  Last 24 hrs.:   Intake/Output Summary (Last 24 hours) at 2023 0850  Last data filed at 2023 0800  Gross per 24 hour   Intake 726.16 ml   Output 2575 ml   Net -1848.84 ml       EXAM:  General:  No acute distress             Lungs:   Decreased in the bases, oxygen dependent   Incision:  No erythema, drainage or swelling. Heart:  Regular rate and rhythm, S1, S2    Abdomen:   Soft, non-tender.  Bowel sounds normal.

## 2023-06-12 NOTE — PROGRESS NOTES
2000: Bedside and Verbal shift change report given to [address] (oncoming nurse) by Zoie Ashby (offgoing nurse). Report included the following information Nurse Handoff Report, Adult Overview, Intake/Output, MAR, Recent Results, Cardiac Rhythm NSR-ST, and Alarm Parameters. 0030: Pt in a fib 130-150s, orders for 2.5 mg metoprolol IV once, 2g mag, 20 mEq K PO per Jorge NP    0148: Pt HR 140s-150s, as high as 168, Vicky REID notified. Isi CAMACHO updated by NP    0111Eric Haynes MD at bedside, orders received for dilt gtt, stat labs, EKG, CXR    0220: Dilt gtt started, see MAR for titrations    0800: Bedside and Verbal shift change report given to [address] (oncoming nurse) by Marnie Coe RN (offgoing nurse). Report included the following information Nurse Handoff Report, Adult Overview, Surgery Report, Intake/Output, MAR, Recent Results, Cardiac Rhythm NSR-A fib, and Alarm Parameters.      Shift Summary: pt slept, BP stable, dilt gtt @ 7.5, converted back to NSR, pt weighed, bathed, linens changed, labs drawn

## 2023-06-12 NOTE — CONSULTS
Surgical History:   Procedure Laterality Date    APPENDECTOMY      BONE MARROW BIOPSY  05/18/2023    negative for BCR/ABL/ASS1; CMML-2 (14% blasts)    CARDIAC PROCEDURE N/A 6/7/2023    Pericardiocentesis performed by Shimon Barba MD at [facility]    CATARACT REMOVAL Right     COLONOSCOPY N/A 4/12/2017    COLONOSCOPY performed by Stuart Stanley MD at [address] Bilateral 6/2/2023    CYSTOSCOPY, BILATERAL  STENTS, RETROGRADE performed by Kiersten Shen MD at [place] [address] N/A 6/8/2023    PERICARDIAL WINDOW performed by Lilly Bruno MD at [place] [address] Hx:  reports that he has quit smoking. His smoking use included cigarettes. He has never used smokeless tobacco. He reports that he does not currently use drugs after having used the following drugs: Marijuana (Jarocho Click), Opium, Hashish (Hemp), and Cocaine. He reports that he does not drink alcohol. Family Hx: family history includes COPD in his father; Hypertension in his mother.   No Known Allergies       OBJECTIVE:  Wt Readings from Last 3 Encounters:   06/12/23 64.6 kg (142 lb 6.7 oz)   06/07/23 77.6 kg (171 lb)   05/24/23 71.7 kg (158 lb)         Physical Exam:    Vitals:   Vitals:    06/12/23 0600 06/12/23 0800 06/12/23 1156 06/12/23 1200   BP: 128/74 103/71 103/71 103/71   Pulse: 79 83 83 87   Resp: 23 24 26   Temp:  98.6 °F (37 °C)  98.6 °F (37 °C)   TempSrc:  Oral  Oral   SpO2: 94% 95%  94%   Weight: 64.6 kg (142 lb 6.7 oz)  64.6 kg (142 lb 6.7 oz)    Height:   1.702 m (5' 7.01\")      Telemetry:     Gen: Well-developed, well-nourished, in no acute distress  Neck: Supple,No Carotid Bruit  Resp: No accessory muscle use, diminished posteriorly, O2 via NC   Card: Regular Rate,Rhythm, Normal S1, S2, No murmurs, dsg intact   Abd:   Soft, BS+   MSK: No cyanosis  Skin: No rashes    Neuro: Moving all four extremities, follows commands appropriately  Psych: Good insight, oriented to person, place,

## 2023-06-12 NOTE — DISCHARGE SUMMARY
Discharge Summary       PATIENT ID: Amol Canada  MRN: [id number]   YOB: 1945    DATE OF ADMISSION: 6/7/2023  1:15 PM    DATE OF DISCHARGE: 6/   PRIMARY CARE PROVIDER: Elvia Omalley MD     ATTENDING PHYSICIAN: Julio Luis  DISCHARGING PROVIDER: Faith Garcia MD    To contact this individual call [phone] and ask the  to page. If unavailable ask to be transferred the Adult Hospitalist Department. CONSULTATIONS: IP CONSULT TO CASE MANAGEMENT  IP CONSULT TO DIABETES EDUCATOR  IP CONSULT TO CARDIOLOGY    PROCEDURES/SURGERIES: Procedure(s):  PERICARDIAL WINDOW    ADMITTING [address] [facility] COURSE:  A-fib with RVR    Amol Canada is a 69 y.o. male with a PMH of AFIB(new 5/17/23, eliquis), CML (chemo/ referral [facility] BMT), GERD and HTN who presented to [facility] on 6/2 w/ c/o abdominal and flank pain w/ hematuria (anticoagulation held), and was found to have bilateral hydronephrosis 2/2 ureteral stones, underwent bilateral ureteral stent placement, developed a UTI and MARTITA and new diagnosis of AFIB w/ RVR: tx'd w/ IV ABT's, IVFs and beta blockers, ECHO revealed large pericardial effusion. Pericardiocentesis attempted at [facility], however catheter placement unsucessful and Cardiothoracic Sx service at [facility]. Patient transferred to [facility], underwent a Pericardial Window on 06/08, admitted to Cardiac ICU post procedure. Noted to have been treated recently w/ ceftriaxone and doxycycline for a recent tick bite and cellulitis around site. Pericardial Effusion/Pericarditis/Leukocytosis/AFIB/HTN:  - EVHO (6/7): Mildly reduced lLVSF EF 45 - 50%. Pericardium: Large (>2 cm) pericardial effusion present.    - Cardiothoracic Sx: s/p pericardial window 6/8/23: drainage down, will monitor for today - pathology 6/8: mixed inflammation, compatible with fibrinous pericarditis  -

## 2023-06-12 NOTE — DIABETES MGMT
Diabetes Management Team to sign off at this point as patient's blood glucose remains stable. Please re-consult us if patient needs change. Thank you for including us in their care.         BIJU Hernandez - CNS   [facility]

## 2023-06-12 NOTE — PROGRESS NOTES
Called by hospitalist to discuss pt who went into afib. Pt had periardial window this amission and has history of CHF and afib. When seen in ICU pt already reeived metoprolol and was started on cardizem drip. Pt asymptmatic with stabel vitals, Map of 80 and HR in 150's. Current management acceptable. Pt appeared to be trying to convert out of afib on the EKG at the time I assessed him. I returned to see pt about 45 minutes later and he did convert to sinus and remained hemodynamically stable.

## 2023-06-12 NOTE — DISCHARGE INSTRUCTIONS
Discharge Instructions       PATIENT ID: Madai Upton  MRN: [id number]   YOB: 1945    DATE OF ADMISSION: 6/7/2023   DATE OF DISCHARGE: 6/12/2023    PRIMARY CARE PROVIDER: Stephany Cooley     ATTENDING PHYSICIAN: Matt Schmitt MD   DISCHARGING PROVIDER: Matt Schmitt MD    To contact this individual call [phone] and ask the  to page. If unavailable ask to be transferred the Adult Hospitalist Department. DISCHARGE DIAGNOSES pericardial effusion  and A fib RVR    CONSULTATIONS: [unfilled]    PROCEDURES/SURGERIES: Procedure(s):  PERICARDIAL WINDOW    PENDING TEST RESULTS:   At the time of discharge the following test results are still pending:     FOLLOW UP APPOINTMENTS:   [unfilled]     ADDITIONAL CARE RECOMMENDATIONS:     DIET: cardiac diet      ACTIVITY: activity as tolerated    WOUND CARE:     EQUIPMENT needed:       DISCHARGE MEDICATIONS:   See Medication Reconciliation Form    It is important that you take the medication exactly as they are prescribed. Keep your medication in the bottles provided by the pharmacist and keep a list of the medication names, dosages, and times to be taken in your wallet. Do not take other medications without consulting your doctor. NOTIFY YOUR PHYSICIAN FOR ANY OF THE FOLLOWING:   Fever over 101 degrees for 24 hours. Chest pain, shortness of breath, fever, chills, nausea, vomiting, diarrhea, change in mentation, falling, weakness, bleeding. Severe pain or pain not relieved by medications. Or, any other signs or symptoms that you may have questions about.       DISPOSITION:   x Home With:   OT  PT  HH  RN       SNF/Inpatient Rehab/LTAC    Independent/assisted living    Hospice    Other:     CDMP Checked:   Yes x     PROBLEM LIST Updated:  Yes x       Signed:   Matt Schmitt MD  6/12/2023  2:27 PM

## 2023-06-12 NOTE — CARE COORDINATION
Transition of Care Plan:    RUR: 18% - moderate  Prior Level of Functioning: independent  Disposition: home   Follow up appointments: PCP  DME needed: none  Transportation at discharge: [address]  IM/IMM Medicare/ letter given: 6/12/23  Caregiver Contact: Wife - Caryn Rios - p: [phone]  Discharge Caregiver contacted prior to discharge? no  Care Conference needed? no  Barriers to discharge: none    CM updated by CVICU RN that patient is medically stable for discharge and needs ride assistance for transportation home. Patient defers option to stop by his pharmacy on the way home. [address] set up for patient.  at [address] Entrance ETA 1600. CM will continue to follow.     Disposition Plan:  Home  Transportation: Samanta Nesbitt, [address]  Available via Beep or

## 2023-06-15 LAB — INSULIN AB SER-ACNC: <5 UU/ML

## 2023-06-19 LAB
BACTERIA SPEC CULT: NORMAL
SERVICE CMNT-IMP: NORMAL

## 2023-06-20 ENCOUNTER — OFFICE VISIT (OUTPATIENT)
Age: 78
End: 2023-06-20
Payer: MEDICARE

## 2023-06-20 ENCOUNTER — HOSPITAL ENCOUNTER (OUTPATIENT)
Facility: HOSPITAL | Age: 78
Setting detail: INFUSION SERIES
End: 2023-06-20
Payer: MEDICARE

## 2023-06-20 VITALS
DIASTOLIC BLOOD PRESSURE: 59 MMHG | HEART RATE: 58 BPM | RESPIRATION RATE: 16 BRPM | OXYGEN SATURATION: 99 % | SYSTOLIC BLOOD PRESSURE: 115 MMHG | TEMPERATURE: 97.5 F

## 2023-06-20 VITALS
TEMPERATURE: 97.5 F | WEIGHT: 146 LBS | OXYGEN SATURATION: 99 % | BODY MASS INDEX: 22.86 KG/M2 | DIASTOLIC BLOOD PRESSURE: 59 MMHG | HEART RATE: 58 BPM | SYSTOLIC BLOOD PRESSURE: 115 MMHG | RESPIRATION RATE: 16 BRPM

## 2023-06-20 DIAGNOSIS — C93.10 CHRONIC MYELOMONOCYTIC LEUKEMIA NOT HAVING ACHIEVED REMISSION (HCC): Primary | ICD-10-CM

## 2023-06-20 DIAGNOSIS — I48.0 PAROXYSMAL ATRIAL FIBRILLATION (HCC): ICD-10-CM

## 2023-06-20 LAB
ALBUMIN SERPL-MCNC: 3.1 G/DL (ref 3.5–5)
ALBUMIN/GLOB SERPL: 0.7 (ref 1.1–2.2)
ALP SERPL-CCNC: 61 U/L (ref 45–117)
ALT SERPL-CCNC: 19 U/L (ref 12–78)
ANION GAP SERPL CALC-SCNC: 5 MMOL/L (ref 5–15)
AST SERPL-CCNC: 16 U/L (ref 15–37)
BASOPHILS # BLD: 0.1 K/UL (ref 0–0.1)
BASOPHILS NFR BLD: 2 % (ref 0–1)
BILIRUB SERPL-MCNC: 0.9 MG/DL (ref 0.2–1)
BUN SERPL-MCNC: 21 MG/DL (ref 6–20)
BUN/CREAT SERPL: 13 (ref 12–20)
CALCIUM SERPL-MCNC: 8.6 MG/DL (ref 8.5–10.1)
CHLORIDE SERPL-SCNC: 108 MMOL/L (ref 97–108)
CO2 SERPL-SCNC: 27 MMOL/L (ref 21–32)
CREAT SERPL-MCNC: 1.61 MG/DL (ref 0.7–1.3)
DIFFERENTIAL METHOD BLD: ABNORMAL
EOSINOPHIL # BLD: 0 K/UL (ref 0–0.4)
EOSINOPHIL NFR BLD: 0 % (ref 0–7)
ERYTHROCYTE [DISTWIDTH] IN BLOOD BY AUTOMATED COUNT: 20.2 % (ref 11.5–14.5)
GLOBULIN SER CALC-MCNC: 4.5 G/DL (ref 2–4)
GLUCOSE SERPL-MCNC: 101 MG/DL (ref 65–100)
HCT VFR BLD AUTO: 25.3 % (ref 36.6–50.3)
HGB BLD-MCNC: 8.3 G/DL (ref 12.1–17)
IMM GRANULOCYTES # BLD AUTO: 0 K/UL
IMM GRANULOCYTES NFR BLD AUTO: 0 %
LYMPHOCYTES # BLD: 0.9 K/UL (ref 0.8–3.5)
LYMPHOCYTES NFR BLD: 13 % (ref 12–49)
MCH RBC QN AUTO: 29.9 PG (ref 26–34)
MCHC RBC AUTO-ENTMCNC: 32.8 G/DL (ref 30–36.5)
MCV RBC AUTO: 91 FL (ref 80–99)
METAMYELOCYTES NFR BLD MANUAL: 2 %
MONOCYTES # BLD: 2.4 K/UL (ref 0–1)
MONOCYTES NFR BLD: 35 % (ref 5–13)
MYELOCYTES NFR BLD MANUAL: 3 %
NEUTS BAND NFR BLD MANUAL: 2 % (ref 0–6)
NEUTS SEG # BLD: 3.1 K/UL (ref 1.8–8)
NEUTS SEG NFR BLD: 43 % (ref 32–75)
NRBC # BLD: 0 K/UL (ref 0–0.01)
NRBC BLD-RTO: 0 PER 100 WBC
PLATELET # BLD AUTO: 110 K/UL (ref 150–400)
PMV BLD AUTO: 10.1 FL (ref 8.9–12.9)
POTASSIUM SERPL-SCNC: 3.6 MMOL/L (ref 3.5–5.1)
PROT SERPL-MCNC: 7.6 G/DL (ref 6.4–8.2)
RBC # BLD AUTO: 2.78 M/UL (ref 4.1–5.7)
RBC MORPH BLD: ABNORMAL
SODIUM SERPL-SCNC: 140 MMOL/L (ref 136–145)
WBC # BLD AUTO: 6.9 K/UL (ref 4.1–11.1)
WBC MORPH BLD: ABNORMAL

## 2023-06-20 PROCEDURE — 80053 COMPREHEN METABOLIC PANEL: CPT

## 2023-06-20 PROCEDURE — 3074F SYST BP LT 130 MM HG: CPT | Performed by: INTERNAL MEDICINE

## 2023-06-20 PROCEDURE — 3078F DIAST BP <80 MM HG: CPT | Performed by: INTERNAL MEDICINE

## 2023-06-20 PROCEDURE — G8420 CALC BMI NORM PARAMETERS: HCPCS | Performed by: INTERNAL MEDICINE

## 2023-06-20 PROCEDURE — 1123F ACP DISCUSS/DSCN MKR DOCD: CPT | Performed by: INTERNAL MEDICINE

## 2023-06-20 PROCEDURE — G8427 DOCREV CUR MEDS BY ELIG CLIN: HCPCS | Performed by: INTERNAL MEDICINE

## 2023-06-20 PROCEDURE — 85025 COMPLETE CBC W/AUTO DIFF WBC: CPT

## 2023-06-20 PROCEDURE — 99215 OFFICE O/P EST HI 40 MIN: CPT | Performed by: INTERNAL MEDICINE

## 2023-06-20 PROCEDURE — 1111F DSCHRG MED/CURRENT MED MERGE: CPT | Performed by: INTERNAL MEDICINE

## 2023-06-20 PROCEDURE — 1036F TOBACCO NON-USER: CPT | Performed by: INTERNAL MEDICINE

## 2023-06-20 PROCEDURE — 36415 COLL VENOUS BLD VENIPUNCTURE: CPT

## 2023-06-20 NOTE — PROGRESS NOTES
[address], [address] [place] [address]  [address]  W: [phone]  F: [phone]     HEME/ONC CONSULT    HPI:   Chadwick Gupta is a 69 y.o. male who I am seeing for CMML      Interval history: admitted 6/7/23-6/12/23 underwent pericardial window on 6/8/23    Gr 1 fatigue, gr 1 insomnia, gr 1 anxiety, gr 1 cough, gr 1 sob, gr 1 tachycardia, gr 1 urinary frequency, gr 1 loss of libido    Review of systems was obtained and pertinent findings reviewed above. Past medical history, social history, family history, medications, and allergies are located in the electronic medical record. 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   - SRSF2 mutations can be seen in various myeloid neoplasms, and are associated with a poor prognosis in MDS, AML,  PMF, and PV.  - TET2 mutations can be seen in various myeloid neoplasms, and are associated with an increased response to  hypomethylating agents and a poor prognosis in MDS after hematopoietic stem cell transplantation.  - Co-mutation of TET2 and SRSF2 is reported to be highly predictive of a myeloid neoplasm characterized by  myelodysplasia and monocytosis, including, but not limited to, CMML    NGS:  ASXL1 P139JKD*27; KRAS G12R; SRSF2 P95L; TET2 *, *; NO alterations detected in the following genes:  FLT3, IDH1, IDH2, NPM1      6/8/23:  pericardial window, negative    Hydrea 500 mg daily 6/9/23-    Objective:  Physical Exam:  Visit Vitals  Vitals:    06/20/23 0935   BP: (!) 115/59   Pulse: 58   Resp: 16   Temp: 97.5 °F (36.4 °C)   SpO2: 99%             Constitutional: Appears well-developed and well-nourished in no apparent distress      Mental status: Alert and awake, Oriented to person/place/time, Able to follow commands    Eyes: EOM normal, Sclera normal, No
Nidia Isaacs is a 69 y.o. male here today to follow up for CML. 1. Have you been to the ER, urgent care clinic since your last visit? Hospitalized since your last visit? yes    2. Have you seen or consulted any other health care providers outside of the [address] [place] [address] since your last visit? Include any pap smears or colon screening.  yes
Yes

## 2023-06-22 ENCOUNTER — TELEPHONE (OUTPATIENT)
Age: 78
End: 2023-06-22

## 2023-06-22 DIAGNOSIS — I31.39 PERICARDIAL EFFUSION: Primary | ICD-10-CM

## 2023-06-22 NOTE — TELEPHONE ENCOUNTER
Pt had multiple echos while in hospital d/t pericardial effusion. Our echo was scheduled 5/19/23. Per Dr. Abilio March, continue with Limited echo on 6/30 to follow up on effusion.

## 2023-06-22 NOTE — TELEPHONE ENCOUNTER
Rosalinda Black,    Patient is scheduled for an echocardiogram and to see Dr. Apurva Castro on 6/30/2023 at 10:00/11:00. Patient had an echocardiogram on 6/12/2023. Is the echocardiogram on 6/30/2023 needed? If so, please provide an order.      Thank you,     [facility]

## 2023-06-26 LAB
BACTERIA SPEC CULT: NORMAL
SERVICE CMNT-IMP: NORMAL

## 2023-06-27 ENCOUNTER — APPOINTMENT (OUTPATIENT)
Facility: HOSPITAL | Age: 78
DRG: 871 | End: 2023-06-27
Payer: MEDICARE

## 2023-06-27 ENCOUNTER — HOSPITAL ENCOUNTER (INPATIENT)
Facility: HOSPITAL | Age: 78
LOS: 6 days | Discharge: HOME | DRG: 871 | End: 2023-07-03
Attending: STUDENT IN AN ORGANIZED HEALTH CARE EDUCATION/TRAINING PROGRAM | Admitting: INTERNAL MEDICINE
Payer: MEDICARE

## 2023-06-27 DIAGNOSIS — R09.02 HYPOXIA: ICD-10-CM

## 2023-06-27 DIAGNOSIS — R31.9 HEMATURIA, UNSPECIFIED TYPE: ICD-10-CM

## 2023-06-27 DIAGNOSIS — J18.9 HCAP (HEALTHCARE-ASSOCIATED PNEUMONIA): Primary | ICD-10-CM

## 2023-06-27 DIAGNOSIS — N28.9 ACUTE RENAL INSUFFICIENCY: ICD-10-CM

## 2023-06-27 DIAGNOSIS — R65.20 SEPSIS WITH ACUTE ORGAN DYSFUNCTION WITHOUT SEPTIC SHOCK, DUE TO UNSPECIFIED ORGANISM, UNSPECIFIED TYPE (HCC): ICD-10-CM

## 2023-06-27 DIAGNOSIS — A41.9 SEPSIS WITH ACUTE ORGAN DYSFUNCTION WITHOUT SEPTIC SHOCK, DUE TO UNSPECIFIED ORGANISM, UNSPECIFIED TYPE (HCC): ICD-10-CM
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
## 2023-06-27 LAB
ALBUMIN SERPL-MCNC: 3.2 G/DL (ref 3.5–5)
ALBUMIN/GLOB SERPL: 0.7 (ref 1.1–2.2)
ALP SERPL-CCNC: 56 U/L (ref 45–117)
ALT SERPL-CCNC: 18 U/L (ref 12–78)
ANION GAP SERPL CALC-SCNC: 6 MMOL/L (ref 5–15)
APPEARANCE UR: ABNORMAL
APTT PPP: 30.5 SEC (ref 22.1–31)
APTT PPP: 33.2 SEC (ref 22.1–31)
AST SERPL-CCNC: 14 U/L (ref 15–37)
B PERT DNA SPEC QL NAA+PROBE: NOT DETECTED
BACTERIA URNS QL MICRO: NEGATIVE /HPF
BASOPHILS # BLD: 0 K/UL (ref 0–0.1)
BASOPHILS NFR BLD: 0 % (ref 0–1)
BILIRUB SERPL-MCNC: 1.1 MG/DL (ref 0.2–1)
BILIRUB UR QL: NEGATIVE
BORDETELLA PARAPERTUSSIS BY PCR: NOT DETECTED
BUN SERPL-MCNC: 23 MG/DL (ref 6–20)
BUN/CREAT SERPL: 12 (ref 12–20)
C PNEUM DNA SPEC QL NAA+PROBE: NOT DETECTED
CALCIUM SERPL-MCNC: 8.5 MG/DL (ref 8.5–10.1)
CHLORIDE SERPL-SCNC: 103 MMOL/L (ref 97–108)
CO2 SERPL-SCNC: 25 MMOL/L (ref 21–32)
COLOR UR: ABNORMAL
COMMENT:: NORMAL
COMMENT:: NORMAL
CREAT SERPL-MCNC: 1.99 MG/DL (ref 0.7–1.3)
DIFFERENTIAL METHOD BLD: ABNORMAL
EKG ATRIAL RATE: 84 BPM
EKG DIAGNOSIS: NORMAL
EKG P AXIS: 44 DEGREES
EKG P-R INTERVAL: 146 MS
EKG Q-T INTERVAL: 416 MS
EKG QRS DURATION: 86 MS
EKG QTC CALCULATION (BAZETT): 491 MS
EKG R AXIS: 43 DEGREES
EKG T AXIS: 22 DEGREES
EKG VENTRICULAR RATE: 84 BPM
EOSINOPHIL # BLD: 0 K/UL (ref 0–0.4)
EOSINOPHIL NFR BLD: 0 % (ref 0–7)
EPITH CASTS URNS QL MICRO: ABNORMAL /LPF
ERYTHROCYTE [DISTWIDTH] IN BLOOD BY AUTOMATED COUNT: 21.8 % (ref 11.5–14.5)
ERYTHROCYTE [DISTWIDTH] IN BLOOD BY AUTOMATED COUNT: 21.8 % (ref 11.5–14.5)
FLUAV SUBTYP SPEC NAA+PROBE: NOT DETECTED
FLUBV RNA SPEC QL NAA+PROBE: NOT DETECTED
GLOBULIN SER CALC-MCNC: 4.4 G/DL (ref 2–4)
GLUCOSE SERPL-MCNC: 119 MG/DL (ref 65–100)
GLUCOSE UR STRIP.AUTO-MCNC: NEGATIVE MG/DL
HADV DNA SPEC QL NAA+PROBE: NOT DETECTED
HCOV 229E RNA SPEC QL NAA+PROBE: NOT DETECTED
HCOV HKU1 RNA SPEC QL NAA+PROBE: NOT DETECTED
HCOV NL63 RNA SPEC QL NAA+PROBE: NOT DETECTED
HCOV OC43 RNA SPEC QL NAA+PROBE: NOT DETECTED
HCT VFR BLD AUTO: 23.9 % (ref 36.6–50.3)
HCT VFR BLD AUTO: 28.8 % (ref 36.6–50.3)
HGB BLD-MCNC: 7.7 G/DL (ref 12.1–17)
HGB BLD-MCNC: 9.4 G/DL (ref 12.1–17)
HGB UR QL STRIP: ABNORMAL
HMPV RNA SPEC QL NAA+PROBE: NOT DETECTED
HPIV1 RNA SPEC QL NAA+PROBE: NOT DETECTED
HPIV2 RNA SPEC QL NAA+PROBE: NOT DETECTED
HPIV3 RNA SPEC QL NAA+PROBE: NOT DETECTED
HPIV4 RNA SPEC QL NAA+PROBE: NOT DETECTED
HYALINE CASTS URNS QL MICRO: ABNORMAL /LPF (ref 0–5)
IMM GRANULOCYTES # BLD AUTO: 0 K/UL
IMM GRANULOCYTES NFR BLD AUTO: 0 %
INR PPP: 1.5 (ref 0.9–1.1)
KETONES UR QL STRIP.AUTO: NEGATIVE MG/DL
LACTATE BLD-SCNC: 0.57 MMOL/L (ref 0.4–2)
LACTATE BLD-SCNC: 0.87 MMOL/L (ref 0.4–2)
LEUKOCYTE ESTERASE UR QL STRIP.AUTO: ABNORMAL
LIPASE SERPL-CCNC: 67 U/L (ref 73–393)
LYMPHOCYTES # BLD: 1.4 K/UL (ref 0.8–3.5)
LYMPHOCYTES NFR BLD: 7 % (ref 12–49)
M PNEUMO DNA SPEC QL NAA+PROBE: NOT DETECTED
MCH RBC QN AUTO: 30 PG (ref 26–34)
MCH RBC QN AUTO: 30 PG (ref 26–34)
MCHC RBC AUTO-ENTMCNC: 32.2 G/DL (ref 30–36.5)
MCHC RBC AUTO-ENTMCNC: 32.6 G/DL (ref 30–36.5)
MCV RBC AUTO: 92 FL (ref 80–99)
MCV RBC AUTO: 93 FL (ref 80–99)
MONOCYTES # BLD: 9.9 K/UL (ref 0–1)
MONOCYTES NFR BLD: 48 % (ref 5–13)
NEUTS SEG # BLD: 9.3 K/UL (ref 1.8–8)
NEUTS SEG NFR BLD: 45 % (ref 32–75)
NITRITE UR QL STRIP.AUTO: NEGATIVE
NRBC # BLD: 0 K/UL (ref 0–0.01)
NRBC # BLD: 0 K/UL (ref 0–0.01)
NRBC BLD-RTO: 0 PER 100 WBC
NRBC BLD-RTO: 0 PER 100 WBC
PH UR STRIP: 5.5 (ref 5–8)
PLATELET # BLD AUTO: 101 K/UL (ref 150–400)
PLATELET # BLD AUTO: 123 K/UL (ref 150–400)
PMV BLD AUTO: 10.6 FL (ref 8.9–12.9)
PMV BLD AUTO: 10.6 FL (ref 8.9–12.9)
POTASSIUM SERPL-SCNC: 3.4 MMOL/L (ref 3.5–5.1)
PROT SERPL-MCNC: 7.6 G/DL (ref 6.4–8.2)
PROT UR STRIP-MCNC: 100 MG/DL
PROTHROMBIN TIME: 15.2 SEC (ref 9–11.1)
RBC # BLD AUTO: 2.57 M/UL (ref 4.1–5.7)
RBC # BLD AUTO: 3.13 M/UL (ref 4.1–5.7)
RBC #/AREA URNS HPF: >100 /HPF (ref 0–5)
RBC MORPH BLD: ABNORMAL
RSV RNA SPEC QL NAA+PROBE: NOT DETECTED
RV+EV RNA SPEC QL NAA+PROBE: NOT DETECTED
SARS-COV-2 RNA RESP QL NAA+PROBE: NOT DETECTED
SODIUM SERPL-SCNC: 134 MMOL/L (ref 136–145)
SP GR UR REFRACTOMETRY: 1.02 (ref 1–1.03)
SPECIMEN HOLD: NORMAL
SPECIMEN HOLD: NORMAL
THERAPEUTIC RANGE: ABNORMAL SECS (ref 58–77)
THERAPEUTIC RANGE: NORMAL SECS (ref 58–77)
UFH PPP CHRO-ACNC: 1.31 IU/ML
UROBILINOGEN UR QL STRIP.AUTO: 0.2 EU/DL (ref 0.2–1)
WBC # BLD AUTO: 19.9 K/UL (ref 4.1–11.1)
WBC # BLD AUTO: 20.6 K/UL (ref 4.1–11.1)
WBC URNS QL MICRO: ABNORMAL /HPF (ref 0–4)

## 2023-06-27 PROCEDURE — 87086 URINE CULTURE/COLONY COUNT: CPT

## 2023-06-27 PROCEDURE — 85730 THROMBOPLASTIN TIME PARTIAL: CPT

## 2023-06-27 PROCEDURE — 83605 ASSAY OF LACTIC ACID: CPT

## 2023-06-27 PROCEDURE — 85027 COMPLETE CBC AUTOMATED: CPT

## 2023-06-27 PROCEDURE — 2580000003 HC RX 258: Performed by: INTERNAL MEDICINE

## 2023-06-27 PROCEDURE — 94761 N-INVAS EAR/PLS OXIMETRY MLT: CPT

## 2023-06-27 PROCEDURE — 87040 BLOOD CULTURE FOR BACTERIA: CPT

## 2023-06-27 PROCEDURE — 93005 ELECTROCARDIOGRAM TRACING: CPT | Performed by: STUDENT IN AN ORGANIZED HEALTH CARE EDUCATION/TRAINING PROGRAM

## 2023-06-27 PROCEDURE — 6370000000 HC RX 637 (ALT 250 FOR IP): Performed by: INTERNAL MEDICINE

## 2023-06-27 PROCEDURE — 86738 MYCOPLASMA ANTIBODY: CPT

## 2023-06-27 PROCEDURE — 0202U NFCT DS 22 TRGT SARS-COV-2: CPT

## 2023-06-27 PROCEDURE — 6370000000 HC RX 637 (ALT 250 FOR IP): Performed by: STUDENT IN AN ORGANIZED HEALTH CARE EDUCATION/TRAINING PROGRAM

## 2023-06-27 PROCEDURE — 85520 HEPARIN ASSAY: CPT

## 2023-06-27 PROCEDURE — 93010 ELECTROCARDIOGRAM REPORT: CPT | Performed by: INTERNAL MEDICINE

## 2023-06-27 PROCEDURE — 36415 COLL VENOUS BLD VENIPUNCTURE: CPT

## 2023-06-27 PROCEDURE — 80053 COMPREHEN METABOLIC PANEL: CPT

## 2023-06-27 PROCEDURE — 2580000003 HC RX 258: Performed by: STUDENT IN AN ORGANIZED HEALTH CARE EDUCATION/TRAINING PROGRAM

## 2023-06-27 PROCEDURE — 1100000000 HC RM PRIVATE

## 2023-06-27 PROCEDURE — 71045 X-RAY EXAM CHEST 1 VIEW: CPT

## 2023-06-27 PROCEDURE — 87449 NOS EACH ORGANISM AG IA: CPT

## 2023-06-27 PROCEDURE — 97165 OT EVAL LOW COMPLEX 30 MIN: CPT

## 2023-06-27 PROCEDURE — 99285 EMERGENCY DEPT VISIT HI MDM: CPT

## 2023-06-27 PROCEDURE — 6360000002 HC RX W HCPCS: Performed by: STUDENT IN AN ORGANIZED HEALTH CARE EDUCATION/TRAINING PROGRAM

## 2023-06-27 PROCEDURE — 6360000002 HC RX W HCPCS: Performed by: INTERNAL MEDICINE

## 2023-06-27 PROCEDURE — 96374 THER/PROPH/DIAG INJ IV PUSH: CPT

## 2023-06-27 PROCEDURE — 85025 COMPLETE CBC W/AUTO DIFF WBC: CPT

## 2023-06-27 PROCEDURE — 85610 PROTHROMBIN TIME: CPT

## 2023-06-27 PROCEDURE — 81001 URINALYSIS AUTO W/SCOPE: CPT

## 2023-06-27 PROCEDURE — 97161 PT EVAL LOW COMPLEX 20 MIN: CPT

## 2023-06-27 PROCEDURE — 83690 ASSAY OF LIPASE: CPT

## 2023-06-27 PROCEDURE — 71250 CT THORAX DX C-: CPT

## 2023-06-27 PROCEDURE — 2700000000 HC OXYGEN THERAPY PER DAY

## 2023-06-27 RX ORDER — HYDROXYUREA 500 MG/1
500 CAPSULE ORAL DAILY
Status: DISCONTINUED | OUTPATIENT
Start: 2023-06-27 | End: 2023-07-03 | Stop reason: HOSPADM

## 2023-06-27 RX ORDER — SODIUM CHLORIDE 9 MG/ML
INJECTION, SOLUTION INTRAVENOUS PRN
Status: DISCONTINUED | OUTPATIENT
Start: 2023-06-27 | End: 2023-07-03 | Stop reason: HOSPADM

## 2023-06-27 RX ORDER — 0.9 % SODIUM CHLORIDE 0.9 %
1000 INTRAVENOUS SOLUTION INTRAVENOUS ONCE
Status: COMPLETED | OUTPATIENT
Start: 2023-06-27 | End: 2023-06-27

## 2023-06-27 RX ORDER — ACETAMINOPHEN 325 MG/1
650 TABLET ORAL EVERY 6 HOURS PRN
Status: DISCONTINUED | OUTPATIENT
Start: 2023-06-27 | End: 2023-07-03 | Stop reason: HOSPADM

## 2023-06-27 RX ORDER — POTASSIUM CHLORIDE 750 MG/1
40 TABLET, FILM COATED, EXTENDED RELEASE ORAL ONCE
Status: COMPLETED | OUTPATIENT
Start: 2023-06-27 | End: 2023-06-27

## 2023-06-27 RX ORDER — HEPARIN SODIUM 1000 [USP'U]/ML
30 INJECTION, SOLUTION INTRAVENOUS; SUBCUTANEOUS PRN
Status: DISCONTINUED | OUTPATIENT
Start: 2023-06-27 | End: 2023-06-29 | Stop reason: ALTCHOICE

## 2023-06-27 RX ORDER — HEPARIN SODIUM 1000 [USP'U]/ML
60 INJECTION, SOLUTION INTRAVENOUS; SUBCUTANEOUS PRN
Status: DISCONTINUED | OUTPATIENT
Start: 2023-06-27 | End: 2023-06-29 | Stop reason: ALTCHOICE

## 2023-06-27 RX ORDER — POLYETHYLENE GLYCOL 3350 17 G/17G
17 POWDER, FOR SOLUTION ORAL DAILY PRN
Status: DISCONTINUED | OUTPATIENT
Start: 2023-06-27 | End: 2023-07-03 | Stop reason: HOSPADM

## 2023-06-27 RX ORDER — HEPARIN SODIUM 10000 [USP'U]/100ML
5-30 INJECTION, SOLUTION INTRAVENOUS CONTINUOUS
Status: DISCONTINUED | OUTPATIENT
Start: 2023-06-27 | End: 2023-06-29

## 2023-06-27 RX ORDER — ACETAMINOPHEN 650 MG/1
650 SUPPOSITORY RECTAL EVERY 6 HOURS PRN
Status: DISCONTINUED | OUTPATIENT
Start: 2023-06-27 | End: 2023-07-03 | Stop reason: HOSPADM

## 2023-06-27 RX ORDER — ASCORBIC ACID 500 MG
1000 TABLET ORAL DAILY
Status: DISCONTINUED | OUTPATIENT
Start: 2023-06-27 | End: 2023-07-03 | Stop reason: HOSPADM

## 2023-06-27 RX ORDER — SODIUM CHLORIDE 0.9 % (FLUSH) 0.9 %
5-40 SYRINGE (ML) INJECTION EVERY 12 HOURS SCHEDULED
Status: DISCONTINUED | OUTPATIENT
Start: 2023-06-27 | End: 2023-07-03 | Stop reason: HOSPADM

## 2023-06-27 RX ORDER — HEPARIN SODIUM 1000 [USP'U]/ML
60 INJECTION, SOLUTION INTRAVENOUS; SUBCUTANEOUS ONCE
Status: DISCONTINUED | OUTPATIENT
Start: 2023-06-27 | End: 2023-06-27

## 2023-06-27 RX ORDER — ONDANSETRON 2 MG/ML
4 INJECTION INTRAMUSCULAR; INTRAVENOUS
Status: ACTIVE | OUTPATIENT
Start: 2023-06-27 | End: 2023-06-28

## 2023-06-27 RX ORDER — SODIUM CHLORIDE 0.9 % (FLUSH) 0.9 %
5-40 SYRINGE (ML) INJECTION PRN
Status: DISCONTINUED | OUTPATIENT
Start: 2023-06-27 | End: 2023-07-03 | Stop reason: HOSPADM

## 2023-06-27 RX ORDER — HEPARIN SODIUM 1000 [USP'U]/ML
60 INJECTION, SOLUTION INTRAVENOUS; SUBCUTANEOUS ONCE
Status: COMPLETED | OUTPATIENT
Start: 2023-06-27 | End: 2023-06-27

## 2023-06-27 RX ORDER — ACETAMINOPHEN 325 MG/1
650 TABLET ORAL
Status: COMPLETED | OUTPATIENT
Start: 2023-06-27 | End: 2023-06-27

## 2023-06-27 RX ADMIN — SODIUM CHLORIDE, PRESERVATIVE FREE 10 ML: 5 INJECTION INTRAVENOUS at 10:46

## 2023-06-27 RX ADMIN — POTASSIUM CHLORIDE 40 MEQ: 750 TABLET, FILM COATED, EXTENDED RELEASE ORAL at 13:20

## 2023-06-27 RX ADMIN — HEPARIN SODIUM 3890 UNITS: 1000 INJECTION INTRAVENOUS; SUBCUTANEOUS at 22:56

## 2023-06-27 RX ADMIN — AZITHROMYCIN MONOHYDRATE 500 MG: 500 INJECTION, POWDER, LYOPHILIZED, FOR SOLUTION INTRAVENOUS at 10:43

## 2023-06-27 RX ADMIN — CEFEPIME 1000 MG: 1 INJECTION, POWDER, FOR SOLUTION INTRAMUSCULAR; INTRAVENOUS at 20:39

## 2023-06-27 RX ADMIN — HYDROXYUREA 500 MG: 500 CAPSULE ORAL at 13:31

## 2023-06-27 RX ADMIN — OXYCODONE HYDROCHLORIDE AND ACETAMINOPHEN 1000 MG: 500 TABLET ORAL at 13:21

## 2023-06-27 RX ADMIN — WATER 2000 MG: 1 INJECTION INTRAMUSCULAR; INTRAVENOUS; SUBCUTANEOUS at 08:50

## 2023-06-27 RX ADMIN — SODIUM CHLORIDE 1000 ML: 9 INJECTION, SOLUTION INTRAVENOUS at 13:29

## 2023-06-27 RX ADMIN — VANCOMYCIN HYDROCHLORIDE 1500 MG: 10 INJECTION, POWDER, LYOPHILIZED, FOR SOLUTION INTRAVENOUS at 11:10

## 2023-06-27 RX ADMIN — SODIUM CHLORIDE 1000 ML: 9 INJECTION, SOLUTION INTRAVENOUS at 08:46

## 2023-06-27 RX ADMIN — HEPARIN SODIUM 12 UNITS/KG/HR: 10000 INJECTION, SOLUTION INTRAVENOUS at 13:25

## 2023-06-27 RX ADMIN — ACETAMINOPHEN 650 MG: 325 TABLET ORAL at 08:48

## 2023-06-27 RX ADMIN — METOPROLOL TARTRATE 25 MG: 25 TABLET, FILM COATED ORAL at 20:39

## 2023-06-27 ASSESSMENT — PAIN DESCRIPTION - LOCATION
LOCATION: FLANK
LOCATION: FLANK

## 2023-06-27 ASSESSMENT — PAIN - FUNCTIONAL ASSESSMENT: PAIN_FUNCTIONAL_ASSESSMENT: 0-10

## 2023-06-27 ASSESSMENT — PAIN DESCRIPTION - ORIENTATION
ORIENTATION: LEFT
ORIENTATION: LEFT

## 2023-06-27 ASSESSMENT — PAIN SCALES - GENERAL
PAINLEVEL_OUTOF10: 1
PAINLEVEL_OUTOF10: 1

## 2023-06-27 ASSESSMENT — PAIN DESCRIPTION - DESCRIPTORS: DESCRIPTORS: ACHING

## 2023-06-28 ENCOUNTER — TELEPHONE (OUTPATIENT)
Age: 78
End: 2023-06-28

## 2023-06-28 LAB
ANION GAP SERPL CALC-SCNC: 5 MMOL/L (ref 5–15)
APTT PPP: 34.6 SEC (ref 22.1–31)
APTT PPP: 61.8 SEC (ref 22.1–31)
APTT PPP: 72.9 SEC (ref 22.1–31)
BACTERIA SPEC CULT: NORMAL
BASOPHILS # BLD: 0 K/UL (ref 0–0.1)
BASOPHILS NFR BLD: 0 % (ref 0–1)
BUN SERPL-MCNC: 19 MG/DL (ref 6–20)
BUN/CREAT SERPL: 13 (ref 12–20)
CALCIUM SERPL-MCNC: 8.3 MG/DL (ref 8.5–10.1)
CHLORIDE SERPL-SCNC: 107 MMOL/L (ref 97–108)
CO2 SERPL-SCNC: 25 MMOL/L (ref 21–32)
CREAT SERPL-MCNC: 1.51 MG/DL (ref 0.7–1.3)
DIFFERENTIAL METHOD BLD: ABNORMAL
EOSINOPHIL # BLD: 0.2 K/UL (ref 0–0.4)
EOSINOPHIL NFR BLD: 1 % (ref 0–7)
ERYTHROCYTE [DISTWIDTH] IN BLOOD BY AUTOMATED COUNT: 21.6 % (ref 11.5–14.5)
GLUCOSE SERPL-MCNC: 62 MG/DL (ref 65–100)
HCT VFR BLD AUTO: 26.7 % (ref 36.6–50.3)
HGB BLD-MCNC: 8.5 G/DL (ref 12.1–17)
IMM GRANULOCYTES # BLD AUTO: 0 K/UL
IMM GRANULOCYTES NFR BLD AUTO: 0 %
LYMPHOCYTES # BLD: 1.1 K/UL (ref 0.8–3.5)
LYMPHOCYTES NFR BLD: 6 % (ref 12–49)
MCH RBC QN AUTO: 29.9 PG (ref 26–34)
MCHC RBC AUTO-ENTMCNC: 31.8 G/DL (ref 30–36.5)
MCV RBC AUTO: 94 FL (ref 80–99)
MONOCYTES # BLD: 7.8 K/UL (ref 0–1)
MONOCYTES NFR BLD: 41 % (ref 5–13)
NEUTS SEG # BLD: 9.9 K/UL (ref 1.8–8)
NEUTS SEG NFR BLD: 52 % (ref 32–75)
NRBC # BLD: 0 K/UL (ref 0–0.01)
NRBC BLD-RTO: 0 PER 100 WBC
PLATELET # BLD AUTO: 100 K/UL (ref 150–400)
PMV BLD AUTO: 10.6 FL (ref 8.9–12.9)
POTASSIUM SERPL-SCNC: 3.8 MMOL/L (ref 3.5–5.1)
RBC # BLD AUTO: 2.84 M/UL (ref 4.1–5.7)
RBC MORPH BLD: ABNORMAL
SERVICE CMNT-IMP: NORMAL
SERVICE CMNT-IMP: NORMAL
SODIUM SERPL-SCNC: 137 MMOL/L (ref 136–145)
THERAPEUTIC RANGE: ABNORMAL SECS (ref 58–77)
VANCOMYCIN SERPL-MCNC: 6.4 UG/ML
WBC # BLD AUTO: 19 K/UL (ref 4.1–11.1)

## 2023-06-28 PROCEDURE — 6360000002 HC RX W HCPCS: Performed by: INTERNAL MEDICINE

## 2023-06-28 PROCEDURE — 94761 N-INVAS EAR/PLS OXIMETRY MLT: CPT

## 2023-06-28 PROCEDURE — 6370000000 HC RX 637 (ALT 250 FOR IP): Performed by: INTERNAL MEDICINE

## 2023-06-28 PROCEDURE — 2700000000 HC OXYGEN THERAPY PER DAY

## 2023-06-28 PROCEDURE — 80048 BASIC METABOLIC PNL TOTAL CA: CPT

## 2023-06-28 PROCEDURE — 85730 THROMBOPLASTIN TIME PARTIAL: CPT

## 2023-06-28 PROCEDURE — 1100000000 HC RM PRIVATE

## 2023-06-28 PROCEDURE — 36415 COLL VENOUS BLD VENIPUNCTURE: CPT

## 2023-06-28 PROCEDURE — 85025 COMPLETE CBC W/AUTO DIFF WBC: CPT

## 2023-06-28 PROCEDURE — 80202 ASSAY OF VANCOMYCIN: CPT

## 2023-06-28 PROCEDURE — 2580000003 HC RX 258: Performed by: INTERNAL MEDICINE

## 2023-06-28 RX ADMIN — AZITHROMYCIN DIHYDRATE 500 MG: 500 INJECTION, POWDER, LYOPHILIZED, FOR SOLUTION INTRAVENOUS at 10:28

## 2023-06-28 RX ADMIN — OXYCODONE HYDROCHLORIDE AND ACETAMINOPHEN 1000 MG: 500 TABLET ORAL at 08:34

## 2023-06-28 RX ADMIN — SODIUM CHLORIDE, PRESERVATIVE FREE 10 ML: 5 INJECTION INTRAVENOUS at 08:35

## 2023-06-28 RX ADMIN — HYDROXYUREA 500 MG: 500 CAPSULE ORAL at 08:40

## 2023-06-28 RX ADMIN — HEPARIN SODIUM 20 UNITS/KG/HR: 10000 INJECTION, SOLUTION INTRAVENOUS at 13:52

## 2023-06-28 RX ADMIN — SODIUM CHLORIDE, PRESERVATIVE FREE 10 ML: 5 INJECTION INTRAVENOUS at 22:43

## 2023-06-28 RX ADMIN — CEFEPIME 1000 MG: 1 INJECTION, POWDER, FOR SOLUTION INTRAMUSCULAR; INTRAVENOUS at 22:37

## 2023-06-28 RX ADMIN — HEPARIN SODIUM 3890 UNITS: 1000 INJECTION INTRAVENOUS; SUBCUTANEOUS at 13:49

## 2023-06-28 RX ADMIN — VANCOMYCIN HYDROCHLORIDE 1000 MG: 1 INJECTION, POWDER, LYOPHILIZED, FOR SOLUTION INTRAVENOUS at 06:54

## 2023-06-28 RX ADMIN — CEFEPIME 1000 MG: 1 INJECTION, POWDER, FOR SOLUTION INTRAMUSCULAR; INTRAVENOUS at 08:34

## 2023-06-28 RX ADMIN — METOPROLOL TARTRATE 25 MG: 25 TABLET, FILM COATED ORAL at 08:34

## 2023-06-28 RX ADMIN — METOPROLOL TARTRATE 25 MG: 25 TABLET, FILM COATED ORAL at 22:39

## 2023-06-29 ENCOUNTER — TELEPHONE (OUTPATIENT)
Age: 78
End: 2023-06-29

## 2023-06-29 ENCOUNTER — APPOINTMENT (OUTPATIENT)
Facility: HOSPITAL | Age: 78
DRG: 871 | End: 2023-06-29
Payer: MEDICARE

## 2023-06-29 LAB
APPEARANCE FLD: ABNORMAL
APTT PPP: 57.4 SEC (ref 22.1–31)
BODY FLD TYPE: NORMAL
COLOR FLD: ABNORMAL
CREAT SERPL-MCNC: 1.42 MG/DL (ref 0.7–1.3)
L PNEUMO1 AG UR QL IA: NEGATIVE
LDH FLD L TO P-CCNC: 404 U/L
LYMPHOCYTES NFR FLD: 3 %
MONOS+MACROS NFR FLD: 9 %
NEUTROPHILS NFR FLD: 88 %
NUC CELL # FLD: 5516 /CU MM
PH FLD: 7.2
PROT FLD-MCNC: 5.2 G/DL
RBC # FLD: >100 /CU MM
SPECIMEN SOURCE FLD: ABNORMAL
SPECIMEN SOURCE FLD: NORMAL
SPECIMEN SOURCE FLD: NORMAL
SPECIMEN SOURCE: NORMAL
THERAPEUTIC RANGE: ABNORMAL SECS (ref 58–77)
VANCOMYCIN SERPL-MCNC: 6.5 UG/ML

## 2023-06-29 PROCEDURE — 2709999900 HC NON-CHARGEABLE SUPPLY: Performed by: INTERNAL MEDICINE

## 2023-06-29 PROCEDURE — 83986 ASSAY PH BODY FLUID NOS: CPT

## 2023-06-29 PROCEDURE — 82565 ASSAY OF CREATININE: CPT

## 2023-06-29 PROCEDURE — 88305 TISSUE EXAM BY PATHOLOGIST: CPT

## 2023-06-29 PROCEDURE — 94761 N-INVAS EAR/PLS OXIMETRY MLT: CPT

## 2023-06-29 PROCEDURE — C1729 CATH, DRAINAGE: HCPCS | Performed by: INTERNAL MEDICINE

## 2023-06-29 PROCEDURE — 88112 CYTOPATH CELL ENHANCE TECH: CPT

## 2023-06-29 PROCEDURE — 87205 SMEAR GRAM STAIN: CPT

## 2023-06-29 PROCEDURE — 7100000011 HC PHASE II RECOVERY - ADDTL 15 MIN: Performed by: INTERNAL MEDICINE

## 2023-06-29 PROCEDURE — 36415 COLL VENOUS BLD VENIPUNCTURE: CPT

## 2023-06-29 PROCEDURE — 85730 THROMBOPLASTIN TIME PARTIAL: CPT

## 2023-06-29 PROCEDURE — 6360000002 HC RX W HCPCS: Performed by: INTERNAL MEDICINE

## 2023-06-29 PROCEDURE — 80202 ASSAY OF VANCOMYCIN: CPT

## 2023-06-29 PROCEDURE — 6370000000 HC RX 637 (ALT 250 FOR IP): Performed by: INTERNAL MEDICINE

## 2023-06-29 PROCEDURE — 3600007502: Performed by: INTERNAL MEDICINE

## 2023-06-29 PROCEDURE — 83615 LACTATE (LD) (LDH) ENZYME: CPT

## 2023-06-29 PROCEDURE — 88342 IMHCHEM/IMCYTCHM 1ST ANTB: CPT

## 2023-06-29 PROCEDURE — 2580000003 HC RX 258: Performed by: INTERNAL MEDICINE

## 2023-06-29 PROCEDURE — 87070 CULTURE OTHR SPECIMN AEROBIC: CPT

## 2023-06-29 PROCEDURE — 7100000010 HC PHASE II RECOVERY - FIRST 15 MIN: Performed by: INTERNAL MEDICINE

## 2023-06-29 PROCEDURE — 88341 IMHCHEM/IMCYTCHM EA ADD ANTB: CPT

## 2023-06-29 PROCEDURE — 3600007512: Performed by: INTERNAL MEDICINE

## 2023-06-29 PROCEDURE — 84157 ASSAY OF PROTEIN OTHER: CPT

## 2023-06-29 PROCEDURE — 89050 BODY FLUID CELL COUNT: CPT

## 2023-06-29 PROCEDURE — 1100000000 HC RM PRIVATE

## 2023-06-29 PROCEDURE — 0W9B3ZZ DRAINAGE OF LEFT PLEURAL CAVITY, PERCUTANEOUS APPROACH: ICD-10-PCS | Performed by: INTERNAL MEDICINE

## 2023-06-29 PROCEDURE — 71045 X-RAY EXAM CHEST 1 VIEW: CPT

## 2023-06-29 RX ORDER — SODIUM CHLORIDE 9 MG/ML
INJECTION, SOLUTION INTRAVENOUS CONTINUOUS
Status: DISCONTINUED | OUTPATIENT
Start: 2023-06-29 | End: 2023-07-01

## 2023-06-29 RX ADMIN — OXYCODONE HYDROCHLORIDE AND ACETAMINOPHEN 1000 MG: 500 TABLET ORAL at 10:59

## 2023-06-29 RX ADMIN — SODIUM CHLORIDE, PRESERVATIVE FREE 10 ML: 5 INJECTION INTRAVENOUS at 22:26

## 2023-06-29 RX ADMIN — APIXABAN 5 MG: 5 TABLET, FILM COATED ORAL at 22:24

## 2023-06-29 RX ADMIN — METOPROLOL TARTRATE 25 MG: 25 TABLET, FILM COATED ORAL at 10:59

## 2023-06-29 RX ADMIN — HYDROXYUREA 500 MG: 500 CAPSULE ORAL at 11:11

## 2023-06-29 RX ADMIN — SODIUM CHLORIDE: 9 INJECTION, SOLUTION INTRAVENOUS at 11:23

## 2023-06-29 RX ADMIN — CEFEPIME 1000 MG: 1 INJECTION, POWDER, FOR SOLUTION INTRAMUSCULAR; INTRAVENOUS at 11:24

## 2023-06-29 RX ADMIN — SODIUM CHLORIDE, PRESERVATIVE FREE 10 ML: 5 INJECTION INTRAVENOUS at 11:00

## 2023-06-29 RX ADMIN — METOPROLOL TARTRATE 25 MG: 25 TABLET, FILM COATED ORAL at 22:24

## 2023-06-29 RX ADMIN — AZITHROMYCIN DIHYDRATE 500 MG: 500 INJECTION, POWDER, LYOPHILIZED, FOR SOLUTION INTRAVENOUS at 10:59

## 2023-06-29 RX ADMIN — VANCOMYCIN HYDROCHLORIDE 1000 MG: 1 INJECTION, POWDER, LYOPHILIZED, FOR SOLUTION INTRAVENOUS at 06:50

## 2023-06-29 RX ADMIN — APIXABAN 5 MG: 5 TABLET, FILM COATED ORAL at 12:54

## 2023-06-29 RX ADMIN — ACETAMINOPHEN 650 MG: 325 TABLET ORAL at 10:59

## 2023-06-29 RX ADMIN — CEFEPIME 1000 MG: 1 INJECTION, POWDER, FOR SOLUTION INTRAMUSCULAR; INTRAVENOUS at 22:24

## 2023-06-29 ASSESSMENT — PAIN SCALES - GENERAL
PAINLEVEL_OUTOF10: 0
PAINLEVEL_OUTOF10: 0
PAINLEVEL_OUTOF10: 1
PAINLEVEL_OUTOF10: 0

## 2023-06-29 ASSESSMENT — PAIN DESCRIPTION - DESCRIPTORS: DESCRIPTORS: ACHING

## 2023-06-29 ASSESSMENT — PAIN DESCRIPTION - ORIENTATION: ORIENTATION: MID

## 2023-06-29 ASSESSMENT — PAIN DESCRIPTION - LOCATION: LOCATION: ABDOMEN;CHEST

## 2023-06-29 ASSESSMENT — PAIN - FUNCTIONAL ASSESSMENT: PAIN_FUNCTIONAL_ASSESSMENT: ACTIVITIES ARE NOT PREVENTED

## 2023-06-30 ENCOUNTER — APPOINTMENT (OUTPATIENT)
Facility: HOSPITAL | Age: 78
DRG: 871 | End: 2023-06-30
Payer: MEDICARE

## 2023-06-30 LAB
APTT PPP: 33.5 SEC (ref 22.1–31)
APTT PPP: 35.4 SEC (ref 22.1–31)
M PNEUMO IGG SER IA-ACNC: 1017 U/ML (ref 0–99)
M PNEUMO IGM SER IA-ACNC: 1139 U/ML (ref 0–769)
THERAPEUTIC RANGE: ABNORMAL SECS (ref 58–77)
THERAPEUTIC RANGE: ABNORMAL SECS (ref 58–77)

## 2023-06-30 PROCEDURE — 36415 COLL VENOUS BLD VENIPUNCTURE: CPT

## 2023-06-30 PROCEDURE — 6370000000 HC RX 637 (ALT 250 FOR IP): Performed by: INTERNAL MEDICINE

## 2023-06-30 PROCEDURE — 1100000000 HC RM PRIVATE

## 2023-06-30 PROCEDURE — 6360000002 HC RX W HCPCS: Performed by: INTERNAL MEDICINE

## 2023-06-30 PROCEDURE — 94761 N-INVAS EAR/PLS OXIMETRY MLT: CPT

## 2023-06-30 PROCEDURE — 2580000003 HC RX 258: Performed by: INTERNAL MEDICINE

## 2023-06-30 PROCEDURE — 85730 THROMBOPLASTIN TIME PARTIAL: CPT

## 2023-06-30 PROCEDURE — 71045 X-RAY EXAM CHEST 1 VIEW: CPT

## 2023-06-30 RX ORDER — GUAIFENESIN 600 MG/1
600 TABLET, EXTENDED RELEASE ORAL 2 TIMES DAILY
Status: DISCONTINUED | OUTPATIENT
Start: 2023-06-30 | End: 2023-07-03 | Stop reason: HOSPADM

## 2023-06-30 RX ORDER — ONDANSETRON 2 MG/ML
4 INJECTION INTRAMUSCULAR; INTRAVENOUS EVERY 6 HOURS PRN
Status: DISCONTINUED | OUTPATIENT
Start: 2023-06-30 | End: 2023-07-03 | Stop reason: HOSPADM

## 2023-06-30 RX ADMIN — ACETAMINOPHEN 650 MG: 325 TABLET ORAL at 09:58

## 2023-06-30 RX ADMIN — VANCOMYCIN HYDROCHLORIDE 1000 MG: 1 INJECTION, POWDER, LYOPHILIZED, FOR SOLUTION INTRAVENOUS at 06:28

## 2023-06-30 RX ADMIN — METOPROLOL TARTRATE 25 MG: 25 TABLET, FILM COATED ORAL at 20:18

## 2023-06-30 RX ADMIN — APIXABAN 5 MG: 5 TABLET, FILM COATED ORAL at 09:59

## 2023-06-30 RX ADMIN — OXYCODONE HYDROCHLORIDE AND ACETAMINOPHEN 1000 MG: 500 TABLET ORAL at 09:59

## 2023-06-30 RX ADMIN — CEFEPIME 1000 MG: 1 INJECTION, POWDER, FOR SOLUTION INTRAMUSCULAR; INTRAVENOUS at 20:18

## 2023-06-30 RX ADMIN — GUAIFENESIN 600 MG: 600 TABLET ORAL at 09:59

## 2023-06-30 RX ADMIN — ONDANSETRON 4 MG: 2 INJECTION INTRAMUSCULAR; INTRAVENOUS at 20:21

## 2023-06-30 RX ADMIN — AZITHROMYCIN DIHYDRATE 500 MG: 500 INJECTION, POWDER, LYOPHILIZED, FOR SOLUTION INTRAVENOUS at 10:00

## 2023-06-30 RX ADMIN — HYDROXYUREA 500 MG: 500 CAPSULE ORAL at 09:58

## 2023-06-30 RX ADMIN — APIXABAN 5 MG: 5 TABLET, FILM COATED ORAL at 20:18

## 2023-06-30 RX ADMIN — CEFEPIME 1000 MG: 1 INJECTION, POWDER, FOR SOLUTION INTRAMUSCULAR; INTRAVENOUS at 11:30

## 2023-06-30 RX ADMIN — METOPROLOL TARTRATE 25 MG: 25 TABLET, FILM COATED ORAL at 10:01

## 2023-06-30 RX ADMIN — SODIUM CHLORIDE, PRESERVATIVE FREE 10 ML: 5 INJECTION INTRAVENOUS at 20:19

## 2023-06-30 RX ADMIN — SODIUM CHLORIDE, PRESERVATIVE FREE 10 ML: 5 INJECTION INTRAVENOUS at 10:01

## 2023-06-30 ASSESSMENT — PAIN - FUNCTIONAL ASSESSMENT: PAIN_FUNCTIONAL_ASSESSMENT: ACTIVITIES ARE NOT PREVENTED

## 2023-06-30 ASSESSMENT — PAIN DESCRIPTION - LOCATION: LOCATION: CHEST

## 2023-06-30 ASSESSMENT — PAIN DESCRIPTION - ORIENTATION: ORIENTATION: MID

## 2023-06-30 ASSESSMENT — PAIN DESCRIPTION - DESCRIPTORS: DESCRIPTORS: ACHING

## 2023-06-30 ASSESSMENT — PAIN SCALES - GENERAL: PAINLEVEL_OUTOF10: 1

## 2023-07-01 LAB
ANION GAP SERPL CALC-SCNC: 6 MMOL/L (ref 5–15)
APTT PPP: 35.9 SEC (ref 22.1–31)
BASOPHILS # BLD: 0 K/UL (ref 0–0.1)
BASOPHILS NFR BLD: 0 % (ref 0–1)
BUN SERPL-MCNC: 16 MG/DL (ref 6–20)
BUN/CREAT SERPL: 13 (ref 12–20)
CALCIUM SERPL-MCNC: 8.4 MG/DL (ref 8.5–10.1)
CHLORIDE SERPL-SCNC: 103 MMOL/L (ref 97–108)
CO2 SERPL-SCNC: 25 MMOL/L (ref 21–32)
CREAT SERPL-MCNC: 1.27 MG/DL (ref 0.7–1.3)
DIFFERENTIAL METHOD BLD: ABNORMAL
EOSINOPHIL # BLD: 0 K/UL (ref 0–0.4)
EOSINOPHIL NFR BLD: 0 % (ref 0–7)
ERYTHROCYTE [DISTWIDTH] IN BLOOD BY AUTOMATED COUNT: 20.4 % (ref 11.5–14.5)
GLUCOSE SERPL-MCNC: 119 MG/DL (ref 65–100)
HCT VFR BLD AUTO: 27.3 % (ref 36.6–50.3)
HGB BLD-MCNC: 8.7 G/DL (ref 12.1–17)
IMM GRANULOCYTES # BLD AUTO: 0 K/UL
IMM GRANULOCYTES NFR BLD AUTO: 0 %
LYMPHOCYTES # BLD: 0.5 K/UL (ref 0.8–3.5)
LYMPHOCYTES NFR BLD: 3 % (ref 12–49)
MAGNESIUM SERPL-MCNC: 1.8 MG/DL (ref 1.6–2.4)
MCH RBC QN AUTO: 29.8 PG (ref 26–34)
MCHC RBC AUTO-ENTMCNC: 31.9 G/DL (ref 30–36.5)
MCV RBC AUTO: 93.5 FL (ref 80–99)
MONOCYTES # BLD: 8.3 K/UL (ref 0–1)
MONOCYTES NFR BLD: 52 % (ref 5–13)
NEUTS BAND NFR BLD MANUAL: 2 % (ref 0–6)
NEUTS SEG # BLD: 7.2 K/UL (ref 1.8–8)
NEUTS SEG NFR BLD: 43 % (ref 32–75)
NRBC # BLD: 0 K/UL (ref 0–0.01)
NRBC BLD-RTO: 0 PER 100 WBC
PLATELET # BLD AUTO: 94 K/UL (ref 150–400)
PMV BLD AUTO: 11.1 FL (ref 8.9–12.9)
POTASSIUM SERPL-SCNC: 3.4 MMOL/L (ref 3.5–5.1)
RBC # BLD AUTO: 2.92 M/UL (ref 4.1–5.7)
RBC MORPH BLD: ABNORMAL
SODIUM SERPL-SCNC: 134 MMOL/L (ref 136–145)
THERAPEUTIC RANGE: ABNORMAL SECS (ref 58–77)
WBC # BLD AUTO: 16 K/UL (ref 4.1–11.1)

## 2023-07-01 PROCEDURE — 85025 COMPLETE CBC W/AUTO DIFF WBC: CPT

## 2023-07-01 PROCEDURE — 2580000003 HC RX 258: Performed by: INTERNAL MEDICINE

## 2023-07-01 PROCEDURE — 6370000000 HC RX 637 (ALT 250 FOR IP): Performed by: INTERNAL MEDICINE

## 2023-07-01 PROCEDURE — 6360000002 HC RX W HCPCS: Performed by: HOSPITALIST

## 2023-07-01 PROCEDURE — 36415 COLL VENOUS BLD VENIPUNCTURE: CPT

## 2023-07-01 PROCEDURE — 94761 N-INVAS EAR/PLS OXIMETRY MLT: CPT

## 2023-07-01 PROCEDURE — 6360000002 HC RX W HCPCS: Performed by: INTERNAL MEDICINE

## 2023-07-01 PROCEDURE — 80048 BASIC METABOLIC PNL TOTAL CA: CPT

## 2023-07-01 PROCEDURE — 98960 EDU&TRN PT SELF-MGMT NQHP 1: CPT

## 2023-07-01 PROCEDURE — 83735 ASSAY OF MAGNESIUM: CPT

## 2023-07-01 PROCEDURE — 85730 THROMBOPLASTIN TIME PARTIAL: CPT

## 2023-07-01 PROCEDURE — 6370000000 HC RX 637 (ALT 250 FOR IP): Performed by: HOSPITALIST

## 2023-07-01 PROCEDURE — 1100000000 HC RM PRIVATE

## 2023-07-01 RX ORDER — POTASSIUM CHLORIDE 750 MG/1
40 TABLET, FILM COATED, EXTENDED RELEASE ORAL
Status: COMPLETED | OUTPATIENT
Start: 2023-07-01 | End: 2023-07-01

## 2023-07-01 RX ORDER — SODIUM CHLORIDE AND POTASSIUM CHLORIDE 150; 900 MG/100ML; MG/100ML
INJECTION, SOLUTION INTRAVENOUS CONTINUOUS
Status: DISCONTINUED | OUTPATIENT
Start: 2023-07-01 | End: 2023-07-02

## 2023-07-01 RX ADMIN — POTASSIUM CHLORIDE 40 MEQ: 750 TABLET, FILM COATED, EXTENDED RELEASE ORAL at 16:54

## 2023-07-01 RX ADMIN — GUAIFENESIN 600 MG: 600 TABLET ORAL at 08:23

## 2023-07-01 RX ADMIN — HYDROXYUREA 500 MG: 500 CAPSULE ORAL at 08:22

## 2023-07-01 RX ADMIN — METOPROLOL TARTRATE 25 MG: 25 TABLET, FILM COATED ORAL at 08:22

## 2023-07-01 RX ADMIN — CEFEPIME 1000 MG: 1 INJECTION, POWDER, FOR SOLUTION INTRAMUSCULAR; INTRAVENOUS at 08:37

## 2023-07-01 RX ADMIN — APIXABAN 5 MG: 5 TABLET, FILM COATED ORAL at 20:24

## 2023-07-01 RX ADMIN — GUAIFENESIN 600 MG: 600 TABLET ORAL at 20:24

## 2023-07-01 RX ADMIN — POTASSIUM CHLORIDE AND SODIUM CHLORIDE: 900; 150 INJECTION, SOLUTION INTRAVENOUS at 16:57

## 2023-07-01 RX ADMIN — APIXABAN 5 MG: 5 TABLET, FILM COATED ORAL at 08:22

## 2023-07-01 RX ADMIN — CEFEPIME 2000 MG: 2 INJECTION, POWDER, FOR SOLUTION INTRAVENOUS at 20:25

## 2023-07-01 RX ADMIN — SODIUM CHLORIDE, PRESERVATIVE FREE 10 ML: 5 INJECTION INTRAVENOUS at 20:25

## 2023-07-01 RX ADMIN — METOPROLOL TARTRATE 25 MG: 25 TABLET, FILM COATED ORAL at 20:24

## 2023-07-01 RX ADMIN — OXYCODONE HYDROCHLORIDE AND ACETAMINOPHEN 1000 MG: 500 TABLET ORAL at 08:22

## 2023-07-01 RX ADMIN — POTASSIUM CHLORIDE 40 MEQ: 750 TABLET, FILM COATED, EXTENDED RELEASE ORAL at 14:11

## 2023-07-01 RX ADMIN — AZITHROMYCIN DIHYDRATE 500 MG: 500 INJECTION, POWDER, LYOPHILIZED, FOR SOLUTION INTRAVENOUS at 09:56

## 2023-07-01 RX ADMIN — SODIUM CHLORIDE, PRESERVATIVE FREE 10 ML: 5 INJECTION INTRAVENOUS at 08:41

## 2023-07-02 ENCOUNTER — APPOINTMENT (OUTPATIENT)
Facility: HOSPITAL | Age: 78
DRG: 871 | End: 2023-07-02
Payer: MEDICARE

## 2023-07-02 LAB
APTT PPP: 32.5 SEC (ref 22.1–31)
APTT PPP: 33.6 SEC (ref 22.1–31)
BASOPHILS # BLD: 0 K/UL (ref 0–0.1)
BASOPHILS NFR BLD: 0 % (ref 0–1)
COMMENT:: NORMAL
DIFFERENTIAL METHOD BLD: ABNORMAL
EOSINOPHIL # BLD: 0 K/UL (ref 0–0.4)
EOSINOPHIL NFR BLD: 0 % (ref 0–7)
ERYTHROCYTE [DISTWIDTH] IN BLOOD BY AUTOMATED COUNT: 19.9 % (ref 11.5–14.5)
HCT VFR BLD AUTO: 24.8 % (ref 36.6–50.3)
HGB BLD-MCNC: 8.1 G/DL (ref 12.1–17)
IMM GRANULOCYTES # BLD AUTO: 0 K/UL
IMM GRANULOCYTES NFR BLD AUTO: 0 %
LYMPHOCYTES # BLD: 1.3 K/UL (ref 0.8–3.5)
LYMPHOCYTES NFR BLD: 9 % (ref 12–49)
MCH RBC QN AUTO: 30.5 PG (ref 26–34)
MCHC RBC AUTO-ENTMCNC: 32.7 G/DL (ref 30–36.5)
MCV RBC AUTO: 93.2 FL (ref 80–99)
MONOCYTES # BLD: 6.1 K/UL (ref 0–1)
MONOCYTES NFR BLD: 43 % (ref 5–13)
NEUTS SEG # BLD: 6.8 K/UL (ref 1.8–8)
NEUTS SEG NFR BLD: 48 % (ref 32–75)
NRBC # BLD: 0 K/UL (ref 0–0.01)
NRBC BLD-RTO: 0 PER 100 WBC
PLATELET # BLD AUTO: 96 K/UL (ref 150–400)
PMV BLD AUTO: 10.2 FL (ref 8.9–12.9)
RBC # BLD AUTO: 2.66 M/UL (ref 4.1–5.7)
RBC MORPH BLD: ABNORMAL
SPECIMEN HOLD: NORMAL
THERAPEUTIC RANGE: ABNORMAL SECS (ref 58–77)
THERAPEUTIC RANGE: ABNORMAL SECS (ref 58–77)
WBC # BLD AUTO: 14.2 K/UL (ref 4.1–11.1)

## 2023-07-02 PROCEDURE — 94761 N-INVAS EAR/PLS OXIMETRY MLT: CPT

## 2023-07-02 PROCEDURE — 6370000000 HC RX 637 (ALT 250 FOR IP): Performed by: INTERNAL MEDICINE

## 2023-07-02 PROCEDURE — 85025 COMPLETE CBC W/AUTO DIFF WBC: CPT

## 2023-07-02 PROCEDURE — 71046 X-RAY EXAM CHEST 2 VIEWS: CPT

## 2023-07-02 PROCEDURE — 2500000003 HC RX 250 WO HCPCS: Performed by: HOSPITALIST

## 2023-07-02 PROCEDURE — 6360000002 HC RX W HCPCS: Performed by: INTERNAL MEDICINE

## 2023-07-02 PROCEDURE — 98960 EDU&TRN PT SELF-MGMT NQHP 1: CPT

## 2023-07-02 PROCEDURE — 6370000000 HC RX 637 (ALT 250 FOR IP): Performed by: HOSPITALIST

## 2023-07-02 PROCEDURE — 85730 THROMBOPLASTIN TIME PARTIAL: CPT

## 2023-07-02 PROCEDURE — 2580000003 HC RX 258: Performed by: INTERNAL MEDICINE

## 2023-07-02 PROCEDURE — 36415 COLL VENOUS BLD VENIPUNCTURE: CPT

## 2023-07-02 PROCEDURE — 1100000000 HC RM PRIVATE

## 2023-07-02 RX ORDER — GUAIFENESIN 200 MG/10ML
200 LIQUID ORAL EVERY 4 HOURS PRN
Status: DISCONTINUED | OUTPATIENT
Start: 2023-07-02 | End: 2023-07-03 | Stop reason: HOSPADM

## 2023-07-02 RX ORDER — CODEINE PHOSPHATE AND GUAIFENESIN 10; 100 MG/5ML; MG/5ML
10 SOLUTION ORAL ONCE
Status: COMPLETED | OUTPATIENT
Start: 2023-07-02 | End: 2023-07-02

## 2023-07-02 RX ADMIN — CEFEPIME 2000 MG: 2 INJECTION, POWDER, FOR SOLUTION INTRAVENOUS at 20:16

## 2023-07-02 RX ADMIN — GUAIFENESIN 200 MG: 100 SOLUTION ORAL at 09:31

## 2023-07-02 RX ADMIN — HYDROXYUREA 500 MG: 500 CAPSULE ORAL at 09:13

## 2023-07-02 RX ADMIN — GUAIFENESIN AND CODEINE PHOSPHATE 10 ML: 10; 100 LIQUID ORAL at 04:07

## 2023-07-02 RX ADMIN — METOPROLOL TARTRATE 25 MG: 25 TABLET, FILM COATED ORAL at 09:13

## 2023-07-02 RX ADMIN — GUAIFENESIN 600 MG: 600 TABLET ORAL at 20:16

## 2023-07-02 RX ADMIN — SODIUM CHLORIDE, PRESERVATIVE FREE 10 ML: 5 INJECTION INTRAVENOUS at 20:17

## 2023-07-02 RX ADMIN — OXYCODONE HYDROCHLORIDE AND ACETAMINOPHEN 1000 MG: 500 TABLET ORAL at 09:21

## 2023-07-02 RX ADMIN — GUAIFENESIN 600 MG: 600 TABLET ORAL at 09:13

## 2023-07-02 RX ADMIN — APIXABAN 5 MG: 5 TABLET, FILM COATED ORAL at 20:16

## 2023-07-02 RX ADMIN — APIXABAN 5 MG: 5 TABLET, FILM COATED ORAL at 09:21

## 2023-07-02 RX ADMIN — GUAIFENESIN 200 MG: 100 SOLUTION ORAL at 20:16

## 2023-07-02 RX ADMIN — AZITHROMYCIN DIHYDRATE 500 MG: 500 INJECTION, POWDER, LYOPHILIZED, FOR SOLUTION INTRAVENOUS at 09:21

## 2023-07-02 RX ADMIN — CEFEPIME 2000 MG: 2 INJECTION, POWDER, FOR SOLUTION INTRAVENOUS at 09:21

## 2023-07-02 RX ADMIN — METOPROLOL TARTRATE 25 MG: 25 TABLET, FILM COATED ORAL at 20:16

## 2023-07-03 VITALS
BODY MASS INDEX: 21.67 KG/M2 | RESPIRATION RATE: 18 BRPM | HEIGHT: 68 IN | TEMPERATURE: 98.6 F | DIASTOLIC BLOOD PRESSURE: 65 MMHG | HEART RATE: 68 BPM | OXYGEN SATURATION: 95 % | WEIGHT: 143 LBS | SYSTOLIC BLOOD PRESSURE: 112 MMHG

## 2023-07-03 LAB
ANION GAP SERPL CALC-SCNC: 3 MMOL/L (ref 5–15)
APTT PPP: 33.8 SEC (ref 22.1–31)
BACTERIA SPEC CULT: NORMAL
BASOPHILS # BLD: 0 K/UL (ref 0–0.1)
BASOPHILS NFR BLD: 0 % (ref 0–1)
BUN SERPL-MCNC: 22 MG/DL (ref 6–20)
BUN/CREAT SERPL: 14 (ref 12–20)
CALCIUM SERPL-MCNC: 8.6 MG/DL (ref 8.5–10.1)
CHLORIDE SERPL-SCNC: 103 MMOL/L (ref 97–108)
CO2 SERPL-SCNC: 27 MMOL/L (ref 21–32)
COMMENT:: NORMAL
CREAT SERPL-MCNC: 1.57 MG/DL (ref 0.7–1.3)
DIFFERENTIAL METHOD BLD: ABNORMAL
EOSINOPHIL # BLD: 0 K/UL (ref 0–0.4)
EOSINOPHIL NFR BLD: 0 % (ref 0–7)
ERYTHROCYTE [DISTWIDTH] IN BLOOD BY AUTOMATED COUNT: 19.6 % (ref 11.5–14.5)
GLUCOSE SERPL-MCNC: 82 MG/DL (ref 65–100)
GRAM STN SPEC: NORMAL
GRAM STN SPEC: NORMAL
HCT VFR BLD AUTO: 27.5 % (ref 36.6–50.3)
HGB BLD-MCNC: 8.9 G/DL (ref 12.1–17)
IMM GRANULOCYTES # BLD AUTO: 0 K/UL
IMM GRANULOCYTES NFR BLD AUTO: 0 %
LYMPHOCYTES # BLD: 1.3 K/UL (ref 0.8–3.5)
LYMPHOCYTES NFR BLD: 8 % (ref 12–49)
MCH RBC QN AUTO: 30.2 PG (ref 26–34)
MCHC RBC AUTO-ENTMCNC: 32.4 G/DL (ref 30–36.5)
MCV RBC AUTO: 93.2 FL (ref 80–99)
MONOCYTES # BLD: 6.5 K/UL (ref 0–1)
MONOCYTES NFR BLD: 41 % (ref 5–13)
NEUTS SEG # BLD: 8.1 K/UL (ref 1.8–8)
NEUTS SEG NFR BLD: 51 % (ref 32–75)
NRBC # BLD: 0 K/UL (ref 0–0.01)
NRBC BLD-RTO: 0 PER 100 WBC
PHOSPHATE SERPL-MCNC: 3.7 MG/DL (ref 2.6–4.7)
PLATELET # BLD AUTO: 118 K/UL (ref 150–400)
PMV BLD AUTO: 10.3 FL (ref 8.9–12.9)
POTASSIUM SERPL-SCNC: 4.3 MMOL/L (ref 3.5–5.1)
RBC # BLD AUTO: 2.95 M/UL (ref 4.1–5.7)
RBC MORPH BLD: ABNORMAL
SERVICE CMNT-IMP: NORMAL
SODIUM SERPL-SCNC: 133 MMOL/L (ref 136–145)
SPECIMEN HOLD: NORMAL
THERAPEUTIC RANGE: ABNORMAL SECS (ref 58–77)
WBC # BLD AUTO: 15.9 K/UL (ref 4.1–11.1)

## 2023-07-03 PROCEDURE — 6370000000 HC RX 637 (ALT 250 FOR IP): Performed by: INTERNAL MEDICINE

## 2023-07-03 PROCEDURE — 85025 COMPLETE CBC W/AUTO DIFF WBC: CPT

## 2023-07-03 PROCEDURE — 80048 BASIC METABOLIC PNL TOTAL CA: CPT

## 2023-07-03 PROCEDURE — 2580000003 HC RX 258: Performed by: INTERNAL MEDICINE

## 2023-07-03 PROCEDURE — 6360000002 HC RX W HCPCS: Performed by: INTERNAL MEDICINE

## 2023-07-03 PROCEDURE — 6360000002 HC RX W HCPCS: Performed by: HOSPITALIST

## 2023-07-03 PROCEDURE — 84100 ASSAY OF PHOSPHORUS: CPT

## 2023-07-03 PROCEDURE — 2580000003 HC RX 258: Performed by: HOSPITALIST

## 2023-07-03 PROCEDURE — 36415 COLL VENOUS BLD VENIPUNCTURE: CPT

## 2023-07-03 PROCEDURE — 85730 THROMBOPLASTIN TIME PARTIAL: CPT

## 2023-07-03 RX ORDER — LEVOFLOXACIN 750 MG/1
750 TABLET ORAL EVERY OTHER DAY
Qty: 2 TABLET | Refills: 0 | Status: SHIPPED | OUTPATIENT
Start: 2023-07-04 | End: 2023-07-08

## 2023-07-03 RX ORDER — AZITHROMYCIN 250 MG/1
500 TABLET, FILM COATED ORAL ONCE
Status: DISCONTINUED | OUTPATIENT
Start: 2023-07-03 | End: 2023-07-03

## 2023-07-03 RX ADMIN — HYDROXYUREA 500 MG: 500 CAPSULE ORAL at 09:25

## 2023-07-03 RX ADMIN — GUAIFENESIN 600 MG: 600 TABLET ORAL at 09:27

## 2023-07-03 RX ADMIN — AZITHROMYCIN MONOHYDRATE 500 MG: 500 INJECTION, POWDER, LYOPHILIZED, FOR SOLUTION INTRAVENOUS at 10:42

## 2023-07-03 RX ADMIN — SODIUM CHLORIDE, PRESERVATIVE FREE 10 ML: 5 INJECTION INTRAVENOUS at 09:29

## 2023-07-03 RX ADMIN — OXYCODONE HYDROCHLORIDE AND ACETAMINOPHEN 1000 MG: 500 TABLET ORAL at 09:25

## 2023-07-03 RX ADMIN — METOPROLOL TARTRATE 25 MG: 25 TABLET, FILM COATED ORAL at 09:28

## 2023-07-03 RX ADMIN — CEFEPIME 2000 MG: 2 INJECTION, POWDER, FOR SOLUTION INTRAVENOUS at 09:35

## 2023-07-03 RX ADMIN — APIXABAN 5 MG: 5 TABLET, FILM COATED ORAL at 09:28

## 2023-07-03 NOTE — PROGRESS NOTES
Discharge instructions reviewed with patient at bedside. All questions answered. Patient wheeled out of hospital to private vehicle by hospital staff.

## 2023-07-03 NOTE — DISCHARGE INSTRUCTIONS
Patient Discharge Instructions    [facility] [id number] : 1945    Admitted 2023 Discharged: 7/3/2023       Discharge Medications: It is important that you take the medication exactly as they are prescribed. Keep your medication in the bottles provided by the pharmacist and keep a list of the medication names, dosages, and times to be taken in your wallet. Do not take other medications without consulting your doctor. Call your PCP for medication refills      What to do at Home    Take medications as prescribed and follow up with PCP, Pulmonary, and Urology. Call your PCP for refills of your medications.       Recommended Diet: cardiac diet       Recommended Activity: Activity as tolerated      **If you experience any of the following symptoms chest pain, shortness of breath, fevers, chills, signs of infection, and symptoms of stroke, please follow up with PCP or go to the nearest ER.**         Hortense Aase, MD     July 3, 2023

## 2023-07-03 NOTE — PROGRESS NOTES
[facility]   : 1945 Admit Date: 2023   Phone: [phone]  Room: [facility]   PCP: Rosalina Gloria MD  MRN: [id number]   Date: 7/3/2023  Code: Full Code          Chart and notes reviewed. Data reviewed. I review the patient's current medications in the medical record at each encounter. I have evaluated and examined the patient. HPI:    9:54 AM       History was obtained from patient. I was asked by Gabriela Ugarte DO to see Dylan Law in consultation for a chief complaint of pleural effusion. History of Present Illness:  Mr. Gris Flood is a 70 yo gentleman with a history of afib (on Eliquis), pericardial effusion s/ pericardial window , CML, nephrolithiasis s/p bilateral ureteral stent placement and ureteral dilation H/TN, and GERD who is admitted for pneumonia and found to have a L sided effusion. He presented with L sided back/flank pain, n/v, and chills. Symptoms began about two days prior to admission. Denies shortness of breath, cough, wheezing. Imaging revealed a LLL PNA with associated effusion. He is currently requiring 2L. No documented fevers. Labs: Na 134, K 3.4, cr 1.99, WBC 19.9, Hgb 7.7     Images reviewed:  CT chest 2023: no LAD, moderate L pleural effusion, dense LLL infiltrate, R basilar atelectasis, stable RML nodule 6mm    ROS:  Denies any complaints, feeling well. Interval history: no acute events, he is eager to go home. 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 Was referred to [facility] for transplant, but may not be candidate due to age, was started on chemotherapy.     GERD (gastroesophageal reflux disease)     Hypertension        Past Surgical History:   Procedure Laterality Date    APPENDECTOMY      BONE MARROW BIOPSY  2023    negative for BCR/ABL/ASS1; CMML-2 (14% blasts)

## 2023-07-03 NOTE — CARE COORDINATION
7/3/2023  11:08 AM  Care Management Progress Note      ICD-10-CM    1. HCAP (healthcare-associated pneumonia)  J18.9       2. Hematuria, unspecified type  R31.9       3. Hypoxia  R09.02       4. Acute renal insufficiency  N28.9       5. Sepsis with acute organ dysfunction without septic shock, due to unspecified organism, unspecified type ([address])  A41.9     R65.20           RUR:  21%  Risk Level: []Low []Moderate [x]High  Value-based purchasing: [] Yes [x] No  Bundle patient: [] Yes [x] No   Specify:     Transition of care plan:  Discharge order submitted. Pt has medically cleared. Home. No CM needs indicated. [facility] information provided to pt. Outpatient follow-up (see AVS). Pt's family to transport.       06/28/23 1340   Service Assessment   Patient Orientation Alert and Oriented   Cognition Alert   History Provided By Patient   Primary Caregiver Self   Support Systems Spouse/Significant Other   Patient's Healthcare Decision Maker is: Legal Next of Kin   PCP Verified by CM Yes   Last Visit to PCP Within last year   Current Functional Level Independent in ADLs/IADLs   Can patient return to prior living arrangement Yes   Ability to make needs known: Good   Family able to assist with home care needs: Yes   Would you like for me to discuss the discharge plan with any other family members/significant others, and if so, who? Yes  (As needed)   Financial Resources Medicare   Social/Functional History   Lives With Spouse   Type of [address] [place] [address]  One level   Entrance Stairs - Number of Steps [address]   Ambulation Assistance Independent   Transfer Assistance Independent   Active  Yes   Mode of Transportation Car   Occupation Retired   Discharge Planning   Type of [address] [place] [address] Prior To Admission None   DME Ordered?  No   Potential Assistance

## 2023-07-03 NOTE — PLAN OF CARE
Problem: Discharge Planning  Goal: Discharge to home or other facility with appropriate resources  7/3/2023 1241 by Ed Fritz RN  Outcome: Adequate for Discharge  Flowsheets (Taken 7/3/2023 0756)  Discharge to home or other facility with appropriate resources: Identify barriers to discharge with patient and caregiver  6/5/1911 4163 by Ana Cristina Schwarz RN  Outcome: Progressing     Problem: Pain  Goal: Verbalizes/displays adequate comfort level or baseline comfort level  7/3/2023 1241 by Ed Fritz RN  Outcome: Adequate for Discharge  Flowsheets (Taken 7/3/2023 0756)  Verbalizes/displays adequate comfort level or baseline comfort level: Encourage patient to monitor pain and request assistance  1/0/2375 1303 by Ana Cristina Schwarz RN  Outcome: Progressing     Problem: Safety - Adult  Goal: Free from fall injury  7/3/2023 1241 by Ed Fritz RN  Outcome: Adequate for Discharge  2/8/9444 4818 by Ana Cristina Schwarz RN  Outcome: Progressing

## 2023-07-03 NOTE — DISCHARGE INSTR - COC
Continuity of Care Form    Patient Name: Thea Ott   :  1945  MRN:  [id number]    Admit date:  2023  Discharge date:  7/3/23    Code Status Order: Full Code   Advance Directives:     Admitting Physician:  Torrie Mae DO  PCP: Davida Cramer MD    Discharging Nurse: Rick Elam, [address] Unit/Room#: 224/03  Discharging Unit Phone Number: [phone]    Emergency Contact:   Extended Emergency Contact Information  Primary Emergency Contact: Alex Dougherty  Address: [address] [place] [address]. trail           [address] [facility], [address] [place] [address] of [address] Phone: [phone]  Relation: Spouse    Past Surgical History:  Past Surgical History:   Procedure Laterality Date    APPENDECTOMY      BONE MARROW BIOPSY  2023    negative for BCR/ABL/ASS1; CMML-2 (14% blasts)    CARDIAC PROCEDURE N/A 2023    Pericardiocentesis performed by Catina Michael MD at [facility] CATH LAB    CATARACT REMOVAL Right     COLONOSCOPY N/A 2017    COLONOSCOPY performed by Max Cabot, MD at [address] [place] [address] Pkwy Bilateral 2023    CYSTOSCOPY, BILATERAL  STENTS, RETROGRADE performed by Mindy Liz MD at [facility] N/A 2023    PERICARDIAL WINDOW performed by Ugo Valenzuela MD at [facility]  2023    THORACENTESIS N/A 2023    [facility]       Immunization History: There is no immunization history on file for this patient.     Active Problems:  Patient Active Problem List   Diagnosis Code    Thrombocytopenia ([address]) D69.6    Pneumonia due to COVID-19 virus U07.1, J12.82    HTN (hypertension), benign I10    Leukocytosis D72.829    MARTITA (acute kidney injury) ([address]) N17.9    PAF (paroxysmal atrial fibrillation) (HCC) I48.0    Hepatic steatosis K76.0    Atrial fibrillation with rapid ventricular response (HCC) I48.91    Bilateral hydronephrosis N13.30    Sepsis due to urinary tract

## 2023-07-10 LAB
BACTERIA SPEC CULT: NORMAL
SERVICE CMNT-IMP: NORMAL

## 2023-07-14 DIAGNOSIS — C93.10 CHRONIC MYELOMONOCYTIC LEUKEMIA NOT HAVING ACHIEVED REMISSION (HCC): Primary | ICD-10-CM

## 2023-08-14 ENCOUNTER — OFFICE VISIT (OUTPATIENT)
Age: 78
End: 2023-08-14

## 2023-08-14 VITALS
SYSTOLIC BLOOD PRESSURE: 126 MMHG | BODY MASS INDEX: 22.73 KG/M2 | WEIGHT: 150 LBS | DIASTOLIC BLOOD PRESSURE: 73 MMHG | HEART RATE: 70 BPM | OXYGEN SATURATION: 98 % | HEIGHT: 68 IN | RESPIRATION RATE: 16 BRPM | TEMPERATURE: 97.6 F

## 2023-08-14 DIAGNOSIS — Z98.890 S/P PERICARDIAL WINDOW CREATION: ICD-10-CM

## 2023-08-14 PROCEDURE — 99024 POSTOP FOLLOW-UP VISIT: CPT | Performed by: NURSE PRACTITIONER

## 2023-08-14 ASSESSMENT — PATIENT HEALTH QUESTIONNAIRE - PHQ9
SUM OF ALL RESPONSES TO PHQ QUESTIONS 1-9: 0
1. LITTLE INTEREST OR PLEASURE IN DOING THINGS: 0
SUM OF ALL RESPONSES TO PHQ9 QUESTIONS 1 & 2: 0
2. FEELING DOWN, DEPRESSED OR HOPELESS: 0
SUM OF ALL RESPONSES TO PHQ QUESTIONS 1-9: 0

## 2023-08-14 NOTE — PROGRESS NOTES
Patient: Magdaleno Schuler   Age: 69 y.o. Patient Care Team:  Denise Rodriguez MD as PCP - Sarai Ray MD as Consulting Physician (Cardiothoracic Surgery)  Dwight Krishna MD as Consulting Physician (Cardiology)    Diagnosis: The encounter diagnosis was S/P pericardial window creation. Problem List:   Patient Active Problem List   Diagnosis    Thrombocytopenia ([address])    Pneumonia due to COVID-19 virus    HTN (hypertension), benign    Leukocytosis    MARTITA (acute kidney injury) ([address])    PAF (paroxysmal atrial fibrillation) (HCC)    Hepatic steatosis    Atrial fibrillation with rapid ventricular response ([address])    Bilateral hydronephrosis    Sepsis due to urinary tract infection ([address])    Chronic myelomonocytic leukemia not having achieved remission ([address])    HAP (hospital-acquired pneumonia)    S/P pericardial window creation            Date of Surgery: 6/8/23     Surgery: Pericardial Window    HPI:  Magdaleno Schuler presents with his wife for his 1 month follow up visit. Following his discharge on 6/12/23, he went home but then had to be readmitted. Since DC this time, he has been doing well. Denies fever, chills, worsening shortness of breath or fatigue, chest pain, orthopnea, PND, dizziness, syncope or recent fall, or issues with his incisions. He is following up with his Urologist and Oncologist.  He has also seen his PCP. No complaints today.     Current Medications:   Current Outpatient Medications   Medication Sig Dispense Refill    amiodarone (PACERONE) 400 MG tablet Take 1 tablet by mouth daily for 15 days 15 tablet 0    apixaban (ELIQUIS) 5 MG TABS tablet Take 1 tablet by mouth 2 times daily 60 tablet 0    hydroxyurea (HYDREA) 500 MG chemo capsule Take 1 capsule by mouth daily 90 capsule 3    metoprolol tartrate (LOPRESSOR) 25 MG tablet Take 1 tablet by mouth 2 times daily 60 tablet 1    ascorbic acid (VITAMIN C) 1000 MG tablet Take 1 tablet by mouth daily      ergocalciferol

## 2023-08-15 ENCOUNTER — OFFICE VISIT (OUTPATIENT)
Age: 78
End: 2023-08-15
Payer: MEDICARE

## 2023-08-15 ENCOUNTER — HOSPITAL ENCOUNTER (OUTPATIENT)
Facility: HOSPITAL | Age: 78
Setting detail: INFUSION SERIES
End: 2023-08-15
Payer: MEDICARE

## 2023-08-15 VITALS
SYSTOLIC BLOOD PRESSURE: 141 MMHG | TEMPERATURE: 98 F | OXYGEN SATURATION: 98 % | DIASTOLIC BLOOD PRESSURE: 68 MMHG | HEART RATE: 64 BPM | RESPIRATION RATE: 16 BRPM

## 2023-08-15 VITALS
RESPIRATION RATE: 16 BRPM | HEIGHT: 68 IN | WEIGHT: 150 LBS | OXYGEN SATURATION: 98 % | HEART RATE: 64 BPM | TEMPERATURE: 98 F | SYSTOLIC BLOOD PRESSURE: 141 MMHG | BODY MASS INDEX: 22.73 KG/M2 | DIASTOLIC BLOOD PRESSURE: 68 MMHG

## 2023-08-15 DIAGNOSIS — C93.10 CHRONIC MYELOMONOCYTIC LEUKEMIA NOT HAVING ACHIEVED REMISSION (HCC): Primary | ICD-10-CM

## 2023-08-15 DIAGNOSIS — C93.10 CHRONIC MYELOMONOCYTIC LEUKEMIA NOT HAVING ACHIEVED REMISSION (HCC): ICD-10-CM

## 2023-08-15 LAB
ALBUMIN SERPL-MCNC: 3.5 G/DL (ref 3.5–5)
ALBUMIN/GLOB SERPL: 0.8 (ref 1.1–2.2)
ALP SERPL-CCNC: 60 U/L (ref 45–117)
ALT SERPL-CCNC: 24 U/L (ref 12–78)
ANION GAP SERPL CALC-SCNC: 4 MMOL/L (ref 5–15)
AST SERPL-CCNC: 20 U/L (ref 15–37)
BASOPHILS # BLD: 0 K/UL (ref 0–0.1)
BASOPHILS NFR BLD: 0 % (ref 0–1)
BILIRUB SERPL-MCNC: 0.6 MG/DL (ref 0.2–1)
BUN SERPL-MCNC: 20 MG/DL (ref 6–20)
BUN/CREAT SERPL: 13 (ref 12–20)
CALCIUM SERPL-MCNC: 8.5 MG/DL (ref 8.5–10.1)
CHLORIDE SERPL-SCNC: 108 MMOL/L (ref 97–108)
CO2 SERPL-SCNC: 26 MMOL/L (ref 21–32)
CREAT SERPL-MCNC: 1.55 MG/DL (ref 0.7–1.3)
DIFFERENTIAL METHOD BLD: ABNORMAL
EOSINOPHIL # BLD: 0 K/UL (ref 0–0.4)
EOSINOPHIL NFR BLD: 0 % (ref 0–7)
ERYTHROCYTE [DISTWIDTH] IN BLOOD BY AUTOMATED COUNT: 18.8 % (ref 11.5–14.5)
GLOBULIN SER CALC-MCNC: 4.6 G/DL (ref 2–4)
GLUCOSE SERPL-MCNC: 109 MG/DL (ref 65–100)
HCT VFR BLD AUTO: 29.5 % (ref 36.6–50.3)
HGB BLD-MCNC: 9.4 G/DL (ref 12.1–17)
IMM GRANULOCYTES # BLD AUTO: 0 K/UL
IMM GRANULOCYTES NFR BLD AUTO: 0 %
LYMPHOCYTES # BLD: 1 K/UL (ref 0.8–3.5)
LYMPHOCYTES NFR BLD: 19 % (ref 12–49)
MCH RBC QN AUTO: 32 PG (ref 26–34)
MCHC RBC AUTO-ENTMCNC: 31.9 G/DL (ref 30–36.5)
MCV RBC AUTO: 100.3 FL (ref 80–99)
MONOCYTES # BLD: 2.4 K/UL (ref 0–1)
MONOCYTES NFR BLD: 49 % (ref 5–13)
NEUTS SEG # BLD: 1.6 K/UL (ref 1.8–8)
NEUTS SEG NFR BLD: 32 % (ref 32–75)
NRBC # BLD: 0 K/UL (ref 0–0.01)
NRBC BLD-RTO: 0 PER 100 WBC
PLATELET # BLD AUTO: 90 K/UL (ref 150–400)
PMV BLD AUTO: 10 FL (ref 8.9–12.9)
POTASSIUM SERPL-SCNC: 3.9 MMOL/L (ref 3.5–5.1)
PROT SERPL-MCNC: 8.1 G/DL (ref 6.4–8.2)
RBC # BLD AUTO: 2.94 M/UL (ref 4.1–5.7)
RBC MORPH BLD: ABNORMAL
RBC MORPH BLD: ABNORMAL
SODIUM SERPL-SCNC: 138 MMOL/L (ref 136–145)
WBC # BLD AUTO: 5 K/UL (ref 4.1–11.1)

## 2023-08-15 PROCEDURE — 99214 OFFICE O/P EST MOD 30 MIN: CPT | Performed by: INTERNAL MEDICINE

## 2023-08-15 PROCEDURE — 3077F SYST BP >= 140 MM HG: CPT | Performed by: INTERNAL MEDICINE

## 2023-08-15 PROCEDURE — 3078F DIAST BP <80 MM HG: CPT | Performed by: INTERNAL MEDICINE

## 2023-08-15 PROCEDURE — 1036F TOBACCO NON-USER: CPT | Performed by: INTERNAL MEDICINE

## 2023-08-15 PROCEDURE — G8427 DOCREV CUR MEDS BY ELIG CLIN: HCPCS | Performed by: INTERNAL MEDICINE

## 2023-08-15 PROCEDURE — 36415 COLL VENOUS BLD VENIPUNCTURE: CPT

## 2023-08-15 PROCEDURE — 1123F ACP DISCUSS/DSCN MKR DOCD: CPT | Performed by: INTERNAL MEDICINE

## 2023-08-15 PROCEDURE — G8420 CALC BMI NORM PARAMETERS: HCPCS | Performed by: INTERNAL MEDICINE

## 2023-08-15 PROCEDURE — 80053 COMPREHEN METABOLIC PANEL: CPT

## 2023-08-15 PROCEDURE — 85025 COMPLETE CBC W/AUTO DIFF WBC: CPT

## 2023-08-16 ENCOUNTER — HOSPITAL ENCOUNTER (OUTPATIENT)
Facility: HOSPITAL | Age: 78
Discharge: HOME | End: 2023-08-19
Payer: MEDICARE

## 2023-08-16 DIAGNOSIS — R93.89 ABNORMAL CHEST CT: ICD-10-CM

## 2023-08-16 PROCEDURE — 71250 CT THORAX DX C-: CPT

## 2023-08-29 DIAGNOSIS — C93.10 CHRONIC MYELOMONOCYTIC LEUKEMIA NOT HAVING ACHIEVED REMISSION (HCC): Primary | ICD-10-CM

## 2023-08-29 DIAGNOSIS — D69.6 THROMBOCYTOPENIA, UNSPECIFIED (HCC): ICD-10-CM

## 2023-10-20 ENCOUNTER — TELEPHONE (OUTPATIENT)
Age: 78
End: 2023-10-20

## 2023-10-20 NOTE — TELEPHONE ENCOUNTER
Adams Castrejon at [facility]  [phone]    10/20/23 1:04 PM EDT  Reviewed Dr Leland Bhardwaj last OV note and only noted mention of Dr Saad Meza at [facility]. Spoke with pt wife and she said they went to [facility] yesterday and per wife it was crowded and just too much and she was wanting to know who the name of the other Dr that Dr Xiomara Calero spoke with them about but I let her know I only saw the [facility] mentioned and I will speak with Dr Xiomara Calero Monday about the other name.

## 2023-10-20 NOTE — TELEPHONE ENCOUNTER
PT's wife called in stating they went to Dr. Rebecca Sharma office in [place] based on Dr. Katiuska Mendez recommendation. However, she stated Dr. Bo Chacon also had a second recommendation for a stem cell specialist and they would like the name of that doctor.     #: [phone]

## 2023-10-25 ENCOUNTER — TELEPHONE (OUTPATIENT)
Age: 78
End: 2023-10-25

## 2023-10-25 DIAGNOSIS — C93.10 CHRONIC MYELOMONOCYTIC LEUKEMIA NOT HAVING ACHIEVED REMISSION (HCC): Primary | ICD-10-CM

## 2023-10-25 NOTE — TELEPHONE ENCOUNTER
Pt spouse called stating she's needing the number to [facility] office to schedule pt appt.      # [phone]

## 2023-10-25 NOTE — TELEPHONE ENCOUNTER
[employer] at [facility]  [phone]    10/25/23 4:35 PM JUAN MANUEL -Gabbie Miller MD  [phone]

## 2023-10-26 NOTE — TELEPHONE ENCOUNTER
Adams Castrejon at [facility]  [phone]    10/26/23 1:24 PM EDT - Spoke with wife and gave her contact # and name for [facility] bone marrrow transplant

## 2023-11-17 DIAGNOSIS — C93.10 CHRONIC MYELOMONOCYTIC LEUKEMIA NOT HAVING ACHIEVED REMISSION (HCC): ICD-10-CM

## 2023-11-21 ENCOUNTER — OFFICE VISIT (OUTPATIENT)
Age: 78
End: 2023-11-21
Payer: MEDICARE

## 2023-11-21 VITALS
HEART RATE: 52 BPM | DIASTOLIC BLOOD PRESSURE: 82 MMHG | BODY MASS INDEX: 24.02 KG/M2 | SYSTOLIC BLOOD PRESSURE: 160 MMHG | WEIGHT: 158 LBS

## 2023-11-21 DIAGNOSIS — I48.0 PAROXYSMAL ATRIAL FIBRILLATION (HCC): ICD-10-CM

## 2023-11-21 DIAGNOSIS — C93.10 CHRONIC MYELOMONOCYTIC LEUKEMIA NOT HAVING ACHIEVED REMISSION (HCC): Primary | ICD-10-CM

## 2023-11-21 PROCEDURE — G8420 CALC BMI NORM PARAMETERS: HCPCS | Performed by: INTERNAL MEDICINE

## 2023-11-21 PROCEDURE — 1036F TOBACCO NON-USER: CPT | Performed by: INTERNAL MEDICINE

## 2023-11-21 PROCEDURE — 99215 OFFICE O/P EST HI 40 MIN: CPT | Performed by: INTERNAL MEDICINE

## 2023-11-21 PROCEDURE — 3077F SYST BP >= 140 MM HG: CPT | Performed by: INTERNAL MEDICINE

## 2023-11-21 PROCEDURE — 3079F DIAST BP 80-89 MM HG: CPT | Performed by: INTERNAL MEDICINE

## 2023-11-21 PROCEDURE — G8427 DOCREV CUR MEDS BY ELIG CLIN: HCPCS | Performed by: INTERNAL MEDICINE

## 2023-11-21 PROCEDURE — G8484 FLU IMMUNIZE NO ADMIN: HCPCS | Performed by: INTERNAL MEDICINE

## 2023-11-21 PROCEDURE — 1123F ACP DISCUSS/DSCN MKR DOCD: CPT | Performed by: INTERNAL MEDICINE

## 2023-11-21 NOTE — PROGRESS NOTES
Thierno Escoto is a 69 y.o. male here today for a cancer follow up for CML    1. Have you been to the ER, urgent care clinic since your last visit? Hospitalized since your last visit? no    2. Have you seen or consulted any other health care providers outside of the [address] [place] [address] since your last visit? Include any pap smears or colon screening.   Yes [facility]
No visualized signs of difficulty breathing or respiratory distress    Musculoskeletal: Normal range of motion of neck    Neurological: No facial asymmetry (Cranial nerve 7 motor function), No gaze palsy    Skin: No significant exanthematous lesions or discoloration noted on facial skin    Psychiatric: Normal affect, No hallucinations                                                                       Diagnostic Imaging     5/17/23 CTA chest  IMPRESSION:  There is no pulmonary embolism. There is no aortic aneurysm or dissection. Cardiomegaly, pulmonary arterial hypertension and small pericardial effusion. Hepatic steatosis nodular hepatic contour and splenomegaly suggests cirrhotic  change. Lab Results  Lab Results   Component Value Date    WBC 5.0 08/15/2023    HGB 9.4 (L) 08/15/2023    HCT 29.5 (L) 08/15/2023    PLT 90 (L) 08/15/2023    .3 (H) 08/15/2023    NEUTROABS 1.6 (L) 08/15/2023     Lab Results   Component Value Date     08/15/2023    K 3.9 08/15/2023     08/15/2023    CO2 26 08/15/2023    GLUCOSE 109 (H) 08/15/2023    BUN 20 08/15/2023    CREATININE 1.55 (H) 08/15/2023    LABGLOM 46 (L) 08/15/2023    CALCIUM 8.5 08/15/2023    MG 1.8 07/01/2023    PHOS 3.7 07/03/2023     Lab Results   Component Value Date    BILITOT 0.6 08/15/2023    ALT 24 08/15/2023    AST 20 08/15/2023    ALKPHOS 60 08/15/2023    PROT 8.1 08/15/2023    LABALBU 3.5 08/15/2023    GLOB 4.6 (H) 08/15/2023       Assessment/Plan:  69 y.o. male with history of neutropenia and thrombocytopenia, now with leukocytosis    CMML-MPN: CMML-2; [place] molecular model, intermediate risk at 2 points, median OS 31 months; CPSS-mol    Discussed referral to [facility] for alloSCT eval -- though his age may preclude this -- his wife has discussed with them. He declines. Discussed [facility]. He declines    normal male karotype; see NGS above    Options for tx other than transplant:  HMA such as azacitadien or decitabine.   He does not have

## 2023-12-20 DIAGNOSIS — C93.10 CHRONIC MYELOMONOCYTIC LEUKEMIA NOT HAVING ACHIEVED REMISSION (HCC): ICD-10-CM

## 2024-01-16 DIAGNOSIS — C93.10 CHRONIC MYELOMONOCYTIC LEUKEMIA NOT HAVING ACHIEVED REMISSION (HCC): Primary | ICD-10-CM

## 2024-01-16 DIAGNOSIS — D72.829 LEUKOCYTOSIS, UNSPECIFIED TYPE: ICD-10-CM

## 2024-02-15 DIAGNOSIS — C93.10 CHRONIC MYELOMONOCYTIC LEUKEMIA NOT HAVING ACHIEVED REMISSION (HCC): ICD-10-CM

## 2024-02-15 DIAGNOSIS — D72.829 LEUKOCYTOSIS, UNSPECIFIED TYPE: ICD-10-CM

## 2024-02-15 LAB
ALBUMIN SERPL-MCNC: 4.1 G/DL (ref 3.5–5)
ALBUMIN/GLOB SERPL: 1.2 (ref 1.1–2.2)
ALP SERPL-CCNC: 51 U/L (ref 45–117)
ALT SERPL-CCNC: 23 U/L (ref 12–78)
ANION GAP SERPL CALC-SCNC: 3 MMOL/L (ref 5–15)
AST SERPL-CCNC: 22 U/L (ref 15–37)
BILIRUB SERPL-MCNC: 0.9 MG/DL (ref 0.2–1)
BUN SERPL-MCNC: 18 MG/DL (ref 6–20)
BUN/CREAT SERPL: 14 (ref 12–20)
CALCIUM SERPL-MCNC: 9.4 MG/DL (ref 8.5–10.1)
CHLORIDE SERPL-SCNC: 106 MMOL/L (ref 97–108)
CO2 SERPL-SCNC: 29 MMOL/L (ref 21–32)
CREAT SERPL-MCNC: 1.29 MG/DL (ref 0.7–1.3)
GLOBULIN SER CALC-MCNC: 3.5 G/DL (ref 2–4)
GLUCOSE SERPL-MCNC: 92 MG/DL (ref 65–100)
POTASSIUM SERPL-SCNC: 4.7 MMOL/L (ref 3.5–5.1)
PROT SERPL-MCNC: 7.6 G/DL (ref 6.4–8.2)
SODIUM SERPL-SCNC: 138 MMOL/L (ref 136–145)

## 2024-02-16 LAB
BASOPHILS # BLD: 0 K/UL (ref 0–0.1)
BASOPHILS NFR BLD: 1 % (ref 0–1)
DIFFERENTIAL METHOD BLD: ABNORMAL
EOSINOPHIL # BLD: 0 K/UL (ref 0–0.4)
EOSINOPHIL NFR BLD: 0 % (ref 0–7)
ERYTHROCYTE [DISTWIDTH] IN BLOOD BY AUTOMATED COUNT: 15.9 % (ref 11.5–14.5)
HCT VFR BLD AUTO: 37.8 % (ref 36.6–50.3)
HGB BLD-MCNC: 12.5 G/DL (ref 12.1–17)
IMM GRANULOCYTES # BLD AUTO: 0 K/UL (ref 0–0.04)
IMM GRANULOCYTES NFR BLD AUTO: 1 % (ref 0–0.5)
LYMPHOCYTES # BLD: 1.3 K/UL (ref 0.8–3.5)
LYMPHOCYTES NFR BLD: 31 % (ref 12–49)
MCH RBC QN AUTO: 33.3 PG (ref 26–34)
MCHC RBC AUTO-ENTMCNC: 33.1 G/DL (ref 30–36.5)
MCV RBC AUTO: 100.8 FL (ref 80–99)
MONOCYTES # BLD: 1.8 K/UL (ref 0–1)
MONOCYTES NFR BLD: 41 % (ref 5–13)
NEUTS SEG # BLD: 1.1 K/UL (ref 1.8–8)
NEUTS SEG NFR BLD: 26 % (ref 32–75)
NRBC # BLD: 0 K/UL (ref 0–0.01)
NRBC BLD-RTO: 0 PER 100 WBC
PLATELET # BLD AUTO: 87 K/UL (ref 150–400)
PMV BLD AUTO: 12.7 FL (ref 8.9–12.9)
RBC # BLD AUTO: 3.75 M/UL (ref 4.1–5.7)
RBC MORPH BLD: ABNORMAL
RBC MORPH BLD: ABNORMAL
WBC # BLD AUTO: 4.2 K/UL (ref 4.1–11.1)

## 2024-02-21 ENCOUNTER — OFFICE VISIT (OUTPATIENT)
Age: 79
End: 2024-02-21
Payer: MEDICARE

## 2024-02-21 VITALS
RESPIRATION RATE: 16 BRPM | TEMPERATURE: 98.2 F | DIASTOLIC BLOOD PRESSURE: 82 MMHG | WEIGHT: 160 LBS | SYSTOLIC BLOOD PRESSURE: 145 MMHG | HEART RATE: 63 BPM | OXYGEN SATURATION: 98 % | BODY MASS INDEX: 24.33 KG/M2

## 2024-02-21 DIAGNOSIS — I48.0 PAROXYSMAL ATRIAL FIBRILLATION (HCC): ICD-10-CM

## 2024-02-21 DIAGNOSIS — C93.10 CHRONIC MYELOMONOCYTIC LEUKEMIA NOT HAVING ACHIEVED REMISSION (HCC): Primary | ICD-10-CM

## 2024-02-21 PROCEDURE — 3077F SYST BP >= 140 MM HG: CPT | Performed by: INTERNAL MEDICINE

## 2024-02-21 PROCEDURE — 3079F DIAST BP 80-89 MM HG: CPT | Performed by: INTERNAL MEDICINE

## 2024-02-21 PROCEDURE — 99215 OFFICE O/P EST HI 40 MIN: CPT | Performed by: INTERNAL MEDICINE

## 2024-02-21 PROCEDURE — 1036F TOBACCO NON-USER: CPT | Performed by: INTERNAL MEDICINE

## 2024-02-21 PROCEDURE — 1123F ACP DISCUSS/DSCN MKR DOCD: CPT | Performed by: INTERNAL MEDICINE

## 2024-02-21 PROCEDURE — G8484 FLU IMMUNIZE NO ADMIN: HCPCS | Performed by: INTERNAL MEDICINE

## 2024-02-21 PROCEDURE — G8427 DOCREV CUR MEDS BY ELIG CLIN: HCPCS | Performed by: INTERNAL MEDICINE

## 2024-02-21 PROCEDURE — G8420 CALC BMI NORM PARAMETERS: HCPCS | Performed by: INTERNAL MEDICINE

## 2024-02-21 RX ORDER — HYDROXYUREA 500 MG/1
500 CAPSULE ORAL DAILY
Qty: 90 CAPSULE | Refills: 3 | Status: SHIPPED | OUTPATIENT
Start: 2024-02-21

## 2024-02-21 RX ORDER — LOSARTAN POTASSIUM 50 MG/1
50 TABLET ORAL DAILY
COMMUNITY
Start: 2024-02-06

## 2024-02-21 NOTE — PROGRESS NOTES
Terry Grant is a 78 y.o. male here today to follow up for CML    1. Have you been to the ER, urgent care clinic since your last visit?  Hospitalized since your last visit?no    2. Have you seen or consulted any other health care providers outside of the [facility] since your last visit?  Include any pap smears or colon screening. no

## 2024-02-21 NOTE — PROGRESS NOTES
[facility] [place]  [address], [address]  [place]  [address]  W: [phone]  F: [phone]     HEME/ONC CONSULT    HPI:   Terry Grant is a 78 y.o. male who I am seeing for CMML      Interval history: Gr 1 fatigue    Review of systems was obtained and pertinent findings reviewed above. Past medical history, social history, family history, medications, and allergies are located in the electronic medical record.    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  - SRSF2 mutations can be seen in various myeloid neoplasms, and are associated with a poor prognosis in MDS, AML,  PMF, and PV.  - TET2 mutations can be seen in various myeloid neoplasms, and are associated with an increased response to  hypomethylating agents and a poor prognosis in MDS after hematopoietic stem cell transplantation.  - Co-mutation of TET2 and SRSF2 is reported to be highly predictive of a myeloid neoplasm characterized by  myelodysplasia and monocytosis, including, but not limited to, CMML    NGS:  ASXL1 Y438Ytl*12; KRAS G12R; SRSF2 P95L; TET2 *, *; NO alterations detected in the following genes:  FLT3, IDH1, IDH2, NPM1      6/8/23:  pericardial window, negative    Hydrea 500 mg daily 6/9/23-  DR to 500 mg on Mon-Fri    Objective:  Physical Exam:  Visit Vitals  Vitals:    02/21/24 1133   BP: (!) 145/82   Pulse: 63   Resp: 16   Temp: 98.2 °F (36.8 °C)   SpO2: 98%             Constitutional: Appears well-developed and well-nourished in no apparent distress      Mental status: Alert and awake, Oriented to person/place/time, Able to follow commands    Eyes: EOM normal, Sclera normal, No visible ocular discharge    HENT: Normocephalic, atraumatic    Mouth/Throat: Moist mucous membranes    External Ears normal    Neck: No visualized

## 2024-03-01 NOTE — PROGRESS NOTES
Problem: Airway Clearance - Ineffective  Goal: Achieve or maintain patent airway  Outcome: Resolved/Met no gum bleeding/no nose bleeding

## 2024-05-16 DIAGNOSIS — C93.10 CHRONIC MYELOMONOCYTIC LEUKEMIA NOT HAVING ACHIEVED REMISSION (HCC): ICD-10-CM

## 2024-05-17 LAB
ALBUMIN SERPL-MCNC: 3.8 G/DL (ref 3.5–5)
ALBUMIN/GLOB SERPL: 1 (ref 1.1–2.2)
ALP SERPL-CCNC: 62 U/L (ref 45–117)
ALT SERPL-CCNC: 22 U/L (ref 12–78)
ANION GAP SERPL CALC-SCNC: 4 MMOL/L (ref 5–15)
AST SERPL-CCNC: 22 U/L (ref 15–37)
BASOPHILS # BLD: 0.1 K/UL (ref 0–0.1)
BASOPHILS NFR BLD: 1 % (ref 0–1)
BILIRUB SERPL-MCNC: 0.6 MG/DL (ref 0.2–1)
BUN SERPL-MCNC: 22 MG/DL (ref 6–20)
BUN/CREAT SERPL: 16 (ref 12–20)
CALCIUM SERPL-MCNC: 8.9 MG/DL (ref 8.5–10.1)
CHLORIDE SERPL-SCNC: 109 MMOL/L (ref 97–108)
CO2 SERPL-SCNC: 25 MMOL/L (ref 21–32)
CREAT SERPL-MCNC: 1.34 MG/DL (ref 0.7–1.3)
DIFFERENTIAL METHOD BLD: ABNORMAL
EOSINOPHIL # BLD: 0 K/UL (ref 0–0.4)
EOSINOPHIL NFR BLD: 0 % (ref 0–7)
ERYTHROCYTE [DISTWIDTH] IN BLOOD BY AUTOMATED COUNT: 15.9 % (ref 11.5–14.5)
GLOBULIN SER CALC-MCNC: 3.9 G/DL (ref 2–4)
GLUCOSE SERPL-MCNC: 120 MG/DL (ref 65–100)
HCT VFR BLD AUTO: 34.5 % (ref 36.6–50.3)
HGB BLD-MCNC: 11.8 G/DL (ref 12.1–17)
IMM GRANULOCYTES # BLD AUTO: 0.1 K/UL (ref 0–0.04)
IMM GRANULOCYTES NFR BLD AUTO: 1 % (ref 0–0.5)
LYMPHOCYTES # BLD: 1.2 K/UL (ref 0.8–3.5)
LYMPHOCYTES NFR BLD: 18 % (ref 12–49)
MCH RBC QN AUTO: 32.9 PG (ref 26–34)
MCHC RBC AUTO-ENTMCNC: 34.2 G/DL (ref 30–36.5)
MCV RBC AUTO: 96.1 FL (ref 80–99)
MONOCYTES # BLD: 3.5 K/UL (ref 0–1)
MONOCYTES NFR BLD: 53 % (ref 5–13)
NEUTS SEG # BLD: 1.8 K/UL (ref 1.8–8)
NEUTS SEG NFR BLD: 27 % (ref 32–75)
NRBC # BLD: 0 K/UL (ref 0–0.01)
NRBC BLD-RTO: 0 PER 100 WBC
PLATELET # BLD AUTO: 101 K/UL (ref 150–400)
PMV BLD AUTO: 12.1 FL (ref 8.9–12.9)
POTASSIUM SERPL-SCNC: 4.1 MMOL/L (ref 3.5–5.1)
PROT SERPL-MCNC: 7.7 G/DL (ref 6.4–8.2)
RBC # BLD AUTO: 3.59 M/UL (ref 4.1–5.7)
RBC MORPH BLD: ABNORMAL
RBC MORPH BLD: ABNORMAL
SODIUM SERPL-SCNC: 138 MMOL/L (ref 136–145)
WBC # BLD AUTO: 6.7 K/UL (ref 4.1–11.1)

## 2024-05-21 ENCOUNTER — OFFICE VISIT (OUTPATIENT)
Age: 79
End: 2024-05-21
Payer: MEDICARE

## 2024-05-21 VITALS
SYSTOLIC BLOOD PRESSURE: 121 MMHG | WEIGHT: 159 LBS | BODY MASS INDEX: 24.1 KG/M2 | HEIGHT: 68 IN | TEMPERATURE: 97.8 F | OXYGEN SATURATION: 98 % | DIASTOLIC BLOOD PRESSURE: 74 MMHG | HEART RATE: 60 BPM

## 2024-05-21 DIAGNOSIS — D69.6 THROMBOCYTOPENIA, UNSPECIFIED (HCC): ICD-10-CM

## 2024-05-21 DIAGNOSIS — I48.0 PAROXYSMAL ATRIAL FIBRILLATION (HCC): ICD-10-CM

## 2024-05-21 DIAGNOSIS — C93.10 CHRONIC MYELOMONOCYTIC LEUKEMIA NOT HAVING ACHIEVED REMISSION (HCC): Primary | ICD-10-CM

## 2024-05-21 PROCEDURE — G2211 COMPLEX E/M VISIT ADD ON: HCPCS | Performed by: INTERNAL MEDICINE

## 2024-05-21 PROCEDURE — 1036F TOBACCO NON-USER: CPT | Performed by: INTERNAL MEDICINE

## 2024-05-21 PROCEDURE — 3078F DIAST BP <80 MM HG: CPT | Performed by: INTERNAL MEDICINE

## 2024-05-21 PROCEDURE — 99214 OFFICE O/P EST MOD 30 MIN: CPT | Performed by: INTERNAL MEDICINE

## 2024-05-21 PROCEDURE — 3074F SYST BP LT 130 MM HG: CPT | Performed by: INTERNAL MEDICINE

## 2024-05-21 PROCEDURE — G8420 CALC BMI NORM PARAMETERS: HCPCS | Performed by: INTERNAL MEDICINE

## 2024-05-21 PROCEDURE — 1123F ACP DISCUSS/DSCN MKR DOCD: CPT | Performed by: INTERNAL MEDICINE

## 2024-05-21 PROCEDURE — G8428 CUR MEDS NOT DOCUMENT: HCPCS | Performed by: INTERNAL MEDICINE

## 2024-05-21 NOTE — PROGRESS NOTES
[facility] [place]  [address], [address]  [place]  [address]  W: [phone]  F: [phone]     HEME/ONC CONSULT    HPI:   Terry Grant is a 78 y.o. male who I am seeing for CMML      Interval history: no issues    Review of systems was obtained and pertinent findings reviewed above. Past medical history, social history, family history, medications, and allergies are located in the electronic medical record.    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  - SRSF2 mutations can be seen in various myeloid neoplasms, and are associated with a poor prognosis in MDS, AML,  PMF, and PV.  - TET2 mutations can be seen in various myeloid neoplasms, and are associated with an increased response to  hypomethylating agents and a poor prognosis in MDS after hematopoietic stem cell transplantation.  - Co-mutation of TET2 and SRSF2 is reported to be highly predictive of a myeloid neoplasm characterized by  myelodysplasia and monocytosis, including, but not limited to, CMML    NGS:  ASXL1 Y871Dqa*12; KRAS G12R; SRSF2 P95L; TET2 *, *; NO alterations detected in the following genes:  FLT3, IDH1, IDH2, NPM1      6/8/23:  pericardial window, negative    Hydrea 500 mg daily 6/9/23-  DR to 500 mg on Mon-Fri    Objective:  Physical Exam:  Visit Vitals  Vitals:    05/21/24 1114   BP: 121/74   Pulse: 60   Temp: 97.8 °F (36.6 °C)   SpO2: 98%             Constitutional: Appears well-developed and well-nourished in no apparent distress      Mental status: Alert and awake, Oriented to person/place/time, Able to follow commands    Eyes: EOM normal, Sclera normal, No visible ocular discharge    HENT: Normocephalic, atraumatic    Mouth/Throat: Moist mucous membranes    External Ears normal    Neck: No visualized mass    Pulmonary/Chest:

## 2024-05-21 NOTE — PROGRESS NOTES
Identified pt with two pt identifiers(name and ). Reviewed record in preparation for visit and have obtained necessary documentation. All patient medications has been reviewed.  Chief Complaint   Patient presents with    3 Month Follow-Up       Vitals:    24 1114   BP: 121/74   Pulse: 60   Temp: 97.8 °F (36.6 °C)   SpO2: 98%                   Coordination of Care Questionnaire:   1) Have you been to an emergency room, urgent care, or hospitalized since your last visit?   no     2. Have seen or consulted any other health care provider since your last visit? no        Patient is accompanied by wife I have received verbal consent from Terry Grant to discuss any/all medical information while they are present in the room.

## 2024-08-21 ENCOUNTER — OFFICE VISIT (OUTPATIENT)
Age: 79
End: 2024-08-21
Payer: MEDICARE

## 2024-08-21 VITALS
OXYGEN SATURATION: 97 % | SYSTOLIC BLOOD PRESSURE: 123 MMHG | WEIGHT: 152.4 LBS | BODY MASS INDEX: 23.1 KG/M2 | HEART RATE: 75 BPM | HEIGHT: 68 IN | DIASTOLIC BLOOD PRESSURE: 68 MMHG | TEMPERATURE: 97 F

## 2024-08-21 DIAGNOSIS — D69.6 THROMBOCYTOPENIA, UNSPECIFIED (HCC): ICD-10-CM

## 2024-08-21 DIAGNOSIS — C93.10 CHRONIC MYELOMONOCYTIC LEUKEMIA NOT HAVING ACHIEVED REMISSION (HCC): Primary | ICD-10-CM

## 2024-08-21 DIAGNOSIS — I48.0 PAROXYSMAL ATRIAL FIBRILLATION (HCC): ICD-10-CM

## 2024-08-21 DIAGNOSIS — C93.10 CHRONIC MYELOMONOCYTIC LEUKEMIA NOT HAVING ACHIEVED REMISSION (HCC): ICD-10-CM

## 2024-08-21 PROCEDURE — 1036F TOBACCO NON-USER: CPT | Performed by: INTERNAL MEDICINE

## 2024-08-21 PROCEDURE — G8420 CALC BMI NORM PARAMETERS: HCPCS | Performed by: INTERNAL MEDICINE

## 2024-08-21 PROCEDURE — 3074F SYST BP LT 130 MM HG: CPT | Performed by: INTERNAL MEDICINE

## 2024-08-21 PROCEDURE — 99214 OFFICE O/P EST MOD 30 MIN: CPT | Performed by: INTERNAL MEDICINE

## 2024-08-21 PROCEDURE — G2211 COMPLEX E/M VISIT ADD ON: HCPCS | Performed by: INTERNAL MEDICINE

## 2024-08-21 PROCEDURE — G8427 DOCREV CUR MEDS BY ELIG CLIN: HCPCS | Performed by: INTERNAL MEDICINE

## 2024-08-21 PROCEDURE — 1123F ACP DISCUSS/DSCN MKR DOCD: CPT | Performed by: INTERNAL MEDICINE

## 2024-08-21 PROCEDURE — 3078F DIAST BP <80 MM HG: CPT | Performed by: INTERNAL MEDICINE

## 2024-08-21 NOTE — PROGRESS NOTES
I have reviewed all needed documentation in preparation for visit. Verified patient by name and date of birth  Terry Grant is a 78 y.o. male Follow-up  .    Vitals:    08/21/24 1122   BP: 123/68   Pulse: 75   Temp: 97 °F (36.1 °C)   SpO2: 97%   Weight: 69.1 kg (152 lb 6.4 oz)   Height: 1.727 m (5' 8\")       No LMP for male patient.         Health Maintenance Review: Patient reminded of \"due or due soon\" health maintenance. I have asked the patient to contact his/her primary care provider (PCP) for follow-up on his/her health maintenance.    \"Have you been to the ER, urgent care clinic since your last visit?  Hospitalized since your last visit?\"    NO    “Have you seen or consulted any other health care providers outside of [facility] since your last visit?”    NO

## 2024-08-21 NOTE — PROGRESS NOTES
[facility] [place]  [address], [address]  [place]  [address]  W: [phone]  F: [phone]     HEME/ONC CONSULT    HPI:   Terry Grant is a 78 y.o. male who I am seeing for CMML      Interval history: reports gr 1 fatigue, 4-5/10 pain in arms, gr 1 sob; more fatigued in the past 4 weeks    Review of systems was obtained and pertinent findings reviewed above. Past medical history, social history, family history, medications, and allergies are located in the electronic medical record.    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  - SRSF2 mutations can be seen in various myeloid neoplasms, and are associated with a poor prognosis in MDS, AML,  PMF, and PV.  - TET2 mutations can be seen in various myeloid neoplasms, and are associated with an increased response to  hypomethylating agents and a poor prognosis in MDS after hematopoietic stem cell transplantation.  - Co-mutation of TET2 and SRSF2 is reported to be highly predictive of a myeloid neoplasm characterized by  myelodysplasia and monocytosis, including, but not limited to, CMML    NGS:  ASXL1 Q749Gsd*12; KRAS G12R; SRSF2 P95L; TET2 *, *; NO alterations detected in the following genes:  FLT3, IDH1, IDH2, NPM1      6/8/23:  pericardial window, negative    Hydrea 500 mg daily 6/9/23-  DR to 500 mg on Mon-Fri    Objective:  Physical Exam:  Visit Vitals  Vitals:    08/21/24 1122   BP: 123/68   Pulse: 75   Temp: 97 °F (36.1 °C)   SpO2: 97%             Constitutional: Appears well-developed and well-nourished in no apparent distress      Mental status: Alert and awake, Oriented to person/place/time, Able to follow commands    Eyes: EOM normal, Sclera normal, No visible ocular discharge    HENT: Normocephalic, atraumatic    Mouth/Throat: Moist mucous

## 2024-08-22 ENCOUNTER — TELEPHONE (OUTPATIENT)
Age: 79
End: 2024-08-22

## 2024-08-22 DIAGNOSIS — C93.10 CHRONIC MYELOMONOCYTIC LEUKEMIA NOT HAVING ACHIEVED REMISSION (HCC): Primary | ICD-10-CM

## 2024-08-22 LAB
ALBUMIN SERPL-MCNC: 3.9 G/DL (ref 3.5–5)
ALBUMIN/GLOB SERPL: 0.9 (ref 1.1–2.2)
ALP SERPL-CCNC: 84 U/L (ref 45–117)
ALT SERPL-CCNC: 34 U/L (ref 12–78)
ANION GAP SERPL CALC-SCNC: 4 MMOL/L (ref 5–15)
AST SERPL-CCNC: 33 U/L (ref 15–37)
BASOPHILS # BLD: 0 K/UL (ref 0–0.1)
BASOPHILS NFR BLD: 0 % (ref 0–1)
BILIRUB SERPL-MCNC: 1 MG/DL (ref 0.2–1)
BUN SERPL-MCNC: 32 MG/DL (ref 6–20)
BUN/CREAT SERPL: 18 (ref 12–20)
CALCIUM SERPL-MCNC: 8.9 MG/DL (ref 8.5–10.1)
CHLORIDE SERPL-SCNC: 105 MMOL/L (ref 97–108)
CO2 SERPL-SCNC: 26 MMOL/L (ref 21–32)
CREAT SERPL-MCNC: 1.81 MG/DL (ref 0.7–1.3)
DIFFERENTIAL METHOD BLD: ABNORMAL
EOSINOPHIL # BLD: 0 K/UL (ref 0–0.4)
EOSINOPHIL NFR BLD: 0 % (ref 0–7)
ERYTHROCYTE [DISTWIDTH] IN BLOOD BY AUTOMATED COUNT: 18.7 % (ref 11.5–14.5)
GLOBULIN SER CALC-MCNC: 4.3 G/DL (ref 2–4)
GLUCOSE SERPL-MCNC: 101 MG/DL (ref 65–100)
HCT VFR BLD AUTO: 28.7 % (ref 36.6–50.3)
HGB BLD-MCNC: 9.5 G/DL (ref 12.1–17)
IMM GRANULOCYTES # BLD AUTO: 0 K/UL
IMM GRANULOCYTES NFR BLD AUTO: 0 %
LYMPHOCYTES # BLD: 1.5 K/UL (ref 0.8–3.5)
LYMPHOCYTES NFR BLD: 8 % (ref 12–49)
MCH RBC QN AUTO: 33.7 PG (ref 26–34)
MCHC RBC AUTO-ENTMCNC: 33.1 G/DL (ref 30–36.5)
MCV RBC AUTO: 101.8 FL (ref 80–99)
MONOCYTES # BLD: 13.4 K/UL (ref 0–1)
MONOCYTES NFR BLD: 72 % (ref 5–13)
NEUTS BAND NFR BLD MANUAL: 2 % (ref 0–6)
NEUTS SEG # BLD: 3.7 K/UL (ref 1.8–8)
NEUTS SEG NFR BLD: 18 % (ref 32–75)
NRBC # BLD: 0 K/UL (ref 0–0.01)
NRBC BLD-RTO: 0 PER 100 WBC
PATH REV BLD -IMP: ABNORMAL
PLATELET # BLD AUTO: 45 K/UL (ref 150–400)
POTASSIUM SERPL-SCNC: 4.3 MMOL/L (ref 3.5–5.1)
PROT SERPL-MCNC: 8.2 G/DL (ref 6.4–8.2)
RBC # BLD AUTO: 2.82 M/UL (ref 4.1–5.7)
RBC MORPH BLD: ABNORMAL
RBC MORPH BLD: ABNORMAL
SODIUM SERPL-SCNC: 135 MMOL/L (ref 136–145)
WBC # BLD AUTO: 18.6 K/UL (ref 4.1–11.1)

## 2024-08-22 RX ORDER — HYDROXYUREA 500 MG/1
500 CAPSULE ORAL DAILY
Qty: 90 CAPSULE | Refills: 3 | Status: SHIPPED | OUTPATIENT
Start: 2024-08-22

## 2024-08-22 NOTE — TELEPHONE ENCOUNTER
[facility] [place] at [facility]  [phone]    Called to patient and reviewed results. Discussed concern for worsening disease. Per Dr. Rangel will increase Hydrea to 500mg to once daily EVERYDAY. Will see them back in 3 weeks with labs the day prior to appointment. Patient and wife verbalized understanding and had no further questions or concerns.       Signed By: BIJU Barfield NP

## 2024-09-06 DIAGNOSIS — C93.10 CHRONIC MYELOMONOCYTIC LEUKEMIA NOT HAVING ACHIEVED REMISSION (HCC): ICD-10-CM

## 2024-09-06 LAB
ALBUMIN SERPL-MCNC: 3.7 G/DL (ref 3.5–5)
ALBUMIN/GLOB SERPL: 0.8 (ref 1.1–2.2)
ALP SERPL-CCNC: 75 U/L (ref 45–117)
ALT SERPL-CCNC: 21 U/L (ref 12–78)
ANION GAP SERPL CALC-SCNC: 4 MMOL/L (ref 2–12)
AST SERPL-CCNC: 26 U/L (ref 15–37)
BASOPHILS # BLD: 0 K/UL (ref 0–0.1)
BASOPHILS NFR BLD: 0 % (ref 0–1)
BILIRUB SERPL-MCNC: 1 MG/DL (ref 0.2–1)
BUN SERPL-MCNC: 27 MG/DL (ref 6–20)
BUN/CREAT SERPL: 15 (ref 12–20)
CALCIUM SERPL-MCNC: 8.6 MG/DL (ref 8.5–10.1)
CHLORIDE SERPL-SCNC: 107 MMOL/L (ref 97–108)
CO2 SERPL-SCNC: 24 MMOL/L (ref 21–32)
CREAT SERPL-MCNC: 1.76 MG/DL (ref 0.7–1.3)
DIFFERENTIAL METHOD BLD: ABNORMAL
EOSINOPHIL # BLD: 0 K/UL (ref 0–0.4)
EOSINOPHIL NFR BLD: 0 % (ref 0–7)
ERYTHROCYTE [DISTWIDTH] IN BLOOD BY AUTOMATED COUNT: 18.8 % (ref 11.5–14.5)
GLOBULIN SER CALC-MCNC: 4.8 G/DL (ref 2–4)
GLUCOSE SERPL-MCNC: 107 MG/DL (ref 65–100)
HCT VFR BLD AUTO: 25 % (ref 36.6–50.3)
HGB BLD-MCNC: 7.9 G/DL (ref 12.1–17)
IMM GRANULOCYTES # BLD AUTO: 0 K/UL
IMM GRANULOCYTES NFR BLD AUTO: 0 %
LYMPHOCYTES # BLD: 1.6 K/UL (ref 0.8–3.5)
LYMPHOCYTES NFR BLD: 8 % (ref 12–49)
MCH RBC QN AUTO: 32.9 PG (ref 26–34)
MCHC RBC AUTO-ENTMCNC: 31.6 G/DL (ref 30–36.5)
MCV RBC AUTO: 104.2 FL (ref 80–99)
MONOCYTES # BLD: 13.1 K/UL (ref 0–1)
MONOCYTES NFR BLD: 68 % (ref 5–13)
NEUTS SEG # BLD: 4.7 K/UL (ref 1.8–8)
NEUTS SEG NFR BLD: 24 % (ref 32–75)
NRBC # BLD: 0 K/UL (ref 0–0.01)
NRBC BLD-RTO: 0 PER 100 WBC
PLATELET # BLD AUTO: 77 K/UL (ref 150–400)
PMV BLD AUTO: 11.5 FL (ref 8.9–12.9)
POTASSIUM SERPL-SCNC: 4.2 MMOL/L (ref 3.5–5.1)
PROT SERPL-MCNC: 8.5 G/DL (ref 6.4–8.2)
RBC # BLD AUTO: 2.4 M/UL (ref 4.1–5.7)
RBC MORPH BLD: ABNORMAL
RBC MORPH BLD: ABNORMAL
SODIUM SERPL-SCNC: 135 MMOL/L (ref 136–145)
WBC # BLD AUTO: 19.4 K/UL (ref 4.1–11.1)
WBC MORPH BLD: ABNORMAL

## 2024-09-09 ENCOUNTER — OFFICE VISIT (OUTPATIENT)
Age: 79
End: 2024-09-09
Payer: MEDICARE

## 2024-09-09 VITALS
HEIGHT: 68 IN | BODY MASS INDEX: 22.91 KG/M2 | TEMPERATURE: 98 F | OXYGEN SATURATION: 97 % | RESPIRATION RATE: 16 BRPM | WEIGHT: 151.2 LBS | SYSTOLIC BLOOD PRESSURE: 129 MMHG | HEART RATE: 92 BPM | DIASTOLIC BLOOD PRESSURE: 70 MMHG

## 2024-09-09 DIAGNOSIS — C93.10 CHRONIC MYELOMONOCYTIC LEUKEMIA NOT HAVING ACHIEVED REMISSION (HCC): Primary | ICD-10-CM

## 2024-09-09 PROCEDURE — 99215 OFFICE O/P EST HI 40 MIN: CPT | Performed by: INTERNAL MEDICINE

## 2024-09-09 PROCEDURE — G8420 CALC BMI NORM PARAMETERS: HCPCS | Performed by: INTERNAL MEDICINE

## 2024-09-09 PROCEDURE — G2211 COMPLEX E/M VISIT ADD ON: HCPCS | Performed by: INTERNAL MEDICINE

## 2024-09-09 PROCEDURE — 1036F TOBACCO NON-USER: CPT | Performed by: INTERNAL MEDICINE

## 2024-09-09 PROCEDURE — 3078F DIAST BP <80 MM HG: CPT | Performed by: INTERNAL MEDICINE

## 2024-09-09 PROCEDURE — 1123F ACP DISCUSS/DSCN MKR DOCD: CPT | Performed by: INTERNAL MEDICINE

## 2024-09-09 PROCEDURE — 3074F SYST BP LT 130 MM HG: CPT | Performed by: INTERNAL MEDICINE

## 2024-09-09 PROCEDURE — G8427 DOCREV CUR MEDS BY ELIG CLIN: HCPCS | Performed by: INTERNAL MEDICINE

## 2024-09-09 RX ORDER — HYDROXYUREA 500 MG/1
CAPSULE ORAL
Qty: 180 CAPSULE | Refills: 3 | Status: SHIPPED | OUTPATIENT
Start: 2024-09-09

## 2024-09-09 ASSESSMENT — PATIENT HEALTH QUESTIONNAIRE - PHQ9
SUM OF ALL RESPONSES TO PHQ9 QUESTIONS 1 & 2: 0
SUM OF ALL RESPONSES TO PHQ QUESTIONS 1-9: 0
1. LITTLE INTEREST OR PLEASURE IN DOING THINGS: NOT AT ALL
SUM OF ALL RESPONSES TO PHQ QUESTIONS 1-9: 0
2. FEELING DOWN, DEPRESSED OR HOPELESS: NOT AT ALL

## 2024-09-20 DIAGNOSIS — C93.10 CHRONIC MYELOMONOCYTIC LEUKEMIA NOT HAVING ACHIEVED REMISSION (HCC): ICD-10-CM

## 2024-09-20 LAB
ALBUMIN SERPL-MCNC: 3.7 G/DL (ref 3.5–5)
ALBUMIN/GLOB SERPL: 0.7 (ref 1.1–2.2)
ALP SERPL-CCNC: 70 U/L (ref 45–117)
ALT SERPL-CCNC: 20 U/L (ref 12–78)
ANION GAP SERPL CALC-SCNC: 2 MMOL/L (ref 2–12)
AST SERPL-CCNC: 25 U/L (ref 15–37)
BASOPHILS # BLD: 0 K/UL (ref 0–0.1)
BASOPHILS NFR BLD: 0 % (ref 0–1)
BILIRUB SERPL-MCNC: 1.1 MG/DL (ref 0.2–1)
BUN SERPL-MCNC: 29 MG/DL (ref 6–20)
BUN/CREAT SERPL: 18 (ref 12–20)
CALCIUM SERPL-MCNC: 8.7 MG/DL (ref 8.5–10.1)
CHLORIDE SERPL-SCNC: 107 MMOL/L (ref 97–108)
CO2 SERPL-SCNC: 27 MMOL/L (ref 21–32)
CREAT SERPL-MCNC: 1.64 MG/DL (ref 0.7–1.3)
DIFFERENTIAL METHOD BLD: ABNORMAL
EOSINOPHIL # BLD: 0 K/UL (ref 0–0.4)
EOSINOPHIL NFR BLD: 0 % (ref 0–7)
ERYTHROCYTE [DISTWIDTH] IN BLOOD BY AUTOMATED COUNT: 20 % (ref 11.5–14.5)
GLOBULIN SER CALC-MCNC: 5 G/DL (ref 2–4)
GLUCOSE SERPL-MCNC: 95 MG/DL (ref 65–100)
HCT VFR BLD AUTO: 23.5 % (ref 36.6–50.3)
HGB BLD-MCNC: 7.5 G/DL (ref 12.1–17)
IMM GRANULOCYTES # BLD AUTO: 0 K/UL
IMM GRANULOCYTES NFR BLD AUTO: 0 %
LYMPHOCYTES # BLD: 1.6 K/UL (ref 0.8–3.5)
LYMPHOCYTES NFR BLD: 17 % (ref 12–49)
MCH RBC QN AUTO: 33.9 PG (ref 26–34)
MCHC RBC AUTO-ENTMCNC: 31.9 G/DL (ref 30–36.5)
MCV RBC AUTO: 106.3 FL (ref 80–99)
MONOCYTES # BLD: 5.3 K/UL (ref 0–1)
MONOCYTES NFR BLD: 57 % (ref 5–13)
NEUTS SEG # BLD: 2.4 K/UL (ref 1.8–8)
NEUTS SEG NFR BLD: 26 % (ref 32–75)
NRBC # BLD: 0 K/UL (ref 0–0.01)
NRBC BLD-RTO: 0 PER 100 WBC
PLATELET # BLD AUTO: 70 K/UL (ref 150–400)
PMV BLD AUTO: 10.8 FL (ref 8.9–12.9)
POTASSIUM SERPL-SCNC: 4.6 MMOL/L (ref 3.5–5.1)
PROT SERPL-MCNC: 8.7 G/DL (ref 6.4–8.2)
RBC # BLD AUTO: 2.21 M/UL (ref 4.1–5.7)
RBC MORPH BLD: ABNORMAL
RBC MORPH BLD: ABNORMAL
SODIUM SERPL-SCNC: 136 MMOL/L (ref 136–145)
WBC # BLD AUTO: 9.3 K/UL (ref 4.1–11.1)
WBC MORPH BLD: ABNORMAL

## 2024-09-24 ENCOUNTER — OFFICE VISIT (OUTPATIENT)
Age: 79
End: 2024-09-24
Payer: MEDICARE

## 2024-09-24 VITALS
DIASTOLIC BLOOD PRESSURE: 69 MMHG | OXYGEN SATURATION: 99 % | WEIGHT: 148 LBS | BODY MASS INDEX: 22.43 KG/M2 | HEART RATE: 68 BPM | SYSTOLIC BLOOD PRESSURE: 124 MMHG | TEMPERATURE: 98.1 F | RESPIRATION RATE: 16 BRPM | HEIGHT: 68 IN

## 2024-09-24 DIAGNOSIS — C93.10 CHRONIC MYELOMONOCYTIC LEUKEMIA NOT HAVING ACHIEVED REMISSION (HCC): Primary | ICD-10-CM

## 2024-09-24 PROCEDURE — G2211 COMPLEX E/M VISIT ADD ON: HCPCS | Performed by: INTERNAL MEDICINE

## 2024-09-24 PROCEDURE — G8427 DOCREV CUR MEDS BY ELIG CLIN: HCPCS | Performed by: INTERNAL MEDICINE

## 2024-09-24 PROCEDURE — 99215 OFFICE O/P EST HI 40 MIN: CPT | Performed by: INTERNAL MEDICINE

## 2024-09-24 PROCEDURE — 3074F SYST BP LT 130 MM HG: CPT | Performed by: INTERNAL MEDICINE

## 2024-09-24 PROCEDURE — 3078F DIAST BP <80 MM HG: CPT | Performed by: INTERNAL MEDICINE

## 2024-09-24 PROCEDURE — G8420 CALC BMI NORM PARAMETERS: HCPCS | Performed by: INTERNAL MEDICINE

## 2024-09-24 PROCEDURE — 1036F TOBACCO NON-USER: CPT | Performed by: INTERNAL MEDICINE

## 2024-09-24 PROCEDURE — 1123F ACP DISCUSS/DSCN MKR DOCD: CPT | Performed by: INTERNAL MEDICINE

## 2024-09-24 RX ORDER — RIVAROXABAN 20 MG/1
TABLET, FILM COATED ORAL
COMMUNITY
Start: 2024-09-13

## 2024-09-24 RX ORDER — CELECOXIB 200 MG/1
CAPSULE ORAL DAILY
COMMUNITY
Start: 2024-09-16

## 2024-09-24 RX ORDER — BENZONATATE 200 MG/1
CAPSULE ORAL
COMMUNITY
Start: 2024-09-16

## 2024-10-16 DIAGNOSIS — C93.10 CHRONIC MYELOMONOCYTIC LEUKEMIA NOT HAVING ACHIEVED REMISSION (HCC): ICD-10-CM

## 2024-10-16 LAB
ALBUMIN SERPL-MCNC: 3.8 G/DL (ref 3.5–5)
ALBUMIN/GLOB SERPL: 0.8 (ref 1.1–2.2)
ALP SERPL-CCNC: 73 U/L (ref 45–117)
ALT SERPL-CCNC: 22 U/L (ref 12–78)
ANION GAP SERPL CALC-SCNC: 5 MMOL/L (ref 2–12)
AST SERPL-CCNC: 22 U/L (ref 15–37)
BASOPHILS # BLD: 0.1 K/UL (ref 0–0.1)
BASOPHILS NFR BLD: 1 % (ref 0–1)
BILIRUB SERPL-MCNC: 0.9 MG/DL (ref 0.2–1)
BUN SERPL-MCNC: 32 MG/DL (ref 6–20)
BUN/CREAT SERPL: 18 (ref 12–20)
CALCIUM SERPL-MCNC: 8.9 MG/DL (ref 8.5–10.1)
CHLORIDE SERPL-SCNC: 109 MMOL/L (ref 97–108)
CO2 SERPL-SCNC: 24 MMOL/L (ref 21–32)
CREAT SERPL-MCNC: 1.77 MG/DL (ref 0.7–1.3)
DIFFERENTIAL METHOD BLD: ABNORMAL
EOSINOPHIL # BLD: 0 K/UL (ref 0–0.4)
EOSINOPHIL NFR BLD: 0 % (ref 0–7)
ERYTHROCYTE [DISTWIDTH] IN BLOOD BY AUTOMATED COUNT: 23.1 % (ref 11.5–14.5)
GLOBULIN SER CALC-MCNC: 4.8 G/DL (ref 2–4)
GLUCOSE SERPL-MCNC: 69 MG/DL (ref 65–100)
HCT VFR BLD AUTO: 19.8 % (ref 36.6–50.3)
HGB BLD-MCNC: 6.8 G/DL (ref 12.1–17)
IMM GRANULOCYTES # BLD AUTO: 0 K/UL
IMM GRANULOCYTES NFR BLD AUTO: 0 %
LYMPHOCYTES # BLD: 1.1 K/UL (ref 0.8–3.5)
LYMPHOCYTES NFR BLD: 19 % (ref 12–49)
MCH RBC QN AUTO: 39.1 PG (ref 26–34)
MCHC RBC AUTO-ENTMCNC: 34.3 G/DL (ref 30–36.5)
MCV RBC AUTO: 113.8 FL (ref 80–99)
MONOCYTES # BLD: 3.9 K/UL (ref 0–1)
MONOCYTES NFR BLD: 66 % (ref 5–13)
NEUTS SEG # BLD: 0.8 K/UL (ref 1.8–8)
NEUTS SEG NFR BLD: 14 % (ref 32–75)
NRBC # BLD: 0 K/UL (ref 0–0.01)
NRBC BLD-RTO: 0 PER 100 WBC
PLATELET # BLD AUTO: 54 K/UL (ref 150–400)
PMV BLD AUTO: 12.1 FL (ref 8.9–12.9)
POTASSIUM SERPL-SCNC: 5 MMOL/L (ref 3.5–5.1)
PROT SERPL-MCNC: 8.6 G/DL (ref 6.4–8.2)
RBC # BLD AUTO: 1.74 M/UL (ref 4.1–5.7)
RBC MORPH BLD: ABNORMAL
SODIUM SERPL-SCNC: 138 MMOL/L (ref 136–145)
WBC # BLD AUTO: 5.9 K/UL (ref 4.1–11.1)

## 2024-10-17 ENCOUNTER — TELEPHONE (OUTPATIENT)
Age: 79
End: 2024-10-17

## 2024-10-17 DIAGNOSIS — D64.81 ANEMIA DUE TO ANTINEOPLASTIC CHEMOTHERAPY: Primary | ICD-10-CM

## 2024-10-17 DIAGNOSIS — T45.1X5A ANEMIA DUE TO ANTINEOPLASTIC CHEMOTHERAPY: Primary | ICD-10-CM

## 2024-10-17 DIAGNOSIS — C93.10 CHRONIC MYELOMONOCYTIC LEUKEMIA NOT HAVING ACHIEVED REMISSION (HCC): ICD-10-CM

## 2024-10-17 PROBLEM — D64.89 OTHER SPECIFIED ANEMIAS: Status: ACTIVE | Noted: 2024-10-17

## 2024-10-17 RX ORDER — DIPHENHYDRAMINE HYDROCHLORIDE 50 MG/ML
50 INJECTION INTRAMUSCULAR; INTRAVENOUS
Status: CANCELLED | OUTPATIENT
Start: 2024-10-18

## 2024-10-17 RX ORDER — SODIUM CHLORIDE 9 MG/ML
25 INJECTION, SOLUTION INTRAVENOUS PRN
Status: CANCELLED | OUTPATIENT
Start: 2024-10-18

## 2024-10-17 RX ORDER — EPINEPHRINE 1 MG/ML
0.3 INJECTION, SOLUTION, CONCENTRATE INTRAVENOUS PRN
Status: CANCELLED | OUTPATIENT
Start: 2024-10-18

## 2024-10-17 RX ORDER — ONDANSETRON 2 MG/ML
8 INJECTION INTRAMUSCULAR; INTRAVENOUS
Status: CANCELLED | OUTPATIENT
Start: 2024-10-18

## 2024-10-17 RX ORDER — SODIUM CHLORIDE 9 MG/ML
INJECTION, SOLUTION INTRAVENOUS CONTINUOUS
Status: CANCELLED | OUTPATIENT
Start: 2024-10-18

## 2024-10-17 RX ORDER — SODIUM CHLORIDE 0.9 % (FLUSH) 0.9 %
5-40 SYRINGE (ML) INJECTION PRN
Status: CANCELLED | OUTPATIENT
Start: 2024-10-18

## 2024-10-17 RX ORDER — SODIUM CHLORIDE 9 MG/ML
20 INJECTION, SOLUTION INTRAVENOUS CONTINUOUS
Status: CANCELLED | OUTPATIENT
Start: 2024-10-18

## 2024-10-17 RX ORDER — ACETAMINOPHEN 325 MG/1
650 TABLET ORAL
Status: CANCELLED | OUTPATIENT
Start: 2024-10-18

## 2024-10-17 RX ORDER — FAMOTIDINE 10 MG/ML
20 INJECTION, SOLUTION INTRAVENOUS
Status: CANCELLED | OUTPATIENT
Start: 2024-10-18

## 2024-10-17 RX ORDER — ALBUTEROL SULFATE 90 UG/1
4 INHALANT RESPIRATORY (INHALATION) PRN
Status: CANCELLED | OUTPATIENT
Start: 2024-10-18

## 2024-10-17 NOTE — CONSENT
Informed Consent for Blood Component Transfusion Note    I have discussed with the patient the rationale for blood component transfusion; its benefits in treating or preventing fatigue, organ damage, or death; and its risk which includes mild transfusion reactions, rare risk of blood borne infection, or more serious but rare reactions. I have discussed the alternatives to transfusion, including the risk and consequences of not receiving transfusion. The patient had an opportunity to ask questions and had agreed to proceed with transfusion of blood components.    Electronically signed by BIJU Barfield NP on 10/17/24 at 12:55 PM EDT

## 2024-10-17 NOTE — TELEPHONE ENCOUNTER
[facility] [place] at [facility]  [phone]      Called to patient to review lab results. Discussed with patient and wife that WBC improved but now with anemia requiring blood transfusion. Set up for 1 unit PRBCs on 10/18/24, obtained consent.     Also discussed reducing dose of hydrea to 500mg BID on Mondays, Wednesdays, and Fridays, 500mg once daily on Tuesday, Thursday, Saturday, and Sunday.     They had no further questions or concerns.       Signed By: BIJU Barfield NP

## 2024-10-18 ENCOUNTER — HOSPITAL ENCOUNTER (OUTPATIENT)
Facility: HOSPITAL | Age: 79
Setting detail: INFUSION SERIES
Discharge: HOME | End: 2024-10-18
Payer: MEDICARE

## 2024-10-18 VITALS
SYSTOLIC BLOOD PRESSURE: 143 MMHG | TEMPERATURE: 98.2 F | RESPIRATION RATE: 17 BRPM | DIASTOLIC BLOOD PRESSURE: 65 MMHG | HEART RATE: 62 BPM | BODY MASS INDEX: 22.51 KG/M2 | WEIGHT: 148.5 LBS | HEIGHT: 68 IN | OXYGEN SATURATION: 99 %

## 2024-10-18 DIAGNOSIS — T45.1X5A ANEMIA DUE TO ANTINEOPLASTIC CHEMOTHERAPY: ICD-10-CM

## 2024-10-18 DIAGNOSIS — D64.81 ANEMIA DUE TO ANTINEOPLASTIC CHEMOTHERAPY: ICD-10-CM

## 2024-10-18 DIAGNOSIS — C93.10 CHRONIC MYELOMONOCYTIC LEUKEMIA NOT HAVING ACHIEVED REMISSION (HCC): Primary | ICD-10-CM

## 2024-10-18 LAB — HISTORY CHECK: NORMAL

## 2024-10-18 PROCEDURE — 86923 COMPATIBILITY TEST ELECTRIC: CPT

## 2024-10-18 PROCEDURE — 86900 BLOOD TYPING SEROLOGIC ABO: CPT

## 2024-10-18 PROCEDURE — P9040 RBC LEUKOREDUCED IRRADIATED: HCPCS

## 2024-10-18 PROCEDURE — 86850 RBC ANTIBODY SCREEN: CPT

## 2024-10-18 PROCEDURE — 36415 COLL VENOUS BLD VENIPUNCTURE: CPT

## 2024-10-18 PROCEDURE — 36430 TRANSFUSION BLD/BLD COMPNT: CPT

## 2024-10-18 PROCEDURE — 86901 BLOOD TYPING SEROLOGIC RH(D): CPT

## 2024-10-18 RX ORDER — SODIUM CHLORIDE 0.9 % (FLUSH) 0.9 %
5-40 SYRINGE (ML) INJECTION PRN
Status: DISCONTINUED | OUTPATIENT
Start: 2024-10-18 | End: 2024-10-19 | Stop reason: HOSPADM

## 2024-10-18 RX ORDER — SODIUM CHLORIDE 9 MG/ML
20 INJECTION, SOLUTION INTRAVENOUS CONTINUOUS
Status: DISCONTINUED | OUTPATIENT
Start: 2024-10-18 | End: 2024-10-19 | Stop reason: HOSPADM

## 2024-10-18 RX ORDER — ACETAMINOPHEN 325 MG/1
650 TABLET ORAL
OUTPATIENT
Start: 2024-10-18

## 2024-10-18 RX ORDER — FAMOTIDINE 10 MG/ML
20 INJECTION, SOLUTION INTRAVENOUS
OUTPATIENT
Start: 2024-10-18

## 2024-10-18 RX ORDER — SODIUM CHLORIDE 9 MG/ML
25 INJECTION, SOLUTION INTRAVENOUS PRN
OUTPATIENT
Start: 2024-10-18

## 2024-10-18 RX ORDER — DIPHENHYDRAMINE HYDROCHLORIDE 50 MG/ML
50 INJECTION INTRAMUSCULAR; INTRAVENOUS
OUTPATIENT
Start: 2024-10-18

## 2024-10-18 RX ORDER — SODIUM CHLORIDE 9 MG/ML
20 INJECTION, SOLUTION INTRAVENOUS CONTINUOUS
Status: CANCELLED | OUTPATIENT
Start: 2024-10-18

## 2024-10-18 RX ORDER — ALBUTEROL SULFATE 90 UG/1
4 INHALANT RESPIRATORY (INHALATION) PRN
OUTPATIENT
Start: 2024-10-18

## 2024-10-18 RX ORDER — SODIUM CHLORIDE 9 MG/ML
INJECTION, SOLUTION INTRAVENOUS CONTINUOUS
OUTPATIENT
Start: 2024-10-18

## 2024-10-18 RX ORDER — ONDANSETRON 2 MG/ML
8 INJECTION INTRAMUSCULAR; INTRAVENOUS
OUTPATIENT
Start: 2024-10-18

## 2024-10-18 RX ORDER — SODIUM CHLORIDE 0.9 % (FLUSH) 0.9 %
5-40 SYRINGE (ML) INJECTION PRN
Status: CANCELLED | OUTPATIENT
Start: 2024-10-18

## 2024-10-18 RX ORDER — EPINEPHRINE 1 MG/ML
0.3 INJECTION, SOLUTION INTRAMUSCULAR; SUBCUTANEOUS PRN
OUTPATIENT
Start: 2024-10-18

## 2024-10-18 ASSESSMENT — PAIN SCALES - GENERAL: PAINLEVEL_OUTOF10: 0

## 2024-10-18 NOTE — PROGRESS NOTES
OUTPATIENT INFUSION CENTER     DISCHARGE INSTRUCTIONS FOR:  BLOOD TRANSFUSION     We hope you are feeling better after your blood transfusion.  Some mild tenderness or slight bruising at your IV site is normal.  Avoid lifting or heavy use of that extremity for the rest of the day.  Drink plenty of fluids, eat a normal diet and get some rest.     There are some important signs that you need to watch for in case you experience a delayed reaction to the blood you have received.  Call your physician immediately if you develop any of the following symptoms:     1.  Severe headache or backache;     2.  Fever above 100 degrees;     3.  Chills;     4.  Difficulty breathing;     5.  Blood or red color in urine;     6.  The feeling of weakness or constant fatigue;     7.  Yellowing of the whites of your eyes or skin (jaundice).        If your physician is not available, call or go to the nearest emergency room, or dial 911.

## 2024-10-18 NOTE — PROGRESS NOTES
Outpatient Infusion Center Progress Note        Date: 10/18/24    Name: Terry Grant    MRN: [id number]         : 1945    MD: Kevin Rangel MD      Mr. Garnt admitted to [facility] Blood Transfusion (PR[facility]) ambulatory in stable condition. Assessment completed by assessment nurse MELANI Patton. No new concerns voiced, no acute issues at this time. Venous access obtained via 22g left forearm, flushed without difficulty, blood return obtained. Labs drawn peripherally and sent for processing.      ** This is the patient's first time receiving a blood transfusion.      Vitals:    10/18/24 0857 10/18/24 1122 10/18/24 1137 10/18/24 1346   BP: (!) 150/65 (!) 104/54 123/72 (!) 143/65   Pulse: 73 68 69 62   Resp:    Temp: 98.3 °F (36.8 °C) 98.3 °F (36.8 °C) 98.3 °F (36.8 °C) 98.2 °F (36.8 °C)   TempSrc: Temporal  Temporal Temporal   SpO2: 97% 100% 100% 99%   Weight: 67.4 kg (148 lb 8 oz)      Height: 1.727 m (5' 8\")              Results for orders placed or performed during the hospital encounter of 10/18/24   TYPE AND SCREEN   Result Value Ref Range    Crossmatch expiration date 10/21/2024,2359     ABO/Rh A POSITIVE     Antibody Screen NEG     Unit Number [id number]     Product Code Blood Bank RC LRIR     Unit Divison 00     Dispense Status Blood Bank ISSUED     Unit Issue Date/Time [id number]     Product Code Blood Bank [id number]     Blood Bank Unit Type and Rh O POS     Blood Bank ISBT Product Blood Type 5100     Blood Bank Blood Product Expiration Date 414561818141     Crossmatch Result Compatible    PREPARE RBC (CROSSMATCH), 1 Units   Result Value Ref Range    History Check Historical check performed            **Per NP note, risks and benefits of blood transfusion discussed with Mr. Grant, patient is agreeable to transfusion. Transfusion Consent form signed by patient and witnessed by MELANI Patton and given to  to be scanned into the chart.**        Unit received from blood bank.

## 2024-10-19 LAB
ABO + RH BLD: NORMAL
BLD PROD TYP BPU: NORMAL
BLOOD BANK BLOOD PRODUCT EXPIRATION DATE: NORMAL
BLOOD BANK DISPENSE STATUS: NORMAL
BLOOD BANK ISBT PRODUCT BLOOD TYPE: 5100
BLOOD BANK PRODUCT CODE: NORMAL
BLOOD BANK UNIT TYPE AND RH: NORMAL
BLOOD GROUP ANTIBODIES SERPL: NORMAL
BPU ID: NORMAL
CROSSMATCH RESULT: NORMAL
SPECIMEN EXP DATE BLD: NORMAL
UNIT DIVISION: 0
UNIT ISSUE DATE/TIME: NORMAL

## 2024-10-21 ENCOUNTER — OFFICE VISIT (OUTPATIENT)
Age: 79
End: 2024-10-21
Payer: MEDICARE

## 2024-10-21 VITALS
TEMPERATURE: 97.1 F | RESPIRATION RATE: 17 BRPM | DIASTOLIC BLOOD PRESSURE: 66 MMHG | BODY MASS INDEX: 22.58 KG/M2 | WEIGHT: 149 LBS | SYSTOLIC BLOOD PRESSURE: 116 MMHG | HEIGHT: 68 IN | HEART RATE: 61 BPM | OXYGEN SATURATION: 99 %

## 2024-10-21 DIAGNOSIS — C93.10 CHRONIC MYELOMONOCYTIC LEUKEMIA NOT HAVING ACHIEVED REMISSION (HCC): Primary | ICD-10-CM

## 2024-10-21 DIAGNOSIS — C93.10 CHRONIC MYELOMONOCYTIC LEUKEMIA NOT HAVING ACHIEVED REMISSION (HCC): ICD-10-CM

## 2024-10-21 LAB
BASOPHILS # BLD: 0.1 K/UL (ref 0–0.1)
BASOPHILS NFR BLD: 1 % (ref 0–1)
DIFFERENTIAL METHOD BLD: ABNORMAL
EOSINOPHIL # BLD: 0 K/UL (ref 0–0.4)
EOSINOPHIL NFR BLD: 0 % (ref 0–7)
ERYTHROCYTE [DISTWIDTH] IN BLOOD BY AUTOMATED COUNT: 23.5 % (ref 11.5–14.5)
HCT VFR BLD AUTO: 23.5 % (ref 36.6–50.3)
HGB BLD-MCNC: 7.6 G/DL (ref 12.1–17)
IMM GRANULOCYTES # BLD AUTO: 0 K/UL
IMM GRANULOCYTES NFR BLD AUTO: 0 %
LYMPHOCYTES # BLD: 0.7 K/UL (ref 0.8–3.5)
LYMPHOCYTES NFR BLD: 9 % (ref 12–49)
MCH RBC QN AUTO: 35.7 PG (ref 26–34)
MCHC RBC AUTO-ENTMCNC: 32.3 G/DL (ref 30–36.5)
MCV RBC AUTO: 110.3 FL (ref 80–99)
MONOCYTES # BLD: 5.8 K/UL (ref 0–1)
MONOCYTES NFR BLD: 71 % (ref 5–13)
NEUTS SEG # BLD: 1.6 K/UL (ref 1.8–8)
NEUTS SEG NFR BLD: 19 % (ref 32–75)
NRBC # BLD: 0 K/UL (ref 0–0.01)
NRBC BLD-RTO: 0 PER 100 WBC
PLATELET # BLD AUTO: 61 K/UL (ref 150–400)
PMV BLD AUTO: 12.1 FL (ref 8.9–12.9)
RBC # BLD AUTO: 2.13 M/UL (ref 4.1–5.7)
RBC MORPH BLD: ABNORMAL
RBC MORPH BLD: ABNORMAL
WBC # BLD AUTO: 8.2 K/UL (ref 4.1–11.1)

## 2024-10-21 PROCEDURE — 99215 OFFICE O/P EST HI 40 MIN: CPT | Performed by: INTERNAL MEDICINE

## 2024-10-21 ASSESSMENT — PATIENT HEALTH QUESTIONNAIRE - PHQ9
SUM OF ALL RESPONSES TO PHQ QUESTIONS 1-9: 0
SUM OF ALL RESPONSES TO PHQ9 QUESTIONS 1 & 2: 0
SUM OF ALL RESPONSES TO PHQ QUESTIONS 1-9: 0
SUM OF ALL RESPONSES TO PHQ QUESTIONS 1-9: 0
2. FEELING DOWN, DEPRESSED OR HOPELESS: NOT AT ALL
1. LITTLE INTEREST OR PLEASURE IN DOING THINGS: NOT AT ALL
SUM OF ALL RESPONSES TO PHQ QUESTIONS 1-9: 0

## 2024-10-21 NOTE — PROGRESS NOTES
Terry Grant is a 79 y.o. male is here today for a Chronic myelomonocytic leukemia    1. Have you been to the ER, urgent care clinic since your last visit?  Hospitalized since your last visit?No    2. Have you seen or consulted any other health care providers outside of the [facility] since your last visit?  Include any pap smears or colon screening. No        
dose.    Also discussed, Decitabine 35 mg/cedazuridine 100 mg once daily on days 1 to 5 of each 28-day treatment cycle for a minimum of 4 cycles, until disease progression or unacceptable toxicity. AEs include but are not limited to myelosuppression, rash, diarrhea, nausea, stomatitis, LFTs increased, infection, may be life threatening, constipation, and rarely death    started hydrea at 500 mg once daily as his ANC is only 5.1 on 5/18/23.  He is agreeable, rx in.     Is taking hydrea 500 mg bid days Monday, wed, Friday, once daily tues, thurs, sat sunday    The duration of this treatment plan will be until progression, intolerable side effects, or patient choice.    We will plan to see the patient in follow up at least once per 4-12 weeks, or sooner if symptoms warrant.  Labs with each visit for intensive monitoring for toxicity    2. Thrombocytopenia: history of plt clumping; see #1; 54 at last check    3. Monocytosis: normal, 7/2021; high 12/12/21 at 1.5; 3.9 now, due to disease    4. Afib: metoprolol and xarelto and amiodarone    5. Anemia:  due to CMML and hydrea; received 1 U PRBC on 10/18/24    6. Pericardial effusion/fibrinous pericarditis:  following with cardiology    7. Hydronephrosis:  sees nephrology on 6/27/23, s/p bilateral ureteral stents; s/p stent removal;  encouraged much more PO intake    8. Neutropenia:  due to hydrea, DR as above    9. Joint aches:  ok with meloxicam        Will check cbc today, cbc and cmp in 2 weeks, and in 4 weeks      Kevin Rangel Jr, MD

## 2024-11-13 DIAGNOSIS — C93.10 CHRONIC MYELOMONOCYTIC LEUKEMIA NOT HAVING ACHIEVED REMISSION (HCC): ICD-10-CM

## 2024-11-13 LAB
ALBUMIN SERPL-MCNC: 3.9 G/DL (ref 3.5–5)
ALBUMIN/GLOB SERPL: 0.8 (ref 1.1–2.2)
ALP SERPL-CCNC: 70 U/L (ref 45–117)
ALT SERPL-CCNC: 24 U/L (ref 12–78)
ANION GAP SERPL CALC-SCNC: 6 MMOL/L (ref 2–12)
AST SERPL-CCNC: 16 U/L (ref 15–37)
BASOPHILS # BLD: 0 K/UL (ref 0–0.1)
BASOPHILS NFR BLD: 0 % (ref 0–1)
BILIRUB SERPL-MCNC: 0.9 MG/DL (ref 0.2–1)
BUN SERPL-MCNC: 27 MG/DL (ref 6–20)
BUN/CREAT SERPL: 15 (ref 12–20)
CALCIUM SERPL-MCNC: 8.3 MG/DL (ref 8.5–10.1)
CHLORIDE SERPL-SCNC: 107 MMOL/L (ref 97–108)
CO2 SERPL-SCNC: 24 MMOL/L (ref 21–32)
CREAT SERPL-MCNC: 1.81 MG/DL (ref 0.7–1.3)
DIFFERENTIAL METHOD BLD: ABNORMAL
EOSINOPHIL # BLD: 0 K/UL (ref 0–0.4)
EOSINOPHIL NFR BLD: 0 % (ref 0–7)
ERYTHROCYTE [DISTWIDTH] IN BLOOD BY AUTOMATED COUNT: 22.2 % (ref 11.5–14.5)
GLOBULIN SER CALC-MCNC: 4.7 G/DL (ref 2–4)
GLUCOSE SERPL-MCNC: 75 MG/DL (ref 65–100)
HCT VFR BLD AUTO: 19.3 % (ref 36.6–50.3)
HGB BLD-MCNC: 6.2 G/DL (ref 12.1–17)
IMM GRANULOCYTES # BLD AUTO: 0 K/UL
IMM GRANULOCYTES NFR BLD AUTO: 0 %
LYMPHOCYTES # BLD: 1.5 K/UL (ref 0.8–3.5)
LYMPHOCYTES NFR BLD: 16 % (ref 12–49)
MCH RBC QN AUTO: 36.9 PG (ref 26–34)
MCHC RBC AUTO-ENTMCNC: 32.1 G/DL (ref 30–36.5)
MCV RBC AUTO: 114.9 FL (ref 80–99)
MONOCYTES # BLD: 7.3 K/UL (ref 0–1)
MONOCYTES NFR BLD: 78 % (ref 5–13)
NEUTS SEG # BLD: 0.6 K/UL (ref 1.8–8)
NEUTS SEG NFR BLD: 6 % (ref 32–75)
NRBC # BLD: 0 K/UL (ref 0–0.01)
NRBC BLD-RTO: 0 PER 100 WBC
PLATELET # BLD AUTO: 55 K/UL (ref 150–400)
PMV BLD AUTO: 12.4 FL (ref 8.9–12.9)
POTASSIUM SERPL-SCNC: 3.8 MMOL/L (ref 3.5–5.1)
PROT SERPL-MCNC: 8.6 G/DL (ref 6.4–8.2)
RBC # BLD AUTO: 1.68 M/UL (ref 4.1–5.7)
RBC MORPH BLD: ABNORMAL
SODIUM SERPL-SCNC: 137 MMOL/L (ref 136–145)
WBC # BLD AUTO: 9.4 K/UL (ref 4.1–11.1)

## 2024-11-14 ENCOUNTER — TELEPHONE (OUTPATIENT)
Age: 79
End: 2024-11-14

## 2024-11-14 DIAGNOSIS — C93.10 CHRONIC MYELOMONOCYTIC LEUKEMIA NOT HAVING ACHIEVED REMISSION (HCC): ICD-10-CM

## 2024-11-14 DIAGNOSIS — D64.81 ANEMIA DUE TO ANTINEOPLASTIC CHEMOTHERAPY: Primary | ICD-10-CM

## 2024-11-14 DIAGNOSIS — T45.1X5A ANEMIA DUE TO ANTINEOPLASTIC CHEMOTHERAPY: Primary | ICD-10-CM

## 2024-11-14 NOTE — TELEPHONE ENCOUNTER
[facility] [place] at [facility]  [phone]    Called to patient. Reviewed lab results with patient and wife. Mr. Grant does report increased fatigue, mild dyspnea on exertion and intermittent dizziness. Recommend 1 unit PRBCs that will be scheduled 11/15/24 at 9:30am; patient agrees to this. Per Dr. Rangel will decrease dose of hydrea to 500mg once daily every day. Follow up already scheduled 11/18/24, will repeat labs at that time.     They had no further questions or concerns.       Signed By: BIJU Barfield NP

## 2024-11-15 ENCOUNTER — HOSPITAL ENCOUNTER (OUTPATIENT)
Facility: HOSPITAL | Age: 79
Setting detail: INFUSION SERIES
Discharge: HOME | End: 2024-11-15
Payer: MEDICARE

## 2024-11-15 VITALS
OXYGEN SATURATION: 96 % | WEIGHT: 151.3 LBS | HEART RATE: 76 BPM | RESPIRATION RATE: 18 BRPM | DIASTOLIC BLOOD PRESSURE: 62 MMHG | BODY MASS INDEX: 22.93 KG/M2 | SYSTOLIC BLOOD PRESSURE: 128 MMHG | HEIGHT: 68 IN | TEMPERATURE: 98.8 F

## 2024-11-15 DIAGNOSIS — D64.81 ANEMIA DUE TO ANTINEOPLASTIC CHEMOTHERAPY: Primary | ICD-10-CM

## 2024-11-15 DIAGNOSIS — T45.1X5A ANEMIA DUE TO ANTINEOPLASTIC CHEMOTHERAPY: Primary | ICD-10-CM

## 2024-11-15 DIAGNOSIS — C93.10 CHRONIC MYELOMONOCYTIC LEUKEMIA NOT HAVING ACHIEVED REMISSION (HCC): ICD-10-CM

## 2024-11-15 LAB — HISTORY CHECK: NORMAL

## 2024-11-15 PROCEDURE — 86923 COMPATIBILITY TEST ELECTRIC: CPT

## 2024-11-15 PROCEDURE — 36430 TRANSFUSION BLD/BLD COMPNT: CPT

## 2024-11-15 PROCEDURE — 86901 BLOOD TYPING SEROLOGIC RH(D): CPT

## 2024-11-15 PROCEDURE — 86900 BLOOD TYPING SEROLOGIC ABO: CPT

## 2024-11-15 PROCEDURE — 36415 COLL VENOUS BLD VENIPUNCTURE: CPT

## 2024-11-15 PROCEDURE — P9040 RBC LEUKOREDUCED IRRADIATED: HCPCS

## 2024-11-15 PROCEDURE — 86850 RBC ANTIBODY SCREEN: CPT

## 2024-11-15 RX ORDER — EPINEPHRINE 1 MG/ML
0.3 INJECTION, SOLUTION INTRAMUSCULAR; SUBCUTANEOUS PRN
OUTPATIENT
Start: 2024-11-15

## 2024-11-15 RX ORDER — HYDROCORTISONE SODIUM SUCCINATE 100 MG/2ML
100 INJECTION INTRAMUSCULAR; INTRAVENOUS
OUTPATIENT
Start: 2024-11-15

## 2024-11-15 RX ORDER — SODIUM CHLORIDE 9 MG/ML
25 INJECTION, SOLUTION INTRAVENOUS PRN
Status: CANCELLED | OUTPATIENT
Start: 2024-11-15

## 2024-11-15 RX ORDER — SODIUM CHLORIDE 9 MG/ML
INJECTION, SOLUTION INTRAVENOUS CONTINUOUS
OUTPATIENT
Start: 2024-11-15

## 2024-11-15 RX ORDER — DIPHENHYDRAMINE HYDROCHLORIDE 50 MG/ML
50 INJECTION INTRAMUSCULAR; INTRAVENOUS
OUTPATIENT
Start: 2024-11-15

## 2024-11-15 RX ORDER — ACETAMINOPHEN 325 MG/1
650 TABLET ORAL
OUTPATIENT
Start: 2024-11-15

## 2024-11-15 RX ORDER — FAMOTIDINE 10 MG/ML
20 INJECTION, SOLUTION INTRAVENOUS
OUTPATIENT
Start: 2024-11-15

## 2024-11-15 RX ORDER — ONDANSETRON 2 MG/ML
8 INJECTION INTRAMUSCULAR; INTRAVENOUS
OUTPATIENT
Start: 2024-11-15

## 2024-11-15 RX ORDER — SODIUM CHLORIDE 9 MG/ML
25 INJECTION, SOLUTION INTRAVENOUS PRN
Status: DISCONTINUED | OUTPATIENT
Start: 2024-11-15 | End: 2024-11-16 | Stop reason: HOSPADM

## 2024-11-15 RX ORDER — ALBUTEROL SULFATE 90 UG/1
4 INHALANT RESPIRATORY (INHALATION) PRN
OUTPATIENT
Start: 2024-11-15

## 2024-11-15 ASSESSMENT — PAIN SCALES - GENERAL: PAINLEVEL_OUTOF10: 0

## 2024-11-15 NOTE — PROGRESS NOTES
Outpatient Infusion Center Progress Note        Date: 11/15/24    Name: Terry Grant    MRN: [id number]         : 1945    MD: Kevin Rangel MD      Mr. Grant admitted to [facility] for Blood Transfusion (PRBC 1 Unit) ambulatory in stable condition. Assessment completed, no acute issues at this time. No new concerns voiced. Venous access obtained via 22g right forearm, flushed without difficulty, blood return obtained. Labs drawn and sent for processing. CBC reviewed from 2024, Hgb 6.2.      ** Patient has received blood transfusions in the past and reports no previous transfusion reactions. **      Vitals:    11/15/24 0936 11/15/24 1204 11/15/24 1220 11/15/24 1449   BP: 132/62 (!) 100/56 113/63 128/62   Pulse: 73 74 71 76   Resp: 18 16 18 18   Temp: 98.2 °F (36.8 °C) 98.7 °F (37.1 °C) 98.9 °F (37.2 °C) 98.8 °F (37.1 °C)   TempSrc: Temporal Temporal Temporal Temporal   SpO2: 100% 99% 100% 96%   Weight: 68.6 kg (151 lb 4.8 oz)      Height: 1.727 m (5' 8\")              Results for orders placed or performed during the hospital encounter of 11/15/24   TYPE AND SCREEN   Result Value Ref Range    Crossmatch expiration date 2024,2359     ABO/Rh A POSITIVE     Antibody Screen NEG     Unit Number [id number]     Product Code Blood Bank RC LRIR     Unit Divison 00     Dispense Status Blood Bank ISSUED     Unit Issue Date/Time [id number]     Product Code Blood Bank [id number]     Blood Bank Unit Type and Rh O NEG     Blood Bank ISBT Product Blood Type 9500     Blood Bank Blood Product Expiration Date [id number]     Crossmatch Result Compatible    PREPARE RBC (CROSSMATCH), 1 Units   Result Value Ref Range    History Check Historical check performed          **Per NP note, risks and benefits of blood transfusion discussed with Mr. Grant, patient is agreeable to transfusion, .phrase 10/17/2024. Transfusion Consent form signed by patient and scanned into media, 10/18/2024.        Unit received from

## 2024-11-16 LAB
ABO + RH BLD: NORMAL
BLD PROD TYP BPU: NORMAL
BLOOD BANK BLOOD PRODUCT EXPIRATION DATE: NORMAL
BLOOD BANK DISPENSE STATUS: NORMAL
BLOOD BANK ISBT PRODUCT BLOOD TYPE: 9500
BLOOD BANK PRODUCT CODE: NORMAL
BLOOD BANK UNIT TYPE AND RH: NORMAL
BLOOD GROUP ANTIBODIES SERPL: NORMAL
BPU ID: NORMAL
CROSSMATCH RESULT: NORMAL
SPECIMEN EXP DATE BLD: NORMAL
UNIT DIVISION: 0
UNIT ISSUE DATE/TIME: NORMAL

## 2024-11-18 ENCOUNTER — OFFICE VISIT (OUTPATIENT)
Age: 79
End: 2024-11-18
Payer: MEDICARE

## 2024-11-18 VITALS
BODY MASS INDEX: 22.43 KG/M2 | SYSTOLIC BLOOD PRESSURE: 121 MMHG | HEART RATE: 77 BPM | HEIGHT: 68 IN | WEIGHT: 148 LBS | DIASTOLIC BLOOD PRESSURE: 68 MMHG | OXYGEN SATURATION: 95 %

## 2024-11-18 DIAGNOSIS — C93.10 CHRONIC MYELOMONOCYTIC LEUKEMIA NOT HAVING ACHIEVED REMISSION (HCC): Primary | ICD-10-CM

## 2024-11-18 DIAGNOSIS — C93.10 CHRONIC MYELOMONOCYTIC LEUKEMIA NOT HAVING ACHIEVED REMISSION (HCC): ICD-10-CM

## 2024-11-18 LAB
BASOPHILS # BLD: 0 K/UL (ref 0–0.1)
BASOPHILS NFR BLD: 0 % (ref 0–1)
DIFFERENTIAL METHOD BLD: ABNORMAL
EOSINOPHIL # BLD: 0 K/UL (ref 0–0.4)
EOSINOPHIL NFR BLD: 0 % (ref 0–7)
ERYTHROCYTE [DISTWIDTH] IN BLOOD BY AUTOMATED COUNT: 23.3 % (ref 11.5–14.5)
HCT VFR BLD AUTO: 20.2 % (ref 36.6–50.3)
HGB BLD-MCNC: 6.5 G/DL (ref 12.1–17)
IMM GRANULOCYTES # BLD AUTO: 0 K/UL
IMM GRANULOCYTES NFR BLD AUTO: 0 %
LYMPHOCYTES # BLD: 2 K/UL (ref 0.8–3.5)
LYMPHOCYTES NFR BLD: 25 % (ref 12–49)
MCH RBC QN AUTO: 36.5 PG (ref 26–34)
MCHC RBC AUTO-ENTMCNC: 32.2 G/DL (ref 30–36.5)
MCV RBC AUTO: 113.5 FL (ref 80–99)
MONOCYTES # BLD: 4.2 K/UL (ref 0–1)
MONOCYTES NFR BLD: 54 % (ref 5–13)
NEUTS SEG # BLD: 1.6 K/UL (ref 1.8–8)
NEUTS SEG NFR BLD: 21 % (ref 32–75)
NRBC # BLD: 0 K/UL (ref 0–0.01)
NRBC BLD-RTO: 0 PER 100 WBC
PLATELET # BLD AUTO: 45 K/UL (ref 150–400)
RBC # BLD AUTO: 1.78 M/UL (ref 4.1–5.7)
RBC MORPH BLD: ABNORMAL
WBC # BLD AUTO: 7.8 K/UL (ref 4.1–11.1)

## 2024-11-18 PROCEDURE — G8484 FLU IMMUNIZE NO ADMIN: HCPCS | Performed by: INTERNAL MEDICINE

## 2024-11-18 PROCEDURE — 99215 OFFICE O/P EST HI 40 MIN: CPT | Performed by: INTERNAL MEDICINE

## 2024-11-18 PROCEDURE — G8427 DOCREV CUR MEDS BY ELIG CLIN: HCPCS | Performed by: INTERNAL MEDICINE

## 2024-11-18 PROCEDURE — 1160F RVW MEDS BY RX/DR IN RCRD: CPT | Performed by: INTERNAL MEDICINE

## 2024-11-18 PROCEDURE — 1036F TOBACCO NON-USER: CPT | Performed by: INTERNAL MEDICINE

## 2024-11-18 PROCEDURE — G8420 CALC BMI NORM PARAMETERS: HCPCS | Performed by: INTERNAL MEDICINE

## 2024-11-18 PROCEDURE — 3078F DIAST BP <80 MM HG: CPT | Performed by: INTERNAL MEDICINE

## 2024-11-18 PROCEDURE — G2211 COMPLEX E/M VISIT ADD ON: HCPCS | Performed by: INTERNAL MEDICINE

## 2024-11-18 PROCEDURE — 1123F ACP DISCUSS/DSCN MKR DOCD: CPT | Performed by: INTERNAL MEDICINE

## 2024-11-18 PROCEDURE — 1159F MED LIST DOCD IN RCRD: CPT | Performed by: INTERNAL MEDICINE

## 2024-11-18 PROCEDURE — 3074F SYST BP LT 130 MM HG: CPT | Performed by: INTERNAL MEDICINE

## 2024-11-18 NOTE — PROGRESS NOTES
[facility] [place]  [address], [address]  [place]  [address]  W: [phone]  F: [phone]     HEME/ONC CONSULT    HPI:   Terry Grant is a 79 y.o. male who I am seeing for CMML      Interval history: reports gr 1 fatigue, gr 1 sob    Review of systems was obtained and pertinent findings reviewed above. Past medical history, social history, family history, medications, and allergies are located in the electronic medical record.    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  - SRSF2 mutations can be seen in various myeloid neoplasms, and are associated with a poor prognosis in MDS, AML,  PMF, and PV.  - TET2 mutations can be seen in various myeloid neoplasms, and are associated with an increased response to  hypomethylating agents and a poor prognosis in MDS after hematopoietic stem cell transplantation.  - Co-mutation of TET2 and SRSF2 is reported to be highly predictive of a myeloid neoplasm characterized by  myelodysplasia and monocytosis, including, but not limited to, CMML    NGS:  ASXL1 N926Bma*12; KRAS G12R; SRSF2 P95L; TET2 *, *; NO alterations detected in the following genes:  FLT3, IDH1, IDH2, NPM1      6/8/23:  pericardial window, negative    Hydrea 500 mg daily 6/9/23-  DR to 500 mg on Mon-Fri   Increased to daily on 8/22/24   Increased to 500 mg bid mon-Friday and daily on sat and Sunday on 9/9/24   Changed to 500 mg bid on Mon, Wed, Fri, 500 mg daily on Tues, thurs, Sat, Sunday on 10/17/24   Changed to 500 mg PO daily on 11/14/24    Objective:  Physical Exam:  Visit Vitals  Vitals:    11/18/24 1013   BP: 121/68   Pulse: 77   SpO2: 95%             Constitutional: Appears well-developed and well-nourished in no apparent distress      Mental status: Alert and awake, Oriented to person/place/time, Able

## 2024-11-18 NOTE — PROGRESS NOTES
Patient identified by name and date of birth  Terry Grant is a 79 y.o. 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           \"Have you been to the ER, urgent care clinic since your last visit?  Hospitalized since your last visit?\"    NO    “Have you seen or consulted any other health care providers outside of [facility] since your last visit?”    NO

## 2024-11-19 ENCOUNTER — TELEPHONE (OUTPATIENT)
Age: 79
End: 2024-11-19

## 2024-11-19 NOTE — TELEPHONE ENCOUNTER
[facility] [place] at [facility]  [phone]    11/19/24 10:53 AM EST - A phone call was made to the patient with information of his next blood transfusion appointment schedule for 11/25/2024 at 9:30am. Patient verbalized understanding and had no further questions.

## 2024-11-21 DIAGNOSIS — D64.81 ANEMIA DUE TO ANTINEOPLASTIC CHEMOTHERAPY: Primary | ICD-10-CM

## 2024-11-21 DIAGNOSIS — T45.1X5A ANEMIA DUE TO ANTINEOPLASTIC CHEMOTHERAPY: Primary | ICD-10-CM

## 2024-11-21 DIAGNOSIS — C93.10 CHRONIC MYELOMONOCYTIC LEUKEMIA NOT HAVING ACHIEVED REMISSION (HCC): ICD-10-CM

## 2024-11-25 ENCOUNTER — HOSPITAL ENCOUNTER (OUTPATIENT)
Facility: HOSPITAL | Age: 79
Setting detail: INFUSION SERIES
Discharge: HOME | End: 2024-11-25
Payer: MEDICARE

## 2024-11-25 ENCOUNTER — TELEPHONE (OUTPATIENT)
Age: 79
End: 2024-11-25

## 2024-11-25 VITALS
OXYGEN SATURATION: 98 % | SYSTOLIC BLOOD PRESSURE: 115 MMHG | HEART RATE: 74 BPM | RESPIRATION RATE: 18 BRPM | TEMPERATURE: 97.4 F | DIASTOLIC BLOOD PRESSURE: 56 MMHG

## 2024-11-25 DIAGNOSIS — D64.81 ANEMIA DUE TO ANTINEOPLASTIC CHEMOTHERAPY: Primary | ICD-10-CM

## 2024-11-25 DIAGNOSIS — C93.10 CHRONIC MYELOMONOCYTIC LEUKEMIA NOT HAVING ACHIEVED REMISSION (HCC): ICD-10-CM

## 2024-11-25 DIAGNOSIS — T45.1X5A ANEMIA DUE TO ANTINEOPLASTIC CHEMOTHERAPY: Primary | ICD-10-CM

## 2024-11-25 LAB
BASOPHILS # BLD: 0 K/UL (ref 0–0.1)
BASOPHILS NFR BLD: 0 % (ref 0–1)
DIFFERENTIAL METHOD BLD: ABNORMAL
EOSINOPHIL # BLD: 0 K/UL (ref 0–0.4)
EOSINOPHIL NFR BLD: 0 % (ref 0–7)
ERYTHROCYTE [DISTWIDTH] IN BLOOD BY AUTOMATED COUNT: 22.3 % (ref 11.5–14.5)
HCT VFR BLD AUTO: 20.2 % (ref 36.6–50.3)
HGB BLD-MCNC: 6.7 G/DL (ref 12.1–17)
HISTORY CHECK: NORMAL
IMM GRANULOCYTES # BLD AUTO: 0 K/UL
IMM GRANULOCYTES NFR BLD AUTO: 0 %
LYMPHOCYTES # BLD: 0.8 K/UL (ref 0.8–3.5)
LYMPHOCYTES NFR BLD: 8 % (ref 12–49)
MCH RBC QN AUTO: 37.2 PG (ref 26–34)
MCHC RBC AUTO-ENTMCNC: 33.2 G/DL (ref 30–36.5)
MCV RBC AUTO: 112.2 FL (ref 80–99)
MONOCYTES # BLD: 5.8 K/UL (ref 0–1)
MONOCYTES NFR BLD: 61 % (ref 5–13)
NEUTS SEG # BLD: 2.9 K/UL (ref 1.8–8)
NEUTS SEG NFR BLD: 31 % (ref 32–75)
NRBC # BLD: 0 K/UL (ref 0–0.01)
NRBC BLD-RTO: 0 PER 100 WBC
PLATELET # BLD AUTO: 59 K/UL (ref 150–400)
PMV BLD AUTO: 13 FL (ref 8.9–12.9)
RBC # BLD AUTO: 1.8 M/UL (ref 4.1–5.7)
RBC MORPH BLD: ABNORMAL
WBC # BLD AUTO: 9.5 K/UL (ref 4.1–11.1)

## 2024-11-25 PROCEDURE — 36430 TRANSFUSION BLD/BLD COMPNT: CPT

## 2024-11-25 PROCEDURE — 86900 BLOOD TYPING SEROLOGIC ABO: CPT

## 2024-11-25 PROCEDURE — P9040 RBC LEUKOREDUCED IRRADIATED: HCPCS

## 2024-11-25 PROCEDURE — 36415 COLL VENOUS BLD VENIPUNCTURE: CPT

## 2024-11-25 PROCEDURE — 85025 COMPLETE CBC W/AUTO DIFF WBC: CPT

## 2024-11-25 PROCEDURE — 86923 COMPATIBILITY TEST ELECTRIC: CPT

## 2024-11-25 PROCEDURE — 86901 BLOOD TYPING SEROLOGIC RH(D): CPT

## 2024-11-25 PROCEDURE — 86850 RBC ANTIBODY SCREEN: CPT

## 2024-11-25 RX ORDER — ACETAMINOPHEN 325 MG/1
650 TABLET ORAL
OUTPATIENT
Start: 2024-11-25

## 2024-11-25 RX ORDER — ONDANSETRON 2 MG/ML
4 INJECTION INTRAMUSCULAR; INTRAVENOUS EVERY 6 HOURS PRN
Status: CANCELLED
Start: 2024-11-25

## 2024-11-25 RX ORDER — HYDROCORTISONE SODIUM SUCCINATE 100 MG/2ML
100 INJECTION INTRAMUSCULAR; INTRAVENOUS
OUTPATIENT
Start: 2024-11-25

## 2024-11-25 RX ORDER — ONDANSETRON 2 MG/ML
8 INJECTION INTRAMUSCULAR; INTRAVENOUS
OUTPATIENT
Start: 2024-11-25

## 2024-11-25 RX ORDER — ALBUTEROL SULFATE 90 UG/1
4 INHALANT RESPIRATORY (INHALATION) PRN
OUTPATIENT
Start: 2024-11-25

## 2024-11-25 RX ORDER — ONDANSETRON 2 MG/ML
4 INJECTION INTRAMUSCULAR; INTRAVENOUS EVERY 6 HOURS PRN
Start: 2024-11-25

## 2024-11-25 RX ORDER — SODIUM CHLORIDE 9 MG/ML
INJECTION, SOLUTION INTRAVENOUS CONTINUOUS
OUTPATIENT
Start: 2024-11-25

## 2024-11-25 RX ORDER — EPINEPHRINE 1 MG/ML
0.3 INJECTION, SOLUTION INTRAMUSCULAR; SUBCUTANEOUS PRN
OUTPATIENT
Start: 2024-11-25

## 2024-11-25 RX ORDER — DIPHENHYDRAMINE HYDROCHLORIDE 50 MG/ML
50 INJECTION INTRAMUSCULAR; INTRAVENOUS
OUTPATIENT
Start: 2024-11-25

## 2024-11-25 RX ORDER — FAMOTIDINE 10 MG/ML
20 INJECTION, SOLUTION INTRAVENOUS
OUTPATIENT
Start: 2024-11-25

## 2024-11-25 ASSESSMENT — PAIN SCALES - GENERAL: PAINLEVEL_OUTOF10: 0

## 2024-11-25 NOTE — TELEPHONE ENCOUNTER
[facility] [place] at [facility]  [phone]    11/25/24 1:23 PM EST - Ordered zofran for patient to receive in [facility] as he has had one bout of vomiting this morning.

## 2024-11-25 NOTE — PROGRESS NOTES
[facility] Progress Note    Date: 2024    Name: Terry Grant    MRN: [id number]         : 1945      Pt arrived at [facility] and in no distress for transfusion of 1 units PRBCs.  Assessment completed, pt stated having an episode of vomiting this morning, but just a one time incident.  22G IV established in RFA without difficulty.  Signs/symptoms of adverse blood reaction discussed with pt, voiced understanding. Labs drawn and sent for processing hgb 6.7    1138:  1st unit PRBCs started and infusing without difficulty, observed x 15 minutes  1432:  1st unit completed without adverse reaction noted, NS flushing line.      Patient Vitals for the past 12 hrs:   Temp Pulse Resp BP SpO2   24 1432 97.4 °F (36.3 °C) 74 18 (!) 115/56 98 %   24 1202 98.8 °F (37.1 °C) 66 18 (!) 102/55 98 %   24 1138 98 °F (36.7 °C) 66 18 (!) 102/57 97 %   24 0935 97.3 °F (36.3 °C) 71 18 121/77 97 %      Tolerated transfusion  well, no adverse reaction noted. Patient declined 1 hour post transfusion observation. PIV flushed and removed. D/Cd from [facility] in no distress.  Patient aware of next [facility] appointment.    Future Appointments   Date Time Provider Department Center   2024  2:00 PM Kevin Rangel MD [facility] BS AMB         Bethany Norman RN  2024.

## 2024-11-25 NOTE — TELEPHONE ENCOUNTER
Pt spouse called in stating the pt has been vomiting. She wanted to know if the pt can be prescribed a prescription. Please advise     CB# [phone]

## 2024-11-26 DIAGNOSIS — C93.10 CHRONIC MYELOMONOCYTIC LEUKEMIA NOT HAVING ACHIEVED REMISSION (HCC): ICD-10-CM

## 2024-11-26 LAB
ABO + RH BLD: NORMAL
ALBUMIN SERPL-MCNC: 4 G/DL (ref 3.5–5)
ALBUMIN/GLOB SERPL: 0.8 (ref 1.1–2.2)
ALP SERPL-CCNC: 59 U/L (ref 45–117)
ALT SERPL-CCNC: 18 U/L (ref 12–78)
ANION GAP SERPL CALC-SCNC: 6 MMOL/L (ref 2–12)
AST SERPL-CCNC: 18 U/L (ref 15–37)
BASOPHILS # BLD: 0 K/UL (ref 0–0.1)
BASOPHILS NFR BLD: 0 % (ref 0–1)
BILIRUB SERPL-MCNC: 1.1 MG/DL (ref 0.2–1)
BLD PROD TYP BPU: NORMAL
BLOOD BANK BLOOD PRODUCT EXPIRATION DATE: NORMAL
BLOOD BANK DISPENSE STATUS: NORMAL
BLOOD BANK ISBT PRODUCT BLOOD TYPE: 9500
BLOOD BANK PRODUCT CODE: NORMAL
BLOOD BANK UNIT TYPE AND RH: NORMAL
BLOOD GROUP ANTIBODIES SERPL: NORMAL
BPU ID: NORMAL
BUN SERPL-MCNC: 33 MG/DL (ref 6–20)
BUN/CREAT SERPL: 18 (ref 12–20)
CALCIUM SERPL-MCNC: 8.7 MG/DL (ref 8.5–10.1)
CHLORIDE SERPL-SCNC: 105 MMOL/L (ref 97–108)
CO2 SERPL-SCNC: 22 MMOL/L (ref 21–32)
CREAT SERPL-MCNC: 1.85 MG/DL (ref 0.7–1.3)
CROSSMATCH RESULT: NORMAL
DIFFERENTIAL METHOD BLD: ABNORMAL
EOSINOPHIL # BLD: 0 K/UL (ref 0–0.4)
EOSINOPHIL NFR BLD: 0 % (ref 0–7)
ERYTHROCYTE [DISTWIDTH] IN BLOOD BY AUTOMATED COUNT: 23.2 % (ref 11.5–14.5)
GLOBULIN SER CALC-MCNC: 4.8 G/DL (ref 2–4)
GLUCOSE SERPL-MCNC: 65 MG/DL (ref 65–100)
HCT VFR BLD AUTO: 23.4 % (ref 36.6–50.3)
HGB BLD-MCNC: 7.7 G/DL (ref 12.1–17)
IMM GRANULOCYTES # BLD AUTO: 0 K/UL
IMM GRANULOCYTES NFR BLD AUTO: 0 %
LYMPHOCYTES # BLD: 1.3 K/UL (ref 0.8–3.5)
LYMPHOCYTES NFR BLD: 14 % (ref 12–49)
MCH RBC QN AUTO: 36.3 PG (ref 26–34)
MCHC RBC AUTO-ENTMCNC: 32.9 G/DL (ref 30–36.5)
MCV RBC AUTO: 110.4 FL (ref 80–99)
MONOCYTES # BLD: 5.4 K/UL (ref 0–1)
MONOCYTES NFR BLD: 61 % (ref 5–13)
NEUTS SEG # BLD: 2.3 K/UL (ref 1.8–8)
NEUTS SEG NFR BLD: 25 % (ref 32–75)
NRBC # BLD: 0.02 K/UL (ref 0–0.01)
NRBC BLD-RTO: 0.2 PER 100 WBC
PLATELET # BLD AUTO: 55 K/UL (ref 150–400)
PMV BLD AUTO: 12.9 FL (ref 8.9–12.9)
POTASSIUM SERPL-SCNC: 4.3 MMOL/L (ref 3.5–5.1)
PROT SERPL-MCNC: 8.8 G/DL (ref 6.4–8.2)
RBC # BLD AUTO: 2.12 M/UL (ref 4.1–5.7)
RBC MORPH BLD: ABNORMAL
RBC MORPH BLD: ABNORMAL
SODIUM SERPL-SCNC: 133 MMOL/L (ref 136–145)
SPECIMEN EXP DATE BLD: NORMAL
UNIT DIVISION: 0
UNIT ISSUE DATE/TIME: NORMAL
WBC # BLD AUTO: 9 K/UL (ref 4.1–11.1)

## 2024-12-02 ENCOUNTER — OFFICE VISIT (OUTPATIENT)
Age: 79
End: 2024-12-02
Payer: MEDICARE

## 2024-12-02 VITALS
BODY MASS INDEX: 22.82 KG/M2 | DIASTOLIC BLOOD PRESSURE: 55 MMHG | RESPIRATION RATE: 17 BRPM | HEIGHT: 68 IN | HEART RATE: 68 BPM | OXYGEN SATURATION: 99 % | TEMPERATURE: 98.2 F | SYSTOLIC BLOOD PRESSURE: 103 MMHG | WEIGHT: 150.6 LBS

## 2024-12-02 DIAGNOSIS — C93.10 CHRONIC MYELOMONOCYTIC LEUKEMIA NOT HAVING ACHIEVED REMISSION (HCC): ICD-10-CM

## 2024-12-02 DIAGNOSIS — C93.10 CHRONIC MYELOMONOCYTIC LEUKEMIA NOT HAVING ACHIEVED REMISSION (HCC): Primary | ICD-10-CM

## 2024-12-02 PROCEDURE — 3074F SYST BP LT 130 MM HG: CPT | Performed by: INTERNAL MEDICINE

## 2024-12-02 PROCEDURE — 99215 OFFICE O/P EST HI 40 MIN: CPT | Performed by: INTERNAL MEDICINE

## 2024-12-02 PROCEDURE — G2211 COMPLEX E/M VISIT ADD ON: HCPCS | Performed by: INTERNAL MEDICINE

## 2024-12-02 PROCEDURE — G8420 CALC BMI NORM PARAMETERS: HCPCS | Performed by: INTERNAL MEDICINE

## 2024-12-02 PROCEDURE — 3078F DIAST BP <80 MM HG: CPT | Performed by: INTERNAL MEDICINE

## 2024-12-02 PROCEDURE — 1123F ACP DISCUSS/DSCN MKR DOCD: CPT | Performed by: INTERNAL MEDICINE

## 2024-12-02 PROCEDURE — G8484 FLU IMMUNIZE NO ADMIN: HCPCS | Performed by: INTERNAL MEDICINE

## 2024-12-02 PROCEDURE — 1126F AMNT PAIN NOTED NONE PRSNT: CPT | Performed by: INTERNAL MEDICINE

## 2024-12-02 PROCEDURE — 1036F TOBACCO NON-USER: CPT | Performed by: INTERNAL MEDICINE

## 2024-12-02 PROCEDURE — 1159F MED LIST DOCD IN RCRD: CPT | Performed by: INTERNAL MEDICINE

## 2024-12-02 PROCEDURE — 1160F RVW MEDS BY RX/DR IN RCRD: CPT | Performed by: INTERNAL MEDICINE

## 2024-12-02 PROCEDURE — G8427 DOCREV CUR MEDS BY ELIG CLIN: HCPCS | Performed by: INTERNAL MEDICINE

## 2024-12-02 RX ORDER — DEXAMETHASONE 0.5 MG/1
0.5 TABLET ORAL
Qty: 30 TABLET | Refills: 1 | Status: SHIPPED | OUTPATIENT
Start: 2024-12-02

## 2024-12-02 ASSESSMENT — PATIENT HEALTH QUESTIONNAIRE - PHQ9
SUM OF ALL RESPONSES TO PHQ QUESTIONS 1-9: 2
SUM OF ALL RESPONSES TO PHQ QUESTIONS 1-9: 2
1. LITTLE INTEREST OR PLEASURE IN DOING THINGS: SEVERAL DAYS
2. FEELING DOWN, DEPRESSED OR HOPELESS: SEVERAL DAYS
SUM OF ALL RESPONSES TO PHQ9 QUESTIONS 1 & 2: 2
SUM OF ALL RESPONSES TO PHQ QUESTIONS 1-9: 2
SUM OF ALL RESPONSES TO PHQ QUESTIONS 1-9: 2

## 2024-12-02 NOTE — PROGRESS NOTES
Terry Grant is a 79 y.o. male is here today for Chronic myelomonocytic leukemia follow up.    1. Have you been to the ER, urgent care clinic since your last visit?  Hospitalized since your last visit?No    2. Have you seen or consulted any other health care providers outside of the [facility] since your last visit?  Include any pap smears or colon screening. No

## 2024-12-02 NOTE — PROGRESS NOTES
[facility] [place]  [address], [address]  [place]  [address]  W: [phone]  F: [phone]     HEME/ONC CONSULT    HPI:   Terry Grant is a 79 y.o. male who I am seeing for CMML      Interval history: reports gr 2 fatigue, gr 1 chills, gr 1 vomiting    Review of systems was obtained and pertinent findings reviewed above. Past medical history, social history, family history, medications, and allergies are located in the electronic medical record.    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  - SRSF2 mutations can be seen in various myeloid neoplasms, and are associated with a poor prognosis in MDS, AML,  PMF, and PV.  - TET2 mutations can be seen in various myeloid neoplasms, and are associated with an increased response to  hypomethylating agents and a poor prognosis in MDS after hematopoietic stem cell transplantation.  - Co-mutation of TET2 and SRSF2 is reported to be highly predictive of a myeloid neoplasm characterized by  myelodysplasia and monocytosis, including, but not limited to, CMML    NGS:  ASXL1 B060Xnu*12; KRAS G12R; SRSF2 P95L; TET2 *, *; NO alterations detected in the following genes:  FLT3, IDH1, IDH2, NPM1      6/8/23:  pericardial window, negative    Hydrea 500 mg daily 6/9/23-  DR to 500 mg on Mon-Fri   Increased to daily on 8/22/24   Increased to 500 mg bid mon-Friday and daily on sat and Sunday on 9/9/24   Changed to 500 mg bid on Mon, Wed, Fri, 500 mg daily on Tues, thurs, Sat, Sunday on 10/17/24   Changed to 500 mg PO daily on 11/14/24    Objective:  Physical Exam:  Visit Vitals  Vitals:    12/02/24 1359   BP: (!) 103/55   Pulse:    Resp:    Temp:    SpO2:              Constitutional: Appears well-developed and well-nourished in no apparent distress      Mental status: Alert and awake,

## 2024-12-04 ENCOUNTER — TELEPHONE (OUTPATIENT)
Age: 79
End: 2024-12-04

## 2024-12-04 LAB
ALBUMIN SERPL-MCNC: 3.9 G/DL (ref 3.5–5)
ALBUMIN/GLOB SERPL: 0.9 (ref 1.1–2.2)
ALP SERPL-CCNC: 46 U/L (ref 45–117)
ALT SERPL-CCNC: 20 U/L (ref 12–78)
ANION GAP SERPL CALC-SCNC: 9 MMOL/L (ref 2–12)
AST SERPL-CCNC: 17 U/L (ref 15–37)
BASOPHILS # BLD: 0 K/UL (ref 0–0.1)
BASOPHILS NFR BLD: 0 % (ref 0–1)
BILIRUB SERPL-MCNC: 0.9 MG/DL (ref 0.2–1)
BUN SERPL-MCNC: 38 MG/DL (ref 6–20)
BUN/CREAT SERPL: 17 (ref 12–20)
CALCIUM SERPL-MCNC: 9 MG/DL (ref 8.5–10.1)
CHLORIDE SERPL-SCNC: 106 MMOL/L (ref 97–108)
CO2 SERPL-SCNC: 21 MMOL/L (ref 21–32)
CREAT SERPL-MCNC: 2.18 MG/DL (ref 0.7–1.3)
DIFFERENTIAL METHOD BLD: ABNORMAL
EOSINOPHIL # BLD: 0 K/UL (ref 0–0.4)
EOSINOPHIL NFR BLD: 0 % (ref 0–7)
ERYTHROCYTE [DISTWIDTH] IN BLOOD BY AUTOMATED COUNT: 21.8 % (ref 11.5–14.5)
GLOBULIN SER CALC-MCNC: 4.5 G/DL (ref 2–4)
GLUCOSE SERPL-MCNC: 107 MG/DL (ref 65–100)
HCT VFR BLD AUTO: 21.1 % (ref 36.6–50.3)
HGB BLD-MCNC: 7.1 G/DL (ref 12.1–17)
IMM GRANULOCYTES # BLD AUTO: 0 K/UL
IMM GRANULOCYTES NFR BLD AUTO: 0 %
LYMPHOCYTES # BLD: 1.2 K/UL (ref 0.8–3.5)
LYMPHOCYTES NFR BLD: 16 % (ref 12–49)
MCH RBC QN AUTO: 38.4 PG (ref 26–34)
MCHC RBC AUTO-ENTMCNC: 33.6 G/DL (ref 30–36.5)
MCV RBC AUTO: 114.1 FL (ref 80–99)
MONOCYTES # BLD: 5.3 K/UL (ref 0–1)
MONOCYTES NFR BLD: 71 % (ref 5–13)
NEUTS BAND NFR BLD MANUAL: 2 % (ref 0–6)
NEUTS SEG # BLD: 1 K/UL (ref 1.8–8)
NEUTS SEG NFR BLD: 11 % (ref 32–75)
NRBC # BLD: 0 K/UL (ref 0–0.01)
NRBC BLD-RTO: 0 PER 100 WBC
PLATELET # BLD AUTO: 56 K/UL (ref 150–400)
PMV BLD AUTO: 11.9 FL (ref 8.9–12.9)
POTASSIUM SERPL-SCNC: 4.4 MMOL/L (ref 3.5–5.1)
PROT SERPL-MCNC: 8.4 G/DL (ref 6.4–8.2)
RBC # BLD AUTO: 1.85 M/UL (ref 4.1–5.7)
RBC MORPH BLD: ABNORMAL
SODIUM SERPL-SCNC: 136 MMOL/L (ref 136–145)
WBC # BLD AUTO: 7.5 K/UL (ref 4.1–11.1)

## 2024-12-04 NOTE — TELEPHONE ENCOUNTER
[facility] [place] at [facility]  [phone]    12/04/24 12:07 PM EST - Called and spoke to the patient's wife. Let her know that Mr. Grant's labs look good per Dr. Rangel, except that he was dehydrated. Advised them to increase his fluid intake. No questions at this time.

## 2024-12-11 DIAGNOSIS — C93.10 CHRONIC MYELOMONOCYTIC LEUKEMIA NOT HAVING ACHIEVED REMISSION (HCC): ICD-10-CM

## 2024-12-12 LAB
ALBUMIN SERPL-MCNC: 3.7 G/DL (ref 3.5–5)
ALBUMIN/GLOB SERPL: 0.8 (ref 1.1–2.2)
ALP SERPL-CCNC: 48 U/L (ref 45–117)
ALT SERPL-CCNC: 23 U/L (ref 12–78)
ANION GAP SERPL CALC-SCNC: 7 MMOL/L (ref 2–12)
AST SERPL-CCNC: 24 U/L (ref 15–37)
BASOPHILS # BLD: 0.1 K/UL (ref 0–0.1)
BASOPHILS NFR BLD: 1 % (ref 0–1)
BILIRUB SERPL-MCNC: 0.9 MG/DL (ref 0.2–1)
BUN SERPL-MCNC: 29 MG/DL (ref 6–20)
BUN/CREAT SERPL: 15 (ref 12–20)
CALCIUM SERPL-MCNC: 8.2 MG/DL (ref 8.5–10.1)
CHLORIDE SERPL-SCNC: 104 MMOL/L (ref 97–108)
CO2 SERPL-SCNC: 25 MMOL/L (ref 21–32)
CREAT SERPL-MCNC: 1.89 MG/DL (ref 0.7–1.3)
DIFFERENTIAL METHOD BLD: ABNORMAL
EOSINOPHIL # BLD: 0 K/UL (ref 0–0.4)
EOSINOPHIL NFR BLD: 0 % (ref 0–7)
ERYTHROCYTE [DISTWIDTH] IN BLOOD BY AUTOMATED COUNT: 21.8 % (ref 11.5–14.5)
GLOBULIN SER CALC-MCNC: 4.5 G/DL (ref 2–4)
GLUCOSE SERPL-MCNC: 106 MG/DL (ref 65–100)
HCT VFR BLD AUTO: 20.8 % (ref 36.6–50.3)
HGB BLD-MCNC: 7 G/DL (ref 12.1–17)
IMM GRANULOCYTES # BLD AUTO: 0 K/UL
IMM GRANULOCYTES NFR BLD AUTO: 0 %
LYMPHOCYTES # BLD: 0.8 K/UL (ref 0.8–3.5)
LYMPHOCYTES NFR BLD: 15 % (ref 12–49)
MCH RBC QN AUTO: 39.1 PG (ref 26–34)
MCHC RBC AUTO-ENTMCNC: 33.7 G/DL (ref 30–36.5)
MCV RBC AUTO: 116.2 FL (ref 80–99)
MONOCYTES # BLD: 2.9 K/UL (ref 0–1)
MONOCYTES NFR BLD: 55 % (ref 5–13)
NEUTS BAND NFR BLD MANUAL: 2 % (ref 0–6)
NEUTS SEG # BLD: 1.5 K/UL (ref 1.8–8)
NEUTS SEG NFR BLD: 27 % (ref 32–75)
NRBC # BLD: 0 K/UL (ref 0–0.01)
NRBC BLD-RTO: 0 PER 100 WBC
PLATELET # BLD AUTO: 71 K/UL (ref 150–400)
PMV BLD AUTO: 12.4 FL (ref 8.9–12.9)
POTASSIUM SERPL-SCNC: 4.4 MMOL/L (ref 3.5–5.1)
PROT SERPL-MCNC: 8.2 G/DL (ref 6.4–8.2)
RBC # BLD AUTO: 1.79 M/UL (ref 4.1–5.7)
RBC MORPH BLD: ABNORMAL
RBC MORPH BLD: ABNORMAL
SODIUM SERPL-SCNC: 136 MMOL/L (ref 136–145)
WBC # BLD AUTO: 5.3 K/UL (ref 4.1–11.1)

## 2024-12-16 ENCOUNTER — OFFICE VISIT (OUTPATIENT)
Age: 79
End: 2024-12-16
Payer: MEDICARE

## 2024-12-16 VITALS
RESPIRATION RATE: 16 BRPM | WEIGHT: 150 LBS | SYSTOLIC BLOOD PRESSURE: 125 MMHG | BODY MASS INDEX: 22.73 KG/M2 | HEIGHT: 68 IN | HEART RATE: 59 BPM | DIASTOLIC BLOOD PRESSURE: 65 MMHG | TEMPERATURE: 97.2 F | OXYGEN SATURATION: 99 %

## 2024-12-16 DIAGNOSIS — C93.10 CHRONIC MYELOMONOCYTIC LEUKEMIA NOT HAVING ACHIEVED REMISSION (HCC): Primary | ICD-10-CM

## 2024-12-16 DIAGNOSIS — C93.10 CHRONIC MYELOMONOCYTIC LEUKEMIA NOT HAVING ACHIEVED REMISSION (HCC): ICD-10-CM

## 2024-12-16 LAB
BASOPHILS # BLD: 0 K/UL (ref 0–0.1)
BASOPHILS NFR BLD: 0 % (ref 0–1)
DIFFERENTIAL METHOD BLD: ABNORMAL
EOSINOPHIL # BLD: 0.1 K/UL (ref 0–0.4)
EOSINOPHIL NFR BLD: 1 % (ref 0–7)
ERYTHROCYTE [DISTWIDTH] IN BLOOD BY AUTOMATED COUNT: 21.9 % (ref 11.5–14.5)
HCT VFR BLD AUTO: 21.7 % (ref 36.6–50.3)
HGB BLD-MCNC: 7.1 G/DL (ref 12.1–17)
IMM GRANULOCYTES # BLD AUTO: 0 K/UL
IMM GRANULOCYTES NFR BLD AUTO: 0 %
LYMPHOCYTES # BLD: 1 K/UL (ref 0.8–3.5)
LYMPHOCYTES NFR BLD: 16 % (ref 12–49)
MCH RBC QN AUTO: 38 PG (ref 26–34)
MCHC RBC AUTO-ENTMCNC: 32.7 G/DL (ref 30–36.5)
MCV RBC AUTO: 116 FL (ref 80–99)
MONOCYTES # BLD: 4.1 K/UL (ref 0–1)
MONOCYTES NFR BLD: 69 % (ref 5–13)
NEUTS SEG # BLD: 0.9 K/UL (ref 1.8–8)
NEUTS SEG NFR BLD: 14 % (ref 32–75)
NRBC # BLD: 0 K/UL (ref 0–0.01)
NRBC BLD-RTO: 0 PER 100 WBC
PLATELET # BLD AUTO: 70 K/UL (ref 150–400)
PMV BLD AUTO: 12.8 FL (ref 8.9–12.9)
RBC # BLD AUTO: 1.87 M/UL (ref 4.1–5.7)
RBC MORPH BLD: ABNORMAL
RBC MORPH BLD: ABNORMAL
WBC # BLD AUTO: 6.1 K/UL (ref 4.1–11.1)

## 2024-12-16 PROCEDURE — 99215 OFFICE O/P EST HI 40 MIN: CPT | Performed by: INTERNAL MEDICINE

## 2024-12-16 PROCEDURE — 1159F MED LIST DOCD IN RCRD: CPT | Performed by: INTERNAL MEDICINE

## 2024-12-16 PROCEDURE — 1160F RVW MEDS BY RX/DR IN RCRD: CPT | Performed by: INTERNAL MEDICINE

## 2024-12-16 PROCEDURE — G8420 CALC BMI NORM PARAMETERS: HCPCS | Performed by: INTERNAL MEDICINE

## 2024-12-16 PROCEDURE — 1126F AMNT PAIN NOTED NONE PRSNT: CPT | Performed by: INTERNAL MEDICINE

## 2024-12-16 PROCEDURE — 3074F SYST BP LT 130 MM HG: CPT | Performed by: INTERNAL MEDICINE

## 2024-12-16 PROCEDURE — 3078F DIAST BP <80 MM HG: CPT | Performed by: INTERNAL MEDICINE

## 2024-12-16 PROCEDURE — 1123F ACP DISCUSS/DSCN MKR DOCD: CPT | Performed by: INTERNAL MEDICINE

## 2024-12-16 PROCEDURE — G8427 DOCREV CUR MEDS BY ELIG CLIN: HCPCS | Performed by: INTERNAL MEDICINE

## 2024-12-16 PROCEDURE — G8484 FLU IMMUNIZE NO ADMIN: HCPCS | Performed by: INTERNAL MEDICINE

## 2024-12-16 PROCEDURE — G2211 COMPLEX E/M VISIT ADD ON: HCPCS | Performed by: INTERNAL MEDICINE

## 2024-12-16 PROCEDURE — 1036F TOBACCO NON-USER: CPT | Performed by: INTERNAL MEDICINE

## 2024-12-16 RX ORDER — PROCHLORPERAZINE MALEATE 10 MG
10 TABLET ORAL EVERY 6 HOURS PRN
Qty: 60 TABLET | Refills: 3 | Status: SHIPPED | OUTPATIENT
Start: 2024-12-16

## 2024-12-16 RX ORDER — DEXAMETHASONE 0.5 MG/1
0.5 TABLET ORAL 2 TIMES DAILY WITH MEALS
Qty: 60 TABLET | Refills: 1 | Status: SHIPPED | OUTPATIENT
Start: 2024-12-16

## 2024-12-16 ASSESSMENT — PATIENT HEALTH QUESTIONNAIRE - PHQ9
SUM OF ALL RESPONSES TO PHQ9 QUESTIONS 1 & 2: 0
1. LITTLE INTEREST OR PLEASURE IN DOING THINGS: NOT AT ALL
2. FEELING DOWN, DEPRESSED OR HOPELESS: NOT AT ALL
SUM OF ALL RESPONSES TO PHQ QUESTIONS 1-9: 0

## 2024-12-16 NOTE — PROGRESS NOTES
Terry Grant is a 79 y.o. male is here today for a Leukemia follow up.    1. Have you been to the ER, urgent care clinic since your last visit?  Hospitalized since your last visit?No    2. Have you seen or consulted any other health care providers outside of the [facility] since your last visit?  Include any pap smears or colon screening. No

## 2024-12-16 NOTE — PROGRESS NOTES
[facility] [place]  [address], [address]  [place]  [address]  W: [phone]  F: [phone]     HEME/ONC CONSULT    HPI:   Terry Grant is a 79 y.o. male who I am seeing for CMML      Interval history: reports gr 1 fatigue, gr 1 sob    Review of systems was obtained and pertinent findings reviewed above. Past medical history, social history, family history, medications, and allergies are located in the electronic medical record.    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  - SRSF2 mutations can be seen in various myeloid neoplasms, and are associated with a poor prognosis in MDS, AML,  PMF, and PV.  - TET2 mutations can be seen in various myeloid neoplasms, and are associated with an increased response to  hypomethylating agents and a poor prognosis in MDS after hematopoietic stem cell transplantation.  - Co-mutation of TET2 and SRSF2 is reported to be highly predictive of a myeloid neoplasm characterized by  myelodysplasia and monocytosis, including, but not limited to, CMML    NGS:  ASXL1 F181Voe*12; KRAS G12R; SRSF2 P95L; TET2 *, *; NO alterations detected in the following genes:  FLT3, IDH1, IDH2, NPM1      6/8/23:  pericardial window, negative    Hydrea 500 mg daily 6/9/23-  DR to 500 mg on Mon-Fri   Increased to daily on 8/22/24   Increased to 500 mg bid mon-Friday and daily on sat and Sunday on 9/9/24   Changed to 500 mg bid on Mon, Wed, Fri, 500 mg daily on Tues, thurs, Sat, Sunday on 10/17/24   Changed to 500 mg PO daily on 11/14/24    Objective:  Physical Exam:  Visit Vitals  Vitals:    12/16/24 1116   BP: 125/65   Pulse: 59   Resp: 16   Temp: 97.2 °F (36.2 °C)   SpO2: 99%             Constitutional: Appears well-developed and well-nourished in no apparent distress      Mental status: Alert and

## 2025-01-02 ENCOUNTER — TELEPHONE (OUTPATIENT)
Age: 80
End: 2025-01-02

## 2025-01-02 DIAGNOSIS — C93.10 CHRONIC MYELOMONOCYTIC LEUKEMIA NOT HAVING ACHIEVED REMISSION (HCC): ICD-10-CM

## 2025-01-02 LAB
BASOPHILS # BLD: 0 K/UL (ref 0–0.1)
BASOPHILS NFR BLD: 0 % (ref 0–1)
DIFFERENTIAL METHOD BLD: ABNORMAL
EOSINOPHIL # BLD: 0.1 K/UL (ref 0–0.4)
EOSINOPHIL NFR BLD: 1 % (ref 0–7)
ERYTHROCYTE [DISTWIDTH] IN BLOOD BY AUTOMATED COUNT: 21.1 % (ref 11.5–14.5)
HCT VFR BLD AUTO: 17.5 % (ref 36.6–50.3)
HGB BLD-MCNC: 5.7 G/DL (ref 12.1–17)
IMM GRANULOCYTES # BLD AUTO: 0 K/UL
IMM GRANULOCYTES NFR BLD AUTO: 0 %
LYMPHOCYTES # BLD: 1.3 K/UL (ref 0.8–3.5)
LYMPHOCYTES NFR BLD: 23 % (ref 12–49)
MCH RBC QN AUTO: 36.8 PG (ref 26–34)
MCHC RBC AUTO-ENTMCNC: 32.6 G/DL (ref 30–36.5)
MCV RBC AUTO: 112.9 FL (ref 80–99)
MONOCYTES # BLD: 3.4 K/UL (ref 0–1)
MONOCYTES NFR BLD: 58 % (ref 5–13)
NEUTS SEG # BLD: 1 K/UL (ref 1.8–8)
NEUTS SEG NFR BLD: 18 % (ref 32–75)
NRBC # BLD: 0 K/UL (ref 0–0.01)
NRBC BLD-RTO: 0 PER 100 WBC
PLATELET # BLD AUTO: 80 K/UL (ref 150–400)
PMV BLD AUTO: 12.3 FL (ref 8.9–12.9)
RBC # BLD AUTO: 1.55 M/UL (ref 4.1–5.7)
RBC MORPH BLD: ABNORMAL
RBC MORPH BLD: ABNORMAL
WBC # BLD AUTO: 5.8 K/UL (ref 4.1–11.1)

## 2025-01-02 NOTE — TELEPHONE ENCOUNTER
Notified by lab for critical value: Hb 5.7, hct 17.5  Contacted patient who has been feeling well without significant fatigue, lightheadedness, bleeding.  Discussed would forward result to his outpatient team and to expect contact regarding need for outpatient transfusion.  Discussed ED precautions.  Patient and family in agreement with this plan.    Dr. Oli Ham

## 2025-01-03 NOTE — TELEPHONE ENCOUNTER
[facility] [place] at [facility]  [phone]    01/03/25 11:18 AM EST - Per Dr. Rangle's request, a phone call was made to the patient to schedule a blood transfusion appointment. Per patient he's unavailable to come in today 1/3/2025. An appointment was made for 1/7/25 at 9:30am at our infusion center. Patient verbalized understanding and had no further questions.

## 2025-01-06 ENCOUNTER — TELEPHONE (OUTPATIENT)
Age: 80
End: 2025-01-06

## 2025-01-06 DIAGNOSIS — C93.10 CHRONIC MYELOMONOCYTIC LEUKEMIA NOT HAVING ACHIEVED REMISSION (HCC): ICD-10-CM

## 2025-01-06 DIAGNOSIS — T45.1X5A ANEMIA DUE TO ANTINEOPLASTIC CHEMOTHERAPY: Primary | ICD-10-CM

## 2025-01-06 DIAGNOSIS — D64.81 ANEMIA DUE TO ANTINEOPLASTIC CHEMOTHERAPY: Primary | ICD-10-CM

## 2025-01-06 NOTE — TELEPHONE ENCOUNTER
Spoke with patient about the apts for tomorrow his is coming in and told him the team will let him know when to come up and see them

## 2025-01-07 ENCOUNTER — HOSPITAL ENCOUNTER (OUTPATIENT)
Facility: HOSPITAL | Age: 80
Setting detail: INFUSION SERIES
Discharge: HOME | End: 2025-01-07
Payer: MEDICARE

## 2025-01-07 ENCOUNTER — OFFICE VISIT (OUTPATIENT)
Age: 80
End: 2025-01-07
Payer: MEDICARE

## 2025-01-07 VITALS
DIASTOLIC BLOOD PRESSURE: 65 MMHG | OXYGEN SATURATION: 98 % | HEART RATE: 88 BPM | WEIGHT: 154 LBS | SYSTOLIC BLOOD PRESSURE: 115 MMHG | BODY MASS INDEX: 24.17 KG/M2 | RESPIRATION RATE: 18 BRPM | TEMPERATURE: 98.3 F | HEIGHT: 67 IN

## 2025-01-07 VITALS
TEMPERATURE: 98 F | BODY MASS INDEX: 23.34 KG/M2 | HEART RATE: 69 BPM | DIASTOLIC BLOOD PRESSURE: 63 MMHG | WEIGHT: 154 LBS | SYSTOLIC BLOOD PRESSURE: 141 MMHG | OXYGEN SATURATION: 100 % | RESPIRATION RATE: 18 BRPM | HEIGHT: 68 IN

## 2025-01-07 DIAGNOSIS — D64.81 ANEMIA DUE TO ANTINEOPLASTIC CHEMOTHERAPY: Primary | ICD-10-CM

## 2025-01-07 DIAGNOSIS — C93.10 CHRONIC MYELOMONOCYTIC LEUKEMIA NOT HAVING ACHIEVED REMISSION (HCC): ICD-10-CM

## 2025-01-07 DIAGNOSIS — C93.10 CHRONIC MYELOMONOCYTIC LEUKEMIA NOT HAVING ACHIEVED REMISSION (HCC): Primary | ICD-10-CM

## 2025-01-07 DIAGNOSIS — T45.1X5A ANEMIA DUE TO ANTINEOPLASTIC CHEMOTHERAPY: Primary | ICD-10-CM

## 2025-01-07 LAB
ALBUMIN SERPL-MCNC: 3.6 G/DL (ref 3.5–5)
ALBUMIN/GLOB SERPL: 0.8 (ref 1.1–2.2)
ALP SERPL-CCNC: 44 U/L (ref 45–117)
ALT SERPL-CCNC: 23 U/L (ref 12–78)
ANION GAP SERPL CALC-SCNC: 5 MMOL/L (ref 2–12)
AST SERPL-CCNC: 16 U/L (ref 15–37)
BASOPHILS # BLD: 0 K/UL (ref 0–0.1)
BASOPHILS NFR BLD: 0 % (ref 0–1)
BILIRUB SERPL-MCNC: 0.8 MG/DL (ref 0.2–1)
BUN SERPL-MCNC: 41 MG/DL (ref 6–20)
BUN/CREAT SERPL: 20 (ref 12–20)
CALCIUM SERPL-MCNC: 8.6 MG/DL (ref 8.5–10.1)
CHLORIDE SERPL-SCNC: 109 MMOL/L (ref 97–108)
CO2 SERPL-SCNC: 22 MMOL/L (ref 21–32)
CREAT SERPL-MCNC: 2.01 MG/DL (ref 0.7–1.3)
DIFFERENTIAL METHOD BLD: ABNORMAL
EOSINOPHIL # BLD: 0 K/UL (ref 0–0.4)
EOSINOPHIL NFR BLD: 0 % (ref 0–7)
ERYTHROCYTE [DISTWIDTH] IN BLOOD BY AUTOMATED COUNT: 20.9 % (ref 11.5–14.5)
GLOBULIN SER CALC-MCNC: 4.8 G/DL (ref 2–4)
GLUCOSE SERPL-MCNC: 109 MG/DL (ref 65–100)
HCT VFR BLD AUTO: 14.7 % (ref 36.6–50.3)
HGB BLD-MCNC: 4.9 G/DL (ref 12.1–17)
HISTORY CHECK: NORMAL
IMM GRANULOCYTES # BLD AUTO: 0 K/UL
IMM GRANULOCYTES NFR BLD AUTO: 0 %
LYMPHOCYTES # BLD: 1.15 K/UL (ref 0.8–3.5)
LYMPHOCYTES NFR BLD: 23 % (ref 12–49)
MCH RBC QN AUTO: 38 PG (ref 26–34)
MCHC RBC AUTO-ENTMCNC: 33.3 G/DL (ref 30–36.5)
MCV RBC AUTO: 114 FL (ref 80–99)
METAMYELOCYTES NFR BLD MANUAL: 1 %
MONOCYTES # BLD: 2.85 K/UL (ref 0–1)
MONOCYTES NFR BLD: 57 % (ref 5–13)
NEUTS SEG # BLD: 0.95 K/UL (ref 1.8–8)
NEUTS SEG NFR BLD: 19 % (ref 32–75)
NRBC # BLD: 0 K/UL (ref 0–0.01)
NRBC BLD-RTO: 0 PER 100 WBC
PLATELET # BLD AUTO: 77 K/UL (ref 150–400)
PMV BLD AUTO: 10.9 FL (ref 8.9–12.9)
POTASSIUM SERPL-SCNC: 3.9 MMOL/L (ref 3.5–5.1)
PROT SERPL-MCNC: 8.4 G/DL (ref 6.4–8.2)
RBC # BLD AUTO: 1.29 M/UL (ref 4.1–5.7)
RBC MORPH BLD: ABNORMAL
SODIUM SERPL-SCNC: 136 MMOL/L (ref 136–145)
WBC # BLD AUTO: 5 K/UL (ref 4.1–11.1)

## 2025-01-07 PROCEDURE — 85025 COMPLETE CBC W/AUTO DIFF WBC: CPT

## 2025-01-07 PROCEDURE — 36430 TRANSFUSION BLD/BLD COMPNT: CPT

## 2025-01-07 PROCEDURE — P9040 RBC LEUKOREDUCED IRRADIATED: HCPCS

## 2025-01-07 PROCEDURE — 86900 BLOOD TYPING SEROLOGIC ABO: CPT

## 2025-01-07 PROCEDURE — 86923 COMPATIBILITY TEST ELECTRIC: CPT

## 2025-01-07 PROCEDURE — 86901 BLOOD TYPING SEROLOGIC RH(D): CPT

## 2025-01-07 PROCEDURE — 86850 RBC ANTIBODY SCREEN: CPT

## 2025-01-07 PROCEDURE — 36415 COLL VENOUS BLD VENIPUNCTURE: CPT

## 2025-01-07 PROCEDURE — 99215 OFFICE O/P EST HI 40 MIN: CPT | Performed by: INTERNAL MEDICINE

## 2025-01-07 PROCEDURE — 80053 COMPREHEN METABOLIC PANEL: CPT

## 2025-01-07 RX ORDER — ACETAMINOPHEN 325 MG/1
650 TABLET ORAL
OUTPATIENT
Start: 2025-01-07

## 2025-01-07 RX ORDER — ONDANSETRON 2 MG/ML
4 INJECTION INTRAMUSCULAR; INTRAVENOUS EVERY 6 HOURS PRN
Status: DISCONTINUED | OUTPATIENT
Start: 2025-01-07 | End: 2025-01-08 | Stop reason: HOSPADM

## 2025-01-07 RX ORDER — SODIUM CHLORIDE 9 MG/ML
INJECTION, SOLUTION INTRAVENOUS CONTINUOUS
OUTPATIENT
Start: 2025-01-07

## 2025-01-07 RX ORDER — HYDROCORTISONE SODIUM SUCCINATE 100 MG/2ML
100 INJECTION INTRAMUSCULAR; INTRAVENOUS
OUTPATIENT
Start: 2025-01-07

## 2025-01-07 RX ORDER — EPINEPHRINE 1 MG/ML
0.3 INJECTION, SOLUTION INTRAMUSCULAR; SUBCUTANEOUS PRN
OUTPATIENT
Start: 2025-01-07

## 2025-01-07 RX ORDER — FAMOTIDINE 10 MG/ML
20 INJECTION, SOLUTION INTRAVENOUS
OUTPATIENT
Start: 2025-01-07

## 2025-01-07 RX ORDER — ONDANSETRON 2 MG/ML
4 INJECTION INTRAMUSCULAR; INTRAVENOUS EVERY 6 HOURS PRN
Start: 2025-01-07

## 2025-01-07 RX ORDER — ALBUTEROL SULFATE 90 UG/1
4 INHALANT RESPIRATORY (INHALATION) PRN
OUTPATIENT
Start: 2025-01-07

## 2025-01-07 RX ORDER — DIPHENHYDRAMINE HYDROCHLORIDE 50 MG/ML
50 INJECTION INTRAMUSCULAR; INTRAVENOUS
OUTPATIENT
Start: 2025-01-07

## 2025-01-07 RX ORDER — ONDANSETRON 2 MG/ML
8 INJECTION INTRAMUSCULAR; INTRAVENOUS
OUTPATIENT
Start: 2025-01-07

## 2025-01-07 ASSESSMENT — PATIENT HEALTH QUESTIONNAIRE - PHQ9
2. FEELING DOWN, DEPRESSED OR HOPELESS: NOT AT ALL
SUM OF ALL RESPONSES TO PHQ QUESTIONS 1-9: 0
SUM OF ALL RESPONSES TO PHQ QUESTIONS 1-9: 0
SUM OF ALL RESPONSES TO PHQ9 QUESTIONS 1 & 2: 0
1. LITTLE INTEREST OR PLEASURE IN DOING THINGS: NOT AT ALL
SUM OF ALL RESPONSES TO PHQ QUESTIONS 1-9: 0
SUM OF ALL RESPONSES TO PHQ QUESTIONS 1-9: 0

## 2025-01-07 NOTE — PROGRESS NOTES
[facility] [place]  [address], [address]  [place]  [address]  W: [phone]  F: [phone]     HEME/ONC CONSULT    HPI:   Terry Grant is a 79 y.o. male who I am seeing for CMML      Interval history: reports gr 1 fatigue, gr 1 sob    Review of systems was obtained and pertinent findings reviewed above. Past medical history, social history, family history, medications, and allergies are located in the electronic medical record.    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  - SRSF2 mutations can be seen in various myeloid neoplasms, and are associated with a poor prognosis in MDS, AML,  PMF, and PV.  - TET2 mutations can be seen in various myeloid neoplasms, and are associated with an increased response to  hypomethylating agents and a poor prognosis in MDS after hematopoietic stem cell transplantation.  - Co-mutation of TET2 and SRSF2 is reported to be highly predictive of a myeloid neoplasm characterized by  myelodysplasia and monocytosis, including, but not limited to, CMML    NGS:  ASXL1 N167Mcu*12; KRAS G12R; SRSF2 P95L; TET2 *, *; NO alterations detected in the following genes:  FLT3, IDH1, IDH2, NPM1      6/8/23:  pericardial window, negative    Hydrea 500 mg daily 6/9/23-  DR to 500 mg on Mon-Fri   Increased to daily on 8/22/24   Increased to 500 mg bid mon-Friday and daily on sat and Sunday on 9/9/24   Changed to 500 mg bid on Mon, Wed, Fri, 500 mg daily on Tues, thurs, Sat, Sunday on 10/17/24   Changed to 500 mg PO daily on 11/14/24    Objective:  Physical Exam:  Visit Vitals  Vitals:    01/07/25 1010   BP: 115/65   Pulse: 88   Resp: 18   Temp: 98.3 °F (36.8 °C)   SpO2: 98%             Constitutional: Appears well-developed and well-nourished in no apparent distress      Mental status: Alert and

## 2025-01-07 NOTE — PROGRESS NOTES
Outpatient Infusion Center Progress Note        Date: 25    Name: Terry Grant    MRN: [id number]         : 1945    MD: Kevin Rangel MD      Mr. Grant admitted to \[facility]\"" for Blood Transfusion (PRBC) ambulatory in stable condition. Assessment completed by assessment nurse Kaila. No new concerns voiced. Venous access obtained via PIV to the Right FA, flushed without difficulty, blood return obtained. Labs drawn and sent for processing.      ** Patient has received blood transfusions in the past and reports No previous transfusion reactions. **      Vitals:    25 1205 25 1351 25 1415 25 1608   BP: (!) 109/56 122/76 126/66 (!) 141/63   Pulse: 62 65 69 69   Resp: 18 18 18 18   Temp: 98.4 °F (36.9 °C) 98.1 °F (36.7 °C) 98.2 °F (36.8 °C) 98 °F (36.7 °C)   TempSrc: Temporal Temporal Temporal Temporal   SpO2: 97% 98% 100% 100%   Weight:       Height:               Results for orders placed or performed during the hospital encounter of 25   Comprehensive Metabolic Panel   Result Value Ref Range    Sodium 136 136 - 145 mmol/L    Potassium 3.9 3.5 - 5.1 mmol/L    Chloride 109 (H) 97 - 108 mmol/L    CO2 22 21 - 32 mmol/L    Anion Gap 5 2 - 12 mmol/L    Glucose 109 (H) 65 - 100 mg/dL    BUN 41 (H) 6 - 20 MG/DL    Creatinine 2.01 (H) 0.70 - 1.30 MG/DL    BUN/Creatinine Ratio 20 12 - 20      Est, Glom Filt Rate 33 (L) >60 ml/min/1.73m2    Calcium 8.6 8.5 - 10.1 MG/DL    Total Bilirubin 0.8 0.2 - 1.0 MG/DL    ALT 23 12 - 78 U/L    AST 16 15 - 37 U/L    Alk Phosphatase 44 (L) 45 - 117 U/L    Total Protein 8.4 (H) 6.4 - 8.2 g/dL    Albumin 3.6 3.5 - 5.0 g/dL    Globulin 4.8 (H) 2.0 - 4.0 g/dL    Albumin/Globulin Ratio 0.8 (L) 1.1 - 2.2     CBC with Auto Differential   Result Value Ref Range    WBC 5.0 4.1 - 11.1 K/uL    RBC 1.29 (L) 4.10 - 5.70 M/uL    Hemoglobin 4.9 (LL) 12.1 - 17.0 g/dL    Hematocrit 14.7 (LL) 36.6 - 50.3 %    .0 (H) 80.0 - 99.0 FL    MCH 38.0 (H) 26.0 -  benefits of blood transfusion discussed with Mr. Grant, patient is agreeable to transfusion. Transfusion Consent form signed by patient and witnessed by Abdi POLO and given to  to be scanned into the chart.**          Unit(s) received from blood bank. Two nurse verification completed for patient and blood product(s).     1151:  1st unit PRBCs started @ 60ml/hr and infusing without difficulty, observed x 15 minutes, rate increased to 147 to run over 2 hours.  1351:  1st unit completed without adverse reaction noted, NS flushing line.    1400:  2nd unit PRBCs started @ 60ml/hr and infusing without difficulty, observed x 15 minutes, rate increased to 147 to run over 2 hours.  1600:  2nd unit completed without adverse reaction noted, NS flushing line.      Mr. Grant tolerated transfusion well, no adverse reaction noted.  Discharge instructions/transfusion education reviewed, patient voiced understanding.  Patient declined 1 hour post transfusion observation.        Post-Infusion Vitals:  Vitals:    01/07/25 1608   BP: (!) 141/63   Pulse: 69   Resp: 18   Temp: 98 °F (36.7 °C)   SpO2: 100%            Pt tolerated treatment well. PIV maintained positive blood return throughout treatment, flushed with positive blood return at conclusion and removed and wrapped in coban per protocol. D/c home ambulatory in no distress. Patient is to follow up with the physician for their next appointment.        Future Appointments:  Future Appointments   Date Time Provider Department Center   1/20/2025 11:30 AM Kevin aRngel MD [facility] BS AMB

## 2025-01-07 NOTE — PROGRESS NOTES
Terry Grant is a 79 y.o. male is here today for Leukemia follow up.    1. Have you been to the ER, urgent care clinic since your last visit?  Hospitalized since your last visit?No    2. Have you seen or consulted any other health care providers outside of the [facility] since your last visit?  Include any pap smears or colon screening. No       1/7/2025  9:30 AM   Vitals    SYSTOLIC 115    DIASTOLIC 65    Site    Position    Cuff Size    Pulse 88    Temp 98.3 °F (36.8 °C)    Respirations 18    SpO2 98 %    Weight - Scale 154 lb    Height 5' 7.992\"    Body Mass Index 23.42 kg/m2    Pain Score    Pain Loc

## 2025-01-08 LAB
ABO + RH BLD: NORMAL
BLD PROD TYP BPU: NORMAL
BLD PROD TYP BPU: NORMAL
BLOOD BANK BLOOD PRODUCT EXPIRATION DATE: NORMAL
BLOOD BANK BLOOD PRODUCT EXPIRATION DATE: NORMAL
BLOOD BANK DISPENSE STATUS: NORMAL
BLOOD BANK DISPENSE STATUS: NORMAL
BLOOD BANK ISBT PRODUCT BLOOD TYPE: 5100
BLOOD BANK ISBT PRODUCT BLOOD TYPE: 5100
BLOOD BANK PRODUCT CODE: NORMAL
BLOOD BANK PRODUCT CODE: NORMAL
BLOOD BANK UNIT TYPE AND RH: NORMAL
BLOOD BANK UNIT TYPE AND RH: NORMAL
BLOOD GROUP ANTIBODIES SERPL: NORMAL
BPU ID: NORMAL
BPU ID: NORMAL
CROSSMATCH RESULT: NORMAL
CROSSMATCH RESULT: NORMAL
SPECIMEN EXP DATE BLD: NORMAL
UNIT DIVISION: 0
UNIT DIVISION: 0
UNIT ISSUE DATE/TIME: NORMAL
UNIT ISSUE DATE/TIME: NORMAL

## 2025-01-15 DIAGNOSIS — C93.10 CHRONIC MYELOMONOCYTIC LEUKEMIA NOT HAVING ACHIEVED REMISSION (HCC): ICD-10-CM

## 2025-01-16 LAB
ALBUMIN SERPL-MCNC: 3.8 G/DL (ref 3.5–5)
ALBUMIN/GLOB SERPL: 0.9 (ref 1.1–2.2)
ALP SERPL-CCNC: 48 U/L (ref 45–117)
ALT SERPL-CCNC: 21 U/L (ref 12–78)
ANION GAP SERPL CALC-SCNC: 6 MMOL/L (ref 2–12)
AST SERPL-CCNC: 11 U/L (ref 15–37)
BASOPHILS # BLD: 0.06 K/UL (ref 0–0.1)
BASOPHILS NFR BLD: 1 % (ref 0–1)
BILIRUB SERPL-MCNC: 0.7 MG/DL (ref 0.2–1)
BUN SERPL-MCNC: 38 MG/DL (ref 6–20)
BUN/CREAT SERPL: 20 (ref 12–20)
CALCIUM SERPL-MCNC: 8.6 MG/DL (ref 8.5–10.1)
CHLORIDE SERPL-SCNC: 107 MMOL/L (ref 97–108)
CO2 SERPL-SCNC: 22 MMOL/L (ref 21–32)
CREAT SERPL-MCNC: 1.89 MG/DL (ref 0.7–1.3)
DIFFERENTIAL METHOD BLD: ABNORMAL
EOSINOPHIL # BLD: 0 K/UL (ref 0–0.4)
EOSINOPHIL NFR BLD: 0 % (ref 0–7)
ERYTHROCYTE [DISTWIDTH] IN BLOOD BY AUTOMATED COUNT: 23.6 % (ref 11.5–14.5)
GLOBULIN SER CALC-MCNC: 4.4 G/DL (ref 2–4)
GLUCOSE SERPL-MCNC: 84 MG/DL (ref 65–100)
HCT VFR BLD AUTO: 17.8 % (ref 36.6–50.3)
HGB BLD-MCNC: 6 G/DL (ref 12.1–17)
IMM GRANULOCYTES # BLD AUTO: 0 K/UL
IMM GRANULOCYTES NFR BLD AUTO: 0 %
LYMPHOCYTES # BLD: 1.45 K/UL (ref 0.8–3.5)
LYMPHOCYTES NFR BLD: 25 % (ref 12–49)
MCH RBC QN AUTO: 33.7 PG (ref 26–34)
MCHC RBC AUTO-ENTMCNC: 33.7 G/DL (ref 30–36.5)
MCV RBC AUTO: 100 FL (ref 80–99)
MONOCYTES # BLD: 3.13 K/UL (ref 0–1)
MONOCYTES NFR BLD: 54 % (ref 5–13)
NEUTS SEG # BLD: 1.16 K/UL (ref 1.8–8)
NEUTS SEG NFR BLD: 20 % (ref 32–75)
NRBC # BLD: 0 K/UL (ref 0–0.01)
NRBC BLD-RTO: 0 PER 100 WBC
PLATELET # BLD AUTO: 73 K/UL (ref 150–400)
PMV BLD AUTO: 12.9 FL (ref 8.9–12.9)
POTASSIUM SERPL-SCNC: 4.2 MMOL/L (ref 3.5–5.1)
PROT SERPL-MCNC: 8.2 G/DL (ref 6.4–8.2)
RBC # BLD AUTO: 1.78 M/UL (ref 4.1–5.7)
RBC MORPH BLD: ABNORMAL
SODIUM SERPL-SCNC: 135 MMOL/L (ref 136–145)
WBC # BLD AUTO: 5.8 K/UL (ref 4.1–11.1)

## 2025-01-19 DIAGNOSIS — C93.10 CHRONIC MYELOMONOCYTIC LEUKEMIA NOT HAVING ACHIEVED REMISSION (HCC): ICD-10-CM

## 2025-01-19 DIAGNOSIS — D64.81 ANEMIA DUE TO ANTINEOPLASTIC CHEMOTHERAPY: Primary | ICD-10-CM

## 2025-01-19 DIAGNOSIS — T45.1X5A ANEMIA DUE TO ANTINEOPLASTIC CHEMOTHERAPY: Primary | ICD-10-CM

## 2025-01-20 ENCOUNTER — OFFICE VISIT (OUTPATIENT)
Age: 80
End: 2025-01-20
Payer: MEDICARE

## 2025-01-20 VITALS
WEIGHT: 150 LBS | BODY MASS INDEX: 23.54 KG/M2 | HEIGHT: 67 IN | HEART RATE: 58 BPM | TEMPERATURE: 98.1 F | OXYGEN SATURATION: 100 % | RESPIRATION RATE: 18 BRPM | DIASTOLIC BLOOD PRESSURE: 71 MMHG | SYSTOLIC BLOOD PRESSURE: 121 MMHG

## 2025-01-20 DIAGNOSIS — C93.10 CHRONIC MYELOMONOCYTIC LEUKEMIA NOT HAVING ACHIEVED REMISSION (HCC): Primary | ICD-10-CM

## 2025-01-20 PROCEDURE — 3078F DIAST BP <80 MM HG: CPT | Performed by: INTERNAL MEDICINE

## 2025-01-20 PROCEDURE — 1123F ACP DISCUSS/DSCN MKR DOCD: CPT | Performed by: INTERNAL MEDICINE

## 2025-01-20 PROCEDURE — 1126F AMNT PAIN NOTED NONE PRSNT: CPT | Performed by: INTERNAL MEDICINE

## 2025-01-20 PROCEDURE — G8427 DOCREV CUR MEDS BY ELIG CLIN: HCPCS | Performed by: INTERNAL MEDICINE

## 2025-01-20 PROCEDURE — 99215 OFFICE O/P EST HI 40 MIN: CPT | Performed by: INTERNAL MEDICINE

## 2025-01-20 PROCEDURE — 3074F SYST BP LT 130 MM HG: CPT | Performed by: INTERNAL MEDICINE

## 2025-01-20 PROCEDURE — G8420 CALC BMI NORM PARAMETERS: HCPCS | Performed by: INTERNAL MEDICINE

## 2025-01-20 PROCEDURE — G2211 COMPLEX E/M VISIT ADD ON: HCPCS | Performed by: INTERNAL MEDICINE

## 2025-01-20 PROCEDURE — 1036F TOBACCO NON-USER: CPT | Performed by: INTERNAL MEDICINE

## 2025-01-20 PROCEDURE — 1159F MED LIST DOCD IN RCRD: CPT | Performed by: INTERNAL MEDICINE

## 2025-01-20 ASSESSMENT — PATIENT HEALTH QUESTIONNAIRE - PHQ9
SUM OF ALL RESPONSES TO PHQ9 QUESTIONS 1 & 2: 0
1. LITTLE INTEREST OR PLEASURE IN DOING THINGS: NOT AT ALL
SUM OF ALL RESPONSES TO PHQ QUESTIONS 1-9: 0
2. FEELING DOWN, DEPRESSED OR HOPELESS: NOT AT ALL

## 2025-01-20 NOTE — PROGRESS NOTES
Terry Grant is a 79 y.o. male is here today Leukemia follow up.    1. Have you been to the ER, urgent care clinic since your last visit?  Hospitalized since your last visit?No    2. Have you seen or consulted any other health care providers outside of the [facility] since your last visit?  Include any pap smears or colon screening. No

## 2025-01-20 NOTE — PROGRESS NOTES
[facility] [place]  [address], [address]  [place]  [address]  W: [phone]  F: [phone]     HEME/ONC CONSULT    HPI:   Terry Grant is a 79 y.o. male who I am seeing for CMML      Interval history: reports gr 1 fatigue, gr 1 sob, gr 1 tachycardia    Review of systems was obtained and pertinent findings reviewed above. Past medical history, social history, family history, medications, and allergies are located in the electronic medical record.    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  - SRSF2 mutations can be seen in various myeloid neoplasms, and are associated with a poor prognosis in MDS, AML,  PMF, and PV.  - TET2 mutations can be seen in various myeloid neoplasms, and are associated with an increased response to  hypomethylating agents and a poor prognosis in MDS after hematopoietic stem cell transplantation.  - Co-mutation of TET2 and SRSF2 is reported to be highly predictive of a myeloid neoplasm characterized by  myelodysplasia and monocytosis, including, but not limited to, CMML    NGS:  ASXL1 K319Bxl*12; KRAS G12R; SRSF2 P95L; TET2 *, *; NO alterations detected in the following genes:  FLT3, IDH1, IDH2, NPM1      6/8/23:  pericardial window, negative    Hydrea 500 mg daily 6/9/23-  DR to 500 mg on Mon-Fri   Increased to daily on 8/22/24   Increased to 500 mg bid mon-Friday and daily on sat and Sunday on 9/9/24   Changed to 500 mg bid on Mon, Wed, Fri, 500 mg daily on Tues, thurs, Sat, Sunday on 10/17/24   Changed to 500 mg PO daily on 11/14/24    Objective:  Physical Exam:  Visit Vitals  Vitals:    01/20/25 1042   BP: 121/71   Pulse: 58   Resp: 18   Temp: 98.1 °F (36.7 °C)   SpO2: 100%             Constitutional: Appears well-developed and well-nourished in no apparent distress      Mental

## 2025-01-21 ENCOUNTER — HOSPITAL ENCOUNTER (OUTPATIENT)
Facility: HOSPITAL | Age: 80
Setting detail: INFUSION SERIES
Discharge: HOME | End: 2025-01-21
Payer: MEDICARE

## 2025-01-21 VITALS
WEIGHT: 149.91 LBS | TEMPERATURE: 97.9 F | HEART RATE: 66 BPM | RESPIRATION RATE: 18 BRPM | OXYGEN SATURATION: 99 % | BODY MASS INDEX: 23.53 KG/M2 | DIASTOLIC BLOOD PRESSURE: 66 MMHG | SYSTOLIC BLOOD PRESSURE: 133 MMHG | HEIGHT: 67 IN

## 2025-01-21 DIAGNOSIS — C93.10 CHRONIC MYELOMONOCYTIC LEUKEMIA NOT HAVING ACHIEVED REMISSION (HCC): ICD-10-CM

## 2025-01-21 DIAGNOSIS — D64.81 ANEMIA DUE TO ANTINEOPLASTIC CHEMOTHERAPY: Primary | ICD-10-CM

## 2025-01-21 DIAGNOSIS — T45.1X5A ANEMIA DUE TO ANTINEOPLASTIC CHEMOTHERAPY: Primary | ICD-10-CM

## 2025-01-21 LAB
ALBUMIN SERPL-MCNC: 3.6 G/DL (ref 3.5–5)
ALBUMIN/GLOB SERPL: 0.7 (ref 1.1–2.2)
ALP SERPL-CCNC: 54 U/L (ref 45–117)
ALT SERPL-CCNC: 24 U/L (ref 12–78)
ANION GAP SERPL CALC-SCNC: 5 MMOL/L (ref 2–12)
AST SERPL-CCNC: 15 U/L (ref 15–37)
BASOPHILS # BLD: 0.04 K/UL (ref 0–0.1)
BASOPHILS NFR BLD: 1 % (ref 0–1)
BILIRUB SERPL-MCNC: 0.8 MG/DL (ref 0.2–1)
BUN SERPL-MCNC: 38 MG/DL (ref 6–20)
BUN/CREAT SERPL: 19 (ref 12–20)
CALCIUM SERPL-MCNC: 8.5 MG/DL (ref 8.5–10.1)
CHLORIDE SERPL-SCNC: 107 MMOL/L (ref 97–108)
CO2 SERPL-SCNC: 25 MMOL/L (ref 21–32)
CREAT SERPL-MCNC: 2.02 MG/DL (ref 0.7–1.3)
DIFFERENTIAL METHOD BLD: ABNORMAL
EOSINOPHIL # BLD: 0 K/UL (ref 0–0.4)
EOSINOPHIL NFR BLD: 0 % (ref 0–7)
ERYTHROCYTE [DISTWIDTH] IN BLOOD BY AUTOMATED COUNT: 23.5 % (ref 11.5–14.5)
GLOBULIN SER CALC-MCNC: 5.1 G/DL (ref 2–4)
GLUCOSE SERPL-MCNC: 105 MG/DL (ref 65–100)
HCT VFR BLD AUTO: 17 % (ref 36.6–50.3)
HGB BLD-MCNC: 5.5 G/DL (ref 12.1–17)
HISTORY CHECK: NORMAL
IMM GRANULOCYTES # BLD AUTO: 0 K/UL (ref 0–0.04)
IMM GRANULOCYTES NFR BLD AUTO: 0 % (ref 0–0.5)
LYMPHOCYTES # BLD: 1.03 K/UL (ref 0.8–3.5)
LYMPHOCYTES NFR BLD: 24 % (ref 12–49)
MCH RBC QN AUTO: 33.1 PG (ref 26–34)
MCHC RBC AUTO-ENTMCNC: 32.4 G/DL (ref 30–36.5)
MCV RBC AUTO: 102.4 FL (ref 80–99)
METAMYELOCYTES NFR BLD MANUAL: 1 %
MONOCYTES # BLD: 2.15 K/UL (ref 0–1)
MONOCYTES NFR BLD: 50 % (ref 5–13)
NEUTS SEG # BLD: 1.03 K/UL (ref 1.8–8)
NEUTS SEG NFR BLD: 24 % (ref 32–75)
NRBC # BLD: 0 K/UL (ref 0–0.01)
NRBC BLD-RTO: 0 PER 100 WBC
OTHER CELLS NFR BLD MANUAL: 0
PATH REV BLD -IMP: NORMAL
PLATELET # BLD AUTO: 98 K/UL (ref 150–400)
PMV BLD AUTO: 11.6 FL (ref 8.9–12.9)
POTASSIUM SERPL-SCNC: 3.9 MMOL/L (ref 3.5–5.1)
PROT SERPL-MCNC: 8.7 G/DL (ref 6.4–8.2)
RBC # BLD AUTO: 1.66 M/UL (ref 4.1–5.7)
RBC MORPH BLD: ABNORMAL
RBC MORPH BLD: ABNORMAL
SODIUM SERPL-SCNC: 137 MMOL/L (ref 136–145)
WBC # BLD AUTO: 4.3 K/UL (ref 4.1–11.1)

## 2025-01-21 PROCEDURE — 86923 COMPATIBILITY TEST ELECTRIC: CPT

## 2025-01-21 PROCEDURE — 86850 RBC ANTIBODY SCREEN: CPT

## 2025-01-21 PROCEDURE — 36415 COLL VENOUS BLD VENIPUNCTURE: CPT

## 2025-01-21 PROCEDURE — 86901 BLOOD TYPING SEROLOGIC RH(D): CPT

## 2025-01-21 PROCEDURE — 86900 BLOOD TYPING SEROLOGIC ABO: CPT

## 2025-01-21 PROCEDURE — P9040 RBC LEUKOREDUCED IRRADIATED: HCPCS

## 2025-01-21 PROCEDURE — 36430 TRANSFUSION BLD/BLD COMPNT: CPT

## 2025-01-21 PROCEDURE — 80053 COMPREHEN METABOLIC PANEL: CPT

## 2025-01-21 PROCEDURE — 85025 COMPLETE CBC W/AUTO DIFF WBC: CPT

## 2025-01-21 RX ORDER — ACETAMINOPHEN 325 MG/1
650 TABLET ORAL
OUTPATIENT
Start: 2025-01-21

## 2025-01-21 RX ORDER — ONDANSETRON 2 MG/ML
8 INJECTION INTRAMUSCULAR; INTRAVENOUS
OUTPATIENT
Start: 2025-01-21

## 2025-01-21 RX ORDER — EPINEPHRINE 1 MG/ML
0.3 INJECTION, SOLUTION INTRAMUSCULAR; SUBCUTANEOUS PRN
OUTPATIENT
Start: 2025-01-21

## 2025-01-21 RX ORDER — ALBUTEROL SULFATE 90 UG/1
4 INHALANT RESPIRATORY (INHALATION) PRN
OUTPATIENT
Start: 2025-01-21

## 2025-01-21 RX ORDER — ONDANSETRON 2 MG/ML
4 INJECTION INTRAMUSCULAR; INTRAVENOUS EVERY 6 HOURS PRN
Start: 2025-01-21

## 2025-01-21 RX ORDER — HYDROCORTISONE SODIUM SUCCINATE 100 MG/2ML
100 INJECTION INTRAMUSCULAR; INTRAVENOUS
OUTPATIENT
Start: 2025-01-21

## 2025-01-21 RX ORDER — DIPHENHYDRAMINE HYDROCHLORIDE 50 MG/ML
50 INJECTION INTRAMUSCULAR; INTRAVENOUS
OUTPATIENT
Start: 2025-01-21

## 2025-01-21 RX ORDER — SODIUM CHLORIDE 9 MG/ML
INJECTION, SOLUTION INTRAVENOUS CONTINUOUS
OUTPATIENT
Start: 2025-01-21

## 2025-01-21 RX ORDER — FAMOTIDINE 10 MG/ML
20 INJECTION, SOLUTION INTRAVENOUS
OUTPATIENT
Start: 2025-01-21

## 2025-01-21 ASSESSMENT — PAIN SCALES - GENERAL: PAINLEVEL_OUTOF10: 0

## 2025-01-21 NOTE — PROGRESS NOTES
[facility] Progress Note    Date: 2025    Name: Terry Grant    MRN: [id number]         : 1945    Mr. Grant Arrived ambulatory and in no distress for Blood Transfusion.  Assessment was completed, no acute issues at this time, no new complaints voiced.  22G PIV placed in the right arm, labs drawn and sent for processing.  Signs/symptoms of adverse blood reaction discussed with pt, voiced understanding.    Consent signed today at the chairside, scanned into media and .phrase reviewed in notes.     Mr. Grant's vitals were reviewed.  Vitals:    25 1151   BP: 117/60   Pulse: 64   Resp: 18   Temp: 97.9 °F (36.6 °C)   SpO2: 99%       Lab results were obtained and reviewed.  Recent Results (from the past 12 hour(s))   CBC with Auto Differential    Collection Time: 25  9:45 AM   Result Value Ref Range    WBC 4.3 4.1 - 11.1 K/uL    RBC 1.66 (L) 4.10 - 5.70 M/uL    Hemoglobin 5.5 (LL) 12.1 - 17.0 g/dL    Hematocrit 17.0 (LL) 36.6 - 50.3 %    .4 (H) 80.0 - 99.0 FL    MCH 33.1 26.0 - 34.0 PG    MCHC 32.4 30.0 - 36.5 g/dL    RDW 23.5 (H) 11.5 - 14.5 %    Platelets 98 (L) 150 - 400 K/uL    MPV 11.6 8.9 - 12.9 FL    Nucleated RBCs 0.0 0  WBC    nRBC 0.00 0.00 - 0.01 K/uL    Neutrophils % PENDING %    Lymphocytes % PENDING %    Monocytes % PENDING %    Eosinophils % PENDING %    Basophils % PENDING %    Immature Granulocytes % PENDING %    Neutrophils Absolute PENDING K/UL    Lymphocytes Absolute PENDING K/UL    Monocytes Absolute PENDING K/UL    Eosinophils Absolute PENDING K/UL    Basophils Absolute PENDING K/UL    Immature Granulocytes Absolute PENDING K/UL    Differential Type PENDING    Comprehensive Metabolic Panel    Collection Time: 25  9:45 AM   Result Value Ref Range    Sodium 137 136 - 145 mmol/L    Potassium 3.9 3.5 - 5.1 mmol/L    Chloride 107 97 - 108 mmol/L    CO2 25 21 - 32 mmol/L    Anion Gap 5 2 - 12 mmol/L    Glucose 105 (H) 65 - 100 mg/dL    BUN 38 (H) 6 -

## 2025-01-21 NOTE — CONSENT
Informed Consent for Blood Component Transfusion Note    I have discussed with the patient the rationale for blood component transfusion; its benefits in treating or preventing fatigue, organ damage, or death; and its risk which includes mild transfusion reactions, rare risk of blood borne infection, or more serious but rare reactions. I have discussed the alternatives to transfusion, including the risk and consequences of not receiving transfusion. The patient had an opportunity to ask questions and had agreed to proceed with transfusion of blood components.    Electronically signed by BIJU Barfield NP on 1/21/25 at 12:27 PM EST

## 2025-01-22 LAB
ABO + RH BLD: NORMAL
BLD PROD TYP BPU: NORMAL
BLD PROD TYP BPU: NORMAL
BLOOD BANK BLOOD PRODUCT EXPIRATION DATE: NORMAL
BLOOD BANK BLOOD PRODUCT EXPIRATION DATE: NORMAL
BLOOD BANK DISPENSE STATUS: NORMAL
BLOOD BANK DISPENSE STATUS: NORMAL
BLOOD BANK ISBT PRODUCT BLOOD TYPE: 9500
BLOOD BANK ISBT PRODUCT BLOOD TYPE: 9500
BLOOD BANK PRODUCT CODE: NORMAL
BLOOD BANK PRODUCT CODE: NORMAL
BLOOD BANK UNIT TYPE AND RH: NORMAL
BLOOD BANK UNIT TYPE AND RH: NORMAL
BLOOD GROUP ANTIBODIES SERPL: NORMAL
BPU ID: NORMAL
BPU ID: NORMAL
CROSSMATCH RESULT: NORMAL
CROSSMATCH RESULT: NORMAL
SPECIMEN EXP DATE BLD: NORMAL
UNIT DIVISION: 0
UNIT DIVISION: 0
UNIT ISSUE DATE/TIME: NORMAL
UNIT ISSUE DATE/TIME: NORMAL

## 2025-01-27 ENCOUNTER — TELEPHONE (OUTPATIENT)
Age: 80
End: 2025-01-27

## 2025-01-27 NOTE — TELEPHONE ENCOUNTER
[facility] [place] at [facility]  [phone] returned call to patients wife, reports patient had low grade fever no higher than 99.7 late last week, intermittent vomiting late last week, since Saturday appetite is back, no vomiting, no nausea, just aches and pains, would like to get labs week of the 2/3 instead of this week and plans to hold off on starting Decitabine-Cedazuridine until he is feeling better. Informed her I would let the  know about rescheduling appointments and would pass the message along to the providers.

## 2025-01-27 NOTE — TELEPHONE ENCOUNTER
Patient spouse called and stated that the patient has been sick for a week and would like to know if he is okay to start the medication that is being delivered to them tomorrow. She also stated that she wanted to reschedule his appt for Monday. Please advise.     CB# [phone] or [phone]

## 2025-01-28 ENCOUNTER — TELEPHONE (OUTPATIENT)
Age: 80
End: 2025-01-28

## 2025-01-28 NOTE — TELEPHONE ENCOUNTER
Patients spouse called and stated that she forgot to say yesterday that the pt has stopped his hydroxyurea medication and that his night sweats have come back. Requested a call back to discuss.     CB# [phone]

## 2025-01-28 NOTE — TELEPHONE ENCOUNTER
[facility] [place] at [facility]  [phone]    Patients wife called back regarding moving appointment due to labs not being drawn, RN requested to speak with Mrs. Grant prior to rescheduling. Discussed prior message with patients wife, informed that per FRANKLIN Alaniz the night sweats would likely resolve once he begins the new Decitabine-Cedazuridine prescription and he is okay to start medication despite residual cold symptoms. Advised we can push her appointment a week to allow for labs. Mrs. Grant stated understanding and had no further questions at this time.

## 2025-01-31 DIAGNOSIS — C93.10 CHRONIC MYELOMONOCYTIC LEUKEMIA NOT HAVING ACHIEVED REMISSION (HCC): ICD-10-CM

## 2025-01-31 LAB
ALBUMIN SERPL-MCNC: 3.3 G/DL (ref 3.5–5)
ALBUMIN/GLOB SERPL: 0.7 (ref 1.1–2.2)
ALP SERPL-CCNC: 49 U/L (ref 45–117)
ALT SERPL-CCNC: 21 U/L (ref 12–78)
ANION GAP SERPL CALC-SCNC: 11 MMOL/L (ref 2–12)
AST SERPL-CCNC: 14 U/L (ref 15–37)
BASOPHILS # BLD: 0.09 K/UL (ref 0–0.1)
BASOPHILS NFR BLD: 1 % (ref 0–1)
BILIRUB SERPL-MCNC: 0.6 MG/DL (ref 0.2–1)
BUN SERPL-MCNC: 60 MG/DL (ref 6–20)
BUN/CREAT SERPL: 24 (ref 12–20)
CALCIUM SERPL-MCNC: 8.7 MG/DL (ref 8.5–10.1)
CHLORIDE SERPL-SCNC: 103 MMOL/L (ref 97–108)
CO2 SERPL-SCNC: 20 MMOL/L (ref 21–32)
CREAT SERPL-MCNC: 2.47 MG/DL (ref 0.7–1.3)
DIFFERENTIAL METHOD BLD: ABNORMAL
EOSINOPHIL # BLD: 0.09 K/UL (ref 0–0.4)
EOSINOPHIL NFR BLD: 1 % (ref 0–7)
ERYTHROCYTE [DISTWIDTH] IN BLOOD BY AUTOMATED COUNT: 22.6 % (ref 11.5–14.5)
GLOBULIN SER CALC-MCNC: 4.6 G/DL (ref 2–4)
GLUCOSE SERPL-MCNC: 113 MG/DL (ref 65–100)
HCT VFR BLD AUTO: 16 % (ref 36.6–50.3)
HGB BLD-MCNC: 5.1 G/DL (ref 12.1–17)
IMM GRANULOCYTES # BLD AUTO: 0 K/UL
IMM GRANULOCYTES NFR BLD AUTO: 0 %
LYMPHOCYTES # BLD: 4.1 K/UL (ref 0.8–3.5)
LYMPHOCYTES NFR BLD: 44 % (ref 12–49)
MCH RBC QN AUTO: 32.1 PG (ref 26–34)
MCHC RBC AUTO-ENTMCNC: 31.9 G/DL (ref 30–36.5)
MCV RBC AUTO: 100.6 FL (ref 80–99)
MONOCYTES # BLD: 3.07 K/UL (ref 0–1)
MONOCYTES NFR BLD: 33 % (ref 5–13)
NEUTS BAND NFR BLD MANUAL: 4 % (ref 0–6)
NEUTS SEG # BLD: 1.95 K/UL (ref 1.8–8)
NEUTS SEG NFR BLD: 17 % (ref 32–75)
NRBC # BLD: 0.02 K/UL (ref 0–0.01)
NRBC BLD-RTO: 0.2 PER 100 WBC
PLATELET # BLD AUTO: 109 K/UL (ref 150–400)
PMV BLD AUTO: 12.4 FL (ref 8.9–12.9)
POTASSIUM SERPL-SCNC: 4.3 MMOL/L (ref 3.5–5.1)
PROT SERPL-MCNC: 7.9 G/DL (ref 6.4–8.2)
RBC # BLD AUTO: 1.59 M/UL (ref 4.1–5.7)
RBC MORPH BLD: ABNORMAL
SODIUM SERPL-SCNC: 134 MMOL/L (ref 136–145)
WBC # BLD AUTO: 9.3 K/UL (ref 4.1–11.1)
WBC MORPH BLD: ABNORMAL

## 2025-02-01 ENCOUNTER — TELEPHONE (OUTPATIENT)
Age: 80
End: 2025-02-01

## 2025-02-01 NOTE — TELEPHONE ENCOUNTER
[facility] [place] at [facility]  [phone]    Heme/Onc On Call Note  I was notified last night by Dr. Rangel of patient's critical HGB results of 5.1. Chart reviewed, HGB was 5.5 about 10 days prior and he received transfusion support in the [facility]. I called the patient last night to discuss. His wife answered, but patient was present as well in the background. He is very fatigued, but he denies dyspnea or chest pain. We discussed that he needs a transfusion ASAP. He would like to wait until Monday and be set up as an outpatient. We reviewed signs/symptoms that should prompt an urgent visit to the ED for transfusion before then.    He denied any hemalatha bleeding or melena. However, his stools are generally darker than usual. He is on apixaban for Afib and I recommended he hold this at least through the weekend and discuss with Dr. Rangel on Monday whether to resume.

## 2025-02-03 DIAGNOSIS — C93.10 CHRONIC MYELOMONOCYTIC LEUKEMIA NOT HAVING ACHIEVED REMISSION (HCC): ICD-10-CM

## 2025-02-03 DIAGNOSIS — D64.81 ANEMIA DUE TO ANTINEOPLASTIC CHEMOTHERAPY: Primary | ICD-10-CM

## 2025-02-03 DIAGNOSIS — T45.1X5A ANEMIA DUE TO ANTINEOPLASTIC CHEMOTHERAPY: Primary | ICD-10-CM

## 2025-02-04 ENCOUNTER — HOSPITAL ENCOUNTER (OUTPATIENT)
Facility: HOSPITAL | Age: 80
Setting detail: INFUSION SERIES
Discharge: HOME | End: 2025-02-04
Payer: MEDICARE

## 2025-02-04 VITALS
OXYGEN SATURATION: 97 % | DIASTOLIC BLOOD PRESSURE: 62 MMHG | RESPIRATION RATE: 18 BRPM | TEMPERATURE: 98.3 F | SYSTOLIC BLOOD PRESSURE: 109 MMHG | HEART RATE: 67 BPM

## 2025-02-04 DIAGNOSIS — C93.10 CHRONIC MYELOMONOCYTIC LEUKEMIA NOT HAVING ACHIEVED REMISSION (HCC): ICD-10-CM

## 2025-02-04 DIAGNOSIS — T45.1X5A ANEMIA DUE TO ANTINEOPLASTIC CHEMOTHERAPY: Primary | ICD-10-CM

## 2025-02-04 DIAGNOSIS — D64.81 ANEMIA DUE TO ANTINEOPLASTIC CHEMOTHERAPY: Primary | ICD-10-CM

## 2025-02-04 LAB — HISTORY CHECK: NORMAL

## 2025-02-04 PROCEDURE — 86900 BLOOD TYPING SEROLOGIC ABO: CPT

## 2025-02-04 PROCEDURE — 86850 RBC ANTIBODY SCREEN: CPT

## 2025-02-04 PROCEDURE — 86901 BLOOD TYPING SEROLOGIC RH(D): CPT

## 2025-02-04 PROCEDURE — 86923 COMPATIBILITY TEST ELECTRIC: CPT

## 2025-02-04 PROCEDURE — 36415 COLL VENOUS BLD VENIPUNCTURE: CPT

## 2025-02-04 PROCEDURE — P9040 RBC LEUKOREDUCED IRRADIATED: HCPCS

## 2025-02-04 PROCEDURE — 36430 TRANSFUSION BLD/BLD COMPNT: CPT

## 2025-02-04 RX ORDER — ACETAMINOPHEN 325 MG/1
650 TABLET ORAL
OUTPATIENT
Start: 2025-02-04

## 2025-02-04 RX ORDER — DIPHENHYDRAMINE HYDROCHLORIDE 50 MG/ML
50 INJECTION INTRAMUSCULAR; INTRAVENOUS
OUTPATIENT
Start: 2025-02-04

## 2025-02-04 RX ORDER — ONDANSETRON 2 MG/ML
4 INJECTION INTRAMUSCULAR; INTRAVENOUS EVERY 6 HOURS PRN
Status: DISCONTINUED | OUTPATIENT
Start: 2025-02-04 | End: 2025-02-05 | Stop reason: HOSPADM

## 2025-02-04 RX ORDER — ONDANSETRON 2 MG/ML
4 INJECTION INTRAMUSCULAR; INTRAVENOUS EVERY 6 HOURS PRN
Start: 2025-02-04

## 2025-02-04 RX ORDER — SODIUM CHLORIDE 9 MG/ML
INJECTION, SOLUTION INTRAVENOUS CONTINUOUS
OUTPATIENT
Start: 2025-02-04

## 2025-02-04 RX ORDER — HYDROCORTISONE SODIUM SUCCINATE 100 MG/2ML
100 INJECTION INTRAMUSCULAR; INTRAVENOUS
OUTPATIENT
Start: 2025-02-04

## 2025-02-04 RX ORDER — ONDANSETRON 2 MG/ML
8 INJECTION INTRAMUSCULAR; INTRAVENOUS
OUTPATIENT
Start: 2025-02-04

## 2025-02-04 RX ORDER — EPINEPHRINE 1 MG/ML
0.3 INJECTION, SOLUTION INTRAMUSCULAR; SUBCUTANEOUS PRN
OUTPATIENT
Start: 2025-02-04

## 2025-02-04 RX ORDER — ALBUTEROL SULFATE 90 UG/1
4 INHALANT RESPIRATORY (INHALATION) PRN
OUTPATIENT
Start: 2025-02-04

## 2025-02-04 RX ORDER — FAMOTIDINE 10 MG/ML
20 INJECTION, SOLUTION INTRAVENOUS
OUTPATIENT
Start: 2025-02-04

## 2025-02-04 ASSESSMENT — PAIN SCALES - GENERAL: PAINLEVEL_OUTOF10: 0

## 2025-02-04 NOTE — PROGRESS NOTES
[facility] Progress Note    Date: February 4, 2025    0800am: Mr. Grant Arrived ambulatory and in stable condition to the \[facility]\"" for  2 Units of PRBCs.  Assessment was completed, no new complaints voiced. 22G PIV placed to right arm with positive blood return. Type and Screen drawn and sent for processing. Labs reviewed. Signs/symptoms of adverse blood reaction discussed with pt, voiced understanding.    Mr. Grant's vitals were reviewed.  Patient Vitals for the past 12 hrs:   Temp Pulse Resp BP SpO2   02/04/25 0807 98.4 °F (36.9 °C) 65 18 115/71 97 %     Medications:  NS @KVO    1024am:  1st unit PRBCs started and infusing without difficulty, observed x 15 minutes  1245pm:  1st unit completed without adverse reaction noted, NS flushing line.  1313pm:  2nd unit PRBCs started and infusing without difficulty, observed x 15 minutes  1545pm:  2nd unit completed without adverse reaction noted, NS flushing line.    Tolerated transfusion well, no adverse reaction noted.  D/C instructions reviewed, copy to pt, voiced understanding.  Patient declined 1 hour post transfusion observation. PIV removed and 2x2 with coban placed. D/Cd from [facility] in stable condition. Patient is aware of next scheduled [facility] appointment on February 12, 2025 with Dr. Rangel.    Future Appointments   Date Time Provider Department Center   2/12/2025 11:30 AM Kevin Rangel MD [facility] BS LINDA Reed RN  February 4, 2025

## 2025-02-12 ENCOUNTER — HOSPITAL ENCOUNTER (OUTPATIENT)
Facility: HOSPITAL | Age: 80
Setting detail: INFUSION SERIES
End: 2025-02-12
Payer: MEDICARE

## 2025-02-12 ENCOUNTER — TELEPHONE (OUTPATIENT)
Age: 80
End: 2025-02-12

## 2025-02-12 NOTE — TELEPHONE ENCOUNTER
[facility] [place] at [facility]  [phone]    02/12/25 11:28 AM EST - A phone call was made to the patient to the patient to inform him that his appointment will be cancelled for today due to a power outage in the office. Patient verbalized understanding and had no further questions.

## 2025-02-17 ENCOUNTER — TELEPHONE (OUTPATIENT)
Age: 80
End: 2025-02-17

## 2025-02-17 NOTE — TELEPHONE ENCOUNTER
[facility] [place] at [facility][note 2]  [phone]    2/17/25 0966 Returned call to patients wife, notified that the infusion center does not have any openings for today. Mrs. Rosario stated understanding and plans to keep appointment for tomorrow morning.

## 2025-02-17 NOTE — TELEPHONE ENCOUNTER
Patients spouse called and stated that the patient has not been feeling well and wanted to know if he could come in today to get his lab work and possible blood transfusion today. Requested a call back to discuss.    CB# [phone]

## 2025-02-18 ENCOUNTER — OFFICE VISIT (OUTPATIENT)
Age: 80
End: 2025-02-18
Payer: MEDICARE

## 2025-02-18 ENCOUNTER — HOSPITAL ENCOUNTER (OUTPATIENT)
Facility: HOSPITAL | Age: 80
Setting detail: INFUSION SERIES
Discharge: HOME | End: 2025-02-18
Payer: MEDICARE

## 2025-02-18 ENCOUNTER — HOSPITAL ENCOUNTER (INPATIENT)
Facility: HOSPITAL | Age: 80
LOS: 2 days | Discharge: HOME | DRG: 841 | End: 2025-02-20
Attending: EMERGENCY MEDICINE | Admitting: FAMILY MEDICINE
Payer: MEDICARE

## 2025-02-18 ENCOUNTER — APPOINTMENT (OUTPATIENT)
Facility: HOSPITAL | Age: 80
DRG: 841 | End: 2025-02-18
Payer: MEDICARE

## 2025-02-18 VITALS
SYSTOLIC BLOOD PRESSURE: 92 MMHG | RESPIRATION RATE: 20 BRPM | BODY MASS INDEX: 22.79 KG/M2 | HEART RATE: 96 BPM | DIASTOLIC BLOOD PRESSURE: 51 MMHG | HEIGHT: 68 IN | OXYGEN SATURATION: 100 % | TEMPERATURE: 98.3 F

## 2025-02-18 VITALS
BODY MASS INDEX: 22.79 KG/M2 | RESPIRATION RATE: 12 BRPM | DIASTOLIC BLOOD PRESSURE: 54 MMHG | SYSTOLIC BLOOD PRESSURE: 84 MMHG | HEART RATE: 91 BPM | TEMPERATURE: 97.1 F | HEIGHT: 68 IN

## 2025-02-18 DIAGNOSIS — D64.81 ANEMIA DUE TO ANTINEOPLASTIC CHEMOTHERAPY: Primary | ICD-10-CM

## 2025-02-18 DIAGNOSIS — T45.1X5A ANEMIA DUE TO ANTINEOPLASTIC CHEMOTHERAPY: Primary | ICD-10-CM

## 2025-02-18 DIAGNOSIS — C94.80: ICD-10-CM

## 2025-02-18 DIAGNOSIS — C93.10 CHRONIC MYELOMONOCYTIC LEUKEMIA NOT HAVING ACHIEVED REMISSION (HCC): Primary | ICD-10-CM

## 2025-02-18 DIAGNOSIS — C93.10 CHRONIC MYELOMONOCYTIC LEUKEMIA NOT HAVING ACHIEVED REMISSION (HCC): ICD-10-CM

## 2025-02-18 DIAGNOSIS — D64.9 SYMPTOMATIC ANEMIA: Primary | ICD-10-CM

## 2025-02-18 LAB
ABO + RH BLD: NORMAL
ALBUMIN SERPL-MCNC: 3.2 G/DL (ref 3.5–5)
ALBUMIN/GLOB SERPL: 0.7 (ref 1.1–2.2)
ALP SERPL-CCNC: 48 U/L (ref 45–117)
ALT SERPL-CCNC: 18 U/L (ref 12–78)
ANION GAP SERPL CALC-SCNC: 11 MMOL/L (ref 2–12)
AST SERPL-CCNC: 15 U/L (ref 15–37)
BASOPHILS # BLD: 0 K/UL (ref 0–0.1)
BASOPHILS NFR BLD: 0 % (ref 0–1)
BILIRUB SERPL-MCNC: 0.7 MG/DL (ref 0.2–1)
BLOOD GROUP ANTIBODIES SERPL: NORMAL
BUN SERPL-MCNC: 55 MG/DL (ref 6–20)
BUN/CREAT SERPL: 22 (ref 12–20)
CALCIUM SERPL-MCNC: 8.9 MG/DL (ref 8.5–10.1)
CHLORIDE SERPL-SCNC: 108 MMOL/L (ref 97–108)
CO2 SERPL-SCNC: 17 MMOL/L (ref 21–32)
CREAT SERPL-MCNC: 2.52 MG/DL (ref 0.7–1.3)
DIFFERENTIAL METHOD BLD: ABNORMAL
EOSINOPHIL # BLD: 0 K/UL (ref 0–0.4)
EOSINOPHIL NFR BLD: 0 % (ref 0–7)
ERYTHROCYTE [DISTWIDTH] IN BLOOD BY AUTOMATED COUNT: 19 % (ref 11.5–14.5)
GLOBULIN SER CALC-MCNC: 4.6 G/DL (ref 2–4)
GLUCOSE SERPL-MCNC: 105 MG/DL (ref 65–100)
HCT VFR BLD AUTO: 10.5 % (ref 36.6–50.3)
HGB BLD-MCNC: 3.5 G/DL (ref 12.1–17)
HISTORY CHECK: NORMAL
IMM GRANULOCYTES # BLD AUTO: 0 K/UL
IMM GRANULOCYTES NFR BLD AUTO: 0 %
LYMPHOCYTES # BLD: 1.8 K/UL (ref 0.8–3.5)
LYMPHOCYTES NFR BLD: 12 % (ref 12–49)
MCH RBC QN AUTO: 30.7 PG (ref 26–34)
MCHC RBC AUTO-ENTMCNC: 33.3 G/DL (ref 30–36.5)
MCV RBC AUTO: 92.1 FL (ref 80–99)
METAMYELOCYTES NFR BLD MANUAL: 1 %
MONOCYTES # BLD: 8.1 K/UL (ref 0–1)
MONOCYTES NFR BLD: 54 % (ref 5–13)
MYELOCYTES NFR BLD MANUAL: 8 %
NEUTS SEG # BLD: 3.75 K/UL (ref 1.8–8)
NEUTS SEG NFR BLD: 25 % (ref 32–75)
NRBC # BLD: 0.09 K/UL (ref 0–0.01)
NRBC BLD-RTO: 0.6 PER 100 WBC
PLATELET # BLD AUTO: 50 K/UL (ref 150–400)
PMV BLD AUTO: 11.7 FL (ref 8.9–12.9)
POTASSIUM SERPL-SCNC: 4.2 MMOL/L (ref 3.5–5.1)
PROT SERPL-MCNC: 7.8 G/DL (ref 6.4–8.2)
RBC # BLD AUTO: 1.14 M/UL (ref 4.1–5.7)
RBC MORPH BLD: ABNORMAL
RBC MORPH BLD: ABNORMAL
SODIUM SERPL-SCNC: 136 MMOL/L (ref 136–145)
SPECIMEN EXP DATE BLD: NORMAL
TROPONIN I SERPL HS-MCNC: 8 NG/L (ref 0–76)
WBC # BLD AUTO: 15 K/UL (ref 4.1–11.1)
WBC MORPH BLD: ABNORMAL

## 2025-02-18 PROCEDURE — 1123F ACP DISCUSS/DSCN MKR DOCD: CPT | Performed by: INTERNAL MEDICINE

## 2025-02-18 PROCEDURE — 86900 BLOOD TYPING SEROLOGIC ABO: CPT

## 2025-02-18 PROCEDURE — 99215 OFFICE O/P EST HI 40 MIN: CPT | Performed by: INTERNAL MEDICINE

## 2025-02-18 PROCEDURE — 86923 COMPATIBILITY TEST ELECTRIC: CPT

## 2025-02-18 PROCEDURE — 1159F MED LIST DOCD IN RCRD: CPT | Performed by: INTERNAL MEDICINE

## 2025-02-18 PROCEDURE — 80053 COMPREHEN METABOLIC PANEL: CPT

## 2025-02-18 PROCEDURE — 1100000000 HC RM PRIVATE

## 2025-02-18 PROCEDURE — 1036F TOBACCO NON-USER: CPT | Performed by: INTERNAL MEDICINE

## 2025-02-18 PROCEDURE — 86901 BLOOD TYPING SEROLOGIC RH(D): CPT

## 2025-02-18 PROCEDURE — 93005 ELECTROCARDIOGRAM TRACING: CPT | Performed by: EMERGENCY MEDICINE

## 2025-02-18 PROCEDURE — G2211 COMPLEX E/M VISIT ADD ON: HCPCS | Performed by: INTERNAL MEDICINE

## 2025-02-18 PROCEDURE — P9040 RBC LEUKOREDUCED IRRADIATED: HCPCS

## 2025-02-18 PROCEDURE — 6360000002 HC RX W HCPCS: Performed by: FAMILY MEDICINE

## 2025-02-18 PROCEDURE — 3078F DIAST BP <80 MM HG: CPT | Performed by: INTERNAL MEDICINE

## 2025-02-18 PROCEDURE — 84484 ASSAY OF TROPONIN QUANT: CPT

## 2025-02-18 PROCEDURE — 3074F SYST BP LT 130 MM HG: CPT | Performed by: INTERNAL MEDICINE

## 2025-02-18 PROCEDURE — G8427 DOCREV CUR MEDS BY ELIG CLIN: HCPCS | Performed by: INTERNAL MEDICINE

## 2025-02-18 PROCEDURE — G8420 CALC BMI NORM PARAMETERS: HCPCS | Performed by: INTERNAL MEDICINE

## 2025-02-18 PROCEDURE — 1126F AMNT PAIN NOTED NONE PRSNT: CPT | Performed by: INTERNAL MEDICINE

## 2025-02-18 PROCEDURE — 86850 RBC ANTIBODY SCREEN: CPT

## 2025-02-18 PROCEDURE — 2500000003 HC RX 250 WO HCPCS: Performed by: FAMILY MEDICINE

## 2025-02-18 PROCEDURE — 99285 EMERGENCY DEPT VISIT HI MDM: CPT

## 2025-02-18 PROCEDURE — 71045 X-RAY EXAM CHEST 1 VIEW: CPT

## 2025-02-18 PROCEDURE — 85025 COMPLETE CBC W/AUTO DIFF WBC: CPT

## 2025-02-18 PROCEDURE — 36415 COLL VENOUS BLD VENIPUNCTURE: CPT

## 2025-02-18 RX ORDER — ONDANSETRON 4 MG/1
4 TABLET, ORALLY DISINTEGRATING ORAL EVERY 8 HOURS PRN
Status: DISCONTINUED | OUTPATIENT
Start: 2025-02-18 | End: 2025-02-21 | Stop reason: HOSPADM

## 2025-02-18 RX ORDER — SODIUM CHLORIDE 0.9 % (FLUSH) 0.9 %
5-40 SYRINGE (ML) INJECTION EVERY 12 HOURS SCHEDULED
Status: DISCONTINUED | OUTPATIENT
Start: 2025-02-18 | End: 2025-02-21 | Stop reason: HOSPADM

## 2025-02-18 RX ORDER — SODIUM CHLORIDE 9 MG/ML
INJECTION, SOLUTION INTRAVENOUS PRN
Status: DISCONTINUED | OUTPATIENT
Start: 2025-02-18 | End: 2025-02-21 | Stop reason: HOSPADM

## 2025-02-18 RX ORDER — DEXAMETHASONE 0.5 MG/1
0.5 TABLET ORAL 2 TIMES DAILY WITH MEALS
Status: DISCONTINUED | OUTPATIENT
Start: 2025-02-18 | End: 2025-02-21 | Stop reason: HOSPADM

## 2025-02-18 RX ORDER — POLYETHYLENE GLYCOL 3350 17 G/17G
17 POWDER, FOR SOLUTION ORAL DAILY PRN
Status: DISCONTINUED | OUTPATIENT
Start: 2025-02-18 | End: 2025-02-21 | Stop reason: HOSPADM

## 2025-02-18 RX ORDER — MAGNESIUM SULFATE IN WATER 40 MG/ML
2000 INJECTION, SOLUTION INTRAVENOUS PRN
Status: DISCONTINUED | OUTPATIENT
Start: 2025-02-18 | End: 2025-02-21 | Stop reason: HOSPADM

## 2025-02-18 RX ORDER — ACETAMINOPHEN 325 MG/1
650 TABLET ORAL EVERY 6 HOURS PRN
Status: DISCONTINUED | OUTPATIENT
Start: 2025-02-18 | End: 2025-02-21 | Stop reason: HOSPADM

## 2025-02-18 RX ORDER — POTASSIUM CHLORIDE 750 MG/1
40 TABLET, EXTENDED RELEASE ORAL PRN
Status: DISCONTINUED | OUTPATIENT
Start: 2025-02-18 | End: 2025-02-21 | Stop reason: HOSPADM

## 2025-02-18 RX ORDER — POTASSIUM CHLORIDE 7.45 MG/ML
10 INJECTION INTRAVENOUS PRN
Status: DISCONTINUED | OUTPATIENT
Start: 2025-02-18 | End: 2025-02-21 | Stop reason: HOSPADM

## 2025-02-18 RX ORDER — ONDANSETRON 2 MG/ML
4 INJECTION INTRAMUSCULAR; INTRAVENOUS EVERY 6 HOURS PRN
Status: DISCONTINUED | OUTPATIENT
Start: 2025-02-18 | End: 2025-02-21 | Stop reason: HOSPADM

## 2025-02-18 RX ORDER — ACETAMINOPHEN 650 MG/1
650 SUPPOSITORY RECTAL EVERY 6 HOURS PRN
Status: DISCONTINUED | OUTPATIENT
Start: 2025-02-18 | End: 2025-02-21 | Stop reason: HOSPADM

## 2025-02-18 RX ORDER — SODIUM CHLORIDE 0.9 % (FLUSH) 0.9 %
5-40 SYRINGE (ML) INJECTION PRN
Status: DISCONTINUED | OUTPATIENT
Start: 2025-02-18 | End: 2025-02-21 | Stop reason: HOSPADM

## 2025-02-18 RX ADMIN — SODIUM CHLORIDE, PRESERVATIVE FREE 10 ML: 5 INJECTION INTRAVENOUS at 20:32

## 2025-02-18 RX ADMIN — DEXAMETHASONE 0.5 MG: 0.5 TABLET ORAL at 19:24

## 2025-02-18 RX ADMIN — SODIUM CHLORIDE, PRESERVATIVE FREE 10 ML: 5 INJECTION INTRAVENOUS at 20:28

## 2025-02-18 ASSESSMENT — PATIENT HEALTH QUESTIONNAIRE - PHQ9
SUM OF ALL RESPONSES TO PHQ QUESTIONS 1-9: 0
1. LITTLE INTEREST OR PLEASURE IN DOING THINGS: NOT AT ALL
SUM OF ALL RESPONSES TO PHQ QUESTIONS 1-9: 0
2. FEELING DOWN, DEPRESSED OR HOPELESS: NOT AT ALL
SUM OF ALL RESPONSES TO PHQ QUESTIONS 1-9: 0
SUM OF ALL RESPONSES TO PHQ QUESTIONS 1-9: 0
SUM OF ALL RESPONSES TO PHQ9 QUESTIONS 1 & 2: 0

## 2025-02-18 ASSESSMENT — LIFESTYLE VARIABLES
HOW MANY STANDARD DRINKS CONTAINING ALCOHOL DO YOU HAVE ON A TYPICAL DAY: PATIENT DOES NOT DRINK
HOW OFTEN DO YOU HAVE A DRINK CONTAINING ALCOHOL: NEVER

## 2025-02-18 ASSESSMENT — PAIN SCALES - GENERAL: PAINLEVEL_OUTOF10: 0

## 2025-02-18 NOTE — PROGRESS NOTES
Terry Grant is a 79 y.o. male is here today for a breast cancer follow up.      1. Have you been to the ER, urgent care clinic since your last visit?  Hospitalized since your last visit?No    2. Have you seen or consulted any other health care providers outside of the [facility] since your last visit?  Include any pap smears or colon screening. No

## 2025-02-18 NOTE — PROGRESS NOTES
[facility] [place]  [address], [address]  [place]  [address]  W: [phone]  F: [phone]     HEME/ONC CONSULT    HPI:   Terry Grant is a 79 y.o. male who I am seeing for CMML      Interval history: reports gr 2 loss of appetite, gr 3 fatigue, gr 2 nausea, gr 2 loss of concentration, gr 2 sob    Review of systems was obtained and pertinent findings reviewed above. Past medical history, social history, family history, medications, and allergies are located in the electronic medical record.    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  - SRSF2 mutations can be seen in various myeloid neoplasms, and are associated with a poor prognosis in MDS, AML,  PMF, and PV.  - TET2 mutations can be seen in various myeloid neoplasms, and are associated with an increased response to  hypomethylating agents and a poor prognosis in MDS after hematopoietic stem cell transplantation.  - Co-mutation of TET2 and SRSF2 is reported to be highly predictive of a myeloid neoplasm characterized by  myelodysplasia and monocytosis, including, but not limited to, CMML    NGS:  ASXL1 U443Oyn*12; KRAS G12R; SRSF2 P95L; TET2 *, *; NO alterations detected in the following genes:  FLT3, IDH1, IDH2, NPM1      6/8/23:  pericardial window, negative    Hydrea 500 mg daily 6/9/23-  DR to 500 mg on Mon-Fri   Increased to daily on 8/22/24   Increased to 500 mg bid mon-Friday and daily on sat and Sunday on 9/9/24   Changed to 500 mg bid on Mon, Wed, Fri, 500 mg daily on Tues, thurs, Sat, Sunday on 10/17/24   Changed to 500 mg PO daily on 11/14/24    Decitabine-cedazuridine 2/4/25-2/8/25 (first cycle)    Objective:  Physical Exam:  Visit Vitals  Vitals:    02/18/25 1015   BP: (!) 84/54   Pulse: 91   Resp: 12   Temp: 97.1 °F (36.2 °C)

## 2025-02-18 NOTE — PROGRESS NOTES
[facility] Progress Note    Date: February 18, 2025        0930: Pt arrived in a wheelchair to [facility] for blood transfusion in stable condition.  Assessment completed. PIV placed to Left AC with positive blood return. Labs drawn and sent for processing. Labs reviewed. Michelle Medina RN placed a new PIV to Right forearm due to previous IV infiltrating. Patient's HGB is 3.5 and patient is symptomatic of being anemic with SOB and rigors. EMS was called to take the patient to the hospital to receive a blood transfusion.     Patient Vitals for the past 12 hrs:   Temp Pulse Resp BP SpO2   02/18/25 0953 98.3 °F (36.8 °C) 96 20 (!) 92/51 100 %            Mr. Grant was [facility]. Patient is aware of next scheduled [facility] appointment 2/25/25 at 11:30 AM.         Future Appointments   Date Time Provider Department Center   2/25/2025 11:30 AM  LAB CHAIR 1 [facility]   2/25/2025 11:45 AM Kevin Rangel MD [facility] BS AMB         Ninfa Perez, RN, RN  February 18, 2025

## 2025-02-18 NOTE — CONSENT
Informed Consent for Blood Component Transfusion Note    I have discussed with the patient the rationale for blood component transfusion; its benefits in treating or preventing fatigue, organ damage, or death; and its risk which includes mild transfusion reactions, rare risk of blood borne infection, or more serious but rare reactions. I have discussed the alternatives to transfusion, including the risk and consequences of not receiving transfusion. The patient had an opportunity to ask questions and had agreed to proceed with transfusion of blood components.    Electronically signed by Des Mendoza DO on 2/18/25 at 12:11 PM EST

## 2025-02-18 NOTE — ED PROVIDER NOTES
[facility] EMERGENCY DEPARTMENT  EMERGENCY DEPARTMENT ENCOUNTER      Pt Name: Terry Grant  MRN: [id number]  Birthdate 1945  Date of evaluation: 2/18/2025  Provider: Des Mendoza DO    CHIEF COMPLAINT       Chief Complaint   Patient presents with    Abnormal Lab         HISTORY OF PRESENT ILLNESS   (Location/Symptom, Timing/Onset, Context/Setting, Quality, Duration, Modifying Factors, Severity)  Note limiting factors.   79-year-old male comes in for generalized weakness.  History of leukemia.  He gets blood transfusions frequently.  He was unable to get any over the last week or so due to the bad weather.  He went today and was found to have a hemoglobin of 3.  He also apparently had some chest pain and shortness of breath and was sent to the ER emergently.  Here, patient denies other complaints other than the general fatigue and weakness.            Review of External Medical Records:     Nursing Notes were reviewed.    REVIEW OF SYSTEMS    (2-9 systems for level 4, 10 or more for level 5)     Review of Systems    Except as noted above the remainder of the review of systems was reviewed and negative.       PAST MEDICAL HISTORY     Past Medical History:   Diagnosis Date    Atrial fibrillation (HCC)     Chronic myelomonocytic leukemia (HCC)     Confirmed per bone marrow biopsy of 5/18/2023.  Was referred to [facility] for transplant, but may not be candidate due to age, was started on chemotherapy.    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

## 2025-02-18 NOTE — ED TRIAGE NOTES
Patient arrives via EMS from the transfusion center where he was scheduled to get a blood transfusion. Patient was co chest pain and dizziness; EMS was called to bring to ER for further eval.

## 2025-02-18 NOTE — CONSENT
Informed Consent for Blood Component Transfusion Note    I have discussed with the patient the rationale for blood component transfusion; its benefits in treating or preventing fatigue, organ damage, or death; and its risk which includes mild transfusion reactions, rare risk of blood borne infection, or more serious but rare reactions. I have discussed the alternatives to transfusion, including the risk and consequences of not receiving transfusion. The patient had an opportunity to ask questions and had agreed to proceed with transfusion of blood components.    Electronically signed by Kevin Rangel MD on 2/18/25 at 10:50 AM EST

## 2025-02-18 NOTE — H&P
[facility]  [address] [place], [place] [address] [phone]    [facility]    History & Physical    Date of service: 2/18/2025    Patient name: Terry Grant  MRN: [id number]  YOB: 1945  Age: 79 y.o.     Primary care provider:  Jonas Snider MD     Source of Information: patient, medical records                                 Chief complain: Low hemoglobin    History of present illness  Terry Grant is a 79 y.o. male who presented with low hemoglobin.  He has a history of CMML and was at his oncologist office today and was found to have a hemoglobin of 3.5 and was advised to go to the ER for blood transfusion.  Patient states he has been feeling fatigued and short of breath on exertion.  He gets frequent blood transfusions but last week was unable to get his transfusion due to being sick and also due to inclement weather.    Past Medical History:   Diagnosis Date    Atrial fibrillation (HCC)     Chronic myelomonocytic leukemia (HCC)     Confirmed per bone marrow biopsy of 5/18/2023.  Was referred to [facility] for transplant, but may not be candidate due to age, was started on chemotherapy.    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)    CARDIAC PROCEDURE N/A 6/7/2023    Pericardiocentesis performed by Donovan Fan MD at [facility] CARDIAC CATH LAB    CATARACT REMOVAL Right     COLONOSCOPY N/A 4/12/2017    COLONOSCOPY performed by Perico Stiles MD at [facility] ENDOSCOPY    CYSTOSCOPY Bilateral 6/2/2023    CYSTOSCOPY, BILATERAL  STENTS, RETROGRADE performed by Jonas Gatica MD at [facility] MAIN OR    PERICARDIUM SURGERY N/A 6/8/2023    PERICARDIAL WINDOW performed by Devon Uriarte MD at [facility] OPEN HEART    THORACENTESIS  6/29/2023    THORACENTESIS N/A

## 2025-02-19 LAB
ANION GAP SERPL CALC-SCNC: 8 MMOL/L (ref 2–12)
BASOPHILS # BLD: 0 K/UL (ref 0–0.1)
BASOPHILS # BLD: 0 K/UL (ref 0–0.1)
BASOPHILS NFR BLD: 0 % (ref 0–1)
BASOPHILS NFR BLD: 0 % (ref 0–1)
BUN SERPL-MCNC: 54 MG/DL (ref 6–20)
BUN/CREAT SERPL: 25 (ref 12–20)
CALCIUM SERPL-MCNC: 8.4 MG/DL (ref 8.5–10.1)
CHLORIDE SERPL-SCNC: 109 MMOL/L (ref 97–108)
CO2 SERPL-SCNC: 21 MMOL/L (ref 21–32)
CREAT SERPL-MCNC: 2.19 MG/DL (ref 0.7–1.3)
DIFFERENTIAL METHOD BLD: ABNORMAL
DIFFERENTIAL METHOD BLD: ABNORMAL
EKG ATRIAL RATE: 96 BPM
EKG DIAGNOSIS: NORMAL
EKG P AXIS: 47 DEGREES
EKG P-R INTERVAL: 146 MS
EKG Q-T INTERVAL: 378 MS
EKG QRS DURATION: 88 MS
EKG QTC CALCULATION (BAZETT): 477 MS
EKG R AXIS: 18 DEGREES
EKG T AXIS: 44 DEGREES
EKG VENTRICULAR RATE: 96 BPM
EOSINOPHIL # BLD: 0.09 K/UL (ref 0–0.4)
EOSINOPHIL # BLD: 0.19 K/UL (ref 0–0.4)
EOSINOPHIL NFR BLD: 1 % (ref 0–7)
EOSINOPHIL NFR BLD: 2 % (ref 0–7)
ERYTHROCYTE [DISTWIDTH] IN BLOOD BY AUTOMATED COUNT: 15.8 % (ref 11.5–14.5)
ERYTHROCYTE [DISTWIDTH] IN BLOOD BY AUTOMATED COUNT: 17.1 % (ref 11.5–14.5)
GLUCOSE SERPL-MCNC: 97 MG/DL (ref 65–100)
HCT VFR BLD AUTO: 14.5 % (ref 36.6–50.3)
HCT VFR BLD AUTO: 18.4 % (ref 36.6–50.3)
HCT VFR BLD AUTO: 19.3 % (ref 36.6–50.3)
HGB BLD-MCNC: 4.8 G/DL (ref 12.1–17)
HGB BLD-MCNC: 6.5 G/DL (ref 12.1–17)
HGB BLD-MCNC: 6.6 G/DL (ref 12.1–17)
HISTORY CHECK: NORMAL
IMM GRANULOCYTES # BLD AUTO: 0 K/UL
IMM GRANULOCYTES # BLD AUTO: 0 K/UL (ref 0–0.04)
IMM GRANULOCYTES NFR BLD AUTO: 0 %
IMM GRANULOCYTES NFR BLD AUTO: 0 % (ref 0–0.5)
LYMPHOCYTES # BLD: 1.29 K/UL (ref 0.8–3.5)
LYMPHOCYTES # BLD: 1.69 K/UL (ref 0.8–3.5)
LYMPHOCYTES NFR BLD: 14 % (ref 12–49)
LYMPHOCYTES NFR BLD: 18 % (ref 12–49)
MCH RBC QN AUTO: 30 PG (ref 26–34)
MCH RBC QN AUTO: 31.4 PG (ref 26–34)
MCHC RBC AUTO-ENTMCNC: 33.1 G/DL (ref 30–36.5)
MCHC RBC AUTO-ENTMCNC: 35.3 G/DL (ref 30–36.5)
MCV RBC AUTO: 88.9 FL (ref 80–99)
MCV RBC AUTO: 90.6 FL (ref 80–99)
METAMYELOCYTES NFR BLD MANUAL: 1 %
METAMYELOCYTES NFR BLD MANUAL: 5 %
MONOCYTES # BLD: 5.61 K/UL (ref 0–1)
MONOCYTES # BLD: 6.02 K/UL (ref 0–1)
MONOCYTES NFR BLD: 61 % (ref 5–13)
MONOCYTES NFR BLD: 64 % (ref 5–13)
MYELOCYTES NFR BLD MANUAL: 1 %
NEUTS BAND NFR BLD MANUAL: 1 % (ref 0–6)
NEUTS SEG # BLD: 1.41 K/UL (ref 1.8–8)
NEUTS SEG # BLD: 1.66 K/UL (ref 1.8–8)
NEUTS SEG NFR BLD: 15 % (ref 32–75)
NEUTS SEG NFR BLD: 17 % (ref 32–75)
NRBC # BLD: 0.12 K/UL (ref 0–0.01)
NRBC # BLD: 0.15 K/UL (ref 0–0.01)
NRBC BLD-RTO: 1.3 PER 100 WBC
NRBC BLD-RTO: 1.6 PER 100 WBC
PLATELET # BLD AUTO: 28 K/UL (ref 150–400)
PLATELET # BLD AUTO: 31 K/UL (ref 150–400)
PMV BLD AUTO: 11.6 FL (ref 8.9–12.9)
PMV BLD AUTO: 11.8 FL (ref 8.9–12.9)
POTASSIUM SERPL-SCNC: 4.3 MMOL/L (ref 3.5–5.1)
RBC # BLD AUTO: 1.6 M/UL (ref 4.1–5.7)
RBC # BLD AUTO: 2.07 M/UL (ref 4.1–5.7)
RBC MORPH BLD: ABNORMAL
SODIUM SERPL-SCNC: 138 MMOL/L (ref 136–145)
WBC # BLD AUTO: 9.2 K/UL (ref 4.1–11.1)
WBC # BLD AUTO: 9.4 K/UL (ref 4.1–11.1)
WBC MORPH BLD: ABNORMAL

## 2025-02-19 PROCEDURE — 2060000000 HC ICU INTERMEDIATE R&B

## 2025-02-19 PROCEDURE — 93010 ELECTROCARDIOGRAM REPORT: CPT | Performed by: SPECIALIST

## 2025-02-19 PROCEDURE — P9040 RBC LEUKOREDUCED IRRADIATED: HCPCS

## 2025-02-19 PROCEDURE — 36430 TRANSFUSION BLD/BLD COMPNT: CPT

## 2025-02-19 PROCEDURE — 2500000003 HC RX 250 WO HCPCS: Performed by: FAMILY MEDICINE

## 2025-02-19 PROCEDURE — 6360000002 HC RX W HCPCS: Performed by: FAMILY MEDICINE

## 2025-02-19 PROCEDURE — 85018 HEMOGLOBIN: CPT

## 2025-02-19 PROCEDURE — 30233N1 TRANSFUSION OF NONAUTOLOGOUS RED BLOOD CELLS INTO PERIPHERAL VEIN, PERCUTANEOUS APPROACH: ICD-10-PCS | Performed by: FAMILY MEDICINE

## 2025-02-19 PROCEDURE — 36415 COLL VENOUS BLD VENIPUNCTURE: CPT

## 2025-02-19 PROCEDURE — 85014 HEMATOCRIT: CPT

## 2025-02-19 PROCEDURE — 80048 BASIC METABOLIC PNL TOTAL CA: CPT

## 2025-02-19 PROCEDURE — 85025 COMPLETE CBC W/AUTO DIFF WBC: CPT

## 2025-02-19 PROCEDURE — 94761 N-INVAS EAR/PLS OXIMETRY MLT: CPT

## 2025-02-19 RX ORDER — SODIUM CHLORIDE 9 MG/ML
INJECTION, SOLUTION INTRAVENOUS PRN
Status: DISCONTINUED | OUTPATIENT
Start: 2025-02-19 | End: 2025-02-21 | Stop reason: HOSPADM

## 2025-02-19 RX ADMIN — SODIUM CHLORIDE, PRESERVATIVE FREE 10 ML: 5 INJECTION INTRAVENOUS at 20:15

## 2025-02-19 RX ADMIN — SODIUM CHLORIDE, PRESERVATIVE FREE 10 ML: 5 INJECTION INTRAVENOUS at 08:37

## 2025-02-19 RX ADMIN — DEXAMETHASONE 0.5 MG: 0.5 TABLET ORAL at 08:36

## 2025-02-19 NOTE — CARE COORDINATION
Care Management Initial Assessment  2/19/2025 2:45 PM  If patient is discharged prior to next notation, then this note serves as note for discharge by case management.    Reason for Admission:   Anemia [D64.9]  Symptomatic anemia [D64.9]  Other specified leukemia not having achieved remission (HCC) [C94.80]         Patient Admission Status: Inpatient  Date Admitted to INP: 2/18  RUR: Readmission Risk Score: 17.6    Hospitalization in the last 30 days (Readmission):  No        Advance Care Planning:  Code Status: Full Code  Primary Healthcare Decision Maker: (P) Legal Next of Kin  Primary Decision Maker: Jazmín Grant - Spouse - [phone]   Advance Directive: has NO advanced directive - not interested in additional information     __________________________________________________________________________  Assessment:      02/19/25 1443   Service Assessment   Patient Orientation Alert and Oriented   Cognition Alert   History Provided By Patient   Primary Caregiver Self   Support Systems Spouse/Significant Other   Patient's Healthcare Decision Maker is: Legal Next of Kin   PCP Verified by CM Yes   Last Visit to PCP Within last 6 months   Prior Functional Level Independent in ADLs/IADLs   Current Functional Level Independent in ADLs/IADLs   Can patient return to prior living arrangement Yes   Ability to make needs known: Good   Family able to assist with home care needs: Yes   Would you like for me to discuss the discharge plan with any other family members/significant others, and if so, who? No   Financial Resources Medicare   Social/Functional History   Lives With Spouse   Type of Home House   Home Layout One level   Home Access Stairs to enter with rails   Entrance Stairs - Number of Steps 3   Entrance Stairs - Rails Both   Bathroom Shower/Tub Walk-in shower   Bathroom Toilet Standard   Bathroom Equipment Built-in shower seat   Bathroom Accessibility Accessible   Home Equipment None   Receives Help From Other

## 2025-02-20 VITALS
TEMPERATURE: 101.1 F | RESPIRATION RATE: 15 BRPM | OXYGEN SATURATION: 100 % | DIASTOLIC BLOOD PRESSURE: 85 MMHG | SYSTOLIC BLOOD PRESSURE: 138 MMHG | HEART RATE: 101 BPM

## 2025-02-20 LAB
ANION GAP SERPL CALC-SCNC: 6 MMOL/L (ref 2–12)
BASOPHILS # BLD: 0 K/UL (ref 0–0.1)
BASOPHILS # BLD: 0 K/UL (ref 0–0.1)
BASOPHILS NFR BLD: 0 % (ref 0–1)
BASOPHILS NFR BLD: 0 % (ref 0–1)
BUN SERPL-MCNC: 60 MG/DL (ref 6–20)
BUN/CREAT SERPL: 32 (ref 12–20)
CALCIUM SERPL-MCNC: 7.9 MG/DL (ref 8.5–10.1)
CHLORIDE SERPL-SCNC: 111 MMOL/L (ref 97–108)
CO2 SERPL-SCNC: 20 MMOL/L (ref 21–32)
CREAT SERPL-MCNC: 1.86 MG/DL (ref 0.7–1.3)
DIFFERENTIAL METHOD BLD: ABNORMAL
DIFFERENTIAL METHOD BLD: ABNORMAL
EOSINOPHIL # BLD: 0 K/UL (ref 0–0.4)
EOSINOPHIL # BLD: 0.13 K/UL (ref 0–0.4)
EOSINOPHIL NFR BLD: 0 % (ref 0–7)
EOSINOPHIL NFR BLD: 1 % (ref 0–7)
ERYTHROCYTE [DISTWIDTH] IN BLOOD BY AUTOMATED COUNT: 17.9 % (ref 11.5–14.5)
ERYTHROCYTE [DISTWIDTH] IN BLOOD BY AUTOMATED COUNT: 18.3 % (ref 11.5–14.5)
GLUCOSE SERPL-MCNC: 110 MG/DL (ref 65–100)
HCT VFR BLD AUTO: 18.4 % (ref 36.6–50.3)
HCT VFR BLD AUTO: 18.8 % (ref 36.6–50.3)
HCT VFR BLD AUTO: 22 % (ref 36.6–50.3)
HGB BLD-MCNC: 6.4 G/DL (ref 12.1–17)
HGB BLD-MCNC: 6.5 G/DL (ref 12.1–17)
HGB BLD-MCNC: 7.5 G/DL (ref 12.1–17)
IMM GRANULOCYTES # BLD AUTO: 0 K/UL
IMM GRANULOCYTES # BLD AUTO: 0 K/UL
IMM GRANULOCYTES NFR BLD AUTO: 0 %
IMM GRANULOCYTES NFR BLD AUTO: 0 %
LYMPHOCYTES # BLD: 2.34 K/UL (ref 0.8–3.5)
LYMPHOCYTES # BLD: 3.08 K/UL (ref 0.8–3.5)
LYMPHOCYTES NFR BLD: 18 % (ref 12–49)
LYMPHOCYTES NFR BLD: 22 % (ref 12–49)
MAGNESIUM SERPL-MCNC: 2.1 MG/DL (ref 1.6–2.4)
MCH RBC QN AUTO: 29.8 PG (ref 26–34)
MCH RBC QN AUTO: 30.1 PG (ref 26–34)
MCHC RBC AUTO-ENTMCNC: 34.1 G/DL (ref 30–36.5)
MCHC RBC AUTO-ENTMCNC: 34.6 G/DL (ref 30–36.5)
MCV RBC AUTO: 87 FL (ref 80–99)
MCV RBC AUTO: 87.3 FL (ref 80–99)
METAMYELOCYTES NFR BLD MANUAL: 1 %
METAMYELOCYTES NFR BLD MANUAL: 3 %
MONOCYTES # BLD: 6.3 K/UL (ref 0–1)
MONOCYTES # BLD: 8.19 K/UL (ref 0–1)
MONOCYTES NFR BLD: 45 % (ref 5–13)
MONOCYTES NFR BLD: 63 % (ref 5–13)
MYELOCYTES NFR BLD MANUAL: 2 %
NEUTS BAND NFR BLD MANUAL: 4 % (ref 0–6)
NEUTS SEG # BLD: 1.95 K/UL (ref 1.8–8)
NEUTS SEG # BLD: 4.2 K/UL (ref 1.8–8)
NEUTS SEG NFR BLD: 15 % (ref 32–75)
NEUTS SEG NFR BLD: 26 % (ref 32–75)
NRBC # BLD: 0.18 K/UL (ref 0–0.01)
NRBC # BLD: 0.29 K/UL (ref 0–0.01)
NRBC BLD-RTO: 1.3 PER 100 WBC
NRBC BLD-RTO: 2.2 PER 100 WBC
PLATELET # BLD AUTO: 13 K/UL (ref 150–400)
PLATELET # BLD AUTO: 17 K/UL (ref 150–400)
POTASSIUM SERPL-SCNC: 4.1 MMOL/L (ref 3.5–5.1)
RBC # BLD AUTO: 2.16 M/UL (ref 4.1–5.7)
RBC # BLD AUTO: 2.52 M/UL (ref 4.1–5.7)
RBC MORPH BLD: ABNORMAL
SODIUM SERPL-SCNC: 137 MMOL/L (ref 136–145)
WBC # BLD AUTO: 13 K/UL (ref 4.1–11.1)
WBC # BLD AUTO: 14 K/UL (ref 4.1–11.1)
WBC MORPH BLD: ABNORMAL

## 2025-02-20 PROCEDURE — 2500000003 HC RX 250 WO HCPCS: Performed by: FAMILY MEDICINE

## 2025-02-20 PROCEDURE — 85014 HEMATOCRIT: CPT

## 2025-02-20 PROCEDURE — 6360000002 HC RX W HCPCS: Performed by: FAMILY MEDICINE

## 2025-02-20 PROCEDURE — 80048 BASIC METABOLIC PNL TOTAL CA: CPT

## 2025-02-20 PROCEDURE — P9040 RBC LEUKOREDUCED IRRADIATED: HCPCS

## 2025-02-20 PROCEDURE — 85018 HEMOGLOBIN: CPT

## 2025-02-20 PROCEDURE — P9059 PLASMA, FRZ BETWEEN 8-24HOUR: HCPCS

## 2025-02-20 PROCEDURE — 83735 ASSAY OF MAGNESIUM: CPT

## 2025-02-20 PROCEDURE — 36430 TRANSFUSION BLD/BLD COMPNT: CPT

## 2025-02-20 PROCEDURE — 30233L1 TRANSFUSION OF NONAUTOLOGOUS FRESH PLASMA INTO PERIPHERAL VEIN, PERCUTANEOUS APPROACH: ICD-10-PCS | Performed by: FAMILY MEDICINE

## 2025-02-20 PROCEDURE — 85025 COMPLETE CBC W/AUTO DIFF WBC: CPT

## 2025-02-20 PROCEDURE — 94761 N-INVAS EAR/PLS OXIMETRY MLT: CPT

## 2025-02-20 PROCEDURE — 36415 COLL VENOUS BLD VENIPUNCTURE: CPT

## 2025-02-20 RX ORDER — SODIUM CHLORIDE 9 MG/ML
INJECTION, SOLUTION INTRAVENOUS PRN
Status: DISCONTINUED | OUTPATIENT
Start: 2025-02-20 | End: 2025-02-21 | Stop reason: HOSPADM

## 2025-02-20 RX ADMIN — SODIUM CHLORIDE, PRESERVATIVE FREE 10 ML: 5 INJECTION INTRAVENOUS at 08:33

## 2025-02-20 RX ADMIN — DEXAMETHASONE 0.5 MG: 0.5 TABLET ORAL at 08:33

## 2025-02-20 NOTE — CARE COORDINATION
Care Management Progress Note    Reason for Admission:   Anemia [D64.9]  Symptomatic anemia [D64.9]  Other specified leukemia not having achieved remission (HCC) [C94.80]         Patient Admission Status: Inpatient  RUR:   Hospitalization in the last 30 days (Readmission):  No        Transition of care plan:  Medical - ongoing medical management, transfusion  DC plan - Home with family when stable  Discharge plan communicated with patient and/or discharge caregiver: Yes    Outpatient follow-up - per MD  Transport at discharge:  Family

## 2025-02-20 NOTE — PLAN OF CARE
Problem: Safety - Adult  Goal: Free from fall injury  Outcome: Adequate for Discharge     Problem: Discharge Planning  Goal: Discharge to home or other facility with appropriate resources  Outcome: Adequate for Discharge

## 2025-02-20 NOTE — PROGRESS NOTES
LIDIA FARLEY [facility]  [address] [place] [address]  [phone]      Hospitalist  Progress Note      NAME:       Terry Grant   :        1945  MRM:        [id number]    Date of service: 2025      Subjective: Patient seen and examined by me. Patient admitted with symptomatic anemia. He is feeling slighting better today and reports improvement in fatigue/energy levels. Denies any active bleeding.     Objective: Hgb 6.5 following 3 units PRBC.     Vital Signs:    BP (!) 97/52   Pulse 80   Temp 98.5 °F (36.9 °C) (Oral)   Resp 19   SpO2 98%        Intake/Output Summary (Last 24 hours) at 2025 1201  Last data filed at 2025 0830  Gross per 24 hour   Intake 1234.5 ml   Output 1450 ml   Net -215.5 ml        Current inpatient medications reviewed:  Current Facility-Administered Medications   Medication Dose Route Frequency    0.9 % sodium chloride infusion   IntraVENous PRN    0.9 % sodium chloride infusion   IntraVENous PRN    0.9 % sodium chloride infusion   IntraVENous PRN    sodium chloride flush 0.9 % injection 5-40 mL  5-40 mL IntraVENous 2 times per day    sodium chloride flush 0.9 % injection 5-40 mL  5-40 mL IntraVENous PRN    0.9 % sodium chloride infusion   IntraVENous PRN    potassium chloride (KLOR-CON) extended release tablet 40 mEq  40 mEq Oral PRN    Or    potassium bicarb-citric acid (EFFER-K) effervescent tablet 40 mEq  40 mEq Oral PRN    Or    potassium chloride 10 mEq/100 mL IVPB (Peripheral Line)  10 mEq IntraVENous PRN    magnesium sulfate 2000 mg in 50 mL IVPB premix  2,000 mg IntraVENous PRN    ondansetron (ZOFRAN-ODT) disintegrating tablet 4 mg  4 mg Oral Q8H PRN    Or    ondansetron (ZOFRAN) injection 4 mg  4 mg IntraVENous Q6H PRN    polyethylene glycol (GLYCOLAX) packet 17 g  17 g Oral Daily PRN    acetaminophen (TYLENOL) tablet 650 mg  650 mg Oral Q6H PRN    Or    
3136  Hgb 6.6.  Discussed with Jayla REID.      0007  Informed patient that hgb was 6.6 and would need another unit of blood.  Patient is refusing at this time stating \"no more.\"  Discussed with Jayla REID.     6100  Bedside and Verbal shift change report given to German (oncoming nurse) by Na (offgoing nurse). Report included the following information Nurse Handoff Report.     
Spiritual Health History and Assessment/Progress Note  [facility]    Rituals, Rites and Sacraments,  ,  ,      Name: Terry Grant MRN: [id number]    Age: 79 y.o.     Sex: male   Language: English   Voodoo: Shinto   Anemia     Date: 2/20/2025            Total Time Calculated: 5 min              Spiritual Assessment continued in [facility] B3 INTERMEDIATE CARE UNIT        Referral/Consult From: Clergy/   Encounter Overview/Reason: Rituals, Rites and Sacraments  Service Provided For: Patient    Vandana, Belief, Meaning:   Patient is connected with a vandana tradition or spiritual practice  Family/Friends No family/friends present      Importance and Influence:  Patient has spiritual/personal beliefs that influence decisions regarding their health  Family/Friends No family/friends present    Community:  Patient is connected with a spiritual community  Family/Friends No family/friends present    Assessment and Plan of Care:     Patient Interventions include: Provided sacramental/Worship ritual  Family/Friends Interventions include: No family/friends present    Patient Plan of Care: Spiritual Care available upon further referral  Family/Friends Plan of Care: Spiritual Care available upon further referral    Electronically signed by Chaplain RUSSELL on 2/20/2025 at 3:34 PM       [facility]. Mr. Grant is Shinto. He has been visited by Sr. Wright during previous admission.  Prayer and communion offered. He is hoping he doesn't have to stay too long this time. Assured of continued prayer for his well-being.    ELDA Cox Sr., RN, ACSW, LCSW   Page:  [phone]  
            Constitutional: Appears well-developed and well-nourished in no apparent distress      Mental status: Alert and awake, Oriented to person/place/time, Able to follow commands    Eyes: EOM normal, Sclera normal, No visible ocular discharge    HENT: Normocephalic, atraumatic    Mouth/Throat: Moist mucous membranes    External Ears normal    Neck: No visualized mass    Pulmonary/Chest: Respiratory effort normal, No visualized signs of difficulty breathing or respiratory distress    Musculoskeletal: Normal range of motion of neck    Neurological: No facial asymmetry (Cranial nerve 7 motor function), No gaze palsy    Skin: No significant exanthematous lesions or discoloration noted on facial skin    Psychiatric: Normal affect, No hallucinations                                                                       Diagnostic Imaging     5/17/23 CTA chest  IMPRESSION:  There is no pulmonary embolism.  There is no aortic aneurysm or dissection.  Cardiomegaly, pulmonary arterial hypertension and small pericardial effusion.     Hepatic steatosis nodular hepatic contour and splenomegaly suggests cirrhotic  change.    Lab Results  Lab Results   Component Value Date    WBC 9.4 02/19/2025    HGB 6.5 (L) 02/19/2025    HCT 18.4 (L) 02/19/2025    PLT 28 (LL) 02/19/2025    MCV 88.9 02/19/2025    NEUTROABS 1.41 (L) 02/19/2025     Lab Results   Component Value Date     02/19/2025    K 4.3 02/19/2025     (H) 02/19/2025    CO2 21 02/19/2025    GLUCOSE 97 02/19/2025    BUN 54 (H) 02/19/2025    CREATININE 2.19 (H) 02/19/2025    LABGLOM 30 (L) 02/19/2025    CALCIUM 8.4 (L) 02/19/2025    MG 1.8 07/01/2023    PHOS 3.7 07/03/2023     Lab Results   Component Value Date    BILITOT 0.7 02/18/2025    ALT 18 02/18/2025    AST 15 02/18/2025    ALKPHOS 48 02/18/2025    GLOB 4.6 (H) 02/18/2025       Assessment/Plan:  79 y.o. male with history of neutropenia and thrombocytopenia, now with CMML    CMML-MPN: CMML-2; [place] molecular 
thrombocytopenia, now with CMML    CMML-MPN: CMML-2; [place] molecular model, intermediate risk at 2 points, median OS 31 months; CPSS-mol    Discussed referral to [facility] for alloSCT eval -- though his age may preclude this -- his wife has discussed with them.  He declines.  Discussed [facility].  He declines    normal male karotype; see NGS above    Options for tx other than transplant:  HMA such as azacitadien or decitabine.      discussed, Decitabine 35 mg/cedazuridine 100 mg once daily on days 1 to 5 of each 28-day treatment cycle for a minimum of 4 cycles, until disease progression or unacceptable toxicity. AEs include but are not limited to myelosuppression, rash, diarrhea, nausea, stomatitis, LFTs increased, infection, may be life threatening, constipation, and rarely death    After this discussion, he is agreeable, has participated in shared decision making, has signed informed consent.      Started on 2/4/25, next cycle due 3/4/25.  Will DR to 4 days, discussed with pt    The duration of this treatment plan will be until progression, intolerable side effects, or patient choice.    We will plan to see the patient in follow up at least once per 4-12 weeks, or sooner if symptoms warrant.  Labs with each visit for intensive monitoring for toxicity        2. Thrombocytopenia: due to therapy, will DR as above    3. Monocytosis: due to disease    4. Afib: metoprolol and xarelto and amiodarone; hold xarelto with plt < 50    5. Anemia:  due to CMML and hydrea; received 1 U PRBC on 10/18/24 and 11/15/24, 11/25/24; s/p 4, receiving 5th PRBC.  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 Hydronephrosis:  sees nephrology on 6/27/23, s/p bilateral ureteral stents; s/p stent removal;  encouraged much more PO intake    8. Neutropenia:  due to therapy    9.  Fatigue:  dexamethasone 0.5 mg PO

## 2025-02-20 NOTE — DISCHARGE SUMMARY
Hospitalist Discharge Summary     Patient ID:  Terry Grant  [id number]  79 y.o.  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 *         Hospital Course:    #Symptomatic anemia. Improving. History of CMML. Received 5 units PRBC and 1 unit FFP/     - H&H post transfusion stable  - Dexamethasone PO   - oncology following     #Thrombocytopenia. Worsening. Platelets 17.     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 Dr. Rangel aware and plans to follow up closely in clinic.      #CMML  -Follows with oncology and is currently on medication therapy  - Has declined referrals to [facility] and [facility] for [facility]oSCT eval  -appreciate oncology reccs      #A-fib  -Hold metoprolol due to soft blood pressures  -Hold Xarelto due to low platelets     #Hypertension  -Hold lisinopril due to soft blood pressures  -Resume as tolerated    Imaging  XR CHEST PORTABLE    Result Date: 2/18/2025  No acute process on portable chest. Electronically signed by Gautam Mahajan MD       PCP: Jonas Snider MD     Consults: hematology/oncology    Condition of patient at discharge: Stable    Discharge Exam:    Physical Exam:    Gen: Well-developed, well-nourished, in no acute distress  HEENT:  Pink conjunctivae, EOMI , hearing intact to voice, moist mucous membranes  Neck: Supple, without masses, thyroid non-tender  Resp: No accessory muscle use, clear breath sounds without wheezes rales or rhonchi  Card: No murmurs, normal S1, S2 without thrills, bruits or peripheral edema  Abd:  Soft, non-tender, non-distended, normoactive bowel sounds are present, no palpable organomegaly and no detectable hernias  Lymph:  No cervical or inguinal adenopathy  Musc: No

## 2025-02-20 NOTE — DISCHARGE INSTRUCTIONS
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 Your discharging Hospitalist is BIJU Gray CNP. You were admitted for evaluation and treatment for the above diagnoses.  Please follow up with . There are no changes in your medications.     Medications:     It is important that medications are taken exactly as they are prescribed on the discharge medication instructions and keep them your  in the bottles provided by the pharmacist.   Keep a list of the medication names, dosages, and times to be taken at all times.    Do not take other medications without consulting your doctor.     Recommended diet:  regular diet    Recommended activity: activity as tolerated    Post discharge care:    For questions regarding your Hospitalization or to contact the Hospital Medicine team, please call [phone].    Notify follow up health care provider or return to the emergency department if you cannot get hold of your doctor if you feel worse or experience symptoms similar to those that brought you to hospital    Kevin Rangel MD  [address] [address] [facility] [address]  [facility] [address] [phone]    Follow up      Jonas Snider MD  [address] [facility] [address] [phone]    Follow up         Information obtained by :  I understand that if any problems occur once I am at home I am to contact my physician and I understand and acknowledge receipt of the instructions indicated above.

## 2025-02-21 LAB
ABO + RH BLD: NORMAL
BLD PROD TYP BPU: NORMAL
BLOOD BANK BLOOD PRODUCT EXPIRATION DATE: NORMAL
BLOOD BANK DISPENSE STATUS: NORMAL
BLOOD BANK ISBT PRODUCT BLOOD TYPE: 5100
BLOOD BANK ISBT PRODUCT BLOOD TYPE: 6200
BLOOD BANK ISBT PRODUCT BLOOD TYPE: 9500
BLOOD BANK ISBT PRODUCT BLOOD TYPE: 9500
BLOOD BANK PRODUCT CODE: NORMAL
BLOOD BANK UNIT TYPE AND RH: NORMAL
BLOOD GROUP ANTIBODIES SERPL: NORMAL
BPU ID: NORMAL
CROSSMATCH RESULT: NORMAL
SPECIMEN EXP DATE BLD: NORMAL
UNIT DIVISION: 0
UNIT ISSUE DATE/TIME: NORMAL

## 2025-02-25 ENCOUNTER — OFFICE VISIT (OUTPATIENT)
Age: 80
End: 2025-02-25
Payer: MEDICARE

## 2025-02-25 ENCOUNTER — HOSPITAL ENCOUNTER (OUTPATIENT)
Facility: HOSPITAL | Age: 80
Setting detail: INFUSION SERIES
Discharge: HOME | End: 2025-02-25
Payer: MEDICARE

## 2025-02-25 VITALS
HEART RATE: 113 BPM | TEMPERATURE: 101.7 F | WEIGHT: 144 LBS | BODY MASS INDEX: 21.82 KG/M2 | SYSTOLIC BLOOD PRESSURE: 108 MMHG | DIASTOLIC BLOOD PRESSURE: 67 MMHG | HEIGHT: 68 IN | RESPIRATION RATE: 16 BRPM | OXYGEN SATURATION: 99 %

## 2025-02-25 VITALS
SYSTOLIC BLOOD PRESSURE: 117 MMHG | TEMPERATURE: 98.7 F | HEART RATE: 95 BPM | RESPIRATION RATE: 18 BRPM | BODY MASS INDEX: 21.97 KG/M2 | DIASTOLIC BLOOD PRESSURE: 60 MMHG | WEIGHT: 144.5 LBS | OXYGEN SATURATION: 98 %

## 2025-02-25 DIAGNOSIS — D64.81 ANEMIA DUE TO ANTINEOPLASTIC CHEMOTHERAPY: ICD-10-CM

## 2025-02-25 DIAGNOSIS — I48.0 PAROXYSMAL ATRIAL FIBRILLATION (HCC): ICD-10-CM

## 2025-02-25 DIAGNOSIS — C93.10 CHRONIC MYELOMONOCYTIC LEUKEMIA NOT HAVING ACHIEVED REMISSION (HCC): Primary | ICD-10-CM

## 2025-02-25 DIAGNOSIS — T45.1X5A ANEMIA DUE TO ANTINEOPLASTIC CHEMOTHERAPY: ICD-10-CM

## 2025-02-25 LAB
ALBUMIN SERPL-MCNC: 2.8 G/DL (ref 3.5–5)
ALBUMIN/GLOB SERPL: 0.7 (ref 1.1–2.2)
ALP SERPL-CCNC: 69 U/L (ref 45–117)
ALT SERPL-CCNC: 24 U/L (ref 12–78)
ANION GAP SERPL CALC-SCNC: 6 MMOL/L (ref 2–12)
APPEARANCE UR: ABNORMAL
AST SERPL-CCNC: 18 U/L (ref 15–37)
BACTERIA URNS QL MICRO: NEGATIVE /HPF
BASOPHILS # BLD: 0.12 K/UL (ref 0–0.1)
BASOPHILS NFR BLD: 1 % (ref 0–1)
BILIRUB SERPL-MCNC: 0.8 MG/DL (ref 0.2–1)
BILIRUB UR QL: NEGATIVE
BLASTS NFR BLD MANUAL: 3 %
BUN SERPL-MCNC: 43 MG/DL (ref 6–20)
BUN/CREAT SERPL: 21 (ref 12–20)
CALCIUM SERPL-MCNC: 8.3 MG/DL (ref 8.5–10.1)
CAOX CRY URNS QL MICRO: ABNORMAL
CHLORIDE SERPL-SCNC: 107 MMOL/L (ref 97–108)
CO2 SERPL-SCNC: 22 MMOL/L (ref 21–32)
COLOR UR: ABNORMAL
CREAT SERPL-MCNC: 2.01 MG/DL (ref 0.7–1.3)
DIFFERENTIAL METHOD BLD: ABNORMAL
EOSINOPHIL # BLD: 0 K/UL (ref 0–0.4)
EOSINOPHIL NFR BLD: 0 % (ref 0–7)
EPITH CASTS URNS QL MICRO: ABNORMAL /LPF
ERYTHROCYTE [DISTWIDTH] IN BLOOD BY AUTOMATED COUNT: 19.8 % (ref 11.5–14.5)
GLOBULIN SER CALC-MCNC: 4.1 G/DL (ref 2–4)
GLUCOSE SERPL-MCNC: 162 MG/DL (ref 65–100)
GLUCOSE UR STRIP.AUTO-MCNC: NEGATIVE MG/DL
HCT VFR BLD AUTO: 17 % (ref 36.6–50.3)
HGB BLD-MCNC: 5.4 G/DL (ref 12.1–17)
HGB UR QL STRIP: ABNORMAL
HISTORY CHECK: NORMAL
HYALINE CASTS URNS QL MICRO: ABNORMAL /LPF (ref 0–5)
IMM GRANULOCYTES # BLD AUTO: 0 K/UL
IMM GRANULOCYTES NFR BLD AUTO: 0 %
KETONES UR QL STRIP.AUTO: NEGATIVE MG/DL
LEUKOCYTE ESTERASE UR QL STRIP.AUTO: NEGATIVE
LYMPHOCYTES # BLD: 2.58 K/UL (ref 0.8–3.5)
LYMPHOCYTES NFR BLD: 21 % (ref 12–49)
MCH RBC QN AUTO: 29.7 PG (ref 26–34)
MCHC RBC AUTO-ENTMCNC: 31.8 G/DL (ref 30–36.5)
MCV RBC AUTO: 93.4 FL (ref 80–99)
METAMYELOCYTES NFR BLD MANUAL: 2 %
MONOCYTES # BLD: 6.03 K/UL (ref 0–1)
MONOCYTES NFR BLD: 49 % (ref 5–13)
MYELOCYTES NFR BLD MANUAL: 8 %
NEUTS SEG # BLD: 1.97 K/UL (ref 1.8–8)
NEUTS SEG NFR BLD: 16 % (ref 32–75)
NITRITE UR QL STRIP.AUTO: NEGATIVE
NRBC # BLD: 0.09 K/UL (ref 0–0.01)
NRBC BLD-RTO: 0.7 PER 100 WBC
PH UR STRIP: 5 (ref 5–8)
PLATELET # BLD AUTO: 38 K/UL (ref 150–400)
POTASSIUM SERPL-SCNC: 3.9 MMOL/L (ref 3.5–5.1)
PROT SERPL-MCNC: 6.9 G/DL (ref 6.4–8.2)
PROT UR STRIP-MCNC: 30 MG/DL
RBC # BLD AUTO: 1.82 M/UL (ref 4.1–5.7)
RBC #/AREA URNS HPF: ABNORMAL /HPF (ref 0–5)
RBC MORPH BLD: ABNORMAL
RBC MORPH BLD: ABNORMAL
SODIUM SERPL-SCNC: 135 MMOL/L (ref 136–145)
SP GR UR REFRACTOMETRY: 1.02 (ref 1–1.03)
URINE CULTURE IF INDICATED: ABNORMAL
UROBILINOGEN UR QL STRIP.AUTO: 0.2 EU/DL (ref 0.2–1)
WBC # BLD AUTO: 12.3 K/UL (ref 4.1–11.1)
WBC URNS QL MICRO: ABNORMAL /HPF (ref 0–4)

## 2025-02-25 PROCEDURE — 1123F ACP DISCUSS/DSCN MKR DOCD: CPT | Performed by: INTERNAL MEDICINE

## 2025-02-25 PROCEDURE — 87040 BLOOD CULTURE FOR BACTERIA: CPT

## 2025-02-25 PROCEDURE — 86850 RBC ANTIBODY SCREEN: CPT

## 2025-02-25 PROCEDURE — 86870 RBC ANTIBODY IDENTIFICATION: CPT

## 2025-02-25 PROCEDURE — G8427 DOCREV CUR MEDS BY ELIG CLIN: HCPCS | Performed by: INTERNAL MEDICINE

## 2025-02-25 PROCEDURE — 99215 OFFICE O/P EST HI 40 MIN: CPT | Performed by: INTERNAL MEDICINE

## 2025-02-25 PROCEDURE — P9040 RBC LEUKOREDUCED IRRADIATED: HCPCS

## 2025-02-25 PROCEDURE — 36430 TRANSFUSION BLD/BLD COMPNT: CPT

## 2025-02-25 PROCEDURE — 1159F MED LIST DOCD IN RCRD: CPT | Performed by: INTERNAL MEDICINE

## 2025-02-25 PROCEDURE — 86901 BLOOD TYPING SEROLOGIC RH(D): CPT

## 2025-02-25 PROCEDURE — 1160F RVW MEDS BY RX/DR IN RCRD: CPT | Performed by: INTERNAL MEDICINE

## 2025-02-25 PROCEDURE — 85025 COMPLETE CBC W/AUTO DIFF WBC: CPT

## 2025-02-25 PROCEDURE — 80053 COMPREHEN METABOLIC PANEL: CPT

## 2025-02-25 PROCEDURE — G8420 CALC BMI NORM PARAMETERS: HCPCS | Performed by: INTERNAL MEDICINE

## 2025-02-25 PROCEDURE — 1111F DSCHRG MED/CURRENT MED MERGE: CPT | Performed by: INTERNAL MEDICINE

## 2025-02-25 PROCEDURE — 86900 BLOOD TYPING SEROLOGIC ABO: CPT

## 2025-02-25 PROCEDURE — G2211 COMPLEX E/M VISIT ADD ON: HCPCS | Performed by: INTERNAL MEDICINE

## 2025-02-25 PROCEDURE — 1036F TOBACCO NON-USER: CPT | Performed by: INTERNAL MEDICINE

## 2025-02-25 PROCEDURE — 81001 URINALYSIS AUTO W/SCOPE: CPT

## 2025-02-25 PROCEDURE — 1126F AMNT PAIN NOTED NONE PRSNT: CPT | Performed by: INTERNAL MEDICINE

## 2025-02-25 PROCEDURE — 86923 COMPATIBILITY TEST ELECTRIC: CPT

## 2025-02-25 PROCEDURE — 3074F SYST BP LT 130 MM HG: CPT | Performed by: INTERNAL MEDICINE

## 2025-02-25 PROCEDURE — 36415 COLL VENOUS BLD VENIPUNCTURE: CPT

## 2025-02-25 PROCEDURE — 3078F DIAST BP <80 MM HG: CPT | Performed by: INTERNAL MEDICINE

## 2025-02-25 RX ORDER — DIPHENHYDRAMINE HYDROCHLORIDE 50 MG/ML
50 INJECTION INTRAMUSCULAR; INTRAVENOUS
Status: CANCELLED | OUTPATIENT
Start: 2025-02-25

## 2025-02-25 RX ORDER — EPINEPHRINE 1 MG/ML
0.3 INJECTION, SOLUTION INTRAMUSCULAR; SUBCUTANEOUS PRN
Status: CANCELLED | OUTPATIENT
Start: 2025-02-25

## 2025-02-25 RX ORDER — FAMOTIDINE 10 MG/ML
20 INJECTION, SOLUTION INTRAVENOUS
Status: CANCELLED | OUTPATIENT
Start: 2025-02-25

## 2025-02-25 RX ORDER — HYDROCORTISONE SODIUM SUCCINATE 100 MG/2ML
100 INJECTION INTRAMUSCULAR; INTRAVENOUS
Status: CANCELLED | OUTPATIENT
Start: 2025-02-25

## 2025-02-25 RX ORDER — ONDANSETRON 2 MG/ML
4 INJECTION INTRAMUSCULAR; INTRAVENOUS EVERY 6 HOURS PRN
Status: CANCELLED
Start: 2025-02-25

## 2025-02-25 RX ORDER — ALBUTEROL SULFATE 90 UG/1
4 INHALANT RESPIRATORY (INHALATION) PRN
Status: CANCELLED | OUTPATIENT
Start: 2025-02-25

## 2025-02-25 RX ORDER — ONDANSETRON 2 MG/ML
8 INJECTION INTRAMUSCULAR; INTRAVENOUS
Status: CANCELLED | OUTPATIENT
Start: 2025-02-25

## 2025-02-25 RX ORDER — ACETAMINOPHEN 325 MG/1
650 TABLET ORAL
Status: CANCELLED | OUTPATIENT
Start: 2025-02-25

## 2025-02-25 RX ORDER — SODIUM CHLORIDE 9 MG/ML
INJECTION, SOLUTION INTRAVENOUS CONTINUOUS
Status: CANCELLED | OUTPATIENT
Start: 2025-02-25

## 2025-02-25 RX ORDER — CIPROFLOXACIN 750 MG/1
750 TABLET, FILM COATED ORAL 2 TIMES DAILY
Qty: 14 TABLET | Refills: 0 | Status: SHIPPED | OUTPATIENT
Start: 2025-02-25 | End: 2025-03-04

## 2025-02-25 ASSESSMENT — PATIENT HEALTH QUESTIONNAIRE - PHQ9
SUM OF ALL RESPONSES TO PHQ QUESTIONS 1-9: 1
1. LITTLE INTEREST OR PLEASURE IN DOING THINGS: NOT AT ALL
SUM OF ALL RESPONSES TO PHQ9 QUESTIONS 1 & 2: 1
2. FEELING DOWN, DEPRESSED OR HOPELESS: SEVERAL DAYS

## 2025-02-25 ASSESSMENT — PAIN SCALES - GENERAL: PAINLEVEL_OUTOF10: 0

## 2025-02-25 NOTE — CONSENT
Informed Consent for Blood Component Transfusion Note    I have discussed with the patient the rationale for blood component transfusion; its benefits in treating or preventing fatigue, organ damage, or death; and its risk which includes mild transfusion reactions, rare risk of blood borne infection, or more serious but rare reactions. I have discussed the alternatives to transfusion, including the risk and consequences of not receiving transfusion. The patient had an opportunity to ask questions and had agreed to proceed with transfusion of blood components.    Electronically signed by Kevin Rangel MD on 2/25/25 at 12:27 PM EST

## 2025-02-25 NOTE — PROGRESS NOTES
hospitals Progress Note    Date: February 25, 2025    1130am: Mr. Grant Arrived ambulatory and in stable condition to the hospitals for  Labs + Blood Transfusion.  Assessment was completed, Patient reports increased fatigue/ SOB. 22G PIV placed to right arm with positive blood return. Labs drawn and sent for processing. Went to provider appointment with Medical Oncology.  Returned from provider appointment. Labs reviewed. Criteria for treatment was met.     Critical Hemoglobin of 5.4 today. 1 unit of blood added to treatment today and patient coming back for 1 unit of blood tomorrow. MD office reported patient spiked a fever in office, blood cultures and UA/urine culture ordered and sent for processing. Signs/symptoms of adverse blood reaction discussed with pt, voiced understanding.    Mr. Grant's vitals were reviewed.  Patient Vitals for the past 12 hrs:   Temp Pulse Resp BP SpO2   02/25/25 1359 99.1 °F (37.3 °C) (!) 101 18 (!) 118/58 98 %   02/25/25 1315 99.1 °F (37.3 °C) -- -- -- --   02/25/25 1300 97.7 °F (36.5 °C) (!) 109 16 133/62 --   02/25/25 1130 98.7 °F (37.1 °C) (!) 114 16 (!) 119/56 99 %     Lab results were obtained and reviewed.  Recent Results (from the past 12 hour(s))   CBC with Auto Differential    Collection Time: 02/25/25 11:38 AM   Result Value Ref Range    WBC 12.3 (H) 4.1 - 11.1 K/uL    RBC 1.82 (L) 4.10 - 5.70 M/uL    Hemoglobin 5.4 (LL) 12.1 - 17.0 g/dL    Hematocrit 17.0 (LL) 36.6 - 50.3 %    MCV 93.4 80.0 - 99.0 FL    MCH 29.7 26.0 - 34.0 PG    MCHC 31.8 30.0 - 36.5 g/dL    RDW 19.8 (H) 11.5 - 14.5 %    Platelets 38 (LL) 150 - 400 K/uL    Nucleated RBCs 0.7 (H) 0  WBC    nRBC 0.09 (H) 0.00 - 0.01 K/uL    Neutrophils % 16 (L) 32 - 75 %    Lymphocytes % 21 12 - 49 %    Monocytes % 49 (H) 5 - 13 %    Eosinophils % 0 0 - 7 %    Basophils % 1 0 - 1 %    Metamyelocytes 2 (H) 0 %    Myelocytes 8 (H) 0 %    Blasts 3 (H) 0 %    Immature Granulocytes % 0 %    Neutrophils Absolute 1.97 1.8 - 8.0

## 2025-02-25 NOTE — PROGRESS NOTES
Terry Grant is a 79 y.o. male is here today for a breast cancer follow up.    1. Have you been to the ER, urgent care clinic since your last visit?  Hospitalized since your last visit?Yes When: 2/18/2025 Where: [facility]  Reason for visit: Blood transfusion    2. Have you seen or consulted any other health care providers outside of the [facility] since your last visit?  Include any pap smears or colon screening. No

## 2025-02-25 NOTE — PROGRESS NOTES
[facility]  [address], [address]  [place]  [address]  W: [phone]  F: [phone]     HEME/ONC CONSULT    HPI:   Terry Grant is a 79 y.o. male who I am seeing for CMML      Interval history: reports gr 2 loss of appetite, gr 2 fatigue, gr 1 nausea, gr 1 loss of concentration, gr 1 cough, gr 2 sob, gr 2 tachycardia    Temp 101.7 in clinic    Review of systems was obtained and pertinent findings reviewed above. Past medical history, social history, family history, medications, and allergies are located in the electronic medical record.    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  - SRSF2 mutations can be seen in various myeloid neoplasms, and are associated with a poor prognosis in MDS, AML,  PMF, and PV.  - TET2 mutations can be seen in various myeloid neoplasms, and are associated with an increased response to  hypomethylating agents and a poor prognosis in MDS after hematopoietic stem cell transplantation.  - Co-mutation of TET2 and SRSF2 is reported to be highly predictive of a myeloid neoplasm characterized by  myelodysplasia and monocytosis, including, but not limited to, CMML    NGS:  ASXL1 X868Yxh*12; KRAS G12R; SRSF2 P95L; TET2 *, *; NO alterations detected in the following genes:  FLT3, IDH1, IDH2, NPM1      6/8/23:  pericardial window, negative    Hydrea 500 mg daily 6/9/23-  DR to 500 mg on Mon-Fri   Increased to daily on 8/22/24   Increased to 500 mg bid mon-Friday and daily on sat and Sunday on 9/9/24   Changed to 500 mg bid on Mon, Wed, Fri, 500 mg daily on Tues, thurs, Sat, Sunday on 10/17/24   Changed to 500 mg PO daily on 11/14/24    Decitabine-cedazuridine 2/4/25-2/8/25 (first cycle)    Objective:  Physical Exam:  Visit Vitals  Vitals:    02/25/25 1206   BP: 108/67   Pulse: (!) 113

## 2025-02-26 ENCOUNTER — HOSPITAL ENCOUNTER (OUTPATIENT)
Facility: HOSPITAL | Age: 80
Setting detail: INFUSION SERIES
Discharge: HOME | End: 2025-02-26
Payer: MEDICARE

## 2025-02-26 VITALS
DIASTOLIC BLOOD PRESSURE: 68 MMHG | OXYGEN SATURATION: 94 % | RESPIRATION RATE: 18 BRPM | HEART RATE: 95 BPM | SYSTOLIC BLOOD PRESSURE: 107 MMHG | TEMPERATURE: 98 F

## 2025-02-26 DIAGNOSIS — T45.1X5A ANEMIA DUE TO ANTINEOPLASTIC CHEMOTHERAPY: Primary | ICD-10-CM

## 2025-02-26 DIAGNOSIS — D64.81 ANEMIA DUE TO ANTINEOPLASTIC CHEMOTHERAPY: Primary | ICD-10-CM

## 2025-02-26 DIAGNOSIS — C93.10 CHRONIC MYELOMONOCYTIC LEUKEMIA NOT HAVING ACHIEVED REMISSION (HCC): ICD-10-CM

## 2025-02-26 LAB
ABO + RH BLD: NORMAL
BLD PROD TYP BPU: NORMAL
BLOOD BANK BLOOD PRODUCT EXPIRATION DATE: NORMAL
BLOOD BANK DISPENSE STATUS: NORMAL
BLOOD BANK ISBT PRODUCT BLOOD TYPE: 6200
BLOOD BANK PRODUCT CODE: NORMAL
BLOOD BANK UNIT TYPE AND RH: NORMAL
BLOOD GROUP ANTIBODIES SERPL: NORMAL
BPU ID: NORMAL
CROSSMATCH RESULT: NORMAL
SPECIMEN EXP DATE BLD: NORMAL
UNIT DIVISION: 0
UNIT ISSUE DATE/TIME: NORMAL

## 2025-02-26 PROCEDURE — P9040 RBC LEUKOREDUCED IRRADIATED: HCPCS

## 2025-02-26 PROCEDURE — 36430 TRANSFUSION BLD/BLD COMPNT: CPT

## 2025-02-26 RX ORDER — ONDANSETRON 2 MG/ML
4 INJECTION INTRAMUSCULAR; INTRAVENOUS EVERY 6 HOURS PRN
Start: 2025-02-26

## 2025-02-26 RX ORDER — DIPHENHYDRAMINE HYDROCHLORIDE 50 MG/ML
50 INJECTION INTRAMUSCULAR; INTRAVENOUS
OUTPATIENT
Start: 2025-02-26

## 2025-02-26 RX ORDER — FAMOTIDINE 10 MG/ML
20 INJECTION, SOLUTION INTRAVENOUS
OUTPATIENT
Start: 2025-02-26

## 2025-02-26 RX ORDER — HYDROCORTISONE SODIUM SUCCINATE 100 MG/2ML
100 INJECTION INTRAMUSCULAR; INTRAVENOUS
OUTPATIENT
Start: 2025-02-26

## 2025-02-26 RX ORDER — SODIUM CHLORIDE 9 MG/ML
INJECTION, SOLUTION INTRAVENOUS CONTINUOUS
OUTPATIENT
Start: 2025-02-26

## 2025-02-26 RX ORDER — ACETAMINOPHEN 325 MG/1
650 TABLET ORAL
OUTPATIENT
Start: 2025-02-26

## 2025-02-26 RX ORDER — ALBUTEROL SULFATE 90 UG/1
4 INHALANT RESPIRATORY (INHALATION) PRN
OUTPATIENT
Start: 2025-02-26

## 2025-02-26 RX ORDER — ONDANSETRON 2 MG/ML
8 INJECTION INTRAMUSCULAR; INTRAVENOUS
OUTPATIENT
Start: 2025-02-26

## 2025-02-26 RX ORDER — EPINEPHRINE 1 MG/ML
0.3 INJECTION, SOLUTION INTRAMUSCULAR; SUBCUTANEOUS PRN
OUTPATIENT
Start: 2025-02-26

## 2025-02-26 ASSESSMENT — PAIN SCALES - GENERAL: PAINLEVEL_OUTOF10: 0

## 2025-02-26 NOTE — PROGRESS NOTES
OUTPATIENT INFUSION CENTER     DISCHARGE INSTRUCTIONS FOR:  BLOOD TRANSFUSION     We hope you are feeling better after your blood transfusion.  Some mild tenderness or slight bruising at your IV site is normal.  Avoid lifting or heavy use of that extremity for the rest of the day.  Drink plenty of fluids, eat a normal diet and get some rest.     There are some important signs that you need to watch for in case you experience a delayed reaction to the blood you have received.  Call your physician immediately if you develop any of the following symptoms:     1.  Severe headache or backache;     2.  Fever above 100 degrees;     3.  Chills;     4.  Difficulty breathing;     5.  Blood or red color in urine;     6.  The feeling of weakness or constant fatigue;     7.  Yellowing of the whites of your eyes or skin (jaundice).        If your physician is not available, call or go to the nearest emergency room, or dial 911.OUTPATIENT INFUSION CENTER     DISCHARGE INSTRUCTIONS FOR:  BLOOD TRANSFUSION     We hope you are feeling better after your blood transfusion.  Some mild tenderness or slight bruising at your IV site is normal.  Avoid lifting or heavy use of that extremity for the rest of the day.  Drink plenty of fluids, eat a normal diet and get some rest.     There are some important signs that you need to watch for in case you experience a delayed reaction to the blood you have received.  Call your physician immediately if you develop any of the following symptoms:     1.  Severe headache or backache;     2.  Fever above 100 degrees;     3.  Chills;     4.  Difficulty breathing;     5.  Blood or red color in urine;     6.  The feeling of weakness or constant fatigue;     7.  Yellowing of the whites of your eyes or skin (jaundice).        If your physician is not available, call or go to the nearest emergency room, or dial 911.

## 2025-02-26 NOTE — PROGRESS NOTES
Pt arrived at [facility] AT 1300 and in no distress for transfusion of 1 units PRBCs.  Assessment completed, no new complaints voiced.  IV established in Right arm without difficulty.  Signs/symptoms of adverse blood reaction discussed with pt, voiced understanding. Patients blood transfusion consent reviewed.       Upon starting transfusion PIV infiltrated, PIV removed and new PIV placed in patients left arm without difficulty.        Vitals:    02/26/25 1311 02/26/25 1411 02/26/25 1623   BP: 110/60 100/64 107/68   Pulse: 96 95    Resp: 18 18    Temp: 98.1 °F (36.7 °C) 99 °F (37.2 °C) 98 °F (36.7 °C)   TempSrc: Temporal Temporal    SpO2: 92% 94%         Medications received:  NS @ KVO      1354:  1st unit PRBCs started and infusing without difficulty, observed x 15 minutes  1620:  1st unit completed without adverse reaction noted, NS flushing line.      Tolerated transfusion well, no adverse reaction noted.  D/C instructions reviewed, copy to pt, voiced understanding.  Patient declined 1 hour post transfusion observation.  D/Cd from [facility] in no distress accompanied by his spouse.  Next appt 03/04/2025.

## 2025-02-27 LAB
ABO + RH BLD: NORMAL
BLD PROD TYP BPU: NORMAL
BLOOD BANK BLOOD PRODUCT EXPIRATION DATE: NORMAL
BLOOD BANK DISPENSE STATUS: NORMAL
BLOOD BANK ISBT PRODUCT BLOOD TYPE: 5100
BLOOD BANK PRODUCT CODE: NORMAL
BLOOD BANK UNIT TYPE AND RH: NORMAL
BLOOD GROUP ANTIBODIES SERPL: NORMAL
BLOOD GROUP ANTIBODIES SERPL: NORMAL
BPU ID: NORMAL
CROSSMATCH RESULT: NORMAL
SPECIMEN EXP DATE BLD: NORMAL
UNIT DIVISION: 0
UNIT ISSUE DATE/TIME: NORMAL

## 2025-03-03 ENCOUNTER — TELEPHONE (OUTPATIENT)
Age: 80
End: 2025-03-03

## 2025-03-04 ENCOUNTER — HOSPITAL ENCOUNTER (OUTPATIENT)
Facility: HOSPITAL | Age: 80
Setting detail: INFUSION SERIES
Discharge: HOME | End: 2025-03-04
Payer: MEDICARE

## 2025-03-04 ENCOUNTER — APPOINTMENT (OUTPATIENT)
Facility: HOSPITAL | Age: 80
End: 2025-03-04
Payer: MEDICARE

## 2025-03-04 ENCOUNTER — OFFICE VISIT (OUTPATIENT)
Age: 80
End: 2025-03-04
Payer: MEDICARE

## 2025-03-04 VITALS
HEIGHT: 68 IN | HEART RATE: 109 BPM | RESPIRATION RATE: 18 BRPM | WEIGHT: 148.3 LBS | SYSTOLIC BLOOD PRESSURE: 118 MMHG | BODY MASS INDEX: 22.48 KG/M2 | TEMPERATURE: 98.3 F | OXYGEN SATURATION: 100 % | DIASTOLIC BLOOD PRESSURE: 64 MMHG

## 2025-03-04 VITALS
OXYGEN SATURATION: 100 % | BODY MASS INDEX: 22.43 KG/M2 | TEMPERATURE: 98.3 F | HEIGHT: 68 IN | SYSTOLIC BLOOD PRESSURE: 118 MMHG | DIASTOLIC BLOOD PRESSURE: 64 MMHG | HEART RATE: 109 BPM | RESPIRATION RATE: 18 BRPM | WEIGHT: 148 LBS

## 2025-03-04 DIAGNOSIS — C93.10 CHRONIC MYELOMONOCYTIC LEUKEMIA NOT HAVING ACHIEVED REMISSION (HCC): Primary | ICD-10-CM

## 2025-03-04 LAB
ABO + RH BLD: NORMAL
ALBUMIN SERPL-MCNC: 3.2 G/DL (ref 3.5–5)
ALBUMIN/GLOB SERPL: 0.6 (ref 1.1–2.2)
ALP SERPL-CCNC: 92 U/L (ref 45–117)
ALT SERPL-CCNC: 42 U/L (ref 12–78)
ANION GAP SERPL CALC-SCNC: 6 MMOL/L (ref 2–12)
AST SERPL-CCNC: 23 U/L (ref 15–37)
BASOPHILS # BLD: 0.13 K/UL (ref 0–0.1)
BASOPHILS NFR BLD: 2 % (ref 0–1)
BILIRUB SERPL-MCNC: 0.9 MG/DL (ref 0.2–1)
BLOOD GROUP ANTIBODIES SERPL: NORMAL
BUN SERPL-MCNC: 31 MG/DL (ref 6–20)
BUN/CREAT SERPL: 17 (ref 12–20)
CALCIUM SERPL-MCNC: 8.9 MG/DL (ref 8.5–10.1)
CHLORIDE SERPL-SCNC: 105 MMOL/L (ref 97–108)
CO2 SERPL-SCNC: 25 MMOL/L (ref 21–32)
CREAT SERPL-MCNC: 1.84 MG/DL (ref 0.7–1.3)
DIFFERENTIAL METHOD BLD: ABNORMAL
EOSINOPHIL # BLD: 0.13 K/UL (ref 0–0.4)
EOSINOPHIL NFR BLD: 2 % (ref 0–7)
ERYTHROCYTE [DISTWIDTH] IN BLOOD BY AUTOMATED COUNT: 17.2 % (ref 11.5–14.5)
GLOBULIN SER CALC-MCNC: 5.1 G/DL (ref 2–4)
GLUCOSE SERPL-MCNC: 110 MG/DL (ref 65–100)
HCT VFR BLD AUTO: 25.3 % (ref 36.6–50.3)
HGB BLD-MCNC: 8.1 G/DL (ref 12.1–17)
IMM GRANULOCYTES # BLD AUTO: 0 K/UL (ref 0–0.04)
IMM GRANULOCYTES NFR BLD AUTO: 0 % (ref 0–0.5)
LYMPHOCYTES # BLD: 1.2 K/UL (ref 0.8–3.5)
LYMPHOCYTES NFR BLD: 19 % (ref 12–49)
MCH RBC QN AUTO: 29.9 PG (ref 26–34)
MCHC RBC AUTO-ENTMCNC: 32 G/DL (ref 30–36.5)
MCV RBC AUTO: 93.4 FL (ref 80–99)
MONOCYTES # BLD: 2.27 K/UL (ref 0–1)
MONOCYTES NFR BLD: 36 % (ref 5–13)
NEUTS SEG # BLD: 2.57 K/UL (ref 1.8–8)
NEUTS SEG NFR BLD: 41 % (ref 32–75)
NRBC # BLD: 0.03 K/UL (ref 0–0.01)
NRBC BLD-RTO: 0.5 PER 100 WBC
PLATELET # BLD AUTO: 76 K/UL (ref 150–400)
PMV BLD AUTO: 11.9 FL (ref 8.9–12.9)
POTASSIUM SERPL-SCNC: 3.8 MMOL/L (ref 3.5–5.1)
PROT SERPL-MCNC: 8.3 G/DL (ref 6.4–8.2)
RBC # BLD AUTO: 2.71 M/UL (ref 4.1–5.7)
RBC MORPH BLD: ABNORMAL
SODIUM SERPL-SCNC: 136 MMOL/L (ref 136–145)
SPECIMEN EXP DATE BLD: NORMAL
WBC # BLD AUTO: 6.3 K/UL (ref 4.1–11.1)

## 2025-03-04 PROCEDURE — 86901 BLOOD TYPING SEROLOGIC RH(D): CPT

## 2025-03-04 PROCEDURE — 85025 COMPLETE CBC W/AUTO DIFF WBC: CPT

## 2025-03-04 PROCEDURE — 86850 RBC ANTIBODY SCREEN: CPT

## 2025-03-04 PROCEDURE — 99215 OFFICE O/P EST HI 40 MIN: CPT | Performed by: INTERNAL MEDICINE

## 2025-03-04 PROCEDURE — 80053 COMPREHEN METABOLIC PANEL: CPT

## 2025-03-04 PROCEDURE — 36415 COLL VENOUS BLD VENIPUNCTURE: CPT

## 2025-03-04 PROCEDURE — 86900 BLOOD TYPING SEROLOGIC ABO: CPT

## 2025-03-04 ASSESSMENT — PATIENT HEALTH QUESTIONNAIRE - PHQ9
2. FEELING DOWN, DEPRESSED OR HOPELESS: NOT AT ALL
SUM OF ALL RESPONSES TO PHQ QUESTIONS 1-9: 0
1. LITTLE INTEREST OR PLEASURE IN DOING THINGS: NOT AT ALL
SUM OF ALL RESPONSES TO PHQ QUESTIONS 1-9: 0

## 2025-03-04 ASSESSMENT — PAIN SCALES - GENERAL: PAINLEVEL_OUTOF10: 0

## 2025-03-04 NOTE — PROGRESS NOTES
[facility]  [address], [address]  [place]  [address]  W: [phone]  F: [phone]     HEME/ONC CONSULT    HPI:   Terry Grant is a 79 y.o. male who I am seeing for CMML      Interval history: reports no issues today    Afebrile today    Review of systems was obtained and pertinent findings reviewed above. Past medical history, social history, family history, medications, and allergies are located in the electronic medical record.    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  - SRSF2 mutations can be seen in various myeloid neoplasms, and are associated with a poor prognosis in MDS, AML,  PMF, and PV.  - TET2 mutations can be seen in various myeloid neoplasms, and are associated with an increased response to  hypomethylating agents and a poor prognosis in MDS after hematopoietic stem cell transplantation.  - Co-mutation of TET2 and SRSF2 is reported to be highly predictive of a myeloid neoplasm characterized by  myelodysplasia and monocytosis, including, but not limited to, CMML    NGS:  ASXL1 S700Oqo*12; KRAS G12R; SRSF2 P95L; TET2 *, *; NO alterations detected in the following genes:  FLT3, IDH1, IDH2, NPM1      6/8/23:  pericardial window, negative    Hydrea 500 mg daily 6/9/23-  DR to 500 mg on Mon-Fri   Increased to daily on 8/22/24   Increased to 500 mg bid mon-Friday and daily on sat and Sunday on 9/9/24   Changed to 500 mg bid on Mon, Wed, Fri, 500 mg daily on Tues, thurs, Sat, Sunday on 10/17/24   Changed to 500 mg PO daily on 11/14/24    Decitabine-cedazuridine 2/4/25-2/8/25 (first cycle)    Objective:  Physical Exam:  Visit Vitals  Vitals:    03/04/25 0954   BP: 118/64   Pulse: (!) 109   Resp: 18   Temp: 98.3 °F (36.8 °C)   SpO2: 100%             Constitutional: Appears well-developed and

## 2025-03-04 NOTE — PROGRESS NOTES
[facility] Progress Note    Date: March 4, 2025        Pt arrived ambulatory to [facility] for Blood Transfusion(HELD) in stable condition.  Assessment completed. PIV placed to right arm by Noa MUSE with positive blood return. Labs drawn and sent for processing.    Labs reviewed. Criteria for treatment was NOT met.    Patient Vitals for the past 12 hrs:   Temp Pulse Resp BP SpO2   03/04/25 0945 98.3 °F (36.8 °C) (!) 109 18 118/64 100 %       Recent Results (from the past 12 hour(s))   TYPE AND SCREEN    Collection Time: 03/04/25  9:39 AM   Result Value Ref Range    Crossmatch expiration date 03/07/2025,2359     ABO/Rh A POSITIVE     Antibody Screen NEG    Comprehensive Metabolic Panel    Collection Time: 03/04/25  9:39 AM   Result Value Ref Range    Sodium 136 136 - 145 mmol/L    Potassium 3.8 3.5 - 5.1 mmol/L    Chloride 105 97 - 108 mmol/L    CO2 25 21 - 32 mmol/L    Anion Gap 6 2 - 12 mmol/L    Glucose 110 (H) 65 - 100 mg/dL    BUN 31 (H) 6 - 20 MG/DL    Creatinine 1.84 (H) 0.70 - 1.30 MG/DL    BUN/Creatinine Ratio 17 12 - 20      Est, Glom Filt Rate 37 (L) >60 ml/min/1.73m2    Calcium 8.9 8.5 - 10.1 MG/DL    Total Bilirubin 0.9 0.2 - 1.0 MG/DL    ALT 42 12 - 78 U/L    AST 23 15 - 37 U/L    Alk Phosphatase 92 45 - 117 U/L    Total Protein 8.3 (H) 6.4 - 8.2 g/dL    Albumin 3.2 (L) 3.5 - 5.0 g/dL    Globulin 5.1 (H) 2.0 - 4.0 g/dL    Albumin/Globulin Ratio 0.6 (L) 1.1 - 2.2     CBC with Auto Differential    Collection Time: 03/04/25  9:39 AM   Result Value Ref Range    WBC 6.3 4.1 - 11.1 K/uL    RBC 2.71 (L) 4.10 - 5.70 M/uL    Hemoglobin 8.1 (L) 12.1 - 17.0 g/dL    Hematocrit 25.3 (L) 36.6 - 50.3 %    MCV 93.4 80.0 - 99.0 FL    MCH 29.9 26.0 - 34.0 PG    MCHC 32.0 30.0 - 36.5 g/dL    RDW 17.2 (H) 11.5 - 14.5 %    Platelets 76 (L) 150 - 400 K/uL    MPV 11.9 8.9 - 12.9 FL    Nucleated RBCs 0.5 (H) 0  WBC    nRBC 0.03 (H) 0.00 - 0.01 K/uL    Neutrophils % 41 32.0 - 75.0 %    Lymphocytes % 19 12.0 - 49.0 %    Monocytes %  36 (H) 5.0 - 13.0 %    Eosinophils % 2 0.0 - 7.0 %    Basophils % 2 (H) 0.0 - 1.0 %    Immature Granulocytes % 0 0.0 - 0.5 %    Neutrophils Absolute 2.57 1.80 - 8.00 K/UL    Lymphocytes Absolute 1.20 0.80 - 3.50 K/UL    Monocytes Absolute 2.27 (H) 0.00 - 1.00 K/UL    Eosinophils Absolute 0.13 0.00 - 0.40 K/UL    Basophils Absolute 0.13 (H) 0.00 - 0.10 K/UL    Immature Granulocytes Absolute 0.00 0.00 - 0.04 K/UL    Differential Type MANUAL      RBC Comment ANISOCYTOSIS  1+                 1040:  Tolerated treatment well, no adverse reactions noted. PIV flushed and removed. 2x2 and coban placed. D/Cd from [facility] ambulatory and in no distress.  Patient is aware of next scheduled [facility] appointment on 03/18/25.    Future Appointments   Date Time Provider Department Center   3/18/2025  9:30 AM SS LAB CHAIR 1 [facility]   3/18/2025 10:15 AM Kevin Rangel MD [facility] AMB           Caroyln Reeves, MELANI  March 4, 2025

## 2025-03-04 NOTE — PROGRESS NOTES
Terry Grant is a 79 y.o. male is here today for Leukemia follow up.    1. Have you been to the ER, urgent care clinic since your last visit?  Hospitalized since your last visit?No    2. Have you seen or consulted any other health care providers outside of the [facility] since your last visit?  Include any pap smears or colon screening. No       3/4/2025  9:45 AM   Vitals    SYSTOLIC 118    DIASTOLIC 64    Site    Position    Cuff Size    Pulse 109 !    Temp 98.3 °F (36.8 °C)    Respirations 18    SpO2 100 %    Weight - Scale 148 lb 4.8 oz    Height 5' 8\"    Body Mass Index 22.55 kg/m2    Pain Score    Pain Loc    Pain Level 0       Legend:  ! Abnormal

## 2025-03-06 ENCOUNTER — TELEPHONE (OUTPATIENT)
Age: 80
End: 2025-03-06

## 2025-03-06 NOTE — TELEPHONE ENCOUNTER
Patients spouse called and stated that the patients insurance company denied his previous hospital stay due to him not staying for 2 nights. She requested a call back to discuss maybe getting a letter or some documentation to send to the insurance for an appeal.    # [phone]

## 2025-03-06 NOTE — TELEPHONE ENCOUNTER
[facility] [place] at [facility]  [phone]    3/6/2025 1200 Returned call to Lyndsay Barryla, advised to call the billing office with insurance coverage denials and they would be able to help with the appeal due to it being an inpatient stay, phone number provided. Patients wife stated understanding and has no further questions at this time.

## 2025-03-10 ENCOUNTER — APPOINTMENT (OUTPATIENT)
Facility: HOSPITAL | Age: 80
DRG: 840 | End: 2025-03-10
Payer: MEDICARE

## 2025-03-10 ENCOUNTER — TELEPHONE (OUTPATIENT)
Age: 80
End: 2025-03-10

## 2025-03-10 ENCOUNTER — HOSPITAL ENCOUNTER (INPATIENT)
Facility: HOSPITAL | Age: 80
LOS: 5 days | Discharge: HOME | DRG: 840 | End: 2025-03-15
Attending: EMERGENCY MEDICINE | Admitting: HOSPITALIST
Payer: MEDICARE

## 2025-03-10 DIAGNOSIS — D64.9 ANEMIA REQUIRING TRANSFUSIONS: ICD-10-CM

## 2025-03-10 DIAGNOSIS — K92.2 UPPER GI BLEED: Primary | ICD-10-CM

## 2025-03-10 PROBLEM — N18.32 STAGE 3B CHRONIC KIDNEY DISEASE (HCC): Status: ACTIVE | Noted: 2025-03-10

## 2025-03-10 PROBLEM — K92.1 GASTROINTESTINAL HEMORRHAGE WITH MELENA: Status: ACTIVE | Noted: 2025-03-10

## 2025-03-10 LAB
ALBUMIN SERPL-MCNC: 3 G/DL (ref 3.5–5)
ALBUMIN/GLOB SERPL: 0.7 (ref 1.1–2.2)
ALP SERPL-CCNC: 56 U/L (ref 45–117)
ALT SERPL-CCNC: 24 U/L (ref 12–78)
ANION GAP SERPL CALC-SCNC: 8 MMOL/L (ref 2–12)
AST SERPL-CCNC: 18 U/L (ref 15–37)
BASOPHILS # BLD: 0.08 K/UL (ref 0–0.1)
BASOPHILS NFR BLD: 2 % (ref 0–1)
BILIRUB SERPL-MCNC: 0.7 MG/DL (ref 0.2–1)
BUN SERPL-MCNC: 62 MG/DL (ref 6–20)
BUN/CREAT SERPL: 34 (ref 12–20)
CALCIUM SERPL-MCNC: 9 MG/DL (ref 8.5–10.1)
CHLORIDE SERPL-SCNC: 107 MMOL/L (ref 97–108)
CO2 SERPL-SCNC: 21 MMOL/L (ref 21–32)
COMMENT:: NORMAL
CREAT SERPL-MCNC: 1.83 MG/DL (ref 0.7–1.3)
D DIMER PPP FEU-MCNC: 2.15 MG/L FEU (ref 0–0.65)
DIFFERENTIAL METHOD BLD: ABNORMAL
EOSINOPHIL # BLD: 0 K/UL (ref 0–0.4)
EOSINOPHIL NFR BLD: 0 % (ref 0–7)
ERYTHROCYTE [DISTWIDTH] IN BLOOD BY AUTOMATED COUNT: 17.8 % (ref 11.5–14.5)
FLUAV RNA SPEC QL NAA+PROBE: NOT DETECTED
FLUBV RNA SPEC QL NAA+PROBE: NOT DETECTED
GLOBULIN SER CALC-MCNC: 4.4 G/DL (ref 2–4)
GLUCOSE BLD STRIP.AUTO-MCNC: 122 MG/DL (ref 65–117)
GLUCOSE SERPL-MCNC: 106 MG/DL (ref 65–100)
HCT VFR BLD AUTO: 16.7 % (ref 36.6–50.3)
HCT VFR BLD AUTO: 17.9 % (ref 36.6–50.3)
HGB BLD-MCNC: 5.1 G/DL (ref 12.1–17)
HGB BLD-MCNC: 5.7 G/DL (ref 12.1–17)
HISTORY CHECK: NORMAL
HISTORY CHECK: NORMAL
IMM GRANULOCYTES # BLD AUTO: 0 K/UL (ref 0–0.04)
IMM GRANULOCYTES NFR BLD AUTO: 0 % (ref 0–0.5)
LACTATE BLD-SCNC: 2.15 MMOL/L (ref 0.4–2)
LYMPHOCYTES # BLD: 0.88 K/UL (ref 0.8–3.5)
LYMPHOCYTES NFR BLD: 22 % (ref 12–49)
MAGNESIUM SERPL-MCNC: 2.4 MG/DL (ref 1.6–2.4)
MCH RBC QN AUTO: 28.8 PG (ref 26–34)
MCHC RBC AUTO-ENTMCNC: 30.5 G/DL (ref 30–36.5)
MCV RBC AUTO: 94.4 FL (ref 80–99)
MONOCYTES # BLD: 0.8 K/UL (ref 0–1)
MONOCYTES NFR BLD: 20 % (ref 5–13)
MYELOCYTES NFR BLD MANUAL: 3 %
NEUTS BAND NFR BLD MANUAL: 3 %
NEUTS SEG # BLD: 2.12 K/UL (ref 1.8–8)
NEUTS SEG NFR BLD: 50 % (ref 32–75)
NRBC # BLD: 0.02 K/UL (ref 0–0.01)
NRBC BLD-RTO: 0.5 PER 100 WBC
NT PRO BNP: 323 PG/ML
PLATELET # BLD AUTO: 122 K/UL (ref 150–400)
PMV BLD AUTO: 11.5 FL (ref 8.9–12.9)
POTASSIUM SERPL-SCNC: 4.1 MMOL/L (ref 3.5–5.1)
PROCALCITONIN SERPL-MCNC: 0.37 NG/ML
PROT SERPL-MCNC: 7.4 G/DL (ref 6.4–8.2)
RBC # BLD AUTO: 1.77 M/UL (ref 4.1–5.7)
RBC MORPH BLD: ABNORMAL
SARS-COV-2 RNA RESP QL NAA+PROBE: NOT DETECTED
SERVICE CMNT-IMP: ABNORMAL
SODIUM SERPL-SCNC: 136 MMOL/L (ref 136–145)
SOURCE: NORMAL
SPECIMEN HOLD: NORMAL
TROPONIN I SERPL HS-MCNC: 6 NG/L (ref 0–76)
WBC # BLD AUTO: 4 K/UL (ref 4.1–11.1)

## 2025-03-10 PROCEDURE — 85379 FIBRIN DEGRADATION QUANT: CPT

## 2025-03-10 PROCEDURE — 2580000003 HC RX 258: Performed by: HOSPITALIST

## 2025-03-10 PROCEDURE — 83880 ASSAY OF NATRIURETIC PEPTIDE: CPT

## 2025-03-10 PROCEDURE — 86923 COMPATIBILITY TEST ELECTRIC: CPT

## 2025-03-10 PROCEDURE — 2060000000 HC ICU INTERMEDIATE R&B

## 2025-03-10 PROCEDURE — 83605 ASSAY OF LACTIC ACID: CPT

## 2025-03-10 PROCEDURE — 36415 COLL VENOUS BLD VENIPUNCTURE: CPT

## 2025-03-10 PROCEDURE — 85025 COMPLETE CBC W/AUTO DIFF WBC: CPT

## 2025-03-10 PROCEDURE — 86900 BLOOD TYPING SEROLOGIC ABO: CPT

## 2025-03-10 PROCEDURE — 2500000003 HC RX 250 WO HCPCS: Performed by: HOSPITALIST

## 2025-03-10 PROCEDURE — 30233N1 TRANSFUSION OF NONAUTOLOGOUS RED BLOOD CELLS INTO PERIPHERAL VEIN, PERCUTANEOUS APPROACH: ICD-10-PCS | Performed by: FAMILY MEDICINE

## 2025-03-10 PROCEDURE — 86850 RBC ANTIBODY SCREEN: CPT

## 2025-03-10 PROCEDURE — 82962 GLUCOSE BLOOD TEST: CPT

## 2025-03-10 PROCEDURE — 36430 TRANSFUSION BLD/BLD COMPNT: CPT

## 2025-03-10 PROCEDURE — 84145 PROCALCITONIN (PCT): CPT

## 2025-03-10 PROCEDURE — 93005 ELECTROCARDIOGRAM TRACING: CPT | Performed by: EMERGENCY MEDICINE

## 2025-03-10 PROCEDURE — 86644 CMV ANTIBODY: CPT

## 2025-03-10 PROCEDURE — 87040 BLOOD CULTURE FOR BACTERIA: CPT

## 2025-03-10 PROCEDURE — 94761 N-INVAS EAR/PLS OXIMETRY MLT: CPT

## 2025-03-10 PROCEDURE — 71045 X-RAY EXAM CHEST 1 VIEW: CPT

## 2025-03-10 PROCEDURE — 99285 EMERGENCY DEPT VISIT HI MDM: CPT

## 2025-03-10 PROCEDURE — 87636 SARSCOV2 & INF A&B AMP PRB: CPT

## 2025-03-10 PROCEDURE — 86901 BLOOD TYPING SEROLOGIC RH(D): CPT

## 2025-03-10 PROCEDURE — P9040 RBC LEUKOREDUCED IRRADIATED: HCPCS

## 2025-03-10 PROCEDURE — 6360000002 HC RX W HCPCS: Performed by: HOSPITALIST

## 2025-03-10 PROCEDURE — 80053 COMPREHEN METABOLIC PANEL: CPT

## 2025-03-10 PROCEDURE — 85018 HEMOGLOBIN: CPT

## 2025-03-10 PROCEDURE — 85014 HEMATOCRIT: CPT

## 2025-03-10 PROCEDURE — 84484 ASSAY OF TROPONIN QUANT: CPT

## 2025-03-10 PROCEDURE — 83735 ASSAY OF MAGNESIUM: CPT

## 2025-03-10 RX ORDER — SODIUM CHLORIDE 9 MG/ML
INJECTION, SOLUTION INTRAVENOUS PRN
Status: DISCONTINUED | OUTPATIENT
Start: 2025-03-10 | End: 2025-03-15 | Stop reason: HOSPADM

## 2025-03-10 RX ORDER — ONDANSETRON 2 MG/ML
4 INJECTION INTRAMUSCULAR; INTRAVENOUS EVERY 6 HOURS PRN
Status: DISCONTINUED | OUTPATIENT
Start: 2025-03-10 | End: 2025-03-15 | Stop reason: HOSPADM

## 2025-03-10 RX ORDER — ASCORBIC ACID 500 MG
1000 TABLET ORAL DAILY
Status: DISCONTINUED | OUTPATIENT
Start: 2025-03-11 | End: 2025-03-15 | Stop reason: HOSPADM

## 2025-03-10 RX ORDER — ONDANSETRON 4 MG/1
4 TABLET, ORALLY DISINTEGRATING ORAL EVERY 8 HOURS PRN
Status: DISCONTINUED | OUTPATIENT
Start: 2025-03-10 | End: 2025-03-15 | Stop reason: HOSPADM

## 2025-03-10 RX ORDER — ACETAMINOPHEN 325 MG/1
650 TABLET ORAL EVERY 6 HOURS PRN
Status: DISCONTINUED | OUTPATIENT
Start: 2025-03-10 | End: 2025-03-15 | Stop reason: HOSPADM

## 2025-03-10 RX ORDER — SODIUM CHLORIDE 9 MG/ML
INJECTION, SOLUTION INTRAVENOUS CONTINUOUS
Status: DISCONTINUED | OUTPATIENT
Start: 2025-03-10 | End: 2025-03-11

## 2025-03-10 RX ORDER — SODIUM CHLORIDE 0.9 % (FLUSH) 0.9 %
5-40 SYRINGE (ML) INJECTION PRN
Status: DISCONTINUED | OUTPATIENT
Start: 2025-03-10 | End: 2025-03-15 | Stop reason: HOSPADM

## 2025-03-10 RX ORDER — LOSARTAN POTASSIUM 50 MG/1
50 TABLET ORAL DAILY
Status: DISCONTINUED | OUTPATIENT
Start: 2025-03-10 | End: 2025-03-10

## 2025-03-10 RX ORDER — ACETAMINOPHEN 650 MG/1
650 SUPPOSITORY RECTAL EVERY 6 HOURS PRN
Status: DISCONTINUED | OUTPATIENT
Start: 2025-03-10 | End: 2025-03-15 | Stop reason: HOSPADM

## 2025-03-10 RX ORDER — SODIUM CHLORIDE 0.9 % (FLUSH) 0.9 %
5-40 SYRINGE (ML) INJECTION EVERY 12 HOURS SCHEDULED
Status: DISCONTINUED | OUTPATIENT
Start: 2025-03-10 | End: 2025-03-15 | Stop reason: HOSPADM

## 2025-03-10 RX ADMIN — SODIUM CHLORIDE: 0.9 INJECTION, SOLUTION INTRAVENOUS at 14:13

## 2025-03-10 RX ADMIN — SODIUM CHLORIDE, PRESERVATIVE FREE 10 ML: 5 INJECTION INTRAVENOUS at 21:27

## 2025-03-10 RX ADMIN — SODIUM CHLORIDE, PRESERVATIVE FREE 40 MG: 5 INJECTION INTRAVENOUS at 14:12

## 2025-03-10 ASSESSMENT — PAIN - FUNCTIONAL ASSESSMENT: PAIN_FUNCTIONAL_ASSESSMENT: NONE - DENIES PAIN

## 2025-03-10 NOTE — ED NOTES
TRANSFER - OUT REPORT:    Verbal report given to Jailyn POLO on Terry Grant  being transferred to 3rd floor for routine progression of patient care       Report consisted of patient's Situation, Background, Assessment and   Recommendations(SBAR).     Information from the following report(s) Index, MAR, Recent Results, and Neuro Assessment was reviewed with the receiving nurse.    Supply Fall Assessment:    Presents to emergency department  because of falls (Syncope, seizure, or loss of consciousness): No  Age > 70: Yes  Altered Mental Status, Intoxication with alcohol or substance confusion (Disorientation, impaired judgment, poor safety awaremess, or inability to follow instructions): No  Impaired Mobility: Ambulates or transfers with assistive devices or assistance; Unable to ambulate or transer.: Yes  Nursing Judgement: Yes          Lines:   Peripheral IV 03/10/25 Right Antecubital (Active)   Site Assessment Clean, dry & intact 03/10/25 1237   Line Status Blood return noted;No blood return;Capped;Flushed 03/10/25 1237   Line Care Cap changed 03/10/25 1237   Phlebitis Assessment No symptoms 03/10/25 1237   Infiltration Assessment 0 03/10/25 1237   Alcohol Cap Used Yes 03/10/25 1237   Dressing Status New dressing applied;Clean, dry & intact 03/10/25 1237   Dressing Type Gauze;Transparent 03/10/25 1237   Dressing Intervention New 03/10/25 1237        Opportunity for questions and clarification was provided.      Patient transported with:  Monitor and Registered Nurse

## 2025-03-10 NOTE — CONSENT
Informed Consent for Blood Component Transfusion Note    I have discussed with the patient the rationale for blood component transfusion; its benefits in treating or preventing fatigue, organ damage, or death; and its risk which includes mild transfusion reactions, rare risk of blood borne infection, or more serious but rare reactions. I have discussed the alternatives to transfusion, including the risk and consequences of not receiving transfusion. The patient had an opportunity to ask questions and had agreed to proceed with transfusion of blood components.    Electronically signed by Sukhjinder Reddy MD on 3/10/25 at 1:09 PM EDT

## 2025-03-10 NOTE — PROGRESS NOTES
7292 TRANSFER - IN REPORT:    Verbal report received from MELANI Adams on Terry Grant  being received from ED for routine progression of patient care      Report consisted of patient's Situation, Background, Assessment and   Recommendations(SBAR).     Information from the following report(s) Nurse Handoff Report, Recent Results, Med Rec Status, and Cardiac Rhythm NSR  was reviewed with the receiving nurse.    Opportunity for questions and clarification was provided.      Assessment completed upon patient's arrival to unit and care assumed.     1850 Notified MD of hgb 5.7, hct 17.9 - labs were drawn 1 hr post transfusion

## 2025-03-10 NOTE — ED NOTES
Multiple attempts for IV and blood work without success. Provider notified. Requested ultrasound insertion.

## 2025-03-10 NOTE — ED PROVIDER NOTES
[facility] B3 INTERMEDIATE CARE UNIT  EMERGENCY DEPARTMENT ENCOUNTER      Patient Name: Terry Grant  MRN: [id number]  Birthdate 1945  Date of Evaluation: 3/10/2025  Physician: Sukhjinder Reddy MD    CHIEF COMPLAINT       Chief Complaint   Patient presents with    Shortness of Breath       HISTORY OF PRESENT ILLNESS   (Location/Symptom, Timing/Onset, Context/Setting, Quality, Duration, Modifying Factors, Severity)   Terry Grant, 79 y.o., male     79-year-old male with a history of atrial fibrillation, CML, hypertension, GERD presents with a chief complaint of shortness of breath, nausea, generalized fatigue.  Symptoms been ongoing for several days.  Patient states he has home oxygen as needed but has not been wearing it.           Nursing Notes were reviewed.    REVIEW OF SYSTEMS    (Not required)   Review of Systems    Except as noted above the remainder of the review of systems was reviewed and negative.     PAST MEDICAL HISTORY     Past Medical History:   Diagnosis Date    Atrial fibrillation (HCC)     Chronic myelomonocytic leukemia (HCC)     Confirmed per bone marrow biopsy of 5/18/2023.  Was referred to [facility] for transplant, but may not be candidate due to age, was started on chemotherapy.    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)    CARDIAC PROCEDURE N/A 6/7/2023    Pericardiocentesis performed by Donovan Fan MD at [facility] CARDIAC CATH LAB    CATARACT REMOVAL Right     COLONOSCOPY N/A 4/12/2017    COLONOSCOPY performed by Perico Stiles MD at [facility] ENDOSCOPY    CYSTOSCOPY Bilateral 6/2/2023    CYSTOSCOPY, BILATERAL  STENTS, RETROGRADE performed by Jonas Gatica MD at [facility] MAIN OR    PERICARDIUM SURGERY N/A 6/8/2023    PERICARDIAL WINDOW performed by Devon Uriarte MD at [facility] OPEN HEART     RESULTS     RADIOLOGY:   Non-plain film images such as CT, Ultrasound and MRI are read by the radiologist. Plain radiographic images are visualized and preliminarily interpreted by the emergency physician with the below findings:        Interpretation per the Radiologist below, if available at the time of this note:    XR CHEST PORTABLE   Final Result   No acute cardiopulmonary process or focal lung opacity.            Electronically signed by SAJAN CRUZ            ED BEDSIDE ULTRASOUND:   Performed by ED Physician    LABS:  Labs Reviewed   CBC WITH AUTO DIFFERENTIAL - Abnormal; Notable for the following components:       Result Value    WBC 4.0 (*)     RBC 1.77 (*)     Hemoglobin 5.1 (*)     Hematocrit 16.7 (*)     RDW 17.8 (*)     Platelets 122 (*)     Nucleated RBCs 0.5 (*)     nRBC 0.02 (*)     Monocytes % 20 (*)     Basophils % 2 (*)     All other components within normal limits   COMPREHENSIVE METABOLIC PANEL - Abnormal; Notable for the following components:    Glucose 106 (*)     BUN 62 (*)     Creatinine 1.83 (*)     BUN/Creatinine Ratio 34 (*)     Est, Glom Filt Rate 37 (*)     Albumin 3.0 (*)     Globulin 4.4 (*)     Albumin/Globulin Ratio 0.7 (*)     All other components within normal limits   D-DIMER, QUANTITATIVE - Abnormal; Notable for the following components:    D-Dimer, Quant 2.15 (*)     All other components within normal limits   URINALYSIS WITH REFLEX TO CULTURE - Abnormal; Notable for the following components:    Protein, UA TRACE (*)     Blood, Urine MODERATE (*)     All other components within normal limits   POC LACTIC ACID - Abnormal; Notable for the following components:    POC Lactic Acid 2.15 (*)     All other components within normal limits   HEMOGLOBIN AND HEMATOCRIT - Abnormal; Notable for the following components:    Hemoglobin 5.7 (*)     Hematocrit 17.9 (*)     All other components within normal limits   HEMOGLOBIN AND HEMATOCRIT - Abnormal; Notable for the following

## 2025-03-10 NOTE — TELEPHONE ENCOUNTER
[facility] [place] at [facility]  [phone]    3/10/25 8108 Patients  called and stated he is diaphoretic with chills (has not taken temp), vomiting bile since early this morning and is complaining of shortness of breath/trouble breathing and refusing EMS to be called. Advised to call EMS due to trouble breathing, Mrs. Grant states she will give him a few minutes and then she will call EMS to take him in.

## 2025-03-10 NOTE — H&P
2023    Pericardiocentesis performed by Donovan Fan MD at [facility] CARDIAC CATH LAB    CATARACT REMOVAL Right     COLONOSCOPY N/A 2017    COLONOSCOPY performed by Perico Stiles MD at [facility] ENDOSCOPY    CYSTOSCOPY Bilateral 2023    CYSTOSCOPY, BILATERAL  STENTS, RETROGRADE performed by Jonas Gatica MD at [facility] MAIN OR    PERICARDIUM SURGERY N/A 2023    PERICARDIAL WINDOW performed by Devon Uriarte MD at [facility] OPEN HEART    THORACENTESIS  2023    THORACENTESIS N/A 2023    THORACENTESIS performed by Sil Zimmerman MD at [facility] ENDOSCOPY     Social History     Tobacco Use    Smoking status: Former     Types: Cigarettes     Passive exposure: Never    Smokeless tobacco: Never    Tobacco comments:     Smoked cigarettes socially, typically did not even finish a pack a month, and did not smoke every month.   Substance Use Topics    Alcohol use: No     Comment: Patient denies any history of excessive alcohol intake.      Family History   Problem Relation Age of Onset    Hypertension Mother          in her early 90s    COPD Father          in his mid 40s        No Known Allergies     Prior to Admission medications    Medication Sig Start Date End Date Taking? Authorizing Provider   Decitabine-Cedazuridine  MG TABS  mg PO on days 1 through 5 then 23 days off 25   Kevin Rangel MD   prochlorperazine (COMPAZINE) 10 MG tablet Take 1 tablet by mouth every 6 hours as needed (for nausea) 24   Kevin Rangel MD   dexAMETHasone (DECADRON) 0.5 MG tablet Take 1 tablet by mouth 2 times daily (with meals) Take with breakfast and lunch 24   Kevin Rangel MD   benzonatate (TESSALON) 200 MG capsule  24   ProviderDeep MD   losartan (COZAAR) 50 MG tablet Take 1 tablet by mouth daily 24   Deep Gan MD   apixaban (ELIQUIS) 5 MG TABS tablet Take 1 tablet by mouth 2 times daily    Deep Gan MD   ascorbic acid (VITAMIN C) 1000    Family      RN x    Care Manager                    Consultant:  amalia WICK MD   _______________________________________________________________________  Recommended Disposition:   Home with Family x   HH/PT/OT/RN    SNF/LTC    JOSÉ MIGUEL    ________________________________________________________________________  TOTAL TIME:  60 Minutes        Comments   >50% of visit spent in counseling and coordination of care  Chart reviewed  Discussion with patient and/or family and questions answered     ________________________________________________________________________  Signed: Sonya Merritt MD        Procedures: see electronic medical records for all procedures/Xrays/labs and details which were not copied into this note but were reviewed prior to creation of Plan.

## 2025-03-10 NOTE — ED TRIAGE NOTES
Patient arrives via EMS from home for complaints of fatigue and shortness of breath for several days.     Takes Eliquis.     Denies chest pain, abdominal pain.     .     PMH Afib.

## 2025-03-10 NOTE — TELEPHONE ENCOUNTER
[facility] [place] at [facility]  [phone]    03/10/25 2:33 PM EDT - Received phone call from patient's wife, requesting more information in regards of Stem cell transplant. Per Ms. Grant, this was previously discussed with Dr. Rangel and would like to know if patient is still able to get this treatment. Per Katty Goodman NP, patient and wife can ask provider next office visit scheduled for 3/18/2025 since Dr. Rangel is out of the office this week. Patient and his wife verbalized understanding and had no further questions.

## 2025-03-11 ENCOUNTER — TELEPHONE (OUTPATIENT)
Age: 80
End: 2025-03-11

## 2025-03-11 PROBLEM — K92.2 UPPER GI BLEED: Status: ACTIVE | Noted: 2025-03-11

## 2025-03-11 LAB
ANION GAP SERPL CALC-SCNC: 6 MMOL/L (ref 2–12)
APPEARANCE UR: CLEAR
APTT PPP: 28 SEC (ref 22.1–31)
BACTERIA URNS QL MICRO: NEGATIVE /HPF
BILIRUB UR QL: NEGATIVE
BUN SERPL-MCNC: 55 MG/DL (ref 6–20)
BUN/CREAT SERPL: 33 (ref 12–20)
CALCIUM SERPL-MCNC: 8.8 MG/DL (ref 8.5–10.1)
CHLORIDE SERPL-SCNC: 110 MMOL/L (ref 97–108)
CO2 SERPL-SCNC: 22 MMOL/L (ref 21–32)
COLOR UR: ABNORMAL
CREAT SERPL-MCNC: 1.65 MG/DL (ref 0.7–1.3)
EKG ATRIAL RATE: 89 BPM
EKG DIAGNOSIS: NORMAL
EKG P AXIS: 48 DEGREES
EKG P-R INTERVAL: 148 MS
EKG Q-T INTERVAL: 374 MS
EKG QRS DURATION: 84 MS
EKG QTC CALCULATION (BAZETT): 455 MS
EKG R AXIS: 10 DEGREES
EKG T AXIS: 12 DEGREES
EKG VENTRICULAR RATE: 89 BPM
EPITH CASTS URNS QL MICRO: ABNORMAL /LPF
FERRITIN SERPL-MCNC: 296 NG/ML (ref 26–388)
FOLATE SERPL-MCNC: 13.5 NG/ML (ref 5–21)
GLUCOSE SERPL-MCNC: 109 MG/DL (ref 65–100)
GLUCOSE UR STRIP.AUTO-MCNC: NEGATIVE MG/DL
HAPTOGLOB SERPL-MCNC: 43 MG/DL (ref 30–200)
HCT VFR BLD AUTO: 20.5 % (ref 36.6–50.3)
HCT VFR BLD AUTO: 23.5 % (ref 36.6–50.3)
HEMOCCULT STL QL: POSITIVE
HGB BLD-MCNC: 6.7 G/DL (ref 12.1–17)
HGB BLD-MCNC: 7.7 G/DL (ref 12.1–17)
HGB UR QL STRIP: ABNORMAL
HISTORY CHECK: NORMAL
HYALINE CASTS URNS QL MICRO: ABNORMAL /LPF (ref 0–2)
INR PPP: 1.3 (ref 0.9–1.1)
IRON SATN MFR SERPL: 84 % (ref 20–50)
IRON SERPL-MCNC: 204 UG/DL (ref 35–150)
KETONES UR QL STRIP.AUTO: NEGATIVE MG/DL
LDH SERPL L TO P-CCNC: 181 U/L (ref 85–241)
LEUKOCYTE ESTERASE UR QL STRIP.AUTO: NEGATIVE
NITRITE UR QL STRIP.AUTO: NEGATIVE
PH UR STRIP: 5.5 (ref 5–8)
POTASSIUM SERPL-SCNC: 4.5 MMOL/L (ref 3.5–5.1)
PROT UR STRIP-MCNC: ABNORMAL MG/DL
PROTHROMBIN TIME: 13 SEC (ref 9.2–11.2)
RBC #/AREA URNS HPF: ABNORMAL /HPF (ref 0–5)
RETICS # AUTO: 0.08 M/UL (ref 0.03–0.1)
RETICS/RBC NFR AUTO: 3.3 % (ref 0.7–2.1)
SODIUM SERPL-SCNC: 138 MMOL/L (ref 136–145)
SP GR UR REFRACTOMETRY: 1.01 (ref 1–1.03)
THERAPEUTIC RANGE: NORMAL SECS (ref 58–77)
TIBC SERPL-MCNC: 244 UG/DL (ref 250–450)
URINE CULTURE IF INDICATED: ABNORMAL
UROBILINOGEN UR QL STRIP.AUTO: 0.2 EU/DL (ref 0.2–1)
VIT B12 SERPL-MCNC: 527 PG/ML (ref 193–986)
WBC URNS QL MICRO: ABNORMAL /HPF (ref 0–4)

## 2025-03-11 PROCEDURE — 6360000002 HC RX W HCPCS: Performed by: HOSPITALIST

## 2025-03-11 PROCEDURE — 85730 THROMBOPLASTIN TIME PARTIAL: CPT

## 2025-03-11 PROCEDURE — 94761 N-INVAS EAR/PLS OXIMETRY MLT: CPT

## 2025-03-11 PROCEDURE — 99223 1ST HOSP IP/OBS HIGH 75: CPT | Performed by: INTERNAL MEDICINE

## 2025-03-11 PROCEDURE — 2500000003 HC RX 250 WO HCPCS: Performed by: HOSPITALIST

## 2025-03-11 PROCEDURE — 82746 ASSAY OF FOLIC ACID SERUM: CPT

## 2025-03-11 PROCEDURE — 82272 OCCULT BLD FECES 1-3 TESTS: CPT

## 2025-03-11 PROCEDURE — 36415 COLL VENOUS BLD VENIPUNCTURE: CPT

## 2025-03-11 PROCEDURE — 2060000000 HC ICU INTERMEDIATE R&B

## 2025-03-11 PROCEDURE — 82728 ASSAY OF FERRITIN: CPT

## 2025-03-11 PROCEDURE — 6370000000 HC RX 637 (ALT 250 FOR IP): Performed by: HOSPITALIST

## 2025-03-11 PROCEDURE — 85045 AUTOMATED RETICULOCYTE COUNT: CPT

## 2025-03-11 PROCEDURE — 93010 ELECTROCARDIOGRAM REPORT: CPT | Performed by: SPECIALIST

## 2025-03-11 PROCEDURE — 82607 VITAMIN B-12: CPT

## 2025-03-11 PROCEDURE — 83540 ASSAY OF IRON: CPT

## 2025-03-11 PROCEDURE — 83010 ASSAY OF HAPTOGLOBIN QUANT: CPT

## 2025-03-11 PROCEDURE — 36430 TRANSFUSION BLD/BLD COMPNT: CPT

## 2025-03-11 PROCEDURE — 83550 IRON BINDING TEST: CPT

## 2025-03-11 PROCEDURE — 2580000003 HC RX 258: Performed by: HOSPITALIST

## 2025-03-11 PROCEDURE — 85610 PROTHROMBIN TIME: CPT

## 2025-03-11 PROCEDURE — 81001 URINALYSIS AUTO W/SCOPE: CPT

## 2025-03-11 PROCEDURE — 85014 HEMATOCRIT: CPT

## 2025-03-11 PROCEDURE — 83615 LACTATE (LD) (LDH) ENZYME: CPT

## 2025-03-11 PROCEDURE — 80048 BASIC METABOLIC PNL TOTAL CA: CPT

## 2025-03-11 PROCEDURE — P9040 RBC LEUKOREDUCED IRRADIATED: HCPCS

## 2025-03-11 PROCEDURE — 85018 HEMOGLOBIN: CPT

## 2025-03-11 RX ORDER — SODIUM CHLORIDE 9 MG/ML
INJECTION, SOLUTION INTRAVENOUS PRN
Status: DISCONTINUED | OUTPATIENT
Start: 2025-03-11 | End: 2025-03-13

## 2025-03-11 RX ADMIN — OXYCODONE HYDROCHLORIDE AND ACETAMINOPHEN 1000 MG: 500 TABLET ORAL at 08:42

## 2025-03-11 RX ADMIN — SODIUM CHLORIDE, PRESERVATIVE FREE 40 MG: 5 INJECTION INTRAVENOUS at 17:08

## 2025-03-11 RX ADMIN — SODIUM CHLORIDE: 0.9 INJECTION, SOLUTION INTRAVENOUS at 08:43

## 2025-03-11 RX ADMIN — SODIUM CHLORIDE, PRESERVATIVE FREE 40 MG: 5 INJECTION INTRAVENOUS at 23:44

## 2025-03-11 RX ADMIN — SODIUM CHLORIDE, PRESERVATIVE FREE 40 MG: 5 INJECTION INTRAVENOUS at 01:45

## 2025-03-11 RX ADMIN — SODIUM CHLORIDE, PRESERVATIVE FREE 10 ML: 5 INJECTION INTRAVENOUS at 19:50

## 2025-03-11 ASSESSMENT — PAIN SCALES - GENERAL: PAINLEVEL_OUTOF10: 0

## 2025-03-11 NOTE — TELEPHONE ENCOUNTER
[facility] [place] at [facility]  [phone]    3/11/25 1010 Returned call to Mrs. Grant, she states patient has been admitted to [facility] with an upper GI bleed and is questioning whether or not to stop his chemo pill after researching side effects. Advised that our office is aware of his admission to the hospital and GI will be seeing him today to assess the bleed, also informed that plan moving forward will be addressed once hospital workup is complete. Mrs. Grant stated understanding and has no further questions at this time.

## 2025-03-11 NOTE — PROGRESS NOTES
[facility]  [address] [place] [address]  [phone]      Hospitalist  Progress Note      NAME:       Terry Grant   :        1945  MRM:        [id number]    Date of service: 3/11/2025      Subjective: Patient seen and examined by me. Patient admitted with severe anemia, melena stools. He denies any chest pain, no abdominal discomfort. No SOB but weak. Afebrile     Objective:    Vital Signs:    /65   Pulse 74   Temp 97.9 °F (36.6 °C) (Axillary)   Resp 18   Ht 1.727 m (5' 8\")   Wt 67.1 kg (148 lb)   SpO2 97%   BMI 22.50 kg/m²        Intake/Output Summary (Last 24 hours) at 3/11/2025 1312  Last data filed at 3/11/2025 1210  Gross per 24 hour   Intake 1997.5 ml   Output 1800 ml   Net 197.5 ml        Current inpatient medications reviewed:  Current Facility-Administered Medications   Medication Dose Route Frequency    0.9 % sodium chloride infusion   IntraVENous PRN    0.9 % sodium chloride infusion   IntraVENous PRN    ascorbic acid (VITAMIN C) tablet 1,000 mg  1,000 mg Oral Daily    sodium chloride flush 0.9 % injection 5-40 mL  5-40 mL IntraVENous 2 times per day    sodium chloride flush 0.9 % injection 5-40 mL  5-40 mL IntraVENous PRN    0.9 % sodium chloride infusion   IntraVENous PRN    ondansetron (ZOFRAN-ODT) disintegrating tablet 4 mg  4 mg Oral Q8H PRN    Or    ondansetron (ZOFRAN) injection 4 mg  4 mg IntraVENous Q6H PRN    acetaminophen (TYLENOL) tablet 650 mg  650 mg Oral Q6H PRN    Or    acetaminophen (TYLENOL) suppository 650 mg  650 mg Rectal Q6H PRN    0.9 % sodium chloride infusion   IntraVENous Continuous    pantoprazole (PROTONIX) 40 mg in sodium chloride (PF) 0.9 % 10 mL injection  40 mg IntraVENous Q12H    0.9 % sodium chloride infusion   IntraVENous PRN       Physical Examination:    General:   Weak and ill looking patient in no acute distress  Eyes:   pale  pRBCs and Hgb now 7.7. Consult hematology. GI will review for the suspected bleeding    Gastrointestinal hemorrhage with melena POA: discussed with GI who will review him. Continue Pantoprazole in the meantime    Chronic myelomonocytic leukemia not having achieved remission POA: sees Dr Rangel. Previously declined [facility] to [facility] or [facility]. Will consult his team to review    Thrombocytopenia POA: due to CMML and therapy. Monitor    PAF (paroxysmal atrial fibrillation) / HTN (hypertension), benign POA: BP now better. Holding Losartan and Apixaban    Stage 3b chronic kidney disease POA: monitor renal function    Care Plan discussed with: Patient, Nursing, CM     Prophylaxis:  H2B/PPI                Expected Disposition:  Home w/Family    PCP:      Jonas Snider MD     I have personally examined and treated the patient at bedside during this period. To assist coordination of care and communication with nursing and staff, this note may be preliminary early in the day, but finalized by end of the day.         ___________________________________________________    Attending Physician:   Sukhjinder Grullon MD

## 2025-03-11 NOTE — CARE COORDINATION
03/11/25 1626   Readmission Assessment   Number of Days since last admission? 8-30 days   Previous Disposition Home with Family   Who is being Interviewed Patient   What was the patient's/caregiver's perception as to why they think they needed to return back to the hospital? Other (Comment)  (very weak, tar in stools)   Did you visit your Primary Care Physician after you left the hospital, before you returned this time? Yes   Did you see a specialist, such as Cardiac, Pulmonary, Orthopedic Physician, etc. after you left the hospital? No   Who advised the patient to return to the hospital? Self-referral   Does the patient report anything that got in the way of taking their medications? No   In our efforts to provide the best possible care to you and others like you, can you think of anything that we could have done to help you after you left the hospital the first time, so that you might not have needed to return so soon? Other (Comment)  (Nothing)

## 2025-03-11 NOTE — FLOWSHEET NOTE
Repeat hgb 6.7. One additional u PRBC ordered. Fecal occult pos. Hold Eliquis. GI consulted for AM. Monitor for hemodynamic instability.

## 2025-03-11 NOTE — CONSULTS
Cancer [place] at [facility]  [address], [address] [facility] [address]  W: [phone]  F: [phone]      Reason for Visit:   Terry Grant is a 79 y.o. 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  - SRSF2 mutations can be seen in various myeloid neoplasms, and are associated with a poor prognosis in MDS, AML,  PMF, and PV.  - TET2 mutations can be seen in various myeloid neoplasms, and are associated with an increased response to  hypomethylating agents and a poor prognosis in MDS after hematopoietic stem cell transplantation.  - Co-mutation of TET2 and SRSF2 is reported to be highly predictive of a myeloid neoplasm characterized by  myelodysplasia and monocytosis, including, but not limited to, CMML     NGS:  ASXL1 D294Vaf*12; KRAS G12R; SRSF2 P95L; TET2 *, *; NO alterations detected in the following genes:  FLT3, IDH1, IDH2, NPM1     6/8/23:  pericardial window, negative     Hydrea 500 mg daily 6/9/23-  DR to 500 mg on Mon-Fri              Increased to daily on 8/22/24              Increased to 500 mg bid mon-Friday and daily on sat and Sunday on 9/9/24              Changed to 500 mg bid on Mon, Wed, Fri, 500 mg daily on Tues, thurs, Sat, Sunday on 10/17/24              Changed to 500 mg PO daily on 11/14/24     Decitabine-cedazuridine 2/4/25-2/8/25 (first cycle)    Decitabine 35 mg/cedazuridine 100 mg once daily on days 1 to 5 of each 28-day treatment cycle for a minimum of 4 cycles,            C1 2/4/25            C2 3/4/25  DR to 4 pills     Discussed referral to [facility] for alloSCT eval -- though his age may preclude this -- his wife has discussed with them. He declines. Discussed [facility].    Component Value Date    RETICAUTO 3.3 (H) 03/11/2025    IRON 204 (H) 03/11/2025    TIBC 244 (L) 03/11/2025    IRONPERSAT 84 (H) 03/11/2025     03/11/2025    HEPCAB Non Reactive 05/17/2023     Lab Results   Component Value Date    INR 1.3 (H) 03/11/2025    PROTIME 13.0 (H) 03/11/2025    APTT 28.0 03/11/2025    DDIMER 2.15 (H) 03/10/2025    ANA1L NEGATIVE 05/30/2017    LABANTI NEG 03/10/2025    CRP 4.38 (H) 12/10/2021    LIPASE 67 (L) 06/27/2023    TSH 1.18 05/17/2023    POCOCCBLDFEC Positive (A) 03/11/2025     Lab Results   Component Value Date    AFPTM <1.8 05/17/2023     03/11/2025      3/10/25 XR CHEST  IMPRESSION:  No acute cardiopulmonary process or focal lung opacity.      Test results above have been reviewed.    Assessment/Recommendations:     CMML-MPN: CMML-2;   Dx 5/2023   Pt has declined referral to [facility] and [facility] for [facility]oSCT   current tx with Decitabine 35 mg/cedazuridine 100 mg once daily ( days 1-5) stared on 2/4 ; DR to 4 pills with C2 started on 3/4-3/7/25  Pt voices concern about side effects Dr Rangel has discussed option of stopping therapy and hospice; pt wanted to cont with tx.   --has follow up with Dr Rangel on 3/18/25 ; will further discuss at that time     Anemia s/p 3 units PRBCs  2/2 CMML and therapy / ? GI bleed   --cont to monitor and transfuse for Hgb < 7     GI bleed  --GI consulted : plan EGD tomorrow 3/10    Afib; Eliquis on hold     CKD: management per primary team     Plan reviewed with Dr Hernandez; covering physician for Dr Rangel.       Signed By: Ann Schoeneweis, APRN - NP   3/11/25 at 6:28 PM EDT        I personally saw and evaluated the patient and performed the key components of medical decision making.  The history, physical exam, and documentation were performed by Ann Schoeneweis, NP.  I reviewed and verified the above documentation and modified it as needed.    1) CMML  S/p C2 of decitabine which completed on 3/7. He is likely to have some progressive cytopenias

## 2025-03-11 NOTE — TELEPHONE ENCOUNTER
Patient spouse called and stated that the patients stool is black and has tested positive for blood in his stool. She stated that she woudl like to discuss how to proceed regarding his medication because it seems like the medication is what s causing his issues. Requested a call back.     # [phone]

## 2025-03-11 NOTE — CONSULTS
Georgia Evans PA-C                       [phone] office             Monday-Friday 8:00 am-4:30 pm  I am not permitted to use \"perfect serve\" use above for contact, thanks.      Gastroenterology Consultation Note      Admit Date: 3/10/2025  Consult Date: 3/11/2025   I greatly appreciate your asking me to see Terry Grant, thank you very much for the opportunity to participate in his care.    Narrative Assessment and Plan   79-year-old male with CMML, atrial fibrillation previously on Eliquis (recently stopped over a week ago), HTN, GERD, being evaluated by GI for anemia and melena.  Hemoglobin 5.1 on admission, now up to 7.7 after 3 units PRBC.  He frequently needs outpatient blood transfusions for anemia secondary to CMML.  Platelets 122 on admission.  Absolute neutrophils within normal limits at 2.12, WBC low at 4.0.    Impression:  Anemia  Melena  GERD    Plan:   EGD tomorrow afternoon with Dr. Edwards.  Regular diet is fine this evening, please keep patient n.p.o. after midnight.  Continue with pantoprazole 40 mg twice daily.    Georgia Evans PA-C    Subjective:     Chief Complaint: Melena    History of Present Illness: GI consultation requested by FRANKLIN Conteh for anemia and positive FOBT.  79-year-old male with past medical history of CMML on Decitabine-Cedazuridine and Decadron followed by Dr. Rangel, atrial fibrillation previously on Eliquis (this was recently stopped over a week ago), HTN, GERD, presenting with dyspnea, fatigue, and nausea.  He has have been having black \"tarry\" stools for the past 1 to 2 weeks and intermittent hiccuping for the past month.  He does have history of GERD and takes Tums as needed.  Denies smoking, alcohol use, or recent NSAID use.  No prior EGD.    He does note that he frequently needs blood transfusions in the outpatient setting for anemia due to his CMML.    Colonoscopy 4/12/2017 by   PM   Result Value Ref Range     85 - 241 U/L         Assessment/Plan:     Principal Problem:    Anemia  Active Problems:    Thrombocytopenia    HTN (hypertension), benign    PAF (paroxysmal atrial fibrillation) (HCC)    Chronic myelomonocytic leukemia not having achieved remission (HCC)    Stage 3b chronic kidney disease (HCC)    Gastrointestinal hemorrhage with melena  Resolved Problems:    * No resolved hospital problems. *       See above narrative for full detail.

## 2025-03-11 NOTE — CARE COORDINATION
03/11/25 1621   Service Assessment   Patient Orientation Alert and Oriented   Cognition Alert   History Provided By Patient   Primary Caregiver Self   Support Systems Spouse/Significant Other;Children   Patient's Healthcare Decision Maker is: Legal Next of Kin   PCP Verified by CM Yes  (Dr. Snider)   Last Visit to PCP Within last 6 months   Prior Functional Level Independent in ADLs/IADLs   Can patient return to prior living arrangement Yes   Ability to make needs known: Good   Family able to assist with home care needs: Yes   Financial Resources Medicare  ([employer])   Community Resources None   CM/SW Referral ADLs/IADLs;Activity/Exercise   Social/Functional History   Lives With Spouse   Type of Home House   Home Layout One level   Home Access Stairs to enter with rails   Entrance Stairs - Number of Steps 3   Home Equipment None   Receives Help From Family   Prior Level of Assist for ADLs Independent   Ambulation Assistance Independent   Prior Level of Assist for Transfers Independent   Active  Yes   Mode of Transportation Car   Occupation Retired   Discharge Planning   Type of Home Care Services None   Services At/After Discharge   Mode of Transport at Discharge Other (see comment)  (family)   Confirm Follow Up Transport Family         Care Management Initial Assessment  3/11/2025 4:24 PM  If patient is discharged prior to next notation, then this note serves as note for discharge by case management.    Reason for Admission:   GI bleed [K92.2]  Upper GI bleed [K92.2]  Anemia requiring transfusions [D64.9]  Procedure(s) (LRB):  ESOPHAGOGASTRODUODENOSCOPY (N/A)       Patient Admission Status: Inpatient  Date Admitted to IN: 3/10/25  RUR: Readmission Risk Score: 18.6    Hospitalization in the last 30 days (Readmission):  Yes        Advance Care Planning:  Code Status: Full Code  Primary Healthcare Decision Maker: (P) Legal Next of Kin  Primary Decision Maker: Jazmín Grant - Spouse -      [] Home with outpatient services.    Specify:    [] Home with Home Health   - [place] of Choice offered? [] Yes, Preference:   [] NA    []SNF/IPR   -[]Freedom of Choice offered, and preferences given:   []Listing provided and preferences requested   -Status: []Pending []Accepted:    -Auth required: []Yes []No    -Auth initiated date:   -3 midnight stay required: []Yes []No  Date satisfied:     [] LTC:     [] Home with Hospice   - [place] of Choice offered? [] Yes, Preference:   [] NA    [] [facility] information provided.     [] Other:       Megan Junior RN  Case Management Department  For questions or concerns, please PerfectServe

## 2025-03-12 ENCOUNTER — ANESTHESIA EVENT (OUTPATIENT)
Facility: HOSPITAL | Age: 80
End: 2025-03-12
Payer: MEDICARE

## 2025-03-12 ENCOUNTER — ANESTHESIA (OUTPATIENT)
Facility: HOSPITAL | Age: 80
End: 2025-03-12
Payer: MEDICARE

## 2025-03-12 LAB
ABO + RH BLD: NORMAL
ANION GAP SERPL CALC-SCNC: 8 MMOL/L (ref 2–12)
BASOPHILS # BLD: 0 K/UL (ref 0–0.1)
BASOPHILS NFR BLD: 0 % (ref 0–1)
BLD PROD TYP BPU: NORMAL
BLOOD BANK BLOOD PRODUCT EXPIRATION DATE: NORMAL
BLOOD BANK DISPENSE STATUS: NORMAL
BLOOD BANK ISBT PRODUCT BLOOD TYPE: 5100
BLOOD BANK ISBT PRODUCT BLOOD TYPE: 5100
BLOOD BANK ISBT PRODUCT BLOOD TYPE: 9500
BLOOD BANK PRODUCT CODE: NORMAL
BLOOD BANK UNIT TYPE AND RH: NORMAL
BLOOD GROUP ANTIBODIES SERPL: NORMAL
BPU ID: NORMAL
BUN SERPL-MCNC: 42 MG/DL (ref 6–20)
BUN/CREAT SERPL: 27 (ref 12–20)
CALCIUM SERPL-MCNC: 8.3 MG/DL (ref 8.5–10.1)
CHLORIDE SERPL-SCNC: 110 MMOL/L (ref 97–108)
CO2 SERPL-SCNC: 22 MMOL/L (ref 21–32)
CREAT SERPL-MCNC: 1.58 MG/DL (ref 0.7–1.3)
CROSSMATCH RESULT: NORMAL
DIFFERENTIAL METHOD BLD: ABNORMAL
EOSINOPHIL # BLD: 0 K/UL (ref 0–0.4)
EOSINOPHIL NFR BLD: 0 % (ref 0–7)
ERYTHROCYTE [DISTWIDTH] IN BLOOD BY AUTOMATED COUNT: 15.9 % (ref 11.5–14.5)
GLUCOSE SERPL-MCNC: 70 MG/DL (ref 65–100)
HCT VFR BLD AUTO: 22.2 % (ref 36.6–50.3)
HGB BLD-MCNC: 7.2 G/DL (ref 12.1–17)
IMM GRANULOCYTES # BLD AUTO: 0 K/UL
IMM GRANULOCYTES NFR BLD AUTO: 0 %
LYMPHOCYTES # BLD: 0.53 K/UL (ref 0.8–3.5)
LYMPHOCYTES NFR BLD: 25 % (ref 12–49)
MCH RBC QN AUTO: 30.1 PG (ref 26–34)
MCHC RBC AUTO-ENTMCNC: 32.4 G/DL (ref 30–36.5)
MCV RBC AUTO: 92.9 FL (ref 80–99)
METAMYELOCYTES NFR BLD MANUAL: 2 %
MONOCYTES # BLD: 0.44 K/UL (ref 0–1)
MONOCYTES NFR BLD: 21 % (ref 5–13)
NEUTS BAND NFR BLD MANUAL: 3 % (ref 0–6)
NEUTS SEG # BLD: 1.09 K/UL (ref 1.8–8)
NEUTS SEG NFR BLD: 49 % (ref 32–75)
NRBC # BLD: 0 K/UL (ref 0–0.01)
NRBC BLD-RTO: 0 PER 100 WBC
PLATELET # BLD AUTO: 111 K/UL (ref 150–400)
POTASSIUM SERPL-SCNC: 4.2 MMOL/L (ref 3.5–5.1)
RBC # BLD AUTO: 2.39 M/UL (ref 4.1–5.7)
RBC MORPH BLD: ABNORMAL
SODIUM SERPL-SCNC: 140 MMOL/L (ref 136–145)
SPECIMEN EXP DATE BLD: NORMAL
UNIT DIVISION: 0
UNIT ISSUE DATE/TIME: NORMAL
WBC # BLD AUTO: 2.1 K/UL (ref 4.1–11.1)
WBC MORPH BLD: ABNORMAL

## 2025-03-12 PROCEDURE — 2709999900 HC NON-CHARGEABLE SUPPLY: Performed by: INTERNAL MEDICINE

## 2025-03-12 PROCEDURE — 6370000000 HC RX 637 (ALT 250 FOR IP): Performed by: HOSPITALIST

## 2025-03-12 PROCEDURE — 6360000002 HC RX W HCPCS: Performed by: NURSE ANESTHETIST, CERTIFIED REGISTERED

## 2025-03-12 PROCEDURE — 3700000000 HC ANESTHESIA ATTENDED CARE: Performed by: INTERNAL MEDICINE

## 2025-03-12 PROCEDURE — 2500000003 HC RX 250 WO HCPCS: Performed by: HOSPITALIST

## 2025-03-12 PROCEDURE — 7100000010 HC PHASE II RECOVERY - FIRST 15 MIN: Performed by: INTERNAL MEDICINE

## 2025-03-12 PROCEDURE — 80048 BASIC METABOLIC PNL TOTAL CA: CPT

## 2025-03-12 PROCEDURE — 3600007502: Performed by: INTERNAL MEDICINE

## 2025-03-12 PROCEDURE — 0DB78ZX EXCISION OF STOMACH, PYLORUS, VIA NATURAL OR ARTIFICIAL OPENING ENDOSCOPIC, DIAGNOSTIC: ICD-10-PCS | Performed by: INTERNAL MEDICINE

## 2025-03-12 PROCEDURE — C1889 IMPLANT/INSERT DEVICE, NOC: HCPCS | Performed by: INTERNAL MEDICINE

## 2025-03-12 PROCEDURE — 99232 SBSQ HOSP IP/OBS MODERATE 35: CPT | Performed by: INTERNAL MEDICINE

## 2025-03-12 PROCEDURE — 94761 N-INVAS EAR/PLS OXIMETRY MLT: CPT

## 2025-03-12 PROCEDURE — 0W3P8ZZ CONTROL BLEEDING IN GASTROINTESTINAL TRACT, VIA NATURAL OR ARTIFICIAL OPENING ENDOSCOPIC: ICD-10-PCS | Performed by: INTERNAL MEDICINE

## 2025-03-12 PROCEDURE — 1100000000 HC RM PRIVATE

## 2025-03-12 PROCEDURE — 85025 COMPLETE CBC W/AUTO DIFF WBC: CPT

## 2025-03-12 PROCEDURE — 88305 TISSUE EXAM BY PATHOLOGIST: CPT

## 2025-03-12 PROCEDURE — 7100000011 HC PHASE II RECOVERY - ADDTL 15 MIN: Performed by: INTERNAL MEDICINE

## 2025-03-12 DEVICE — WORKING LENGTH 235CM, WORKING CHANNEL 2.8MM
Type: IMPLANTABLE DEVICE | Site: STOMACH | Status: FUNCTIONAL
Brand: RESOLUTION 360 CLIP

## 2025-03-12 RX ORDER — SODIUM CHLORIDE 0.9 % (FLUSH) 0.9 %
5-40 SYRINGE (ML) INJECTION PRN
Status: DISCONTINUED | OUTPATIENT
Start: 2025-03-12 | End: 2025-03-12 | Stop reason: HOSPADM

## 2025-03-12 RX ORDER — SODIUM CHLORIDE 9 MG/ML
25 INJECTION, SOLUTION INTRAVENOUS PRN
Status: DISCONTINUED | OUTPATIENT
Start: 2025-03-12 | End: 2025-03-12 | Stop reason: HOSPADM

## 2025-03-12 RX ORDER — PROPOFOL 10 MG/ML
INJECTION, EMULSION INTRAVENOUS
Status: DISCONTINUED | OUTPATIENT
Start: 2025-03-12 | End: 2025-03-12 | Stop reason: SDUPTHER

## 2025-03-12 RX ORDER — LIDOCAINE HYDROCHLORIDE 20 MG/ML
INJECTION, SOLUTION INTRAVENOUS
Status: DISCONTINUED | OUTPATIENT
Start: 2025-03-12 | End: 2025-03-12 | Stop reason: SDUPTHER

## 2025-03-12 RX ORDER — SODIUM CHLORIDE 0.9 % (FLUSH) 0.9 %
5-40 SYRINGE (ML) INJECTION EVERY 12 HOURS SCHEDULED
Status: DISCONTINUED | OUTPATIENT
Start: 2025-03-12 | End: 2025-03-12 | Stop reason: HOSPADM

## 2025-03-12 RX ADMIN — SODIUM CHLORIDE, PRESERVATIVE FREE 10 ML: 5 INJECTION INTRAVENOUS at 20:40

## 2025-03-12 RX ADMIN — PROPOFOL 50 MG: 10 INJECTION, EMULSION INTRAVENOUS at 14:54

## 2025-03-12 RX ADMIN — PROPOFOL 100 MG: 10 INJECTION, EMULSION INTRAVENOUS at 14:51

## 2025-03-12 RX ADMIN — SODIUM CHLORIDE, PRESERVATIVE FREE 10 ML: 5 INJECTION INTRAVENOUS at 09:54

## 2025-03-12 RX ADMIN — LIDOCAINE HYDROCHLORIDE 60 MG: 20 INJECTION, SOLUTION INTRAVENOUS at 14:49

## 2025-03-12 RX ADMIN — OXYCODONE HYDROCHLORIDE AND ACETAMINOPHEN 1000 MG: 500 TABLET ORAL at 09:53

## 2025-03-12 ASSESSMENT — PAIN - FUNCTIONAL ASSESSMENT: PAIN_FUNCTIONAL_ASSESSMENT: NONE - DENIES PAIN

## 2025-03-12 ASSESSMENT — PAIN SCALES - GENERAL
PAINLEVEL_OUTOF10: 0
PAINLEVEL_OUTOF10: 0

## 2025-03-12 NOTE — CARE COORDINATION
Care Management Progress Note    Reason for Admission:   GI bleed [K92.2]  Upper GI bleed [K92.2]  Anemia requiring transfusions [D64.9]  Procedure(s) (LRB):  ESOPHAGOGASTRODUODENOSCOPY (N/A)       Patient Admission Status: Inpatient  RUR:  19%  Hospitalization in the last 30 days (Readmission):  Yes        Pt transferred to the 5th floor.  EMR reviewed and handoff received from previous  (Megan).   READMISSION:  Pt was admitted to  from 2/18 - 2/20 and returned home.  Pt was readmitted to  on 3/10 with GIB.    Transition Plan of Care:  Oncology, GI follow for medical management   Plan is for pt to return home  Outpatient follow up  Pt will arrange his own transport home    CM will continue to follow pt until discharged.  Moni

## 2025-03-12 NOTE — PERIOP NOTE
TRANSFER - IN REPORT:    Verbal report received from MELANI Siegel on Terry Grant  being received from 5th floor for ordered procedure      Report consisted of patient's Situation, Background, Assessment and   Recommendations(SBAR).     Information from the following report(s) Nurse Handoff Report and MAR was reviewed with the receiving nurse.    Opportunity for questions and clarification was provided.      Assessment completed upon patient's arrival to unit and care assumed.

## 2025-03-12 NOTE — PERIOP NOTE
TRANSFER - OUT REPORT:    Verbal report given to MELANI Siegel on Terry Grant  being transferred to [facility] for ordered procedure       Report consisted of patient's Situation, Background, Assessment and   Recommendations(SBAR).     Information from the following report(s) Nurse Handoff Report, Index, MAR, and Neuro Assessment was reviewed with the receiving nurse.           Lines:   Peripheral IV 03/10/25 Right Antecubital (Active)   Site Assessment Clean, dry & intact 03/12/25 1353   Line Status Capped;Flushed 03/12/25 1353   Line Care Connections checked and tightened 03/12/25 1353   Phlebitis Assessment No symptoms 03/12/25 1353   Infiltration Assessment 0 03/12/25 1353   Alcohol Cap Used Yes 03/12/25 1353   Dressing Status Clean, dry & intact 03/12/25 1353   Dressing Type Transparent 03/12/25 1353   Dressing Intervention New 03/12/25 1353        Opportunity for questions and clarification was provided.      Patient transported with:  Monitor and Registered Nurse

## 2025-03-12 NOTE — ANESTHESIA PRE PROCEDURE
Department of Anesthesiology  Preprocedure Note       Name:  Trery Grant   Age:  79 y.o.  :  1945                                          MRN:  [id number]         Date:  3/12/2025      Surgeon: Surgeon(s):  Sierra Edwards MD    Procedure: Procedure(s):  ESOPHAGOGASTRODUODENOSCOPY    Medications prior to admission:   Prior to Admission medications    Medication Sig Start Date End Date Taking? Authorizing Provider   Decitabine-Cedazuridine  MG TABS  mg PO on days 1 through 5 then 23 days off 25   Kevin Rangel MD   prochlorperazine (COMPAZINE) 10 MG tablet Take 1 tablet by mouth every 6 hours as needed (for nausea) 24   Kevin Rangel MD   dexAMETHasone (DECADRON) 0.5 MG tablet Take 1 tablet by mouth 2 times daily (with meals) Take with breakfast and lunch 24   Kevin Rangel MD   benzonatate (TESSALON) 200 MG capsule  24   ProviderDeep MD   losartan (COZAAR) 50 MG tablet Take 1 tablet by mouth daily 24   Depe Gan MD   apixaban (ELIQUIS) 5 MG TABS tablet Take 1 tablet by mouth 2 times daily    ProviderDeep MD   ascorbic acid (VITAMIN C) 1000 MG tablet Take 1 tablet by mouth daily    Automatic Reconciliation, Ar   ergocalciferol (ERGOCALCIFEROL) 1.25 MG (82502 UT) capsule Take 1 capsule by mouth    Automatic Reconciliation, Ar       Current medications:    Current Facility-Administered Medications   Medication Dose Route Frequency Provider Last Rate Last Admin   • 0.9 % sodium chloride infusion   IntraVENous PRN Christi Conteh, BIJU - NP       • 0.9 % sodium chloride infusion   IntraVENous PRN Sukhjinder Reddy MD       • ascorbic acid (VITAMIN C) tablet 1,000 mg  1,000 mg Oral Daily Sonya Merritt MD   1,000 mg at 25 0953   • sodium chloride flush 0.9 % injection 5-40 mL  5-40 mL IntraVENous 2 times per day Sonya Merritt MD   10 mL at 25 0954   • sodium chloride flush 0.9 % injection 5-40 mL  5-40 mL

## 2025-03-12 NOTE — H&P
.Pre-Endoscopy H&P Update  Chief complaint/HPI/ROS:  The indication for the procedure, the patient's history and the patient's current medications are reviewed prior to the procedure and that data is reported on the H&P to which this document is attached.  Any significant complaints with regard to organ systems will be noted, and if not mentioned then a review of systems is not contributory.  Past Medical History:   Diagnosis Date    Atrial fibrillation (HCC)     Chronic myelomonocytic leukemia (HCC)     Confirmed per bone marrow biopsy of 5/18/2023.  Was referred to [facility] for transplant, but may not be candidate due to age, was started on chemotherapy.    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)    CARDIAC PROCEDURE N/A 6/7/2023    Pericardiocentesis performed by Donovan Fan MD at [facility] CARDIAC CATH LAB    CATARACT REMOVAL Right     COLONOSCOPY N/A 4/12/2017    COLONOSCOPY performed by Perico Stiles MD at [facility] ENDOSCOPY    CYSTOSCOPY Bilateral 6/2/2023    CYSTOSCOPY, BILATERAL  STENTS, RETROGRADE performed by Jonas Gatica MD at [facility] MAIN OR    PERICARDIUM SURGERY N/A 6/8/2023    PERICARDIAL WINDOW performed by Devon Uriarte MD at [facility] OPEN HEART    THORACENTESIS  6/29/2023    THORACENTESIS N/A 6/29/2023    THORACENTESIS performed by Sil Zimmerman MD at [facility] ENDOSCOPY     Social   Social History     Tobacco Use    Smoking status: Former     Types: Cigarettes     Passive exposure: Never    Smokeless tobacco: Never    Tobacco comments:     Smoked cigarettes socially, typically did not even finish a pack a month, and did not smoke every month.   Substance Use Topics    Alcohol use: No     Comment: Patient denies any history of excessive alcohol intake.      Family History   Problem Relation Age of Onset    Hypertension Mother

## 2025-03-12 NOTE — PERIOP NOTE
Received recovery report from anesthesia team, see anesthesia note. Abdomen remains soft and non-tender post-procedure. Pt has no complaints at this time and tolerated procedure well. Endoscope was pre-cleaned at the bedside by Loyd immediately following procedure. Post recovery report given to Tatum Wallace.     Neurology

## 2025-03-12 NOTE — PROGRESS NOTES
Cancer [place] at [facility]  [address], [address] [facility] [address]  W: [phone]  F: [phone]      Reason for Visit:   Terry Grant is a 79 y.o. 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  - SRSF2 mutations can be seen in various myeloid neoplasms, and are associated with a poor prognosis in MDS, AML,  PMF, and PV.  - TET2 mutations can be seen in various myeloid neoplasms, and are associated with an increased response to  hypomethylating agents and a poor prognosis in MDS after hematopoietic stem cell transplantation.  - Co-mutation of TET2 and SRSF2 is reported to be highly predictive of a myeloid neoplasm characterized by  myelodysplasia and monocytosis, including, but not limited to, CMML     NGS:  ASXL1 K248Sla*12; KRAS G12R; SRSF2 P95L; TET2 *, *; NO alterations detected in the following genes:  FLT3, IDH1, IDH2, NPM1     6/8/23:  pericardial window, negative     Hydrea 500 mg daily 6/9/23-  DR to 500 mg on Mon-Fri              Increased to daily on 8/22/24              Increased to 500 mg bid mon-Friday and daily on sat and Sunday on 9/9/24              Changed to 500 mg bid on Mon, Wed, Fri, 500 mg daily on Tues, thurs, Sat, Sunday on 10/17/24              Changed to 500 mg PO daily on 11/14/24     Decitabine-cedazuridine 2/4/25-2/8/25 (first cycle)    Decitabine 35 mg/cedazuridine 100 mg once daily on days 1 to 5 of each 28-day treatment cycle for a minimum of 4 cycles,            C1 2/4/25            C2 3/4/25  DR to 4 pills     Discussed referral to [facility] for alloSCT eval -- though his age may preclude this -- his wife has discussed with them. He declines. Discussed [facility].  POCOCCBLDFEC Positive (A) 03/11/2025     Lab Results   Component Value Date    AFPTM <1.8 05/17/2023     03/11/2025      3/10/25 XR CHEST  IMPRESSION:  No acute cardiopulmonary process or focal lung opacity.      Assessment/Recommendations:     CMML  His cancer is not curable and management is with palliative intent (prolong survival, minimize complications, and maintain quality of life). He completed cycle #2 of decitabine on 3/7. He is likely to have some progressive cytopenias from this over the next 1-2 weeks, as he had with cycle #1.  --Monitor CBC  --Follow up with Dr. Rangel next week as scheduled      Anemia  S/p 3u pRBC this admission  Due in part to his CMML, but there is also concern for GI bleeding based on his reports of melena and his stool testing positive for blood. Retic is a bit elevated as well which would fit with bleeding (would expect low retic with CMML and chemotherapy). GI has been consulted. HGB up today after transfusions.  --EGD today with GI  --Monitor CBC daily and transfuse PRN for HGB <7      Thrombocytopenia  Secondary to CMML and chemotherapy. Improved currently, but likely to drop again over the next week after his recent chemotherapy. Slight decline this morning, but still >100.  --Monitor CBC daily and transfuse PRN for PLT <10 or active bleeding      Afib  --Hold anticoagulation for now given concerns for GI bleeding.      Signed By: Robert Hernandez MD

## 2025-03-12 NOTE — OP NOTE
.                         [facility]  [facility]  Sierra Edwards MD  [phone]      2025    Esophagogastroduodenoscopy (EGD) Procedure Note  Terry Grant  : 1945  [facility] Medical Record Number: [id number]      Indications:   Painless melena, pancytopenia concern for progressive leukemia  Referring Physician:  Jonas Snider MD  Anesthesia/Sedation: Monitored anesthesia care, see separate note  Endoscopist:  Sierra Edwards MD   Complications:  None  Estimated Blood Loss:  None    Permit:  The indications, risks, benefits and alternatives were reviewed with the patient or their decision maker who was provided an opportunity to ask questions and all questions were answered.  The specific risks of esophagogastroduodenoscopy with conscious sedation were reviewed, including but not limited to anesthetic complication, bleeding, adverse drug reaction, missed lesion, infection, IV site reactions, and intestinal perforation which would lead to the need for surgical repair.  Alternatives to EGD including radiographic imaging, observation without testing, or laboratory testing were reviewed as well as the limitations of those alternatives discussed.  After considering the options and having all their questions answered, the patient or their decision maker provided both verbal and written consent to proceed.       Procedure in Detail:  After obtaining informed consent, positioning of the patient in the left lateral decubitus position, and conduction of a pre-procedure pause or \"time out\" the endoscope was introduced into the mouth and advanced to the duodenum.  A careful inspection was made, and findings or interventions are described below.    Findings:   Esophagus-normal-appearing esophagus, at the gastroesophageal junction there was a beefy red fold with slow oozing at the 4 o'clock position which was treated with deployment of 1 MRI conditional

## 2025-03-12 NOTE — PROGRESS NOTES
LIDIA FARLEY [facility]  [address] [place] [address]  [phone]      Hospitalist  Progress Note      NAME:       Terry Grant   :        1945  MRM:        [id number]    Date of service: 3/12/2025      Subjective: Patient seen and examined by me. Patient admitted with severe anemia, melena stools. He says he still has melena stools. No abdominal discomfort. No fever or chills. Not vomiting.      Objective:    Vital Signs:    /64   Pulse 77   Temp 98.1 °F (36.7 °C) (Oral)   Resp 15   Ht 1.727 m (5' 8\")   Wt 67.1 kg (148 lb)   SpO2 97%   BMI 22.50 kg/m²        Intake/Output Summary (Last 24 hours) at 3/12/2025 1022  Last data filed at 3/12/2025 0729  Gross per 24 hour   Intake --   Output 1350 ml   Net -1350 ml        Current inpatient medications reviewed:  Current Facility-Administered Medications   Medication Dose Route Frequency    0.9 % sodium chloride infusion   IntraVENous PRN    0.9 % sodium chloride infusion   IntraVENous PRN    ascorbic acid (VITAMIN C) tablet 1,000 mg  1,000 mg Oral Daily    sodium chloride flush 0.9 % injection 5-40 mL  5-40 mL IntraVENous 2 times per day    sodium chloride flush 0.9 % injection 5-40 mL  5-40 mL IntraVENous PRN    0.9 % sodium chloride infusion   IntraVENous PRN    ondansetron (ZOFRAN-ODT) disintegrating tablet 4 mg  4 mg Oral Q8H PRN    Or    ondansetron (ZOFRAN) injection 4 mg  4 mg IntraVENous Q6H PRN    acetaminophen (TYLENOL) tablet 650 mg  650 mg Oral Q6H PRN    Or    acetaminophen (TYLENOL) suppository 650 mg  650 mg Rectal Q6H PRN    pantoprazole (PROTONIX) 40 mg in sodium chloride (PF) 0.9 % 10 mL injection  40 mg IntraVENous Q12H    0.9 % sodium chloride infusion   IntraVENous PRN       Physical Examination:    General:   Weak and ill looking patient in no acute distress  Eyes:   pale conjunctivae, PERRLA with no discharge.  ENT:   no  underlying CMML, GI bleed, chemotherapy. He has been transfused with 2u pRBCs and Hgb now 7.2. Seen by hematology. Continue to monitor and transfuse as needed to keep Hgb above 7.      Gastrointestinal hemorrhage with melena POA: reviewed by GI who plan for an EGD this afternoon. Continue Pantoprazole.     Chronic myelomonocytic leukemia not having achieved remission POA: sees Dr Rangel. Previously declined referral to [facility] or [facility]. Hematology following.     PAF (paroxysmal atrial fibrillation) / HTN (hypertension), benign POA: BP low normal. Holding Losartan and Apixaban    Thrombocytopenia POA: due to CMML and therapy. Monitor    Stage 3b chronic kidney disease POA: monitor renal function    Care Plan discussed with: Patient, Nursing, CM     Prophylaxis:  H2B/PPI                Expected Disposition:  Home w/Family    PCP:      Jonas Snider MD     I have personally examined and treated the patient at bedside during this period. To assist coordination of care and communication with nursing and staff, this note may be preliminary early in the day, but finalized by end of the day.         ___________________________________________________    Attending Physician:   Sukhjinder Grullon MD

## 2025-03-12 NOTE — ANESTHESIA POSTPROCEDURE EVALUATION
Department of Anesthesiology  Postprocedure Note    Patient: Terry Grant  MRN: [id number]  YOB: 1945  Date of evaluation: 3/12/2025    Procedure Summary       Date: 03/12/25 Room / Location: [facility] 03 / [facility] ENDOSCOPY    Anesthesia Start: 1448 Anesthesia Stop: 1501    Procedures:       ESOPHAGOGASTRODUODENOSCOPY (Upper GI Region)      ESOPHAGOGASTRODUODENOSCOPY BIOPSY (Upper GI Region)      ESOPHAGOGASTRODUODENOSCOPY CONTROL HEMORRHAGE (Upper GI Region) Diagnosis:       Melena      Anemia, unspecified type      (Melena [K92.1])      (Anemia, unspecified type [D64.9])    Surgeons: Sierra Edwards MD Responsible Provider: Nelson Hebert MD    Anesthesia Type: MAC, TIVA ASA Status: 3            Anesthesia Type: MAC, TIVA    Lavinia Phase I: Lavinia Score: 10    Lavinia Phase II: Lavinia Score: 10    Anesthesia Post Evaluation    Patient location during evaluation: PACU  Patient participation: complete - patient participated  Level of consciousness: awake and alert  Pain score: 0  Airway patency: patent  Nausea & Vomiting: no nausea  Cardiovascular status: hemodynamically stable  Respiratory status: room air  There was medical reason for not using a multimodal analgesia pain management approach.    No notable events documented.

## 2025-03-13 LAB
ANION GAP SERPL CALC-SCNC: 10 MMOL/L (ref 2–12)
BASOPHILS # BLD: 0 K/UL (ref 0–0.1)
BASOPHILS NFR BLD: 0 % (ref 0–1)
BUN SERPL-MCNC: 33 MG/DL (ref 6–20)
BUN/CREAT SERPL: 21 (ref 12–20)
CALCIUM SERPL-MCNC: 8.4 MG/DL (ref 8.5–10.1)
CHLORIDE SERPL-SCNC: 107 MMOL/L (ref 97–108)
CO2 SERPL-SCNC: 20 MMOL/L (ref 21–32)
CREAT SERPL-MCNC: 1.55 MG/DL (ref 0.7–1.3)
DIFFERENTIAL METHOD BLD: ABNORMAL
EOSINOPHIL # BLD: 0.05 K/UL (ref 0–0.4)
EOSINOPHIL NFR BLD: 3 % (ref 0–7)
ERYTHROCYTE [DISTWIDTH] IN BLOOD BY AUTOMATED COUNT: 16.2 % (ref 11.5–14.5)
GLUCOSE SERPL-MCNC: 73 MG/DL (ref 65–100)
HCT VFR BLD AUTO: 22 % (ref 36.6–50.3)
HGB BLD-MCNC: 7.2 G/DL (ref 12.1–17)
IMM GRANULOCYTES # BLD AUTO: 0 K/UL
IMM GRANULOCYTES NFR BLD AUTO: 0 %
LYMPHOCYTES # BLD: 0.5 K/UL (ref 0.8–3.5)
LYMPHOCYTES NFR BLD: 33 % (ref 12–49)
MCH RBC QN AUTO: 30.5 PG (ref 26–34)
MCHC RBC AUTO-ENTMCNC: 32.7 G/DL (ref 30–36.5)
MCV RBC AUTO: 93.2 FL (ref 80–99)
MONOCYTES # BLD: 0.33 K/UL (ref 0–1)
MONOCYTES NFR BLD: 22 % (ref 5–13)
NEUTS BAND NFR BLD MANUAL: 2 % (ref 0–6)
NEUTS SEG # BLD: 0.62 K/UL (ref 1.8–8)
NEUTS SEG NFR BLD: 40 % (ref 32–75)
NRBC # BLD: 0 K/UL (ref 0–0.01)
NRBC BLD-RTO: 0 PER 100 WBC
PLATELET # BLD AUTO: 107 K/UL (ref 150–400)
PMV BLD AUTO: 11 FL (ref 8.9–12.9)
POTASSIUM SERPL-SCNC: 4 MMOL/L (ref 3.5–5.1)
RBC # BLD AUTO: 2.36 M/UL (ref 4.1–5.7)
RBC MORPH BLD: ABNORMAL
SODIUM SERPL-SCNC: 137 MMOL/L (ref 136–145)
WBC # BLD AUTO: 1.5 K/UL (ref 4.1–11.1)
WBC MORPH BLD: ABNORMAL

## 2025-03-13 PROCEDURE — 80048 BASIC METABOLIC PNL TOTAL CA: CPT

## 2025-03-13 PROCEDURE — 6360000002 HC RX W HCPCS: Performed by: HOSPITALIST

## 2025-03-13 PROCEDURE — 2580000003 HC RX 258: Performed by: HOSPITALIST

## 2025-03-13 PROCEDURE — 85025 COMPLETE CBC W/AUTO DIFF WBC: CPT

## 2025-03-13 PROCEDURE — 99232 SBSQ HOSP IP/OBS MODERATE 35: CPT | Performed by: INTERNAL MEDICINE

## 2025-03-13 PROCEDURE — 2500000003 HC RX 250 WO HCPCS: Performed by: HOSPITALIST

## 2025-03-13 PROCEDURE — 94761 N-INVAS EAR/PLS OXIMETRY MLT: CPT

## 2025-03-13 PROCEDURE — 1100000000 HC RM PRIVATE

## 2025-03-13 PROCEDURE — 6370000000 HC RX 637 (ALT 250 FOR IP): Performed by: HOSPITALIST

## 2025-03-13 PROCEDURE — 6370000000 HC RX 637 (ALT 250 FOR IP): Performed by: INTERNAL MEDICINE

## 2025-03-13 RX ADMIN — SODIUM CHLORIDE, PRESERVATIVE FREE 40 MG: 5 INJECTION INTRAVENOUS at 01:30

## 2025-03-13 RX ADMIN — OXYCODONE HYDROCHLORIDE AND ACETAMINOPHEN 1000 MG: 500 TABLET ORAL at 09:22

## 2025-03-13 RX ADMIN — SODIUM CHLORIDE, PRESERVATIVE FREE 10 ML: 5 INJECTION INTRAVENOUS at 21:53

## 2025-03-13 RX ADMIN — POLYETHYLENE GLYCOL 3350, SODIUM SULFATE ANHYDROUS, SODIUM BICARBONATE, SODIUM CHLORIDE, POTASSIUM CHLORIDE 2000 ML: 236; 22.74; 6.74; 5.86; 2.97 POWDER, FOR SOLUTION ORAL at 21:53

## 2025-03-13 RX ADMIN — SODIUM CHLORIDE, PRESERVATIVE FREE 10 ML: 5 INJECTION INTRAVENOUS at 09:23

## 2025-03-13 RX ADMIN — POLYETHYLENE GLYCOL 3350, SODIUM SULFATE ANHYDROUS, SODIUM BICARBONATE, SODIUM CHLORIDE, POTASSIUM CHLORIDE 2000 ML: 236; 22.74; 6.74; 5.86; 2.97 POWDER, FOR SOLUTION ORAL at 14:07

## 2025-03-13 RX ADMIN — SODIUM CHLORIDE, PRESERVATIVE FREE 40 MG: 5 INJECTION INTRAVENOUS at 14:08

## 2025-03-13 NOTE — PROGRESS NOTES
Physician Progress Note      PATIENT:               LUKAS SYLVESTER  CSN #:                  [id number]  :                       1945  ADMIT DATE:       3/10/2025 11:15 AM  DISCH DATE:  RESPONDING  PROVIDER #:        Sukhjinder Grullon MD          QUERY TEXT:    Patient admitted with anemia and has documented pancytopenia(3/12op). If   possible, please document in progress notes and discharge summary further   specificity regarding pancytopenia:    The medical record reflects the following:  Risk Factors: CML, chemo  Clinical Indicators: 12opn-ainless melena, pancytopenia concern for   progressive leukemia, 12 pn-Thrombocytopenia POA: due to CMML and therapy.   Monitor.  wbc 1.5, hgb 5.1, rbc 1.77, plt 107, anc 0.62  Treatment: 2u prbc, onc cx  Options provided:  -- Antineoplastic (chemotherapy) induced pancytopenia  -- Pancytopenia due to aplastic anemia  -- Pancytopenia with aplastic anemia  -- Pancytopenia due to CML  -- Pancytopenia due to bone marrow infiltration  -- Other - I will add my own diagnosis  -- Disagree - Not applicable / Not valid  -- Disagree - Clinically unable to determine / Unknown  -- Refer to Clinical Documentation Reviewer    PROVIDER RESPONSE TEXT:    Patient has antineoplastic (chemotherapy) induced pancytopenia.    Query created by: Kortney Reeves on 3/13/2025 2:50 PM      Electronically signed by:  Sukhjinedr Grullon MD 3/13/2025 6:40 PM

## 2025-03-13 NOTE — PROGRESS NOTES
Spiritual Health History and Assessment/Progress Note  [facility]    Rituals, Rites and Sacraments,  ,  ,      Name: Terry Grant MRN: [id number]    Age: 79 y.o.     Sex: male   Language: English   Anabaptist: Yarsanism   Anemia     Date: 3/13/2025            Total Time Calculated: 5 min              Spiritual Assessment began in [facility] B5 MULTI-SPECIALTY ONCOLOGY 2        Referral/Consult From: Clergy/   Encounter Overview/Reason: Rituals, Rites and Sacraments  Service Provided For: Patient    Vandana, Belief, Meaning:   Patient is connected with a vandana tradition or spiritual practice  Family/Friends No family/friends present      Importance and Influence:  Patient has spiritual/personal beliefs that influence decisions regarding their health  Family/Friends No family/friends present    Community:  Patient is connected with a spiritual community  Family/Friends No family/friends present    Assessment and Plan of Care:     Patient Interventions include: Provided sacramental/Roman Catholic ritual  Family/Friends Interventions include: No family/friends present    Patient Plan of Care: Spiritual Care available upon further referral  Family/Friends Plan of Care: Spiritual Care available upon further referral    Electronically signed by Chaplain RUSSELL on 3/13/2025 at 1:17 PM     Eucharistic ministry visit.  Mr. Grant is Yarsanism. He has been visited by Sr. Wright on previous admissions to [facility].  He is on a restricted diet at present so prayer offered.  Let him know Eucharistic ministers would be around the remainder of the week if his diet changed. Assured of continued prayer.      ELDA Cox Sr., RN, ACSW, LCSW   Page:  [phone]

## 2025-03-13 NOTE — PROGRESS NOTES
LIDIA FARLEY [facility]  [address] [place] [address]  [phone]      Hospitalist  Progress Note      NAME:       Terry Grant   :        1945  MRM:        [id number]    Date of service: 3/13/2025      Subjective: Patient seen and examined by me. Patient admitted with severe anemia, melena stools. He continues to see melena stools. No fever or chills. Not vomiting He has no abdominal pain.       Objective:    Vital Signs:    /81   Pulse 95   Temp 97.3 °F (36.3 °C) (Oral)   Resp 16   Ht 1.727 m (5' 8\")   Wt 67.1 kg (148 lb)   SpO2 98%   BMI 22.50 kg/m²        Intake/Output Summary (Last 24 hours) at 3/13/2025 1129  Last data filed at 3/12/2025 1529  Gross per 24 hour   Intake 540 ml   Output --   Net 540 ml        Current inpatient medications reviewed:  Current Facility-Administered Medications   Medication Dose Route Frequency    polyethylene glycol (GoLYTELY) solution 2,000 mL  2,000 mL Oral Once    Followed by    polyethylene glycol (GoLYTELY) solution 2,000 mL  2,000 mL Oral Once    0.9 % sodium chloride infusion   IntraVENous PRN    0.9 % sodium chloride infusion   IntraVENous PRN    ascorbic acid (VITAMIN C) tablet 1,000 mg  1,000 mg Oral Daily    sodium chloride flush 0.9 % injection 5-40 mL  5-40 mL IntraVENous 2 times per day    sodium chloride flush 0.9 % injection 5-40 mL  5-40 mL IntraVENous PRN    0.9 % sodium chloride infusion   IntraVENous PRN    ondansetron (ZOFRAN-ODT) disintegrating tablet 4 mg  4 mg Oral Q8H PRN    Or    ondansetron (ZOFRAN) injection 4 mg  4 mg IntraVENous Q6H PRN    acetaminophen (TYLENOL) tablet 650 mg  650 mg Oral Q6H PRN    Or    acetaminophen (TYLENOL) suppository 650 mg  650 mg Rectal Q6H PRN    pantoprazole (PROTONIX) 40 mg in sodium chloride (PF) 0.9 % 10 mL injection  40 mg IntraVENous Q12H    0.9 % sodium chloride infusion   IntraVENous PRN  interpreted by me and available notes from other care providers - all reviewed by me on: 3/13/2025    Assessment and Plan:    Symptomatic Anemia POA: likely multifactorial from underlying CMML, GI bleed, chemotherapy. He has been transfused with 2u pRBCs and Hgb has remained stable at 7.2. Seen by hematology and GI. Continue to monitor given he is still seeing melena and transfuse as needed to keep Hgb above 7.  Complete GI work up as below.     Gastrointestinal hemorrhage with melena POA: reviewed by GI who did an EGD 3/12 that was essentially unremarkable. He has a colonoscopy scheduled for tomorrow. Continue Pantoprazole.     Chronic myelomonocytic leukemia not having achieved remission POA: sees Dr Rangel. Previously declined referral to [facility] or [facility]. Hematology following.     PAF (paroxysmal atrial fibrillation) / HTN (hypertension), benign POA: BP now stable. Continue to hold Losartan and Apixaban    Thrombocytopenia POA: due to CMML and therapy. Stable.     Stage 3b chronic kidney disease POA: monitor renal function    Care Plan discussed with: Patient, Nursing, CM     Prophylaxis:  H2B/PPI                Expected Disposition:  Home w/Family    PCP:      Jonas Snider MD     I have personally examined and treated the patient at bedside during this period. To assist coordination of care and communication with nursing and staff, this note may be preliminary early in the day, but finalized by end of the day.         ___________________________________________________    Attending Physician:   Sukhjinder Grullon MD

## 2025-03-13 NOTE — CARE COORDINATION
Care Management Progress Note    Reason for Admission:   GI bleed [K92.2]  Upper GI bleed [K92.2]  Anemia requiring transfusions [D64.9]  Procedure(s) (LRB):  ESOPHAGOGASTRODUODENOSCOPY (N/A)  ESOPHAGOGASTRODUODENOSCOPY BIOPSY (N/A)  ESOPHAGOGASTRODUODENOSCOPY CONTROL HEMORRHAGE (N/A)  1 Day Post-Op    Patient Admission Status: Inpatient  RUR:  19%  Hospitalization in the last 30 days (Readmission):  No        READMISSION:  Pt was admitted to  from 2/18 - 2/20 and returned home.  Pt was readmitted to  on 3/10 with GIB.     Transition Plan of Care:  Oncology, GI follow for medical management - pt is s/p EGD on 3/12  Plan is for pt to return home  Outpatient follow up  Pt will arrange his own transport home     CM will continue to follow pt until discharged.  Moni

## 2025-03-13 NOTE — PROGRESS NOTES
[facility]  [address], [address] [place] [facility] [place] [address]  W: [phone]  F: [phone]      Reason for Visit:   Terry Grant is a 79 y.o. 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  - SRSF2 mutations can be seen in various myeloid neoplasms, and are associated with a poor prognosis in MDS, AML,  PMF, and PV.  - TET2 mutations can be seen in various myeloid neoplasms, and are associated with an increased response to  hypomethylating agents and a poor prognosis in MDS after hematopoietic stem cell transplantation.  - Co-mutation of TET2 and SRSF2 is reported to be highly predictive of a myeloid neoplasm characterized by  myelodysplasia and monocytosis, including, but not limited to, CMML     NGS:  ASXL1 Z608Qts*12; KRAS G12R; SRSF2 P95L; TET2 *, *; NO alterations detected in the following genes:  FLT3, IDH1, IDH2, NPM1     6/8/23:  pericardial window, negative     Hydrea 500 mg daily 6/9/23-  DR to 500 mg on Mon-Fri              Increased to daily on 8/22/24              Increased to 500 mg bid mon-Friday and daily on sat and Sunday on 9/9/24              Changed to 500 mg bid on Mon, Wed, Fri, 500 mg daily on Tues, thurs, Sat, Sunday on 10/17/24              Changed to 500 mg PO daily on 11/14/24     Decitabine-cedazuridine 2/4/25-2/8/25 (first cycle)    Decitabine 35 mg/cedazuridine 100 mg once daily on days 1 to 5 of each 28-day treatment cycle for a minimum of 4 cycles,            C1 2/4/25            C2 3/4/25  DR to 4 pills     Discussed referral to [facility] for alloSCT eval -- though his age may preclude this -- his wife has discussed with them. He declines. Discussed [facility].

## 2025-03-13 NOTE — PROGRESS NOTES
Georgia Evans PA-C                       [phone] office             Monday-Friday 8:00 am-4:30 pm  I am not permitted to use \"perfect serve\" use above for contact, thanks.        Gastroenterology Progress Note    March 13, 2025  Admit Date: 3/10/2025         Narrative Assessment and Plan   79 y.o. male being followed by GI for anemia and melena s/p EGD yesterday with 1 small area of bruising s/p Endo Clip.  Hemoglobin relatively stable at 7.2.    Impression:  Anemia  Melena    Plan:  Colonoscopy tomorrow 3/14/2025 for further evaluation of anemia/melena.  Clear liquid diet today with bowel prep this afternoon using GoLytely.  N.p.o. past midnight tonight.    Georgia Evans PA-C    Subjective:   Chief Complaint: Follow-up anemia and melena    HPI: Patient resting comfortably in bed, denies abdominal pain.  He is on clear liquid diet today and will be completing bowel prep today.  EGD yesterday by Dr. Edwards with 1 focal area of oozing in the stomach, treated with Endo Clip.    EGD 3/12/25 by Dr. Edwards:   \"Impression:   EGD/GI upper endoscopy with minimal oozing and hematin at the gastroesophageal junction and a focal area with deployment of 1 [employer] endoscopic metallic clip at this location  Diffuse mild erythematous gastropathy in the stomach\"      ROS:  The previous review of systems on initial consultation / H&P is noted and reviewed.  Specific changes noted above in HPI.    Current Medications:     Current Facility-Administered Medications   Medication Dose Route Frequency    polyethylene glycol (GoLYTELY) solution 2,000 mL  2,000 mL Oral Once    Followed by    polyethylene glycol (GoLYTELY) solution 2,000 mL  2,000 mL Oral Once    0.9 % sodium chloride infusion   IntraVENous PRN    0.9 % sodium chloride infusion   IntraVENous PRN    ascorbic acid (VITAMIN C) tablet 1,000 mg  1,000 mg Oral Daily            Assessment:   (See above)  Principal Problem:    Anemia  Active Problems:    Thrombocytopenia    HTN (hypertension), benign    PAF (paroxysmal atrial fibrillation) (HCC)    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 *      Plan:   (See above)      Signed By: Georgia Evans PA-C     3/13/2025  2:05 PM

## 2025-03-14 ENCOUNTER — ANESTHESIA EVENT (OUTPATIENT)
Facility: HOSPITAL | Age: 80
End: 2025-03-14
Payer: MEDICARE

## 2025-03-14 ENCOUNTER — ANESTHESIA (OUTPATIENT)
Facility: HOSPITAL | Age: 80
End: 2025-03-14
Payer: MEDICARE

## 2025-03-14 LAB
BASOPHILS # BLD: 0.02 K/UL (ref 0–0.1)
BASOPHILS NFR BLD: 1 % (ref 0–1)
DIFFERENTIAL METHOD BLD: ABNORMAL
EOSINOPHIL # BLD: 0.02 K/UL (ref 0–0.4)
EOSINOPHIL NFR BLD: 1 % (ref 0–7)
ERYTHROCYTE [DISTWIDTH] IN BLOOD BY AUTOMATED COUNT: 16 % (ref 11.5–14.5)
HCT VFR BLD AUTO: 22.8 % (ref 36.6–50.3)
HGB BLD-MCNC: 7.3 G/DL (ref 12.1–17)
IMM GRANULOCYTES # BLD AUTO: 0 K/UL (ref 0–0.04)
IMM GRANULOCYTES NFR BLD AUTO: 0 % (ref 0–0.5)
LYMPHOCYTES # BLD: 0.51 K/UL (ref 0.8–3.5)
LYMPHOCYTES NFR BLD: 32 % (ref 12–49)
MCH RBC QN AUTO: 30 PG (ref 26–34)
MCHC RBC AUTO-ENTMCNC: 32 G/DL (ref 30–36.5)
MCV RBC AUTO: 93.8 FL (ref 80–99)
METAMYELOCYTES NFR BLD MANUAL: 4 %
MONOCYTES # BLD: 0.4 K/UL (ref 0–1)
MONOCYTES NFR BLD: 25 % (ref 5–13)
NEUTS BAND NFR BLD MANUAL: 2 %
NEUTS SEG # BLD: 0.59 K/UL (ref 1.8–8)
NEUTS SEG NFR BLD: 35 % (ref 32–75)
NRBC # BLD: 0 K/UL (ref 0–0.01)
NRBC BLD-RTO: 0 PER 100 WBC
PLATELET # BLD AUTO: 94 K/UL (ref 150–400)
PMV BLD AUTO: 11.5 FL (ref 8.9–12.9)
RBC # BLD AUTO: 2.43 M/UL (ref 4.1–5.7)
RBC MORPH BLD: ABNORMAL
WBC # BLD AUTO: 1.6 K/UL (ref 4.1–11.1)

## 2025-03-14 PROCEDURE — 6360000002 HC RX W HCPCS

## 2025-03-14 PROCEDURE — 7100000011 HC PHASE II RECOVERY - ADDTL 15 MIN: Performed by: INTERNAL MEDICINE

## 2025-03-14 PROCEDURE — 2500000003 HC RX 250 WO HCPCS: Performed by: HOSPITALIST

## 2025-03-14 PROCEDURE — 3700000001 HC ADD 15 MINUTES (ANESTHESIA): Performed by: INTERNAL MEDICINE

## 2025-03-14 PROCEDURE — 88305 TISSUE EXAM BY PATHOLOGIST: CPT

## 2025-03-14 PROCEDURE — 99232 SBSQ HOSP IP/OBS MODERATE 35: CPT | Performed by: INTERNAL MEDICINE

## 2025-03-14 PROCEDURE — 2580000003 HC RX 258

## 2025-03-14 PROCEDURE — 2720000010 HC SURG SUPPLY STERILE: Performed by: INTERNAL MEDICINE

## 2025-03-14 PROCEDURE — 3700000000 HC ANESTHESIA ATTENDED CARE: Performed by: INTERNAL MEDICINE

## 2025-03-14 PROCEDURE — 7100000010 HC PHASE II RECOVERY - FIRST 15 MIN: Performed by: INTERNAL MEDICINE

## 2025-03-14 PROCEDURE — 1100000000 HC RM PRIVATE

## 2025-03-14 PROCEDURE — 0DBM8ZZ EXCISION OF DESCENDING COLON, VIA NATURAL OR ARTIFICIAL OPENING ENDOSCOPIC: ICD-10-PCS | Performed by: INTERNAL MEDICINE

## 2025-03-14 PROCEDURE — 0W3P8ZZ CONTROL BLEEDING IN GASTROINTESTINAL TRACT, VIA NATURAL OR ARTIFICIAL OPENING ENDOSCOPIC: ICD-10-PCS | Performed by: INTERNAL MEDICINE

## 2025-03-14 PROCEDURE — 3600007502: Performed by: INTERNAL MEDICINE

## 2025-03-14 PROCEDURE — 2709999900 HC NON-CHARGEABLE SUPPLY: Performed by: INTERNAL MEDICINE

## 2025-03-14 PROCEDURE — 85025 COMPLETE CBC W/AUTO DIFF WBC: CPT

## 2025-03-14 PROCEDURE — 6360000002 HC RX W HCPCS: Performed by: HOSPITALIST

## 2025-03-14 PROCEDURE — 0DBN8ZZ EXCISION OF SIGMOID COLON, VIA NATURAL OR ARTIFICIAL OPENING ENDOSCOPIC: ICD-10-PCS | Performed by: INTERNAL MEDICINE

## 2025-03-14 PROCEDURE — 94761 N-INVAS EAR/PLS OXIMETRY MLT: CPT

## 2025-03-14 PROCEDURE — 3600007512: Performed by: INTERNAL MEDICINE

## 2025-03-14 PROCEDURE — 2580000003 HC RX 258: Performed by: HOSPITALIST

## 2025-03-14 RX ORDER — CIPROFLOXACIN 2 MG/ML
400 INJECTION, SOLUTION INTRAVENOUS EVERY 12 HOURS
Status: DISCONTINUED | OUTPATIENT
Start: 2025-03-14 | End: 2025-03-15 | Stop reason: HOSPADM

## 2025-03-14 RX ORDER — PROPOFOL 10 MG/ML
INJECTION, EMULSION INTRAVENOUS
Status: DISCONTINUED | OUTPATIENT
Start: 2025-03-14 | End: 2025-03-14 | Stop reason: SDUPTHER

## 2025-03-14 RX ORDER — SODIUM CHLORIDE 9 MG/ML
INJECTION, SOLUTION INTRAVENOUS PRN
Status: DISCONTINUED | OUTPATIENT
Start: 2025-03-14 | End: 2025-03-14 | Stop reason: HOSPADM

## 2025-03-14 RX ORDER — METRONIDAZOLE 500 MG/100ML
500 INJECTION, SOLUTION INTRAVENOUS EVERY 8 HOURS
Status: DISCONTINUED | OUTPATIENT
Start: 2025-03-14 | End: 2025-03-15 | Stop reason: HOSPADM

## 2025-03-14 RX ORDER — SODIUM CHLORIDE 9 MG/ML
INJECTION, SOLUTION INTRAVENOUS
Status: DISCONTINUED | OUTPATIENT
Start: 2025-03-14 | End: 2025-03-14 | Stop reason: SDUPTHER

## 2025-03-14 RX ADMIN — PROPOFOL 50 MG: 10 INJECTION, EMULSION INTRAVENOUS at 14:17

## 2025-03-14 RX ADMIN — SODIUM CHLORIDE: 9 INJECTION, SOLUTION INTRAVENOUS at 14:12

## 2025-03-14 RX ADMIN — AMPICILLIN SODIUM 2000 MG: 2 INJECTION, POWDER, FOR SOLUTION INTRAMUSCULAR; INTRAVENOUS at 16:56

## 2025-03-14 RX ADMIN — METRONIDAZOLE 500 MG: 500 INJECTION, SOLUTION INTRAVENOUS at 17:58

## 2025-03-14 RX ADMIN — PROPOFOL 20 MG: 10 INJECTION, EMULSION INTRAVENOUS at 14:21

## 2025-03-14 RX ADMIN — PROPOFOL 150 MCG/KG/MIN: 10 INJECTION, EMULSION INTRAVENOUS at 14:18

## 2025-03-14 RX ADMIN — SODIUM CHLORIDE, PRESERVATIVE FREE 10 ML: 5 INJECTION INTRAVENOUS at 08:25

## 2025-03-14 RX ADMIN — CIPROFLOXACIN 400 MG: 400 INJECTION, SOLUTION INTRAVENOUS at 19:14

## 2025-03-14 RX ADMIN — SODIUM CHLORIDE, PRESERVATIVE FREE 40 MG: 5 INJECTION INTRAVENOUS at 16:49

## 2025-03-14 RX ADMIN — SODIUM CHLORIDE, PRESERVATIVE FREE 40 MG: 5 INJECTION INTRAVENOUS at 01:46

## 2025-03-14 RX ADMIN — LIDOCAINE HYDROCHLORIDE 20 MG: 20 INJECTION, SOLUTION INFILTRATION; PERINEURAL at 14:17

## 2025-03-14 ASSESSMENT — PAIN - FUNCTIONAL ASSESSMENT: PAIN_FUNCTIONAL_ASSESSMENT: NONE - DENIES PAIN

## 2025-03-14 ASSESSMENT — PAIN SCALES - GENERAL
PAINLEVEL_OUTOF10: 0

## 2025-03-14 NOTE — PERIOP NOTE
TRANSFER - OUT REPORT:    Verbal report given to MELANI Galicia on Terry Grant  being transferred to 5th floor for routine progression of patient care       Report consisted of patient's Situation, Background, Assessment and   Recommendations(SBAR).     Information from the following report(s) Nurse Handoff Report, ED Encounter Summary, and Surgery Report was reviewed with the receiving nurse.           Lines:   Peripheral IV 03/10/25 Right Antecubital (Active)   Site Assessment Clean, dry & intact 03/14/25 1447   Line Status Normal saline locked;Infusing 03/14/25 1447   Line Care Connections checked and tightened;Cap changed 03/14/25 1447   Phlebitis Assessment No symptoms 03/14/25 1447   Infiltration Assessment 0 03/14/25 1447   Alcohol Cap Used Yes 03/14/25 1447   Dressing Status Clean, dry & intact 03/14/25 1447   Dressing Type Transparent 03/14/25 1447   Dressing Intervention New 03/14/25 1254        Opportunity for questions and clarification was provided.      Patient transported with:  Tech

## 2025-03-14 NOTE — PLAN OF CARE
Problem: Discharge Planning  Goal: Discharge to home or other facility with appropriate resources  Outcome: Progressing  Flowsheets (Taken 3/13/2025 2300)  Discharge to home or other facility with appropriate resources:   Identify barriers to discharge with patient and caregiver   Refer to discharge planning if patient needs post-hospital services based on physician order or complex needs related to functional status, cognitive ability or social support system   Identify discharge learning needs (meds, wound care, etc)   Arrange for needed discharge resources and transportation as appropriate     Problem: ABCDS Injury Assessment  Goal: Absence of physical injury  Outcome: Progressing  Flowsheets (Taken 3/13/2025 2300)  Absence of Physical Injury: Implement safety measures based on patient assessment     Problem: Safety - Adult  Goal: Free from fall injury  Outcome: Progressing  Flowsheets (Taken 3/13/2025 2300)  Free From Fall Injury: Instruct family/caregiver on patient safety     Problem: Pain  Goal: Verbalizes/displays adequate comfort level or baseline comfort level  Outcome: Progressing  Flowsheets (Taken 3/13/2025 2300)  Verbalizes/displays adequate comfort level or baseline comfort level:   Encourage patient to monitor pain and request assistance   Administer analgesics based on type and severity of pain and evaluate response   Consider cultural and social influences on pain and pain management   Assess pain using appropriate pain scale   Implement non-pharmacological measures as appropriate and evaluate response   Notify Licensed Independent Practitioner if interventions unsuccessful or patient reports new pain

## 2025-03-14 NOTE — PROGRESS NOTES
GI Note    Patient scheduled for colonoscopy today at 1600 with Dr. Key. He has history of CMML and is neutropenic. Prophylactic antibiotic ampicillin 2,000 mg IV within 60 minutes of colonoscopy ordered per hematology/oncology recommendation. Platelets low but sufficient for colonoscopy per hematology/oncology.     Georgia Evans PA-C 03/14/25 10:14 AM

## 2025-03-14 NOTE — OP NOTE
[facility]  [facility]  Satish Key MD  [phone]      2025    Colonoscopy Procedure Note  Terry Grant  :  1945  [facility] Medical Record Number: [id number]    Indications:   Melena, GI bleed, upper GI source excluded, acute posthemorrhagic anemia  PCP:  Jonas Snider MD  Anesthesia/Sedation: Conscious Sedation/Moderate Sedation/GETA, see notes  Endoscopist:  Dr. Satish Key  Complications:  None  Estimated Blood Loss:  None    Permit:  The indications, risks, benefits and alternatives were reviewed with the patient or their decision maker who was provided an opportunity to ask questions and all questions were answered.  The specific risks of colonoscopy with conscious sedation were reviewed, including but not limited to anesthetic complication, bleeding, adverse drug reaction, missed lesion, infection, IV site reactions, and intestinal perforation which would lead to the need for surgical repair.  Alternatives to colonoscopy including radiographic imaging, observation without testing, or laboratory testing were reviewed including the limitations of those alternatives.  After considering the options and having all their questions answered, the patient or their decision maker provided both verbal and written consent to proceed.        Procedure in Detail:  After obtaining informed consent, positioning of the patient in the left lateral decubitus position, and conduction of a pre-procedure pause or \"time out\" the endoscope was introduced into the anus and advanced to the terminal ileum.  The quality of the colonic preparation was fair.  A careful inspection was made as the colonoscope was withdrawn, findings and interventions are described below.    Findings:   ANGIE: Normal.  Rectum: Grade 1 internal hemorrhoids, seen on retroflexed views.  Sigmoid colon: A 2 mm sessile polyp, removed completely with cold biopsy  forceps.  Small size diverticulosis.  Descending colon: A 5 mm sessile polyp, removed completely with cold snare.  Small size diverticulosis.  Transverse colon: Normal.  Ascending colon: Normal.  Cecum: A medium sized AVM, with stigmata of recent bleeding, cauterized with APC.  Hemostasis achieved.  Terminal ileum: Normal.    Specimens:    See above    Complications:   None; patient tolerated the procedure well.    Impression:  Internal hemorrhoids.  Diverticulosis.  Colon polyps.  AVM.    Recommendations:   1.  Await pathology results.  2.  Return patient to hospital [facility] for ongoing care.  3.  Patient can resume all medications and diet.  4.  Repeat colonoscopy, after extended prep, is indicated in 3 years.    Thank you for entrusting me with this patient's care.  Please do not hesitate to contact me with any questions or if I can be of assistance with any of your other patients' GI needs.    Signed By: Satish Key MD                        March 14, 2025      Surgical assistant none.  Implants none unless specified.

## 2025-03-14 NOTE — PROGRESS NOTES
TRANSFER - IN REPORT:    Verbal report received from MELANI Galicia on Terry Grant  being received from 529 for ordered procedure      Report consisted of patient's Situation, Background, Assessment and   Recommendations(SBAR).     Information from the following report(s) Nurse Handoff Report was reviewed with the receiving nurse.    Opportunity for questions and clarification was provided.      Assessment completed upon patient's arrival to unit and care assumed.

## 2025-03-14 NOTE — PROGRESS NOTES
[facility]                                                                                          Hospitalist Progress Note  Atul Joshua MD  Office Phone: [phone]        Date of Service:  3/14/2025  NAME:  Terry Grant  :  1945  MRN:  [id number]           Interval history / Subjective:   No issues overnight. Completed bowel prep for colonoscopy today.     Assessment & Plan:     Symptomatic Anemia POA: likely multifactorial from underlying CMML, GI bleed, chemotherapy. He has been transfused with 2u pRBCs and Hgb has remained stable around 7.2. Seen by hematology and GI. Continue to monitor given he is still seeing melena and transfuse as needed to keep Hgb above 7.  Complete GI work up as below.      Gastrointestinal hemorrhage with melena POA: reviewed by GI who did an EGD 3/12 that was essentially unremarkable. He has a colonoscopy scheduled for today. Continue Pantoprazole.      Chronic myelomonocytic leukemia not having achieved remission POA: sees Dr Rangel. Previously declined referral to [facility] or [facility]. Hematology following.      PAF (paroxysmal atrial fibrillation) / HTN (hypertension), benign POA: BP now stable. Continue to hold Losartan and Apixaban     Thrombocytopenia POA: due to CMML and therapy. Stable.      Stage 3b chronic kidney disease POA: monitor renal function       Code status: Full  DVT Prophylaxis: SCDs  Anticipated Disposition: to home       Review of Systems:   Pertinent symptoms noted in the HPI    Vital Signs:    Last 24hrs VS reviewed since prior progress note. Most recent are:  Vitals:    25 0423   BP: 101/64   Pulse: 98   Resp: 16   Temp: 98.4 °F (36.9 °C)   SpO2: 96%       Physical Examination:     I had a face to face encounter with this patient and independently examined them on 3/14/2025 as outlined below:        General : alert and awake, no acute distress  HEENT: moist mucus membranes  Chest: CTAB, normal

## 2025-03-14 NOTE — ANESTHESIA POSTPROCEDURE EVALUATION
Department of Anesthesiology  Postprocedure Note    Patient: Terry Grant  MRN: [id number]  YOB: 1945  Date of evaluation: 3/14/2025    Procedure Summary       Date: 03/14/25 Room / Location: [facility] 03 / [facility] ENDOSCOPY    Anesthesia Start: 1412 Anesthesia Stop: 1434    Procedures:       COLONOSCOPY DIAGNOSTIC (Lower GI Region)      COLONOSCOPY CONTROL HEMORRHAGE (Lower GI Region)      COLONOSCOPY POLYPECTOMY SNARE/BIOPSY (Lower GI Region) Diagnosis:       Gastrointestinal hemorrhage, unspecified gastrointestinal hemorrhage type      (Gastrointestinal hemorrhage, unspecified gastrointestinal hemorrhage type [K92.2])    Surgeons: Satish Key MD Responsible Provider: Perico Stover MD    Anesthesia Type: MAC ASA Status: 3            Anesthesia Type: MAC    Lavinia Phase I: Lavinia Score: 10    Lavinia Phase II: Lavinia Score: 10    Anesthesia Post Evaluation    Patient location during evaluation: PACU  Patient participation: complete - patient participated  Level of consciousness: awake  Pain score: 0  Airway patency: patent  Nausea & Vomiting: no nausea and no vomiting  Cardiovascular status: blood pressure returned to baseline  Respiratory status: acceptable  Hydration status: euvolemic  Pain management: adequate    No notable events documented.

## 2025-03-14 NOTE — CARE COORDINATION
Care Management Progress Note    Reason for Admission:   GI bleed [K92.2]  Upper GI bleed [K92.2]  Anemia requiring transfusions [D64.9]  Procedure(s) (LRB):  ESOPHAGOGASTRODUODENOSCOPY (N/A)  ESOPHAGOGASTRODUODENOSCOPY BIOPSY (N/A)  ESOPHAGOGASTRODUODENOSCOPY CONTROL HEMORRHAGE (N/A)  2 Days Post-Op    Patient Admission Status: Inpatient  RUR:  20%  Hospitalization in the last 30 days (Readmission):  No        READMISSION:  Pt was admitted to  from 2/18 - 2/20 and returned home.  Pt was readmitted to  on 3/10 with GIB.     Transition Plan of Care:  Oncology, GI follow for medical management - pt is s/p EGD on 3/12  Plan is for pt to return home  Outpatient follow up  Pt will arrange his own transport home     CM will continue to follow pt until discharged.  Moni

## 2025-03-14 NOTE — ANESTHESIA PRE PROCEDURE
Department of Anesthesiology  Preprocedure Note       Name:  Terry Grant   Age:  79 y.o.  :  1945                                          MRN:  [id number]         Date:  3/14/2025      Surgeon: Surgeon(s):  Satish Key MD    Procedure: Procedure(s):  COLONOSCOPY DIAGNOSTIC    Medications prior to admission:   Prior to Admission medications    Medication Sig Start Date End Date Taking? Authorizing Provider   apixaban (ELIQUIS) 5 MG TABS tablet Take 1 tablet by mouth 2 times daily   Yes Deep Gan MD   Decitabine-Cedazuridine  MG TABS  mg PO on days 1 through 5 then 23 days off 25   Kevin Rangel MD   prochlorperazine (COMPAZINE) 10 MG tablet Take 1 tablet by mouth every 6 hours as needed (for nausea) 24   Kevin Rangel MD   dexAMETHasone (DECADRON) 0.5 MG tablet Take 1 tablet by mouth 2 times daily (with meals) Take with breakfast and lunch 24   Kevin Rangel MD   benzonatate (TESSALON) 200 MG capsule  24   Deep Gan MD   losartan (COZAAR) 50 MG tablet Take 1 tablet by mouth daily 24   Deep Gan MD   ascorbic acid (VITAMIN C) 1000 MG tablet Take 1 tablet by mouth daily    Automatic Reconciliation, Ar   ergocalciferol (ERGOCALCIFEROL) 1.25 MG (22982 UT) capsule Take 1 capsule by mouth    Automatic Reconciliation, Ar       Current medications:    Current Facility-Administered Medications   Medication Dose Route Frequency Provider Last Rate Last Admin    ampicillin (OMNIPEN) 2,000 mg in sodium chloride 0.9 % 100 mL IVPB (addEASE)  2,000 mg IntraVENous Once Georgia Evans PA-C        0.9 % sodium chloride infusion   IntraVENous PRN Sukhjinder Reddy MD        ascorbic acid (VITAMIN C) tablet 1,000 mg  1,000 mg Oral Daily Sonya Merritt MD   1,000 mg at 25 0922    sodium chloride flush 0.9 % injection 5-40 mL  5-40 mL IntraVENous 2 times per day Sonya Merritt MD   10 mL at 25 0825    sodium

## 2025-03-14 NOTE — PROGRESS NOTES
[facility]cer [place] at [facility]  [address], [address] [facility] [address]  W: [phone]  F: [phone]      Reason for Visit:   Terry Grant is a 79 y.o. 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  - SRSF2 mutations can be seen in various myeloid neoplasms, and are associated with a poor prognosis in MDS, AML,  PMF, and PV.  - TET2 mutations can be seen in various myeloid neoplasms, and are associated with an increased response to  hypomethylating agents and a poor prognosis in MDS after hematopoietic stem cell transplantation.  - Co-mutation of TET2 and SRSF2 is reported to be highly predictive of a myeloid neoplasm characterized by  myelodysplasia and monocytosis, including, but not limited to, CMML     NGS:  ASXL1 C689Fjf*12; KRAS G12R; SRSF2 P95L; TET2 *, *; NO alterations detected in the following genes:  FLT3, IDH1, IDH2, NPM1     6/8/23:  pericardial window, negative     Hydrea 500 mg daily 6/9/23-  DR to 500 mg on Mon-Fri              Increased to daily on 8/22/24              Increased to 500 mg bid mon-Friday and daily on sat and Sunday on 9/9/24              Changed to 500 mg bid on Mon, Wed, Fri, 500 mg daily on Tues, thurs, Sat, Sunday on 10/17/24              Changed to 500 mg PO daily on 11/14/24     Decitabine-cedazuridine 2/4/25-2/8/25 (first cycle)    Decitabine 35 mg/cedazuridine 100 mg once daily on days 1 to 5 of each 28-day treatment cycle for a minimum of 4 cycles,            C1 2/4/25            C2 3/4/25  DR to 4 pills     Discussed referral to [facility] for alloSCT eval -- though his age may preclude this -- his wife has discussed with them. He declines. Discussed [facility].

## 2025-03-15 VITALS
DIASTOLIC BLOOD PRESSURE: 86 MMHG | SYSTOLIC BLOOD PRESSURE: 119 MMHG | RESPIRATION RATE: 16 BRPM | HEART RATE: 91 BPM | HEIGHT: 68 IN | TEMPERATURE: 98.8 F | BODY MASS INDEX: 22.43 KG/M2 | WEIGHT: 148 LBS | OXYGEN SATURATION: 96 %

## 2025-03-15 LAB
BASOPHILS # BLD: 0.08 K/UL (ref 0–0.1)
BASOPHILS NFR BLD: 5 % (ref 0–1)
DIFFERENTIAL METHOD BLD: ABNORMAL
EOSINOPHIL # BLD: 0.05 K/UL (ref 0–0.4)
EOSINOPHIL NFR BLD: 3 % (ref 0–7)
ERYTHROCYTE [DISTWIDTH] IN BLOOD BY AUTOMATED COUNT: 16.3 % (ref 11.5–14.5)
HCT VFR BLD AUTO: 20.8 % (ref 36.6–50.3)
HCT VFR BLD AUTO: 26.3 % (ref 36.6–50.3)
HGB BLD-MCNC: 6.8 G/DL (ref 12.1–17)
HGB BLD-MCNC: 8.6 G/DL (ref 12.1–17)
HISTORY CHECK: NORMAL
IMM GRANULOCYTES # BLD AUTO: 0 K/UL
IMM GRANULOCYTES NFR BLD AUTO: 0 %
LYMPHOCYTES # BLD: 0.59 K/UL (ref 0.8–3.5)
LYMPHOCYTES NFR BLD: 37 % (ref 12–49)
MCH RBC QN AUTO: 30.6 PG (ref 26–34)
MCHC RBC AUTO-ENTMCNC: 32.7 G/DL (ref 30–36.5)
MCV RBC AUTO: 93.7 FL (ref 80–99)
METAMYELOCYTES NFR BLD MANUAL: 1 %
MONOCYTES # BLD: 0.45 K/UL (ref 0–1)
MONOCYTES NFR BLD: 28 % (ref 5–13)
NEUTS BAND NFR BLD MANUAL: 1 % (ref 0–6)
NEUTS SEG # BLD: 0.42 K/UL (ref 1.8–8)
NEUTS SEG NFR BLD: 25 % (ref 32–75)
NRBC # BLD: 0 K/UL (ref 0–0.01)
NRBC BLD-RTO: 0 PER 100 WBC
PLATELET # BLD AUTO: 79 K/UL (ref 150–400)
PMV BLD AUTO: 11.3 FL (ref 8.9–12.9)
RBC # BLD AUTO: 2.22 M/UL (ref 4.1–5.7)
RBC MORPH BLD: ABNORMAL
WBC # BLD AUTO: 1.6 K/UL (ref 4.1–11.1)

## 2025-03-15 PROCEDURE — 85014 HEMATOCRIT: CPT

## 2025-03-15 PROCEDURE — 86901 BLOOD TYPING SEROLOGIC RH(D): CPT

## 2025-03-15 PROCEDURE — 2500000003 HC RX 250 WO HCPCS: Performed by: HOSPITALIST

## 2025-03-15 PROCEDURE — 85025 COMPLETE CBC W/AUTO DIFF WBC: CPT

## 2025-03-15 PROCEDURE — 99232 SBSQ HOSP IP/OBS MODERATE 35: CPT | Performed by: INTERNAL MEDICINE

## 2025-03-15 PROCEDURE — 2580000003 HC RX 258: Performed by: HOSPITALIST

## 2025-03-15 PROCEDURE — P9040 RBC LEUKOREDUCED IRRADIATED: HCPCS

## 2025-03-15 PROCEDURE — 86900 BLOOD TYPING SEROLOGIC ABO: CPT

## 2025-03-15 PROCEDURE — 94761 N-INVAS EAR/PLS OXIMETRY MLT: CPT

## 2025-03-15 PROCEDURE — 6360000002 HC RX W HCPCS: Performed by: HOSPITALIST

## 2025-03-15 PROCEDURE — 6370000000 HC RX 637 (ALT 250 FOR IP): Performed by: HOSPITALIST

## 2025-03-15 PROCEDURE — 6360000002 HC RX W HCPCS

## 2025-03-15 PROCEDURE — 85018 HEMOGLOBIN: CPT

## 2025-03-15 PROCEDURE — 86850 RBC ANTIBODY SCREEN: CPT

## 2025-03-15 PROCEDURE — 36430 TRANSFUSION BLD/BLD COMPNT: CPT

## 2025-03-15 PROCEDURE — 86923 COMPATIBILITY TEST ELECTRIC: CPT

## 2025-03-15 PROCEDURE — 36415 COLL VENOUS BLD VENIPUNCTURE: CPT

## 2025-03-15 RX ORDER — PANTOPRAZOLE SODIUM 40 MG/1
40 TABLET, DELAYED RELEASE ORAL
Qty: 112 TABLET | Refills: 0 | Status: SHIPPED | OUTPATIENT
Start: 2025-03-15 | End: 2025-05-10

## 2025-03-15 RX ORDER — SODIUM CHLORIDE 9 MG/ML
INJECTION, SOLUTION INTRAVENOUS PRN
Status: DISCONTINUED | OUTPATIENT
Start: 2025-03-15 | End: 2025-03-15 | Stop reason: HOSPADM

## 2025-03-15 RX ADMIN — METRONIDAZOLE 500 MG: 500 INJECTION, SOLUTION INTRAVENOUS at 02:05

## 2025-03-15 RX ADMIN — METRONIDAZOLE 500 MG: 500 INJECTION, SOLUTION INTRAVENOUS at 10:15

## 2025-03-15 RX ADMIN — SODIUM CHLORIDE, PRESERVATIVE FREE 40 MG: 5 INJECTION INTRAVENOUS at 08:13

## 2025-03-15 RX ADMIN — OXYCODONE HYDROCHLORIDE AND ACETAMINOPHEN 1000 MG: 500 TABLET ORAL at 08:05

## 2025-03-15 RX ADMIN — SODIUM CHLORIDE, PRESERVATIVE FREE 40 MG: 5 INJECTION INTRAVENOUS at 04:06

## 2025-03-15 RX ADMIN — SODIUM CHLORIDE, PRESERVATIVE FREE 10 ML: 5 INJECTION INTRAVENOUS at 08:07

## 2025-03-15 RX ADMIN — CIPROFLOXACIN 400 MG: 400 INJECTION, SOLUTION INTRAVENOUS at 08:09

## 2025-03-15 RX ADMIN — METRONIDAZOLE 500 MG: 500 INJECTION, SOLUTION INTRAVENOUS at 18:21

## 2025-03-15 ASSESSMENT — PAIN SCALES - GENERAL
PAINLEVEL_OUTOF10: 0

## 2025-03-15 NOTE — PROGRESS NOTES
[facility]. His ride has not been contacted and he was informed that when his ride is here we can transport him downstairs. Wife and patient requested to speak to nursing supervisor. Nursing supervisor was notified and informed us that if that was what the patient wanted then it was fine.

## 2025-03-15 NOTE — PROGRESS NOTES
Shukri Gonzalez MD  [address] [facility]  [phone] office       Gastroenterology Progress Note    March 15, 2025  Admit Date: 3/10/2025         Narrative Assessment and Plan   79 y.o. male with past medical history of CMML being followed by GI for anemia and melena.    # Pancytopenia  # Anemia  # Melena  # Colonic AVM    -EGD on 3/12 with evidence of oozing of blood at GE junction, s/p 1 Endo Clip placement.  Colonoscopy on 3/14 with 1 nonbleeding cecal AVM, status post APC treatment, status post polypectomy of 2 polyps  - Hemoglobin trended down to 6.8 from 7.3 yesterday.  No evidence of active GI bleeding.  Likely secondary to CMML.  - Recommend transfusing 1 unit of PRBCs for hemoglobin goal of more than 7.  Recommendations as per oncology.  - Recommend PPI twice daily for 8 weeks.  Continue to monitor for any further signs of GI bleeding, monitor H&H.      GI will see if called over the weekend.      Subjective:   Denies any acute complaints.  Denies any nausea, vomiting.  No episodes of hematochezia or melena overnight.      ROS:  The previous review of systems on initial consultation / H&P is noted and reviewed.  Specific changes noted above in HPI.    Current Medications:     Current Facility-Administered Medications   Medication Dose Route Frequency    0.9 % sodium chloride infusion   IntraVENous PRN    ciprofloxacin (CIPRO) IVPB 400 mg  400 mg IntraVENous Q12H    metroNIDAZOLE (FLAGYL) 500 mg in 0.9% NaCl 100 mL IVPB premix  500 mg IntraVENous Q8H    0.9 % sodium chloride infusion   IntraVENous PRN    ascorbic acid (VITAMIN C) tablet 1,000 mg  1,000 mg Oral Daily    sodium chloride flush 0.9 % injection 5-40 mL  5-40 mL IntraVENous 2 times per day    sodium chloride flush 0.9 % injection 5-40 mL  5-40 mL IntraVENous PRN    0.9 % sodium chloride infusion   IntraVENous PRN    ondansetron (ZOFRAN-ODT) disintegrating tablet 4 mg  4 mg Oral Q8H PRN    Or    ondansetron (ZOFRAN)

## 2025-03-15 NOTE — PLAN OF CARE
Problem: Discharge Planning  Goal: Discharge to home or other facility with appropriate resources  Outcome: Adequate for Discharge     Problem: ABCDS Injury Assessment  Goal: Absence of physical injury  Outcome: Adequate for Discharge     Problem: Safety - Adult  Goal: Free from fall injury  Outcome: Adequate for Discharge     Problem: Pain  Goal: Verbalizes/displays adequate comfort level or baseline comfort level  Outcome: Adequate for Discharge

## 2025-03-15 NOTE — PROGRESS NOTES
[facility] [place] at [facility]  [address], [address] [facility] [address]  W: [phone]  F: [phone]      Reason for Visit:   Terry Grant is a 79 y.o. 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  - SRSF2 mutations can be seen in various myeloid neoplasms, and are associated with a poor prognosis in MDS, AML,  PMF, and PV.  - TET2 mutations can be seen in various myeloid neoplasms, and are associated with an increased response to  hypomethylating agents and a poor prognosis in MDS after hematopoietic stem cell transplantation.  - Co-mutation of TET2 and SRSF2 is reported to be highly predictive of a myeloid neoplasm characterized by  myelodysplasia and monocytosis, including, but not limited to, CMML     NGS:  ASXL1 B120Yvv*12; KRAS G12R; SRSF2 P95L; TET2 *, *; NO alterations detected in the following genes:  FLT3, IDH1, IDH2, NPM1     6/8/23:  pericardial window, negative     Hydrea 500 mg daily 6/9/23-  DR to 500 mg on Mon-Fri              Increased to daily on 8/22/24              Increased to 500 mg bid mon-Friday and daily on sat and Sunday on 9/9/24              Changed to 500 mg bid on Mon, Wed, Fri, 500 mg daily on Tues, thurs, Sat, Sunday on 10/17/24              Changed to 500 mg PO daily on 11/14/24     Decitabine-cedazuridine 2/4/25-2/8/25 (first cycle)    Decitabine 35 mg/cedazuridine 100 mg once daily on days 1 to 5 of each 28-day treatment cycle for a minimum of 4 cycles,            C1 2/4/25            C2 3/4/25  DR to 4 pills     Discussed referral to [facility] for alloSCT eval -- though his age may preclude this -- his wife has discussed with them. He declines. Discussed [facility].

## 2025-03-15 NOTE — PLAN OF CARE
Problem: Discharge Planning  Goal: Discharge to home or other facility with appropriate resources  Outcome: Progressing     Problem: ABCDS Injury Assessment  Goal: Absence of physical injury  Outcome: Progressing     Problem: Safety - Adult  Goal: Free from fall injury  Outcome: Progressing     Problem: Pain  Goal: Verbalizes/displays adequate comfort level or baseline comfort level  Outcome: Progressing  Flowsheets (Taken 3/14/2025 2057)  Verbalizes/displays adequate comfort level or baseline comfort level:   Encourage patient to monitor pain and request assistance   Assess pain using appropriate pain scale   Administer analgesics based on type and severity of pain and evaluate response   Implement non-pharmacological measures as appropriate and evaluate response   Consider cultural and social influences on pain and pain management   Notify Licensed Independent Practitioner if interventions unsuccessful or patient reports new pain

## 2025-03-15 NOTE — DISCHARGE INSTRUCTIONS
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 Your discharging Hospitalist is Atul Joshua MD. You were admitted for evaluation and treatment of the above.       MEDICATIONS:    It is important that you take the medication exactly as they are prescribed.   Keep your medication in the bottles provided by the pharmacist and keep a list of the medication names, dosages, and times to be taken in your wallet.   Do not take other medications without consulting your doctor.       ***Your Eliquis has been temporarily stopped due to low platelets and recent GI bleed. Please follow up with your cardiologist and/or PCP to assist with resuming this medication as appropriate. ***      If you experience any of the following symptoms then please call your primary care physician or return to the emergency room if you cannot get hold of your doctor:  Fever, chills, nausea, vomiting, diarrhea, change in mentation, falling, bleeding, shortness of breath    Follow Up:  Please call the below provider to arrange hospital follow up appointment      Kevin Rangel MD  [address] [address] [address] [phone]    Follow up  Follow up with Dr. Rangel on 3/18/25 to check hemoglobin levels and platelets.    Jonas Snider MD  [address] [facility] [address] [phone]    Follow up  Recommend post-hospital follow up with PCP in 1-2 weeks. Will also need repeat colonoscopy in 3 years.      For questions regarding your Hospitalization or to contact the Hospital Medicine team, please call [phone].      Information obtained by :  I understand that if any problems occur once I am at home I am to  contact my physician.    I understand and acknowledge receipt of the instructions indicated above.                                                                                                                                           Physician's or R.N.'s Signature                                                                  Date/Time                                                                                                                                              Patient or Representative Signature                                                          Date/Time

## 2025-03-16 LAB
ABO + RH BLD: NORMAL
BACTERIA SPEC CULT: NORMAL
BLD PROD TYP BPU: NORMAL
BLOOD BANK BLOOD PRODUCT EXPIRATION DATE: NORMAL
BLOOD BANK DISPENSE STATUS: NORMAL
BLOOD BANK ISBT PRODUCT BLOOD TYPE: 6200
BLOOD BANK PRODUCT CODE: NORMAL
BLOOD BANK UNIT TYPE AND RH: NORMAL
BLOOD GROUP ANTIBODIES SERPL: NORMAL
BPU ID: NORMAL
CROSSMATCH RESULT: NORMAL
SERVICE CMNT-IMP: NORMAL
SPECIMEN EXP DATE BLD: NORMAL
UNIT DIVISION: 0
UNIT ISSUE DATE/TIME: NORMAL

## 2025-03-16 NOTE — DISCHARGE SUMMARY
Pt taken downstairs to [facility] per nursing supervisor to wait for his ride. IV taken out and DC paperwork given. Ride is on the way.   
weeks. Will also need repeat colonoscopy in 3 years.      Approximate time spent in patient care on day of discharge: Greater than 30 minutes    Signed:  Atul Joshua MD  3/15/2025  9:24 PM

## 2025-03-18 ENCOUNTER — TELEPHONE (OUTPATIENT)
Age: 80
End: 2025-03-18

## 2025-03-18 ENCOUNTER — OFFICE VISIT (OUTPATIENT)
Age: 80
End: 2025-03-18
Payer: MEDICARE

## 2025-03-18 ENCOUNTER — HOSPITAL ENCOUNTER (OUTPATIENT)
Facility: HOSPITAL | Age: 80
Setting detail: INFUSION SERIES
Discharge: HOME | End: 2025-03-18
Payer: MEDICARE

## 2025-03-18 VITALS
HEART RATE: 92 BPM | SYSTOLIC BLOOD PRESSURE: 122 MMHG | HEIGHT: 67 IN | TEMPERATURE: 98.2 F | WEIGHT: 149.3 LBS | RESPIRATION RATE: 18 BRPM | OXYGEN SATURATION: 98 % | DIASTOLIC BLOOD PRESSURE: 71 MMHG | BODY MASS INDEX: 23.43 KG/M2

## 2025-03-18 VITALS
HEART RATE: 92 BPM | OXYGEN SATURATION: 98 % | RESPIRATION RATE: 18 BRPM | DIASTOLIC BLOOD PRESSURE: 71 MMHG | BODY MASS INDEX: 23.39 KG/M2 | WEIGHT: 149 LBS | HEIGHT: 67 IN | TEMPERATURE: 98.2 F | SYSTOLIC BLOOD PRESSURE: 122 MMHG

## 2025-03-18 DIAGNOSIS — C93.10 CHRONIC MYELOMONOCYTIC LEUKEMIA NOT HAVING ACHIEVED REMISSION (HCC): Primary | ICD-10-CM

## 2025-03-18 LAB
ABO + RH BLD: NORMAL
ABO + RH BLD: NORMAL
ALBUMIN SERPL-MCNC: 3.1 G/DL (ref 3.5–5)
ALBUMIN/GLOB SERPL: 0.7 (ref 1.1–2.2)
ALP SERPL-CCNC: 76 U/L (ref 45–117)
ALT SERPL-CCNC: 38 U/L (ref 12–78)
ANION GAP SERPL CALC-SCNC: 7 MMOL/L (ref 2–12)
AST SERPL-CCNC: 30 U/L (ref 15–37)
BASOPHILS # BLD: 0.11 K/UL (ref 0–0.1)
BASOPHILS NFR BLD: 5 % (ref 0–1)
BILIRUB SERPL-MCNC: 0.9 MG/DL (ref 0.2–1)
BLD PROD TYP BPU: NORMAL
BLOOD BANK BLOOD PRODUCT EXPIRATION DATE: NORMAL
BLOOD BANK DISPENSE STATUS: NORMAL
BLOOD BANK ISBT PRODUCT BLOOD TYPE: 6200
BLOOD BANK PRODUCT CODE: NORMAL
BLOOD BANK UNIT TYPE AND RH: NORMAL
BLOOD GROUP ANTIBODIES SERPL: NORMAL
BLOOD GROUP ANTIBODIES SERPL: NORMAL
BPU ID: NORMAL
BUN SERPL-MCNC: 24 MG/DL (ref 6–20)
BUN/CREAT SERPL: 15 (ref 12–20)
CALCIUM SERPL-MCNC: 8.5 MG/DL (ref 8.5–10.1)
CHLORIDE SERPL-SCNC: 107 MMOL/L (ref 97–108)
CO2 SERPL-SCNC: 24 MMOL/L (ref 21–32)
CREAT SERPL-MCNC: 1.64 MG/DL (ref 0.7–1.3)
CROSSMATCH RESULT: NORMAL
DIFFERENTIAL METHOD BLD: ABNORMAL
EOSINOPHIL # BLD: 0.02 K/UL (ref 0–0.4)
EOSINOPHIL NFR BLD: 1 % (ref 0–7)
ERYTHROCYTE [DISTWIDTH] IN BLOOD BY AUTOMATED COUNT: 17.1 % (ref 11.5–14.5)
GLOBULIN SER CALC-MCNC: 4.4 G/DL (ref 2–4)
GLUCOSE SERPL-MCNC: 122 MG/DL (ref 65–100)
HCT VFR BLD AUTO: 25.6 % (ref 36.6–50.3)
HGB BLD-MCNC: 8.3 G/DL (ref 12.1–17)
IMM GRANULOCYTES # BLD AUTO: 0 K/UL (ref 0–0.04)
IMM GRANULOCYTES NFR BLD AUTO: 0 % (ref 0–0.5)
LYMPHOCYTES # BLD: 1.05 K/UL (ref 0.8–3.5)
LYMPHOCYTES NFR BLD: 50 % (ref 12–49)
MCH RBC QN AUTO: 29.7 PG (ref 26–34)
MCHC RBC AUTO-ENTMCNC: 32.4 G/DL (ref 30–36.5)
MCV RBC AUTO: 91.8 FL (ref 80–99)
METAMYELOCYTES NFR BLD MANUAL: 1 %
MONOCYTES # BLD: 0.55 K/UL (ref 0–1)
MONOCYTES NFR BLD: 26 % (ref 5–13)
MYELOCYTES NFR BLD MANUAL: 1 %
NEUTS SEG # BLD: 0.34 K/UL (ref 1.8–8)
NEUTS SEG NFR BLD: 16 % (ref 32–75)
NRBC # BLD: 0 K/UL (ref 0–0.01)
NRBC BLD-RTO: 0 PER 100 WBC
PLATELET # BLD AUTO: 50 K/UL (ref 150–400)
POTASSIUM SERPL-SCNC: 3.3 MMOL/L (ref 3.5–5.1)
PROT SERPL-MCNC: 7.5 G/DL (ref 6.4–8.2)
RBC # BLD AUTO: 2.79 M/UL (ref 4.1–5.7)
RBC MORPH BLD: ABNORMAL
SODIUM SERPL-SCNC: 138 MMOL/L (ref 136–145)
SPECIMEN EXP DATE BLD: NORMAL
SPECIMEN EXP DATE BLD: NORMAL
UNIT DIVISION: 0
UNIT ISSUE DATE/TIME: NORMAL
WBC # BLD AUTO: 2.1 K/UL (ref 4.1–11.1)

## 2025-03-18 PROCEDURE — 1126F AMNT PAIN NOTED NONE PRSNT: CPT | Performed by: INTERNAL MEDICINE

## 2025-03-18 PROCEDURE — 1123F ACP DISCUSS/DSCN MKR DOCD: CPT | Performed by: INTERNAL MEDICINE

## 2025-03-18 PROCEDURE — G8420 CALC BMI NORM PARAMETERS: HCPCS | Performed by: INTERNAL MEDICINE

## 2025-03-18 PROCEDURE — 36415 COLL VENOUS BLD VENIPUNCTURE: CPT

## 2025-03-18 PROCEDURE — 3078F DIAST BP <80 MM HG: CPT | Performed by: INTERNAL MEDICINE

## 2025-03-18 PROCEDURE — 99215 OFFICE O/P EST HI 40 MIN: CPT | Performed by: INTERNAL MEDICINE

## 2025-03-18 PROCEDURE — 1036F TOBACCO NON-USER: CPT | Performed by: INTERNAL MEDICINE

## 2025-03-18 PROCEDURE — 1159F MED LIST DOCD IN RCRD: CPT | Performed by: INTERNAL MEDICINE

## 2025-03-18 PROCEDURE — 80053 COMPREHEN METABOLIC PANEL: CPT

## 2025-03-18 PROCEDURE — 86850 RBC ANTIBODY SCREEN: CPT

## 2025-03-18 PROCEDURE — G8427 DOCREV CUR MEDS BY ELIG CLIN: HCPCS | Performed by: INTERNAL MEDICINE

## 2025-03-18 PROCEDURE — G2211 COMPLEX E/M VISIT ADD ON: HCPCS | Performed by: INTERNAL MEDICINE

## 2025-03-18 PROCEDURE — 86901 BLOOD TYPING SEROLOGIC RH(D): CPT

## 2025-03-18 PROCEDURE — 3074F SYST BP LT 130 MM HG: CPT | Performed by: INTERNAL MEDICINE

## 2025-03-18 PROCEDURE — 85025 COMPLETE CBC W/AUTO DIFF WBC: CPT

## 2025-03-18 PROCEDURE — 1111F DSCHRG MED/CURRENT MED MERGE: CPT | Performed by: INTERNAL MEDICINE

## 2025-03-18 PROCEDURE — 86900 BLOOD TYPING SEROLOGIC ABO: CPT

## 2025-03-18 ASSESSMENT — PATIENT HEALTH QUESTIONNAIRE - PHQ9
SUM OF ALL RESPONSES TO PHQ QUESTIONS 1-9: 0
2. FEELING DOWN, DEPRESSED OR HOPELESS: NOT AT ALL
SUM OF ALL RESPONSES TO PHQ QUESTIONS 1-9: 0
1. LITTLE INTEREST OR PLEASURE IN DOING THINGS: NOT AT ALL
SUM OF ALL RESPONSES TO PHQ QUESTIONS 1-9: 0
SUM OF ALL RESPONSES TO PHQ QUESTIONS 1-9: 0

## 2025-03-18 ASSESSMENT — PAIN SCALES - GENERAL: PAINLEVEL_OUTOF10: 0

## 2025-03-18 NOTE — PROGRESS NOTES
Terry Grant is a 79 y.o. male  is here today for a follow up.    1. Have you been to the ER, urgent care clinic since your last visit?  Hospitalized since your last visit?Yes When: March 10, 2025  Where: [facility]  Reason for visit: Due to rectal bleeding    2. Have you seen or consulted any other health care providers outside of the [facility] since your last visit?  Include any pap smears or colon screening. No       3/18/2025  9:15 AM   Vitals    SYSTOLIC 122    DIASTOLIC 71    BP Site    Patient Position    BP Cuff Size    Pulse 92    Temp 98.2 °F (36.8 °C)    Respirations 18    SpO2 98 %    Weight - Scale 149 lb 4.8 oz    Height 5' 7\"    Body Mass Index 23.38 kg/m2    Pain Score    Pain Loc    Pain Level 0        
s/p bilateral ureteral stents; s/p stent removal;  encouraged much more PO intake    8. Neutropenia:  resolved    9.  Fatigue:  dexamethasone 0.5 mg PO bid; discussed may elevate WBC artificially; mild improvement    10. Nausea:  rx compazine    11. GI bleed:  resolved; holding eliquis; on protonix        Kevin Rangel Jr, MD

## 2025-03-18 NOTE — PROGRESS NOTES
\[facility]\"" Progress Note    Date: March 18, 2025        Pt arrived ambulatory to \[facility]\"" for Labs/Possible Transfusion (HELD) in stable condition.  Assessment completed. PIV placed to right arm with positive blood return. Labs drawn and sent for processing. Proceeded to medical oncology appointment.    Labs reviewed. Criteria for treatment was not met.    Patient Vitals for the past 12 hrs:   Temp Pulse Resp BP SpO2   03/18/25 0915 98.2 °F (36.8 °C) 92 18 122/71 98 %           1105:  Tolerated treatment well, no adverse reactions noted. PIV flushed and removed. 2x2 and coban placed. D/Cd from \[facility]\"" ambulatory and in no distress.  Patient is aware of next scheduled \[facility]\"" appointment on 04/01/25.    Future Appointments   Date Time Provider Department Center   4/1/2025 10:00 AM  LAB CHAIR 1 [facility]   4/1/2025 10:30 AM Kevin Rangel MD [facility] AMB           Carolyn Reeves, RN  March 18, 2025

## 2025-03-18 NOTE — TELEPHONE ENCOUNTER
Pt spouse called in requesting a prescription refill on Dexamethasone. She stated she would like the request sent to [facility].    CB# [phone]

## 2025-03-19 ENCOUNTER — TELEPHONE (OUTPATIENT)
Age: 80
End: 2025-03-19

## 2025-03-19 RX ORDER — DEXAMETHASONE 0.5 MG/1
0.5 TABLET ORAL 2 TIMES DAILY WITH MEALS
Qty: 60 TABLET | Refills: 1 | Status: SHIPPED | OUTPATIENT
Start: 2025-03-19

## 2025-03-19 RX ORDER — DEXAMETHASONE 0.5 MG/1
0.5 TABLET ORAL 2 TIMES DAILY WITH MEALS
Qty: 60 TABLET | Refills: 1 | OUTPATIENT
Start: 2025-03-19

## 2025-03-19 NOTE — TELEPHONE ENCOUNTER
Patient spouse is calling in to request a refill on dexamethasone. She stated that he is almost out of medication.     # [phone]

## 2025-03-19 NOTE — TELEPHONE ENCOUNTER
[facility] [place] at [facility]  [phone]    3/19/24 8691 Call placed to patients spouse regarding dexamethasone. Informed refill has been sent. Mrs. Grant stated understanding and has no further questions at this time.

## 2025-03-21 ENCOUNTER — TELEPHONE (OUTPATIENT)
Age: 80
End: 2025-03-21

## 2025-03-21 NOTE — TELEPHONE ENCOUNTER
Pt spouse called in stating the pt had a colonoscopy last Friday. She said she recently noticed the pt's stool is black. She said she's concerned. Please advise     CB#  [phone]

## 2025-03-21 NOTE — TELEPHONE ENCOUNTER
[facility] [place] at [facility]  [phone]    3/21/2025 1400 Returned call to patients wife, Mrs. Grant states the patients stool is still black since admission to the hospital with upper GI bleed. Denies any change in stool appearance since discharge from hospital. She states she is still waiting to hear from gastroenterology to call her back regarding same issue, but wanted the office to be aware.

## 2025-03-25 ENCOUNTER — HOSPITAL ENCOUNTER (EMERGENCY)
Facility: HOSPITAL | Age: 80
Discharge: HOME | End: 2025-03-25
Attending: EMERGENCY MEDICINE
Payer: MEDICARE

## 2025-03-25 VITALS
WEIGHT: 147.71 LBS | HEART RATE: 114 BPM | DIASTOLIC BLOOD PRESSURE: 89 MMHG | TEMPERATURE: 98 F | RESPIRATION RATE: 20 BRPM | SYSTOLIC BLOOD PRESSURE: 128 MMHG | BODY MASS INDEX: 23.18 KG/M2 | HEIGHT: 67 IN | OXYGEN SATURATION: 95 %

## 2025-03-25 DIAGNOSIS — R33.9 URINARY RETENTION: Primary | ICD-10-CM

## 2025-03-25 PROCEDURE — 99283 EMERGENCY DEPT VISIT LOW MDM: CPT

## 2025-03-25 ASSESSMENT — PAIN DESCRIPTION - DESCRIPTORS: DESCRIPTORS: DISCOMFORT;PRESSURE

## 2025-03-25 ASSESSMENT — PAIN DESCRIPTION - LOCATION: LOCATION: ABDOMEN

## 2025-03-25 ASSESSMENT — PAIN - FUNCTIONAL ASSESSMENT: PAIN_FUNCTIONAL_ASSESSMENT: 0-10

## 2025-03-25 ASSESSMENT — PAIN DESCRIPTION - ORIENTATION: ORIENTATION: LOWER

## 2025-03-25 ASSESSMENT — PAIN SCALES - GENERAL: PAINLEVEL_OUTOF10: 10

## 2025-03-25 NOTE — ED PROVIDER NOTES
Dr Yun [place] [id number] [phone]    Schedule an appointment as soon as possible for a visit in 2 days  for reevaluation and further treatment as needed      DISCHARGE MEDICATIONS:  New Prescriptions    No medications on file         (Please note that portions of this note were completed with a voice recognition program.  Efforts were made to edit the dictations but occasionally words are mis-transcribed.)    Kevin Ojeda MD (electronically signed)  Emergency Attending Physician / Physician Assistant / Nurse Practitioner            Kevin Ojeda MD  03/25/25 0694

## 2025-03-25 NOTE — ED TRIAGE NOTES
Patient arrives to ED via EMS with c/o urinary retention    Reports that he has not been able to urinate since 4 PM

## 2025-03-27 ENCOUNTER — TELEPHONE (OUTPATIENT)
Age: 80
End: 2025-03-27

## 2025-03-27 DIAGNOSIS — C93.10 CHRONIC MYELOMONOCYTIC LEUKEMIA NOT HAVING ACHIEVED REMISSION (HCC): ICD-10-CM

## 2025-03-27 NOTE — TELEPHONE ENCOUNTER
[facility] [place] at [facility]  [phone]    3/27/2025 1135 Patients wife called stating he was taken to the ER 3/25/25 for urinary retention where they placed a catheter and sent him home with a urology follow up scheduled. Mrs. Grant states he has had bleeding from the catheter since last night, denies any trauma to the area other than time of insertion, she states bleeding enough to \"fill up a towel\" and soak through to couch cushion. Advised to follow up with urology office as they can assess and treat this issue. She states she has already called and is waiting for  call back from the urology office, but wanted make our office aware.

## 2025-04-01 ENCOUNTER — HOSPITAL ENCOUNTER (OUTPATIENT)
Facility: HOSPITAL | Age: 80
Setting detail: INFUSION SERIES
Discharge: HOME | End: 2025-04-01
Payer: MEDICARE

## 2025-04-01 ENCOUNTER — OFFICE VISIT (OUTPATIENT)
Age: 80
End: 2025-04-01
Payer: MEDICARE

## 2025-04-01 VITALS
OXYGEN SATURATION: 96 % | HEART RATE: 91 BPM | SYSTOLIC BLOOD PRESSURE: 124 MMHG | TEMPERATURE: 97.3 F | RESPIRATION RATE: 16 BRPM | DIASTOLIC BLOOD PRESSURE: 64 MMHG

## 2025-04-01 VITALS
BODY MASS INDEX: 23.09 KG/M2 | SYSTOLIC BLOOD PRESSURE: 103 MMHG | HEIGHT: 67 IN | OXYGEN SATURATION: 96 % | TEMPERATURE: 98.7 F | RESPIRATION RATE: 16 BRPM | DIASTOLIC BLOOD PRESSURE: 61 MMHG | HEART RATE: 100 BPM | WEIGHT: 147.1 LBS

## 2025-04-01 DIAGNOSIS — C93.10 CHRONIC MYELOMONOCYTIC LEUKEMIA NOT HAVING ACHIEVED REMISSION (HCC): Primary | ICD-10-CM

## 2025-04-01 DIAGNOSIS — D64.81 ANEMIA DUE TO ANTINEOPLASTIC CHEMOTHERAPY: ICD-10-CM

## 2025-04-01 DIAGNOSIS — D64.9 ANEMIA REQUIRING TRANSFUSIONS: ICD-10-CM

## 2025-04-01 DIAGNOSIS — T45.1X5A ANEMIA DUE TO ANTINEOPLASTIC CHEMOTHERAPY: ICD-10-CM

## 2025-04-01 LAB
ALBUMIN SERPL-MCNC: 3.1 G/DL (ref 3.5–5)
ALBUMIN/GLOB SERPL: 0.7 (ref 1.1–2.2)
ALP SERPL-CCNC: 72 U/L (ref 45–117)
ALT SERPL-CCNC: 26 U/L (ref 12–78)
ANION GAP SERPL CALC-SCNC: 8 MMOL/L (ref 2–12)
AST SERPL-CCNC: 21 U/L (ref 15–37)
BASOPHILS # BLD: 0.1 K/UL (ref 0–0.1)
BASOPHILS NFR BLD: 2 % (ref 0–1)
BILIRUB SERPL-MCNC: 1.1 MG/DL (ref 0.2–1)
BLASTS NFR BLD MANUAL: 3 %
BUN SERPL-MCNC: 33 MG/DL (ref 6–20)
BUN/CREAT SERPL: 17 (ref 12–20)
CALCIUM SERPL-MCNC: 8.5 MG/DL (ref 8.5–10.1)
CHLORIDE SERPL-SCNC: 109 MMOL/L (ref 97–108)
CO2 SERPL-SCNC: 22 MMOL/L (ref 21–32)
CREAT SERPL-MCNC: 2 MG/DL (ref 0.7–1.3)
DIFFERENTIAL METHOD BLD: ABNORMAL
EOSINOPHIL # BLD: 0.1 K/UL (ref 0–0.4)
EOSINOPHIL NFR BLD: 2 % (ref 0–7)
ERYTHROCYTE [DISTWIDTH] IN BLOOD BY AUTOMATED COUNT: 17.3 % (ref 11.5–14.5)
GLOBULIN SER CALC-MCNC: 4.2 G/DL (ref 2–4)
GLUCOSE SERPL-MCNC: 118 MG/DL (ref 65–100)
HCT VFR BLD AUTO: 17.3 % (ref 36.6–50.3)
HGB BLD-MCNC: 5.1 G/DL (ref 12.1–17)
HISTORY CHECK: NORMAL
IMM GRANULOCYTES # BLD AUTO: 0 K/UL
IMM GRANULOCYTES NFR BLD AUTO: 0 %
LYMPHOCYTES # BLD: 2 K/UL (ref 0.8–3.5)
LYMPHOCYTES NFR BLD: 40 % (ref 12–49)
MCH RBC QN AUTO: 28 PG (ref 26–34)
MCHC RBC AUTO-ENTMCNC: 29.5 G/DL (ref 30–36.5)
MCV RBC AUTO: 95.1 FL (ref 80–99)
METAMYELOCYTES NFR BLD MANUAL: 5 %
MONOCYTES # BLD: 1.35 K/UL (ref 0–1)
MONOCYTES NFR BLD: 27 % (ref 5–13)
MYELOCYTES NFR BLD MANUAL: 1 %
NEUTS BAND NFR BLD MANUAL: 1 % (ref 0–6)
NEUTS SEG # BLD: 1 K/UL (ref 1.8–8)
NEUTS SEG NFR BLD: 19 % (ref 32–75)
NRBC # BLD: 0.07 K/UL (ref 0–0.01)
NRBC BLD-RTO: 1.4 PER 100 WBC
PATH REV BLD -IMP: NORMAL
PLATELET # BLD AUTO: 55 K/UL (ref 150–400)
PMV BLD AUTO: 12.9 FL (ref 8.9–12.9)
POTASSIUM SERPL-SCNC: 3.6 MMOL/L (ref 3.5–5.1)
PROT SERPL-MCNC: 7.3 G/DL (ref 6.4–8.2)
RBC # BLD AUTO: 1.82 M/UL (ref 4.1–5.7)
RBC MORPH BLD: ABNORMAL
SODIUM SERPL-SCNC: 139 MMOL/L (ref 136–145)
WBC # BLD AUTO: 5 K/UL (ref 4.1–11.1)

## 2025-04-01 PROCEDURE — 1159F MED LIST DOCD IN RCRD: CPT | Performed by: INTERNAL MEDICINE

## 2025-04-01 PROCEDURE — G8420 CALC BMI NORM PARAMETERS: HCPCS | Performed by: INTERNAL MEDICINE

## 2025-04-01 PROCEDURE — 86900 BLOOD TYPING SEROLOGIC ABO: CPT

## 2025-04-01 PROCEDURE — G2211 COMPLEX E/M VISIT ADD ON: HCPCS | Performed by: INTERNAL MEDICINE

## 2025-04-01 PROCEDURE — 86850 RBC ANTIBODY SCREEN: CPT

## 2025-04-01 PROCEDURE — 1123F ACP DISCUSS/DSCN MKR DOCD: CPT | Performed by: INTERNAL MEDICINE

## 2025-04-01 PROCEDURE — 36415 COLL VENOUS BLD VENIPUNCTURE: CPT

## 2025-04-01 PROCEDURE — G8427 DOCREV CUR MEDS BY ELIG CLIN: HCPCS | Performed by: INTERNAL MEDICINE

## 2025-04-01 PROCEDURE — 1036F TOBACCO NON-USER: CPT | Performed by: INTERNAL MEDICINE

## 2025-04-01 PROCEDURE — 3074F SYST BP LT 130 MM HG: CPT | Performed by: INTERNAL MEDICINE

## 2025-04-01 PROCEDURE — 3078F DIAST BP <80 MM HG: CPT | Performed by: INTERNAL MEDICINE

## 2025-04-01 PROCEDURE — P9040 RBC LEUKOREDUCED IRRADIATED: HCPCS

## 2025-04-01 PROCEDURE — 1160F RVW MEDS BY RX/DR IN RCRD: CPT | Performed by: INTERNAL MEDICINE

## 2025-04-01 PROCEDURE — 86901 BLOOD TYPING SEROLOGIC RH(D): CPT

## 2025-04-01 PROCEDURE — 99215 OFFICE O/P EST HI 40 MIN: CPT | Performed by: INTERNAL MEDICINE

## 2025-04-01 PROCEDURE — 1111F DSCHRG MED/CURRENT MED MERGE: CPT | Performed by: INTERNAL MEDICINE

## 2025-04-01 PROCEDURE — 85025 COMPLETE CBC W/AUTO DIFF WBC: CPT

## 2025-04-01 PROCEDURE — 36430 TRANSFUSION BLD/BLD COMPNT: CPT

## 2025-04-01 PROCEDURE — 1125F AMNT PAIN NOTED PAIN PRSNT: CPT | Performed by: INTERNAL MEDICINE

## 2025-04-01 PROCEDURE — 86923 COMPATIBILITY TEST ELECTRIC: CPT

## 2025-04-01 PROCEDURE — 80053 COMPREHEN METABOLIC PANEL: CPT

## 2025-04-01 RX ORDER — ACETAMINOPHEN 325 MG/1
650 TABLET ORAL
OUTPATIENT
Start: 2025-04-01

## 2025-04-01 RX ORDER — ALBUTEROL SULFATE 90 UG/1
4 INHALANT RESPIRATORY (INHALATION) PRN
OUTPATIENT
Start: 2025-04-01

## 2025-04-01 RX ORDER — CIPROFLOXACIN 500 MG/1
500 TABLET, FILM COATED ORAL 2 TIMES DAILY
COMMUNITY
Start: 2025-03-28

## 2025-04-01 RX ORDER — EPINEPHRINE 1 MG/ML
0.3 INJECTION, SOLUTION INTRAMUSCULAR; SUBCUTANEOUS PRN
OUTPATIENT
Start: 2025-04-01

## 2025-04-01 RX ORDER — ONDANSETRON 2 MG/ML
8 INJECTION INTRAMUSCULAR; INTRAVENOUS
OUTPATIENT
Start: 2025-04-01

## 2025-04-01 RX ORDER — HYDROCORTISONE SODIUM SUCCINATE 100 MG/2ML
100 INJECTION INTRAMUSCULAR; INTRAVENOUS
OUTPATIENT
Start: 2025-04-01

## 2025-04-01 RX ORDER — FAMOTIDINE 10 MG/ML
20 INJECTION, SOLUTION INTRAVENOUS
OUTPATIENT
Start: 2025-04-01

## 2025-04-01 RX ORDER — SODIUM CHLORIDE 9 MG/ML
INJECTION, SOLUTION INTRAVENOUS CONTINUOUS
OUTPATIENT
Start: 2025-04-01

## 2025-04-01 RX ORDER — ONDANSETRON 2 MG/ML
4 INJECTION INTRAMUSCULAR; INTRAVENOUS EVERY 6 HOURS PRN
Start: 2025-04-01

## 2025-04-01 RX ORDER — DIPHENHYDRAMINE HYDROCHLORIDE 50 MG/ML
50 INJECTION, SOLUTION INTRAMUSCULAR; INTRAVENOUS
OUTPATIENT
Start: 2025-04-01

## 2025-04-01 RX ORDER — ONDANSETRON 2 MG/ML
4 INJECTION INTRAMUSCULAR; INTRAVENOUS EVERY 6 HOURS PRN
Status: DISCONTINUED | OUTPATIENT
Start: 2025-04-01 | End: 2025-04-02 | Stop reason: HOSPADM

## 2025-04-01 ASSESSMENT — PAIN DESCRIPTION - LOCATION: LOCATION: ABDOMEN

## 2025-04-01 ASSESSMENT — PAIN SCALES - GENERAL: PAINLEVEL_OUTOF10: 10

## 2025-04-01 NOTE — PROGRESS NOTES
Chief Complaint   Patient presents with    Follow-up     /61 (BP Site: Left Upper Arm, Patient Position: Sitting, BP Cuff Size: Medium Adult)   Pulse 100   Temp 98.7 °F (37.1 °C) (Temporal)   Resp 16   Ht 1.702 m (5' 7\")   Wt 66.7 kg (147 lb 1.6 oz)   SpO2 96%   BMI 23.04 kg/m²   Terry Grant is a 79 y.o. male here for   Chief Complaint   Patient presents with    Follow-up       1. Have you been to the ER, urgent care clinic since your last visit?  Hospitalized since your last visit? -yes    2. Have you seen or consulted any other health care providers outside of the [facility] since your last visit?  Include any pap smears or colon screening.-no

## 2025-04-01 NOTE — CONSENT
Informed Consent for Blood Component Transfusion Note    I have discussed with the patient the rationale for blood component transfusion; its benefits in treating or preventing fatigue, organ damage, or death; and its risk which includes mild transfusion reactions, rare risk of blood borne infection, or more serious but rare reactions. I have discussed the alternatives to transfusion, including the risk and consequences of not receiving transfusion. The patient had an opportunity to ask questions and had agreed to proceed with transfusion of blood components.    Electronically signed by Kevin Rangel MD on 4/1/25 at 11:12 AM EDT

## 2025-04-01 NOTE — PROGRESS NOTES
[facility]fusion [facility] Progress Note        Date: 25    Name: Terry Grant    MRN: [id number]         : 1945    MD: Kevin Rangel MD      Mr. Grant admitted to \[facility]\"" for Blood Transfusion (PRBC) ambulatory in stable condition. Assessment completed. No new concerns voiced.  22 gauge peripheral IV obtained in the right upper forearm without difficulty, line flushed and capped. Labs drawn and sent for processing.      ** Patient has received blood transfusions in the past and reports no previous transfusion reactions. **      Vitals:    25 1000 25 1224 25 1243 25 1452   BP: 103/61 104/63 114/66 124/64   Pulse: (!) 110 93 93 91   Resp: 16 16 16 16   Temp: 97.8 °F (36.6 °C) 98.8 °F (37.1 °C) 98.2 °F (36.8 °C) 97.3 °F (36.3 °C)   TempSrc: Temporal Temporal Temporal Temporal   SpO2: 96% 95% 99% 96%           Results for orders placed or performed during the hospital encounter of 25   CBC with Auto Differential   Result Value Ref Range    WBC 5.0 4.1 - 11.1 K/uL    RBC 1.82 (L) 4.10 - 5.70 M/uL    Hemoglobin 5.1 (LL) 12.1 - 17.0 g/dL    Hematocrit 17.3 (LL) 36.6 - 50.3 %    MCV 95.1 80.0 - 99.0 FL    MCH 28.0 26.0 - 34.0 PG    MCHC 29.5 (L) 30.0 - 36.5 g/dL    RDW 17.3 (H) 11.5 - 14.5 %    Platelets 55 (L) 150 - 400 K/uL    MPV 12.9 8.9 - 12.9 FL    Nucleated RBCs 1.4 (H) 0  WBC    nRBC 0.07 (H) 0.00 - 0.01 K/uL    Neutrophils % 19 (L) 32 - 75 %    Band Neutrophils 1 0 - 6 %    Lymphocytes % 40 12 - 49 %    Monocytes % 27 (H) 5 - 13 %    Eosinophils % 2 0 - 7 %    Basophils % 2 (H) 0 - 1 %    Metamyelocytes 5 (H) 0 %    Myelocytes 1 (H) 0 %    Blasts 3 (H) 0 %    Immature Granulocytes % 0 %    Neutrophils Absolute 1.00 (L) 1.8 - 8.0 K/UL    Lymphocytes Absolute 2.00 0.8 - 3.5 K/UL    Monocytes Absolute 1.35 (H) 0.0 - 1.0 K/UL    Eosinophils Absolute 0.10 0.0 - 0.4 K/UL    Basophils Absolute 0.10 0.0 - 0.1 K/UL    Immature Granulocytes Absolute 0.00 K/UL

## 2025-04-01 NOTE — PROGRESS NOTES
[facility] [place]  [address], [address]  [place]  [address]  W: [phone]  F: [phone]     HEME/ONC CONSULT    HPI:   Terry Grant is a 79 y.o. male who I am seeing for CMML      Interval history: was admitted from 3/10/25-3/15/25 for upper GI bleed, noted black stoools.  Received 3 U PRBCs.  EGD 3/12/25, unremarkable.  Colonoscopy on 3/14/25 with 1 nonbleeding cecal AVM    Reports gr 1 fatigue.  Gr 1 sob, gr 1 tachycardia    Afebrile today    Review of systems was obtained and pertinent findings reviewed above. Past medical history, social history, family history, medications, and allergies are located in the electronic medical record.    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  - SRSF2 mutations can be seen in various myeloid neoplasms, and are associated with a poor prognosis in MDS, AML,  PMF, and PV.  - TET2 mutations can be seen in various myeloid neoplasms, and are associated with an increased response to  hypomethylating agents and a poor prognosis in MDS after hematopoietic stem cell transplantation.  - Co-mutation of TET2 and SRSF2 is reported to be highly predictive of a myeloid neoplasm characterized by  myelodysplasia and monocytosis, including, but not limited to, CMML    NGS:  ASXL1 L984Bhv*12; KRAS G12R; SRSF2 P95L; TET2 *, *; NO alterations detected in the following genes:  FLT3, IDH1, IDH2, NPM1      6/8/23:  pericardial window, negative    Hydrea 500 mg daily 6/9/23-  DR to 500 mg on Mon-Fri   Increased to daily on 8/22/24   Increased to 500 mg bid mon-Friday and daily on sat and Sunday on 9/9/24   Changed to 500 mg bid on Mon, Wed, Fri, 500 mg daily on Tues, thurs, Sat, Sunday on 10/17/24   Changed to 500 mg PO daily on 11/14/24    Decitabine-cedazuridine 2/4/25-2/8/25 (first

## 2025-04-02 ENCOUNTER — TELEPHONE (OUTPATIENT)
Age: 80
End: 2025-04-02

## 2025-04-02 NOTE — TELEPHONE ENCOUNTER
[facility] [place] at [facility]  [phone]    4/2/2025 1340 Patients wife called and stated he has been leaking urine around catheter along with having spasms, has contacted urology office but has not heard back yet and would also like to cancel transfusion appointment scheduled for tomorrow. Advised per Dr. Rangel he can go to urology office or urgent care for follow up regarding catheter. Mrs. Grant states he would wait to hear back from urology office and will reschedule transfusion once they have a plan in place.

## 2025-04-03 ENCOUNTER — HOSPITAL ENCOUNTER (OUTPATIENT)
Facility: HOSPITAL | Age: 80
Setting detail: INFUSION SERIES
End: 2025-04-03

## 2025-04-08 ENCOUNTER — HOSPITAL ENCOUNTER (OUTPATIENT)
Facility: HOSPITAL | Age: 80
Setting detail: INFUSION SERIES
Discharge: HOME | End: 2025-04-08
Payer: MEDICARE

## 2025-04-08 VITALS
RESPIRATION RATE: 18 BRPM | HEART RATE: 81 BPM | OXYGEN SATURATION: 98 % | SYSTOLIC BLOOD PRESSURE: 110 MMHG | TEMPERATURE: 98 F | DIASTOLIC BLOOD PRESSURE: 74 MMHG

## 2025-04-08 DIAGNOSIS — D64.81 ANEMIA DUE TO ANTINEOPLASTIC CHEMOTHERAPY: ICD-10-CM

## 2025-04-08 DIAGNOSIS — C93.10 CHRONIC MYELOMONOCYTIC LEUKEMIA NOT HAVING ACHIEVED REMISSION (HCC): Primary | ICD-10-CM

## 2025-04-08 DIAGNOSIS — T45.1X5A ANEMIA DUE TO ANTINEOPLASTIC CHEMOTHERAPY: ICD-10-CM

## 2025-04-08 LAB
ALBUMIN SERPL-MCNC: 3.2 G/DL (ref 3.5–5)
ALBUMIN/GLOB SERPL: 0.7 (ref 1.1–2.2)
ALP SERPL-CCNC: 72 U/L (ref 45–117)
ALT SERPL-CCNC: 29 U/L (ref 12–78)
ANION GAP SERPL CALC-SCNC: 7 MMOL/L (ref 2–12)
AST SERPL-CCNC: 26 U/L (ref 15–37)
BASOPHILS # BLD: 0 K/UL (ref 0–0.1)
BASOPHILS NFR BLD: 0 % (ref 0–1)
BILIRUB SERPL-MCNC: 0.7 MG/DL (ref 0.2–1)
BUN SERPL-MCNC: 36 MG/DL (ref 6–20)
BUN/CREAT SERPL: 20 (ref 12–20)
CALCIUM SERPL-MCNC: 8.5 MG/DL (ref 8.5–10.1)
CHLORIDE SERPL-SCNC: 107 MMOL/L (ref 97–108)
CO2 SERPL-SCNC: 22 MMOL/L (ref 21–32)
CREAT SERPL-MCNC: 1.81 MG/DL (ref 0.7–1.3)
DIFFERENTIAL METHOD BLD: ABNORMAL
EOSINOPHIL # BLD: 0.02 K/UL (ref 0–0.4)
EOSINOPHIL NFR BLD: 1 % (ref 0–7)
ERYTHROCYTE [DISTWIDTH] IN BLOOD BY AUTOMATED COUNT: 16 % (ref 11.5–14.5)
GLOBULIN SER CALC-MCNC: 4.6 G/DL (ref 2–4)
GLUCOSE SERPL-MCNC: 89 MG/DL (ref 65–100)
HCT VFR BLD AUTO: 18 % (ref 36.6–50.3)
HGB BLD-MCNC: 5.6 G/DL (ref 12.1–17)
HISTORY CHECK: NORMAL
IMM GRANULOCYTES # BLD AUTO: 0 K/UL
IMM GRANULOCYTES NFR BLD AUTO: 0 %
LYMPHOCYTES # BLD: 0.55 K/UL (ref 0.8–3.5)
LYMPHOCYTES NFR BLD: 26 % (ref 12–49)
MCH RBC QN AUTO: 28 PG (ref 26–34)
MCHC RBC AUTO-ENTMCNC: 31.1 G/DL (ref 30–36.5)
MCV RBC AUTO: 90 FL (ref 80–99)
MONOCYTES # BLD: 0.17 K/UL (ref 0–1)
MONOCYTES NFR BLD: 8 % (ref 5–13)
NEUTS SEG # BLD: 1.34 K/UL (ref 1.8–8)
NEUTS SEG NFR BLD: 64 % (ref 32–75)
NRBC # BLD: 0 K/UL (ref 0–0.01)
NRBC BLD-RTO: 0 PER 100 WBC
OTHER CELLS NFR BLD MANUAL: 1
PLATELET # BLD AUTO: 92 K/UL (ref 150–400)
POTASSIUM SERPL-SCNC: 3.8 MMOL/L (ref 3.5–5.1)
PROT SERPL-MCNC: 7.8 G/DL (ref 6.4–8.2)
RBC # BLD AUTO: 2 M/UL (ref 4.1–5.7)
RBC MORPH BLD: ABNORMAL
RBC MORPH BLD: ABNORMAL
SODIUM SERPL-SCNC: 136 MMOL/L (ref 136–145)
WBC # BLD AUTO: 2.1 K/UL (ref 4.1–11.1)

## 2025-04-08 PROCEDURE — 36430 TRANSFUSION BLD/BLD COMPNT: CPT

## 2025-04-08 PROCEDURE — 80053 COMPREHEN METABOLIC PANEL: CPT

## 2025-04-08 PROCEDURE — 85025 COMPLETE CBC W/AUTO DIFF WBC: CPT

## 2025-04-08 PROCEDURE — 86923 COMPATIBILITY TEST ELECTRIC: CPT

## 2025-04-08 PROCEDURE — 86900 BLOOD TYPING SEROLOGIC ABO: CPT

## 2025-04-08 PROCEDURE — 36415 COLL VENOUS BLD VENIPUNCTURE: CPT

## 2025-04-08 PROCEDURE — 86901 BLOOD TYPING SEROLOGIC RH(D): CPT

## 2025-04-08 PROCEDURE — 86850 RBC ANTIBODY SCREEN: CPT

## 2025-04-08 PROCEDURE — P9040 RBC LEUKOREDUCED IRRADIATED: HCPCS

## 2025-04-08 RX ORDER — DIPHENHYDRAMINE HYDROCHLORIDE 50 MG/ML
50 INJECTION, SOLUTION INTRAMUSCULAR; INTRAVENOUS
Status: CANCELLED | OUTPATIENT
Start: 2025-04-08

## 2025-04-08 RX ORDER — ACETAMINOPHEN 325 MG/1
650 TABLET ORAL
Status: CANCELLED | OUTPATIENT
Start: 2025-04-08

## 2025-04-08 RX ORDER — SODIUM CHLORIDE 0.9 % (FLUSH) 0.9 %
5-40 SYRINGE (ML) INJECTION PRN
Status: CANCELLED | OUTPATIENT
Start: 2025-04-08

## 2025-04-08 RX ORDER — ONDANSETRON 2 MG/ML
8 INJECTION INTRAMUSCULAR; INTRAVENOUS
Status: CANCELLED | OUTPATIENT
Start: 2025-04-08

## 2025-04-08 RX ORDER — EPINEPHRINE 1 MG/ML
0.3 INJECTION, SOLUTION INTRAMUSCULAR; SUBCUTANEOUS PRN
Status: CANCELLED | OUTPATIENT
Start: 2025-04-08

## 2025-04-08 RX ORDER — SODIUM CHLORIDE 0.9 % (FLUSH) 0.9 %
5-40 SYRINGE (ML) INJECTION PRN
Status: DISCONTINUED | OUTPATIENT
Start: 2025-04-08 | End: 2025-04-09 | Stop reason: HOSPADM

## 2025-04-08 RX ORDER — ALBUTEROL SULFATE 90 UG/1
4 INHALANT RESPIRATORY (INHALATION) PRN
Status: CANCELLED | OUTPATIENT
Start: 2025-04-08

## 2025-04-08 RX ORDER — HYDROCORTISONE SODIUM SUCCINATE 100 MG/2ML
100 INJECTION INTRAMUSCULAR; INTRAVENOUS
Status: CANCELLED | OUTPATIENT
Start: 2025-04-08

## 2025-04-08 RX ORDER — SODIUM CHLORIDE 9 MG/ML
INJECTION, SOLUTION INTRAVENOUS CONTINUOUS
Status: CANCELLED | OUTPATIENT
Start: 2025-04-08

## 2025-04-08 RX ORDER — SODIUM CHLORIDE 9 MG/ML
20 INJECTION, SOLUTION INTRAVENOUS CONTINUOUS
Status: CANCELLED | OUTPATIENT
Start: 2025-04-08

## 2025-04-08 RX ORDER — FAMOTIDINE 10 MG/ML
20 INJECTION, SOLUTION INTRAVENOUS
Status: CANCELLED | OUTPATIENT
Start: 2025-04-08

## 2025-04-08 RX ORDER — SODIUM CHLORIDE 9 MG/ML
25 INJECTION, SOLUTION INTRAVENOUS PRN
Status: CANCELLED | OUTPATIENT
Start: 2025-04-08

## 2025-04-08 RX ORDER — EPINEPHRINE 1 MG/ML
0.3 INJECTION, SOLUTION, CONCENTRATE INTRAVENOUS PRN
Status: CANCELLED | OUTPATIENT
Start: 2025-04-08

## 2025-04-08 ASSESSMENT — PAIN SCALES - GENERAL: PAINLEVEL_OUTOF10: 0

## 2025-04-08 NOTE — PROGRESS NOTES
[facility] Progress Note    Date: 2025    Name: Terry Grant    MRN: [id number]         : 1945    Mr. Grant Arrived ambulatory and in no distress for Blood Transfusion.  Assessment was completed, no acute issues at this time, no new complaints voiced.  24G PIV placed by Noa Benitez, labs drawn & sent for processing. Signs/symptoms of adverse blood reaction discussed with pt, voiced understanding.    Patient has received blood transfusions in the past and reports no previous transfusion reactions.       **Blood consent singed on 25 and scanned into chart**     Mr. Grant's vitals were reviewed.  Vitals:    25 0945   BP: 112/64   Pulse: (!) 112   Resp: 18   Temp: 98.9 °F (37.2 °C)   SpO2: 97%       Lab results were obtained and reviewed.  Recent Results (from the past 12 hours)   CBC with Auto Differential    Collection Time: 25  9:37 AM   Result Value Ref Range    WBC 2.1 (L) 4.1 - 11.1 K/uL    RBC 2.00 (L) 4.10 - 5.70 M/uL    Hemoglobin 5.6 (LL) 12.1 - 17.0 g/dL    Hematocrit 18.0 (L) 36.6 - 50.3 %    MCV 90.0 80.0 - 99.0 FL    MCH 28.0 26.0 - 34.0 PG    MCHC 31.1 30.0 - 36.5 g/dL    RDW 16.0 (H) 11.5 - 14.5 %    Platelets 92 (L) 150 - 400 K/uL    Nucleated RBCs 0.0 0  WBC    nRBC 0.00 0.00 - 0.01 K/uL    Neutrophils % 64 32 - 75 %    Lymphocytes % 26 12 - 49 %    Monocytes % 8 5 - 13 %    Eosinophils % 1 0 - 7 %    Basophils % 0 0 - 1 %    Other cells, fluid 1 (H) 0      Immature Granulocytes % 0 %    Neutrophils Absolute 1.34 (L) 1.8 - 8.0 K/UL    Lymphocytes Absolute 0.55 (L) 0.8 - 3.5 K/UL    Monocytes Absolute 0.17 0.0 - 1.0 K/UL    Eosinophils Absolute 0.02 0.0 - 0.4 K/UL    Basophils Absolute 0.00 0.0 - 0.1 K/UL    Immature Granulocytes Absolute 0.00 K/UL    Differential Type MANUAL      RBC Comment ANISOCYTOSIS  1+        RBC Comment MICROCYTOSIS  1+       Comprehensive Metabolic Panel    Collection Time: 25  9:37 AM   Result Value Ref Range    Sodium 136 136  without adverse reaction noted, NS flushing line.         Mr. Grant tolerated treatment well and was discharged from [facility] in stable condition.   PIV removed. Patient aware of their next appointment. D/C instructions reviewed, copy to pt, voiced understanding.  Patient declined 1 hour post transfusion observation.    Cash Ravi RN  April 8, 2025    Future Appointments   Date Time Provider Department Center   4/8/2025 10:00 AM SS CHEMO CHAIR 5 [facility]   4/15/2025  9:30 AM SS LAB CHAIR [facility]   4/15/2025 10:30 AM Kevin Rangel MD [facility] BS AMB   4/15/2025 10:45 AM SS CHEMO CHAIR 15 [facility]

## 2025-04-09 LAB
ABO + RH BLD: NORMAL
BLD PROD TYP BPU: NORMAL
BLD PROD TYP BPU: NORMAL
BLOOD BANK BLOOD PRODUCT EXPIRATION DATE: NORMAL
BLOOD BANK BLOOD PRODUCT EXPIRATION DATE: NORMAL
BLOOD BANK DISPENSE STATUS: NORMAL
BLOOD BANK DISPENSE STATUS: NORMAL
BLOOD BANK ISBT PRODUCT BLOOD TYPE: 5100
BLOOD BANK ISBT PRODUCT BLOOD TYPE: 6200
BLOOD BANK PRODUCT CODE: NORMAL
BLOOD BANK PRODUCT CODE: NORMAL
BLOOD BANK UNIT TYPE AND RH: NORMAL
BLOOD BANK UNIT TYPE AND RH: NORMAL
BLOOD GROUP ANTIBODIES SERPL: NORMAL
BPU ID: NORMAL
BPU ID: NORMAL
CROSSMATCH RESULT: NORMAL
CROSSMATCH RESULT: NORMAL
SPECIMEN EXP DATE BLD: NORMAL
UNIT DIVISION: 0
UNIT DIVISION: 0
UNIT ISSUE DATE/TIME: NORMAL
UNIT ISSUE DATE/TIME: NORMAL

## 2025-04-15 ENCOUNTER — OFFICE VISIT (OUTPATIENT)
Age: 80
End: 2025-04-15
Payer: MEDICARE

## 2025-04-15 ENCOUNTER — HOSPITAL ENCOUNTER (OUTPATIENT)
Facility: HOSPITAL | Age: 80
Setting detail: INFUSION SERIES
Discharge: HOME | End: 2025-04-15
Payer: MEDICARE

## 2025-04-15 VITALS
DIASTOLIC BLOOD PRESSURE: 70 MMHG | WEIGHT: 143 LBS | SYSTOLIC BLOOD PRESSURE: 111 MMHG | OXYGEN SATURATION: 99 % | HEIGHT: 67 IN | TEMPERATURE: 97.5 F | RESPIRATION RATE: 18 BRPM | BODY MASS INDEX: 22.44 KG/M2 | HEART RATE: 110 BPM

## 2025-04-15 VITALS
TEMPERATURE: 97.8 F | RESPIRATION RATE: 18 BRPM | SYSTOLIC BLOOD PRESSURE: 117 MMHG | HEART RATE: 87 BPM | OXYGEN SATURATION: 97 % | DIASTOLIC BLOOD PRESSURE: 66 MMHG

## 2025-04-15 DIAGNOSIS — C93.10 CHRONIC MYELOMONOCYTIC LEUKEMIA NOT HAVING ACHIEVED REMISSION (HCC): Primary | ICD-10-CM

## 2025-04-15 DIAGNOSIS — T45.1X5A ANEMIA DUE TO ANTINEOPLASTIC CHEMOTHERAPY: ICD-10-CM

## 2025-04-15 DIAGNOSIS — D64.81 ANEMIA DUE TO ANTINEOPLASTIC CHEMOTHERAPY: ICD-10-CM

## 2025-04-15 LAB
ALBUMIN SERPL-MCNC: 3.4 G/DL (ref 3.5–5)
ALBUMIN/GLOB SERPL: 0.7 (ref 1.1–2.2)
ALP SERPL-CCNC: 68 U/L (ref 45–117)
ALT SERPL-CCNC: 23 U/L (ref 12–78)
ANION GAP SERPL CALC-SCNC: 9 MMOL/L (ref 2–12)
AST SERPL-CCNC: 22 U/L (ref 15–37)
BASOPHILS # BLD: 0.04 K/UL (ref 0–0.1)
BASOPHILS NFR BLD: 2 % (ref 0–1)
BILIRUB SERPL-MCNC: 0.6 MG/DL (ref 0.2–1)
BUN SERPL-MCNC: 39 MG/DL (ref 6–20)
BUN/CREAT SERPL: 22 (ref 12–20)
CALCIUM SERPL-MCNC: 8.9 MG/DL (ref 8.5–10.1)
CHLORIDE SERPL-SCNC: 107 MMOL/L (ref 97–108)
CO2 SERPL-SCNC: 22 MMOL/L (ref 21–32)
CREAT SERPL-MCNC: 1.74 MG/DL (ref 0.7–1.3)
DIFFERENTIAL METHOD BLD: ABNORMAL
EOSINOPHIL # BLD: 0.02 K/UL (ref 0–0.4)
EOSINOPHIL NFR BLD: 1 % (ref 0–7)
ERYTHROCYTE [DISTWIDTH] IN BLOOD BY AUTOMATED COUNT: 16.5 % (ref 11.5–14.5)
GLOBULIN SER CALC-MCNC: 4.6 G/DL (ref 2–4)
GLUCOSE SERPL-MCNC: 107 MG/DL (ref 65–100)
HCT VFR BLD AUTO: 20.5 % (ref 36.6–50.3)
HGB BLD-MCNC: 6.5 G/DL (ref 12.1–17)
HISTORY CHECK: NORMAL
IMM GRANULOCYTES # BLD AUTO: 0 K/UL
IMM GRANULOCYTES NFR BLD AUTO: 0 %
LYMPHOCYTES # BLD: 0.63 K/UL (ref 0.8–3.5)
LYMPHOCYTES NFR BLD: 35 % (ref 12–49)
MCH RBC QN AUTO: 29 PG (ref 26–34)
MCHC RBC AUTO-ENTMCNC: 31.7 G/DL (ref 30–36.5)
MCV RBC AUTO: 91.5 FL (ref 80–99)
METAMYELOCYTES NFR BLD MANUAL: 1 %
MONOCYTES # BLD: 0.56 K/UL (ref 0–1)
MONOCYTES NFR BLD: 31 % (ref 5–13)
NEUTS BAND NFR BLD MANUAL: 1 % (ref 0–6)
NEUTS SEG # BLD: 0.54 K/UL (ref 1.8–8)
NEUTS SEG NFR BLD: 29 % (ref 32–75)
NRBC # BLD: 0 K/UL (ref 0–0.01)
NRBC BLD-RTO: 0 PER 100 WBC
PLATELET # BLD AUTO: 38 K/UL (ref 150–400)
POTASSIUM SERPL-SCNC: 3.9 MMOL/L (ref 3.5–5.1)
PROT SERPL-MCNC: 8 G/DL (ref 6.4–8.2)
RBC # BLD AUTO: 2.24 M/UL (ref 4.1–5.7)
RBC MORPH BLD: ABNORMAL
SODIUM SERPL-SCNC: 138 MMOL/L (ref 136–145)
WBC # BLD AUTO: 1.8 K/UL (ref 4.1–11.1)

## 2025-04-15 PROCEDURE — G8427 DOCREV CUR MEDS BY ELIG CLIN: HCPCS | Performed by: INTERNAL MEDICINE

## 2025-04-15 PROCEDURE — 99215 OFFICE O/P EST HI 40 MIN: CPT | Performed by: INTERNAL MEDICINE

## 2025-04-15 PROCEDURE — 3074F SYST BP LT 130 MM HG: CPT | Performed by: INTERNAL MEDICINE

## 2025-04-15 PROCEDURE — 85025 COMPLETE CBC W/AUTO DIFF WBC: CPT

## 2025-04-15 PROCEDURE — G8420 CALC BMI NORM PARAMETERS: HCPCS | Performed by: INTERNAL MEDICINE

## 2025-04-15 PROCEDURE — 3078F DIAST BP <80 MM HG: CPT | Performed by: INTERNAL MEDICINE

## 2025-04-15 PROCEDURE — 86923 COMPATIBILITY TEST ELECTRIC: CPT

## 2025-04-15 PROCEDURE — 36415 COLL VENOUS BLD VENIPUNCTURE: CPT

## 2025-04-15 PROCEDURE — 86901 BLOOD TYPING SEROLOGIC RH(D): CPT

## 2025-04-15 PROCEDURE — 1123F ACP DISCUSS/DSCN MKR DOCD: CPT | Performed by: INTERNAL MEDICINE

## 2025-04-15 PROCEDURE — 1126F AMNT PAIN NOTED NONE PRSNT: CPT | Performed by: INTERNAL MEDICINE

## 2025-04-15 PROCEDURE — 86850 RBC ANTIBODY SCREEN: CPT

## 2025-04-15 PROCEDURE — G2211 COMPLEX E/M VISIT ADD ON: HCPCS | Performed by: INTERNAL MEDICINE

## 2025-04-15 PROCEDURE — 1159F MED LIST DOCD IN RCRD: CPT | Performed by: INTERNAL MEDICINE

## 2025-04-15 PROCEDURE — 86900 BLOOD TYPING SEROLOGIC ABO: CPT

## 2025-04-15 PROCEDURE — P9040 RBC LEUKOREDUCED IRRADIATED: HCPCS

## 2025-04-15 PROCEDURE — 36430 TRANSFUSION BLD/BLD COMPNT: CPT

## 2025-04-15 PROCEDURE — 1036F TOBACCO NON-USER: CPT | Performed by: INTERNAL MEDICINE

## 2025-04-15 PROCEDURE — 80053 COMPREHEN METABOLIC PANEL: CPT

## 2025-04-15 RX ORDER — ALBUTEROL SULFATE 90 UG/1
4 INHALANT RESPIRATORY (INHALATION) PRN
OUTPATIENT
Start: 2025-04-15

## 2025-04-15 RX ORDER — HYDROCORTISONE SODIUM SUCCINATE 100 MG/2ML
100 INJECTION INTRAMUSCULAR; INTRAVENOUS
OUTPATIENT
Start: 2025-04-15

## 2025-04-15 RX ORDER — FAMOTIDINE 10 MG/ML
20 INJECTION, SOLUTION INTRAVENOUS
OUTPATIENT
Start: 2025-04-15

## 2025-04-15 RX ORDER — DEXAMETHASONE 0.5 MG/1
0.5 TABLET ORAL 2 TIMES DAILY WITH MEALS
Qty: 60 TABLET | Refills: 1 | Status: SHIPPED | OUTPATIENT
Start: 2025-04-15

## 2025-04-15 RX ORDER — DIPHENHYDRAMINE HYDROCHLORIDE 50 MG/ML
50 INJECTION, SOLUTION INTRAMUSCULAR; INTRAVENOUS
OUTPATIENT
Start: 2025-04-15

## 2025-04-15 RX ORDER — SODIUM CHLORIDE 0.9 % (FLUSH) 0.9 %
5-40 SYRINGE (ML) INJECTION PRN
OUTPATIENT
Start: 2025-04-15

## 2025-04-15 RX ORDER — SODIUM CHLORIDE 0.9 % (FLUSH) 0.9 %
5-40 SYRINGE (ML) INJECTION PRN
Status: DISCONTINUED | OUTPATIENT
Start: 2025-04-15 | End: 2025-04-16 | Stop reason: HOSPADM

## 2025-04-15 RX ORDER — SODIUM CHLORIDE 9 MG/ML
25 INJECTION, SOLUTION INTRAVENOUS PRN
OUTPATIENT
Start: 2025-04-15

## 2025-04-15 RX ORDER — ONDANSETRON 2 MG/ML
8 INJECTION INTRAMUSCULAR; INTRAVENOUS
OUTPATIENT
Start: 2025-04-15

## 2025-04-15 RX ORDER — SODIUM CHLORIDE 9 MG/ML
20 INJECTION, SOLUTION INTRAVENOUS CONTINUOUS
Status: DISCONTINUED | OUTPATIENT
Start: 2025-04-15 | End: 2025-04-16 | Stop reason: HOSPADM

## 2025-04-15 RX ORDER — SODIUM CHLORIDE 9 MG/ML
INJECTION, SOLUTION INTRAVENOUS CONTINUOUS
OUTPATIENT
Start: 2025-04-15

## 2025-04-15 RX ORDER — ACETAMINOPHEN 325 MG/1
650 TABLET ORAL
OUTPATIENT
Start: 2025-04-15

## 2025-04-15 RX ORDER — EPINEPHRINE 1 MG/ML
0.3 INJECTION, SOLUTION INTRAMUSCULAR; SUBCUTANEOUS PRN
OUTPATIENT
Start: 2025-04-15

## 2025-04-15 RX ORDER — SODIUM CHLORIDE 9 MG/ML
20 INJECTION, SOLUTION INTRAVENOUS CONTINUOUS
OUTPATIENT
Start: 2025-04-15

## 2025-04-15 RX ORDER — SODIUM CHLORIDE 9 MG/ML
25 INJECTION, SOLUTION INTRAVENOUS PRN
Status: DISCONTINUED | OUTPATIENT
Start: 2025-04-15 | End: 2025-04-16 | Stop reason: HOSPADM

## 2025-04-15 ASSESSMENT — PATIENT HEALTH QUESTIONNAIRE - PHQ9
1. LITTLE INTEREST OR PLEASURE IN DOING THINGS: NOT AT ALL
SUM OF ALL RESPONSES TO PHQ QUESTIONS 1-9: 0
SUM OF ALL RESPONSES TO PHQ QUESTIONS 1-9: 0
2. FEELING DOWN, DEPRESSED OR HOPELESS: NOT AT ALL
SUM OF ALL RESPONSES TO PHQ QUESTIONS 1-9: 0
SUM OF ALL RESPONSES TO PHQ QUESTIONS 1-9: 0

## 2025-04-15 ASSESSMENT — PAIN SCALES - GENERAL: PAINLEVEL_OUTOF10: 0

## 2025-04-15 NOTE — CONSENT
Informed Consent for Blood Component Transfusion Note    I have discussed with the patient the rationale for blood component transfusion; its benefits in treating or preventing fatigue, organ damage, or death; and its risk which includes mild transfusion reactions, rare risk of blood borne infection, or more serious but rare reactions. I have discussed the alternatives to transfusion, including the risk and consequences of not receiving transfusion. The patient had an opportunity to ask questions and had agreed to proceed with transfusion of blood components.    Electronically signed by Kevin Rangel MD on 4/15/25 at 10:56 AM EDT

## 2025-04-15 NOTE — PROGRESS NOTES
[facility] Progress Note    Date: April 15, 2025    Name: Terry Grant    MRN: [id number]         : 1945    Mr. Grant Arrived ambulatory and in no distress for Blood Transfusion.  Assessment was completed, no acute issues at this time, no new complaints voiced.  24G PIV placed by Noa Benitez, labs drawn & sent for processing. Signs/symptoms of adverse blood reaction discussed with pt, voiced understanding.    Patient has received blood transfusions in the past and reports no previous transfusion reactions.     Blood consent singed on 25 and scanned into chart.    Mr. Grant's vitals were reviewed.  Vitals:    04/15/25 1625   BP: 117/66   Pulse: 87   Resp: 18   Temp: 97.8 °F (36.6 °C)   SpO2: 97%       Lab results were obtained and reviewed.  Recent Results (from the past 12 hours)   CBC with Auto Differential    Collection Time: 04/15/25  9:57 AM   Result Value Ref Range    WBC 1.8 (L) 4.1 - 11.1 K/uL    RBC 2.24 (L) 4.10 - 5.70 M/uL    Hemoglobin 6.5 (L) 12.1 - 17.0 g/dL    Hematocrit 20.5 (L) 36.6 - 50.3 %    MCV 91.5 80.0 - 99.0 FL    MCH 29.0 26.0 - 34.0 PG    MCHC 31.7 30.0 - 36.5 g/dL    RDW 16.5 (H) 11.5 - 14.5 %    Platelets 38 (LL) 150 - 400 K/uL    Nucleated RBCs 0.0 0  WBC    nRBC 0.00 0.00 - 0.01 K/uL    Neutrophils % 29 (L) 32 - 75 %    Band Neutrophils 1 0 - 6 %    Lymphocytes % 35 12 - 49 %    Monocytes % 31 (H) 5 - 13 %    Eosinophils % 1 0 - 7 %    Basophils % 2 (H) 0 - 1 %    Metamyelocytes 1 (H) 0 %    Immature Granulocytes % 0 %    Neutrophils Absolute 0.54 (L) 1.8 - 8.0 K/UL    Lymphocytes Absolute 0.63 (L) 0.8 - 3.5 K/UL    Monocytes Absolute 0.56 0.0 - 1.0 K/UL    Eosinophils Absolute 0.02 0.0 - 0.4 K/UL    Basophils Absolute 0.04 0.0 - 0.1 K/UL    Immature Granulocytes Absolute 0.00 K/UL    Differential Type MANUAL      RBC Comment ANISOCYTOSIS  1+       Comprehensive Metabolic Panel    Collection Time: 04/15/25  9:57 AM   Result Value Ref Range    Sodium 138 136 - 145

## 2025-04-15 NOTE — PROGRESS NOTES
[facility] [place]  [address], [address]  [place]  [address]  W: [phone]  F: [phone]     HEME/ONC CONSULT    HPI:   Terry Grant is a 79 y.o. male who I am seeing for CMML      Interval history: was admitted from 3/10/25-3/15/25 for upper GI bleed, noted black stoools.  Received 3 U PRBCs.  EGD 3/12/25, unremarkable.  Colonoscopy on 3/14/25 with 1 nonbleeding cecal AVM    Reports gr 1 fatigue, gr 1 sob    Afebrile today    Review of systems was obtained and pertinent findings reviewed above. Past medical history, social history, family history, medications, and allergies are located in the electronic medical record.    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  - SRSF2 mutations can be seen in various myeloid neoplasms, and are associated with a poor prognosis in MDS, AML,  PMF, and PV.  - TET2 mutations can be seen in various myeloid neoplasms, and are associated with an increased response to  hypomethylating agents and a poor prognosis in MDS after hematopoietic stem cell transplantation.  - Co-mutation of TET2 and SRSF2 is reported to be highly predictive of a myeloid neoplasm characterized by  myelodysplasia and monocytosis, including, but not limited to, CMML    NGS:  ASXL1 C649Snw*12; KRAS G12R; SRSF2 P95L; TET2 *, *; NO alterations detected in the following genes:  FLT3, IDH1, IDH2, NPM1      6/8/23:  pericardial window, negative    Hydrea 500 mg daily 6/9/23-  DR to 500 mg on Mon-Fri   Increased to daily on 8/22/24   Increased to 500 mg bid mon-Friday and daily on sat and Sunday on 9/9/24   Changed to 500 mg bid on Mon, Wed, Fri, 500 mg daily on Tues, thurs, Sat, Sunday on 10/17/24   Changed to 500 mg PO daily on 11/14/24    Decitabine-cedazuridine 2/4/25-2/8/25 (first

## 2025-04-15 NOTE — PROGRESS NOTES
Terry Grant is a 79 y.o. male is here today for a follow up.    1. Have you been to the ER, urgent care clinic since your last visit?  Hospitalized since your last visit?No    2. Have you seen or consulted any other health care providers outside of the [facility] since your last visit?  Include any pap smears or colon screening. No

## 2025-04-17 ENCOUNTER — TELEPHONE (OUTPATIENT)
Age: 80
End: 2025-04-17

## 2025-04-17 NOTE — TELEPHONE ENCOUNTER
Patients spouse called and stated that the patient is supposed to start his chemo pill on 4/28 and sees Dr. Rangel on  4/29. She stated that she wanted to know if the patient should waiut until 4/29 to start the medication.     #[phone]

## 2025-04-17 NOTE — TELEPHONE ENCOUNTER
[facility] [place] at [facility]  [phone]    4/17/25 1300 Returned call to patients wife, informed that he needs to wait until 4/29 to begin new medication. Mrs. Grant stated understanding and has no further questions at this time.

## 2025-04-29 ENCOUNTER — HOSPITAL ENCOUNTER (OUTPATIENT)
Facility: HOSPITAL | Age: 80
Setting detail: INFUSION SERIES
Discharge: HOME | End: 2025-04-29
Payer: MEDICARE

## 2025-04-29 ENCOUNTER — OFFICE VISIT (OUTPATIENT)
Age: 80
End: 2025-04-29
Payer: MEDICARE

## 2025-04-29 VITALS
DIASTOLIC BLOOD PRESSURE: 57 MMHG | HEART RATE: 93 BPM | TEMPERATURE: 98 F | SYSTOLIC BLOOD PRESSURE: 108 MMHG | RESPIRATION RATE: 16 BRPM | OXYGEN SATURATION: 100 %

## 2025-04-29 VITALS
HEART RATE: 89 BPM | TEMPERATURE: 99 F | OXYGEN SATURATION: 98 % | HEIGHT: 67 IN | BODY MASS INDEX: 23.17 KG/M2 | DIASTOLIC BLOOD PRESSURE: 64 MMHG | WEIGHT: 147.6 LBS | SYSTOLIC BLOOD PRESSURE: 111 MMHG

## 2025-04-29 DIAGNOSIS — C93.10 CHRONIC MYELOMONOCYTIC LEUKEMIA NOT HAVING ACHIEVED REMISSION (HCC): Primary | ICD-10-CM

## 2025-04-29 LAB
ALBUMIN SERPL-MCNC: 3.2 G/DL (ref 3.5–5)
ALBUMIN/GLOB SERPL: 0.7 (ref 1.1–2.2)
ALP SERPL-CCNC: 61 U/L (ref 45–117)
ALT SERPL-CCNC: 19 U/L (ref 12–78)
ANION GAP SERPL CALC-SCNC: 6 MMOL/L (ref 2–12)
AST SERPL-CCNC: 17 U/L (ref 15–37)
BASOPHILS # BLD: 0.03 K/UL (ref 0–0.1)
BASOPHILS NFR BLD: 2 % (ref 0–1)
BILIRUB SERPL-MCNC: 0.8 MG/DL (ref 0.2–1)
BUN SERPL-MCNC: 22 MG/DL (ref 6–20)
BUN/CREAT SERPL: 13 (ref 12–20)
CALCIUM SERPL-MCNC: 8.3 MG/DL (ref 8.5–10.1)
CHLORIDE SERPL-SCNC: 108 MMOL/L (ref 97–108)
CO2 SERPL-SCNC: 24 MMOL/L (ref 21–32)
CREAT SERPL-MCNC: 1.72 MG/DL (ref 0.7–1.3)
DIFFERENTIAL METHOD BLD: ABNORMAL
EOSINOPHIL # BLD: 0 K/UL (ref 0–0.4)
EOSINOPHIL NFR BLD: 0 % (ref 0–7)
ERYTHROCYTE [DISTWIDTH] IN BLOOD BY AUTOMATED COUNT: 17.7 % (ref 11.5–14.5)
GLOBULIN SER CALC-MCNC: 4.4 G/DL (ref 2–4)
GLUCOSE SERPL-MCNC: 79 MG/DL (ref 65–100)
HCT VFR BLD AUTO: 17.4 % (ref 36.6–50.3)
HGB BLD-MCNC: 5.3 G/DL (ref 12.1–17)
IMM GRANULOCYTES # BLD AUTO: 0 K/UL (ref 0–0.04)
IMM GRANULOCYTES NFR BLD AUTO: 0 % (ref 0–0.5)
LYMPHOCYTES # BLD: 0.56 K/UL (ref 0.8–3.5)
LYMPHOCYTES NFR BLD: 40 % (ref 12–49)
MCH RBC QN AUTO: 27.7 PG (ref 26–34)
MCHC RBC AUTO-ENTMCNC: 30.5 G/DL (ref 30–36.5)
MCV RBC AUTO: 91.1 FL (ref 80–99)
METAMYELOCYTES NFR BLD MANUAL: 1 %
MONOCYTES # BLD: 0.27 K/UL (ref 0–1)
MONOCYTES NFR BLD: 19 % (ref 5–13)
MYELOCYTES NFR BLD MANUAL: 1 %
NEUTS SEG # BLD: 0.52 K/UL (ref 1.8–8)
NEUTS SEG NFR BLD: 37 % (ref 32–75)
NRBC # BLD: 0.02 K/UL (ref 0–0.01)
NRBC BLD-RTO: 1.4 PER 100 WBC
PLATELET # BLD AUTO: 48 K/UL (ref 150–400)
POTASSIUM SERPL-SCNC: 3.2 MMOL/L (ref 3.5–5.1)
PROT SERPL-MCNC: 7.6 G/DL (ref 6.4–8.2)
RBC # BLD AUTO: 1.91 M/UL (ref 4.1–5.7)
RBC MORPH BLD: ABNORMAL
SODIUM SERPL-SCNC: 138 MMOL/L (ref 136–145)
WBC # BLD AUTO: 1.4 K/UL (ref 4.1–11.1)

## 2025-04-29 PROCEDURE — G2211 COMPLEX E/M VISIT ADD ON: HCPCS | Performed by: INTERNAL MEDICINE

## 2025-04-29 PROCEDURE — 1159F MED LIST DOCD IN RCRD: CPT | Performed by: INTERNAL MEDICINE

## 2025-04-29 PROCEDURE — G8420 CALC BMI NORM PARAMETERS: HCPCS | Performed by: INTERNAL MEDICINE

## 2025-04-29 PROCEDURE — 1123F ACP DISCUSS/DSCN MKR DOCD: CPT | Performed by: INTERNAL MEDICINE

## 2025-04-29 PROCEDURE — 3074F SYST BP LT 130 MM HG: CPT | Performed by: INTERNAL MEDICINE

## 2025-04-29 PROCEDURE — 86850 RBC ANTIBODY SCREEN: CPT

## 2025-04-29 PROCEDURE — 85025 COMPLETE CBC W/AUTO DIFF WBC: CPT

## 2025-04-29 PROCEDURE — 1036F TOBACCO NON-USER: CPT | Performed by: INTERNAL MEDICINE

## 2025-04-29 PROCEDURE — 99215 OFFICE O/P EST HI 40 MIN: CPT | Performed by: INTERNAL MEDICINE

## 2025-04-29 PROCEDURE — 3078F DIAST BP <80 MM HG: CPT | Performed by: INTERNAL MEDICINE

## 2025-04-29 PROCEDURE — G8427 DOCREV CUR MEDS BY ELIG CLIN: HCPCS | Performed by: INTERNAL MEDICINE

## 2025-04-29 PROCEDURE — 86923 COMPATIBILITY TEST ELECTRIC: CPT

## 2025-04-29 PROCEDURE — 86900 BLOOD TYPING SEROLOGIC ABO: CPT

## 2025-04-29 PROCEDURE — 80053 COMPREHEN METABOLIC PANEL: CPT

## 2025-04-29 PROCEDURE — 86901 BLOOD TYPING SEROLOGIC RH(D): CPT

## 2025-04-29 PROCEDURE — 36415 COLL VENOUS BLD VENIPUNCTURE: CPT

## 2025-04-29 NOTE — CONSENT
Informed Consent for Blood Component Transfusion Note    I have discussed with the patient the rationale for blood component transfusion; its benefits in treating or preventing fatigue, organ damage, or death; and its risk which includes mild transfusion reactions, rare risk of blood borne infection, or more serious but rare reactions. I have discussed the alternatives to transfusion, including the risk and consequences of not receiving transfusion. The patient had an opportunity to ask questions and had agreed to proceed with transfusion of blood components.    Electronically signed by Kevin Rangel MD on 4/29/25 at 10:37 AM EDT

## 2025-04-29 NOTE — PROGRESS NOTES
[facility] [place]  [address], [address]  [place]  [address]  W: [phone]  F: [phone]     HEME/ONC CONSULT    HPI:   Terry Grant is a 79 y.o. male who I am seeing for CMML      Interval history: was admitted from 3/10/25-3/15/25 for upper GI bleed, noted black stoools.  Received 3 U PRBCs.  EGD 3/12/25, unremarkable.  Colonoscopy on 3/14/25 with 1 nonbleeding cecal AVM    Reports gr 1 fatigue, gr 2 sob    Afebrile today    Review of systems was obtained and pertinent findings reviewed above. Past medical history, social history, family history, medications, and allergies are located in the electronic medical record.    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  - SRSF2 mutations can be seen in various myeloid neoplasms, and are associated with a poor prognosis in MDS, AML,  PMF, and PV.  - TET2 mutations can be seen in various myeloid neoplasms, and are associated with an increased response to  hypomethylating agents and a poor prognosis in MDS after hematopoietic stem cell transplantation.  - Co-mutation of TET2 and SRSF2 is reported to be highly predictive of a myeloid neoplasm characterized by  myelodysplasia and monocytosis, including, but not limited to, CMML    NGS:  ASXL1 F142Rao*12; KRAS G12R; SRSF2 P95L; TET2 *, *; NO alterations detected in the following genes:  FLT3, IDH1, IDH2, NPM1      6/8/23:  pericardial window, negative    Hydrea 500 mg daily 6/9/23-  DR to 500 mg on Mon-Fri   Increased to daily on 8/22/24   Increased to 500 mg bid mon-Friday and daily on sat and Sunday on 9/9/24   Changed to 500 mg bid on Mon, Wed, Fri, 500 mg daily on Tues, thurs, Sat, Sunday on 10/17/24   Changed to 500 mg PO daily on 11/14/24    Decitabine-cedazuridine 2/4/25-2/8/25 (first

## 2025-04-29 NOTE — PROGRESS NOTES
Terry Grant is a 79 y.o. male   Chemotherapy    Vitals:    04/29/25 0955   BP: 111/64   BP Site: Right Upper Arm   Pulse: 89   Temp: 99 °F (37.2 °C)   SpO2: 98%   Weight: 67 kg (147 lb 9.6 oz)   Height: 1.702 m (5' 7\")     No LMP for male patient.    \"Have you been to the ER, urgent care clinic since your last visit?  Hospitalized since your last visit?\"    NO    “Have you seen or consulted any other health care providers outside of [facility] since your last visit?”    Yes seen by an Urologist.

## 2025-05-01 LAB — HISTORY CHECK: NORMAL

## 2025-05-01 RX ORDER — ONDANSETRON 2 MG/ML
8 INJECTION INTRAMUSCULAR; INTRAVENOUS
OUTPATIENT
Start: 2025-05-01

## 2025-05-01 RX ORDER — ALBUTEROL SULFATE 90 UG/1
4 INHALANT RESPIRATORY (INHALATION) PRN
OUTPATIENT
Start: 2025-05-01

## 2025-05-01 RX ORDER — ACETAMINOPHEN 325 MG/1
650 TABLET ORAL
OUTPATIENT
Start: 2025-05-01

## 2025-05-01 RX ORDER — SODIUM CHLORIDE 0.9 % (FLUSH) 0.9 %
5-40 SYRINGE (ML) INJECTION PRN
OUTPATIENT
Start: 2025-05-01

## 2025-05-01 RX ORDER — FAMOTIDINE 10 MG/ML
20 INJECTION, SOLUTION INTRAVENOUS
OUTPATIENT
Start: 2025-05-01

## 2025-05-01 RX ORDER — HYDROCORTISONE SODIUM SUCCINATE 100 MG/2ML
100 INJECTION INTRAMUSCULAR; INTRAVENOUS
OUTPATIENT
Start: 2025-05-01

## 2025-05-01 RX ORDER — SODIUM CHLORIDE 9 MG/ML
20 INJECTION, SOLUTION INTRAVENOUS CONTINUOUS
OUTPATIENT
Start: 2025-05-01

## 2025-05-01 RX ORDER — SODIUM CHLORIDE 9 MG/ML
INJECTION, SOLUTION INTRAVENOUS CONTINUOUS
OUTPATIENT
Start: 2025-05-01

## 2025-05-01 RX ORDER — SODIUM CHLORIDE 9 MG/ML
25 INJECTION, SOLUTION INTRAVENOUS PRN
OUTPATIENT
Start: 2025-05-01

## 2025-05-01 RX ORDER — EPINEPHRINE 1 MG/ML
0.3 INJECTION, SOLUTION INTRAMUSCULAR; SUBCUTANEOUS PRN
OUTPATIENT
Start: 2025-05-01

## 2025-05-01 RX ORDER — DIPHENHYDRAMINE HYDROCHLORIDE 50 MG/ML
50 INJECTION, SOLUTION INTRAMUSCULAR; INTRAVENOUS
OUTPATIENT
Start: 2025-05-01

## 2025-05-02 ENCOUNTER — HOSPITAL ENCOUNTER (OUTPATIENT)
Facility: HOSPITAL | Age: 80
Setting detail: INFUSION SERIES
Discharge: HOME | End: 2025-05-02
Payer: MEDICARE

## 2025-05-02 VITALS
OXYGEN SATURATION: 97 % | WEIGHT: 147.2 LBS | HEART RATE: 76 BPM | RESPIRATION RATE: 16 BRPM | DIASTOLIC BLOOD PRESSURE: 55 MMHG | BODY MASS INDEX: 23.1 KG/M2 | SYSTOLIC BLOOD PRESSURE: 108 MMHG | TEMPERATURE: 99 F | HEIGHT: 67 IN

## 2025-05-02 DIAGNOSIS — T45.1X5A ANEMIA DUE TO ANTINEOPLASTIC CHEMOTHERAPY: ICD-10-CM

## 2025-05-02 DIAGNOSIS — D64.81 ANEMIA DUE TO ANTINEOPLASTIC CHEMOTHERAPY: ICD-10-CM

## 2025-05-02 DIAGNOSIS — C93.10 CHRONIC MYELOMONOCYTIC LEUKEMIA NOT HAVING ACHIEVED REMISSION (HCC): Primary | ICD-10-CM

## 2025-05-02 PROCEDURE — P9040 RBC LEUKOREDUCED IRRADIATED: HCPCS

## 2025-05-02 PROCEDURE — 36430 TRANSFUSION BLD/BLD COMPNT: CPT

## 2025-05-02 ASSESSMENT — PAIN SCALES - GENERAL: PAINLEVEL_OUTOF10: 0

## 2025-05-02 NOTE — PROGRESS NOTES
[facility]fusion Center Progress Note        Date: 25    Name: Terry Grant    MRN: [id number]         : 1945    MD: Kevin Rangel MD      Mr. Grant admitted to [facility] for Blood Transfusion (PRBC) ambulatory in stable condition. Assessment completed. No new concerns voiced.  22  gauge peripheral IV obtained in the Right forearm without difficulty, line flushed and capped. Labs reviewed from 2025.      ** Patient has received blood transfusions in the past and reports no previous transfusion reactions. **      Vitals:    25 0956 25 1152 25 1220 25 1415   BP: 112/66 118/71 114/69 (!) 108/55   Pulse: 81 85 73 76   Resp: 17 17 17 16   Temp: 97.7 °F (36.5 °C) 98.8 °F (37.1 °C) 98.7 °F (37.1 °C) 99 °F (37.2 °C)   TempSrc: Temporal Temporal Temporal    SpO2: 98% 100% 100% 97%   Weight:       Height:               Results for orders placed or performed during the hospital encounter of 25   PREPARE RBC (CROSSMATCH), 2 Units   Result Value Ref Range    History Check Historical check performed          MD notified of lab values (HGB of 5.3). 2 units of PRBSC's ordered.      **Per MD note, risks and benefits of blood transfusion discussed with Mr. Grant, patient is agreeable to transfusion. Transfusion Consent form signed by patient and witnessed by Ninfa Perez and given to  to be scanned into the chart.**          Unit(s) received from blood bank. Two nurse verification completed for patient and blood product(s).     0942:  1st unit PRBCs started @ 60ml/hr and infusing without difficulty, observed x 15 minutes, rate increased to 147 to run over 2 hours.  1152:  1st unit completed without adverse reaction noted, NS flushing line.    1206:  2nd unit PRBCs started @ 60ml/hr and infusing without difficulty, observed x 15 minutes, rate increased to 148 to run over 2 hours.  1412:  2nd unit completed without adverse reaction noted, NS flushing line.

## 2025-05-13 ENCOUNTER — HOSPITAL ENCOUNTER (OUTPATIENT)
Facility: HOSPITAL | Age: 80
Setting detail: INFUSION SERIES
Discharge: HOME | End: 2025-05-13
Payer: MEDICARE

## 2025-05-13 ENCOUNTER — OFFICE VISIT (OUTPATIENT)
Age: 80
End: 2025-05-13
Payer: MEDICARE

## 2025-05-13 ENCOUNTER — TELEPHONE (OUTPATIENT)
Age: 80
End: 2025-05-13

## 2025-05-13 VITALS
RESPIRATION RATE: 18 BRPM | BODY MASS INDEX: 23.07 KG/M2 | HEART RATE: 83 BPM | DIASTOLIC BLOOD PRESSURE: 65 MMHG | WEIGHT: 147 LBS | HEIGHT: 67 IN | SYSTOLIC BLOOD PRESSURE: 130 MMHG | OXYGEN SATURATION: 98 % | TEMPERATURE: 98.9 F

## 2025-05-13 VITALS
RESPIRATION RATE: 18 BRPM | SYSTOLIC BLOOD PRESSURE: 130 MMHG | TEMPERATURE: 98.9 F | DIASTOLIC BLOOD PRESSURE: 65 MMHG | HEART RATE: 83 BPM | OXYGEN SATURATION: 98 % | WEIGHT: 147.5 LBS | BODY MASS INDEX: 23.1 KG/M2

## 2025-05-13 DIAGNOSIS — C93.10 CHRONIC MYELOMONOCYTIC LEUKEMIA NOT HAVING ACHIEVED REMISSION (HCC): Primary | ICD-10-CM

## 2025-05-13 PROCEDURE — 99215 OFFICE O/P EST HI 40 MIN: CPT | Performed by: INTERNAL MEDICINE

## 2025-05-13 PROCEDURE — 1036F TOBACCO NON-USER: CPT | Performed by: INTERNAL MEDICINE

## 2025-05-13 PROCEDURE — 1159F MED LIST DOCD IN RCRD: CPT | Performed by: INTERNAL MEDICINE

## 2025-05-13 PROCEDURE — 3078F DIAST BP <80 MM HG: CPT | Performed by: INTERNAL MEDICINE

## 2025-05-13 PROCEDURE — 1123F ACP DISCUSS/DSCN MKR DOCD: CPT | Performed by: INTERNAL MEDICINE

## 2025-05-13 PROCEDURE — 1126F AMNT PAIN NOTED NONE PRSNT: CPT | Performed by: INTERNAL MEDICINE

## 2025-05-13 PROCEDURE — 3075F SYST BP GE 130 - 139MM HG: CPT | Performed by: INTERNAL MEDICINE

## 2025-05-13 PROCEDURE — G8420 CALC BMI NORM PARAMETERS: HCPCS | Performed by: INTERNAL MEDICINE

## 2025-05-13 PROCEDURE — G2211 COMPLEX E/M VISIT ADD ON: HCPCS | Performed by: INTERNAL MEDICINE

## 2025-05-13 PROCEDURE — G8427 DOCREV CUR MEDS BY ELIG CLIN: HCPCS | Performed by: INTERNAL MEDICINE

## 2025-05-13 ASSESSMENT — PATIENT HEALTH QUESTIONNAIRE - PHQ9
1. LITTLE INTEREST OR PLEASURE IN DOING THINGS: NOT AT ALL
SUM OF ALL RESPONSES TO PHQ QUESTIONS 1-9: 0
2. FEELING DOWN, DEPRESSED OR HOPELESS: NOT AT ALL
SUM OF ALL RESPONSES TO PHQ QUESTIONS 1-9: 0

## 2025-05-13 NOTE — TELEPHONE ENCOUNTER
Lvm to get patient set up for  Return in about 15 days (around 5/28/2025) for labs on 5/27 [facility] the day before.   Yes

## 2025-05-13 NOTE — PROGRESS NOTES
Terry Grant is a 79 y.o. male is here today for a follow up.    1. Have you been to the ER, urgent care clinic since your last visit?  Hospitalized since your last visit?No    2. Have you seen or consulted any other health care providers outside of the [facility] since your last visit?  Include any pap smears or colon screening. No     5/13/2025  9:15 AM   Vitals    SYSTOLIC 130    DIASTOLIC 65    BP Site    Patient Position    BP Cuff Size    Pulse 83    Temp 98.9 °F (37.2 °C)    Respirations 18    SpO2 98 %        
cardiology    7. Hydronephrosis:  s/p bilateral ureteral stents; s/p stent removal;  encouraged much more PO intake    8. Neutropenia:  due to disease and therapy    9.  Fatigue:  dexamethasone 0.5 mg PO bid; discussed may elevate WBC artificially; mild improvement    10. Nausea:  rx compazine    11. GI bleed:  resolved; holding eliquis; on protonix    12. Urinary retention:  with walton catheter    Rtc 2 weeks with labs the day before        Kevin Rangel Jr, MD

## 2025-05-27 ENCOUNTER — OFFICE VISIT (OUTPATIENT)
Age: 80
End: 2025-05-27
Payer: MEDICARE

## 2025-05-27 ENCOUNTER — HOSPITAL ENCOUNTER (OUTPATIENT)
Facility: HOSPITAL | Age: 80
Setting detail: INFUSION SERIES
Discharge: HOME | End: 2025-05-27
Payer: MEDICARE

## 2025-05-27 VITALS
RESPIRATION RATE: 18 BRPM | HEART RATE: 90 BPM | SYSTOLIC BLOOD PRESSURE: 127 MMHG | TEMPERATURE: 97.8 F | OXYGEN SATURATION: 99 % | DIASTOLIC BLOOD PRESSURE: 58 MMHG

## 2025-05-27 VITALS
OXYGEN SATURATION: 99 % | HEART RATE: 90 BPM | RESPIRATION RATE: 18 BRPM | TEMPERATURE: 97.8 F | DIASTOLIC BLOOD PRESSURE: 58 MMHG | HEIGHT: 67 IN | BODY MASS INDEX: 23.07 KG/M2 | SYSTOLIC BLOOD PRESSURE: 127 MMHG | WEIGHT: 147 LBS

## 2025-05-27 DIAGNOSIS — C93.10 CHRONIC MYELOMONOCYTIC LEUKEMIA NOT HAVING ACHIEVED REMISSION (HCC): Primary | ICD-10-CM

## 2025-05-27 DIAGNOSIS — D64.81 ANEMIA DUE TO ANTINEOPLASTIC CHEMOTHERAPY: ICD-10-CM

## 2025-05-27 DIAGNOSIS — T45.1X5A ANEMIA DUE TO ANTINEOPLASTIC CHEMOTHERAPY: ICD-10-CM

## 2025-05-27 LAB
ALBUMIN SERPL-MCNC: 3.5 G/DL (ref 3.5–5)
ALBUMIN/GLOB SERPL: 0.7 (ref 1.1–2.2)
ALP SERPL-CCNC: 60 U/L (ref 45–117)
ALT SERPL-CCNC: 23 U/L (ref 12–78)
ANION GAP SERPL CALC-SCNC: 5 MMOL/L (ref 2–12)
AST SERPL-CCNC: 16 U/L (ref 15–37)
BASOPHILS # BLD: 0 K/UL (ref 0–0.1)
BASOPHILS NFR BLD: 0 % (ref 0–1)
BILIRUB SERPL-MCNC: 0.6 MG/DL (ref 0.2–1)
BUN SERPL-MCNC: 24 MG/DL (ref 6–20)
BUN/CREAT SERPL: 15 (ref 12–20)
CALCIUM SERPL-MCNC: 8.8 MG/DL (ref 8.5–10.1)
CHLORIDE SERPL-SCNC: 106 MMOL/L (ref 97–108)
CO2 SERPL-SCNC: 24 MMOL/L (ref 21–32)
CREAT SERPL-MCNC: 1.57 MG/DL (ref 0.7–1.3)
DIFFERENTIAL METHOD BLD: ABNORMAL
EOSINOPHIL # BLD: 0 K/UL (ref 0–0.4)
EOSINOPHIL NFR BLD: 0 % (ref 0–7)
ERYTHROCYTE [DISTWIDTH] IN BLOOD BY AUTOMATED COUNT: 19.4 % (ref 11.5–14.5)
GLOBULIN SER CALC-MCNC: 4.8 G/DL (ref 2–4)
GLUCOSE SERPL-MCNC: 84 MG/DL (ref 65–100)
HCT VFR BLD AUTO: 19.1 % (ref 36.6–50.3)
HGB BLD-MCNC: 5.9 G/DL (ref 12.1–17)
HISTORY CHECK: NORMAL
IMM GRANULOCYTES # BLD AUTO: 0 K/UL
IMM GRANULOCYTES NFR BLD AUTO: 0 %
LYMPHOCYTES # BLD: 0.74 K/UL (ref 0.8–3.5)
LYMPHOCYTES NFR BLD: 37 % (ref 12–49)
MCH RBC QN AUTO: 26.2 PG (ref 26–34)
MCHC RBC AUTO-ENTMCNC: 30.9 G/DL (ref 30–36.5)
MCV RBC AUTO: 84.9 FL (ref 80–99)
MONOCYTES # BLD: 0.62 K/UL (ref 0–1)
MONOCYTES NFR BLD: 31 % (ref 5–13)
NEUTS SEG # BLD: 0.64 K/UL (ref 1.8–8)
NEUTS SEG NFR BLD: 32 % (ref 32–75)
NRBC # BLD: 0 K/UL (ref 0–0.01)
NRBC BLD-RTO: 0 PER 100 WBC
PLATELET # BLD AUTO: 43 K/UL (ref 150–400)
POTASSIUM SERPL-SCNC: 3.5 MMOL/L (ref 3.5–5.1)
PROT SERPL-MCNC: 8.3 G/DL (ref 6.4–8.2)
RBC # BLD AUTO: 2.25 M/UL (ref 4.1–5.7)
RBC MORPH BLD: ABNORMAL
SODIUM SERPL-SCNC: 135 MMOL/L (ref 136–145)
WBC # BLD AUTO: 2 K/UL (ref 4.1–11.1)

## 2025-05-27 PROCEDURE — 80053 COMPREHEN METABOLIC PANEL: CPT

## 2025-05-27 PROCEDURE — 1160F RVW MEDS BY RX/DR IN RCRD: CPT | Performed by: INTERNAL MEDICINE

## 2025-05-27 PROCEDURE — 1126F AMNT PAIN NOTED NONE PRSNT: CPT | Performed by: INTERNAL MEDICINE

## 2025-05-27 PROCEDURE — 86850 RBC ANTIBODY SCREEN: CPT

## 2025-05-27 PROCEDURE — 1159F MED LIST DOCD IN RCRD: CPT | Performed by: INTERNAL MEDICINE

## 2025-05-27 PROCEDURE — 85025 COMPLETE CBC W/AUTO DIFF WBC: CPT

## 2025-05-27 PROCEDURE — G8420 CALC BMI NORM PARAMETERS: HCPCS | Performed by: INTERNAL MEDICINE

## 2025-05-27 PROCEDURE — G2211 COMPLEX E/M VISIT ADD ON: HCPCS | Performed by: INTERNAL MEDICINE

## 2025-05-27 PROCEDURE — 86923 COMPATIBILITY TEST ELECTRIC: CPT

## 2025-05-27 PROCEDURE — 1036F TOBACCO NON-USER: CPT | Performed by: INTERNAL MEDICINE

## 2025-05-27 PROCEDURE — 86901 BLOOD TYPING SEROLOGIC RH(D): CPT

## 2025-05-27 PROCEDURE — 99215 OFFICE O/P EST HI 40 MIN: CPT | Performed by: INTERNAL MEDICINE

## 2025-05-27 PROCEDURE — 36415 COLL VENOUS BLD VENIPUNCTURE: CPT

## 2025-05-27 PROCEDURE — G8427 DOCREV CUR MEDS BY ELIG CLIN: HCPCS | Performed by: INTERNAL MEDICINE

## 2025-05-27 PROCEDURE — 3074F SYST BP LT 130 MM HG: CPT | Performed by: INTERNAL MEDICINE

## 2025-05-27 PROCEDURE — 3078F DIAST BP <80 MM HG: CPT | Performed by: INTERNAL MEDICINE

## 2025-05-27 PROCEDURE — 1123F ACP DISCUSS/DSCN MKR DOCD: CPT | Performed by: INTERNAL MEDICINE

## 2025-05-27 PROCEDURE — 86900 BLOOD TYPING SEROLOGIC ABO: CPT

## 2025-05-27 RX ORDER — ALBUTEROL SULFATE 90 UG/1
4 INHALANT RESPIRATORY (INHALATION) PRN
Status: CANCELLED | OUTPATIENT
Start: 2025-05-27

## 2025-05-27 RX ORDER — ONDANSETRON 2 MG/ML
8 INJECTION INTRAMUSCULAR; INTRAVENOUS
Status: CANCELLED | OUTPATIENT
Start: 2025-05-27

## 2025-05-27 RX ORDER — SODIUM CHLORIDE 9 MG/ML
25 INJECTION, SOLUTION INTRAVENOUS PRN
Status: CANCELLED | OUTPATIENT
Start: 2025-05-27

## 2025-05-27 RX ORDER — SODIUM CHLORIDE 0.9 % (FLUSH) 0.9 %
5-40 SYRINGE (ML) INJECTION PRN
Status: CANCELLED | OUTPATIENT
Start: 2025-05-27

## 2025-05-27 RX ORDER — HYDROCORTISONE SODIUM SUCCINATE 100 MG/2ML
100 INJECTION INTRAMUSCULAR; INTRAVENOUS
Status: CANCELLED | OUTPATIENT
Start: 2025-05-27

## 2025-05-27 RX ORDER — EPINEPHRINE 1 MG/ML
0.3 INJECTION, SOLUTION INTRAMUSCULAR; SUBCUTANEOUS PRN
Status: CANCELLED | OUTPATIENT
Start: 2025-05-27

## 2025-05-27 RX ORDER — ACETAMINOPHEN 325 MG/1
650 TABLET ORAL
Status: CANCELLED | OUTPATIENT
Start: 2025-05-27

## 2025-05-27 RX ORDER — FAMOTIDINE 10 MG/ML
20 INJECTION, SOLUTION INTRAVENOUS
Status: CANCELLED | OUTPATIENT
Start: 2025-05-27

## 2025-05-27 RX ORDER — SODIUM CHLORIDE 9 MG/ML
20 INJECTION, SOLUTION INTRAVENOUS CONTINUOUS
Status: CANCELLED | OUTPATIENT
Start: 2025-05-27

## 2025-05-27 RX ORDER — DIPHENHYDRAMINE HYDROCHLORIDE 50 MG/ML
50 INJECTION, SOLUTION INTRAMUSCULAR; INTRAVENOUS
Status: CANCELLED | OUTPATIENT
Start: 2025-05-27

## 2025-05-27 RX ORDER — SODIUM CHLORIDE 9 MG/ML
INJECTION, SOLUTION INTRAVENOUS CONTINUOUS
Status: CANCELLED | OUTPATIENT
Start: 2025-05-27

## 2025-05-27 ASSESSMENT — PATIENT HEALTH QUESTIONNAIRE - PHQ9
SUM OF ALL RESPONSES TO PHQ QUESTIONS 1-9: 0
1. LITTLE INTEREST OR PLEASURE IN DOING THINGS: NOT AT ALL
2. FEELING DOWN, DEPRESSED OR HOPELESS: NOT AT ALL
SUM OF ALL RESPONSES TO PHQ QUESTIONS 1-9: 0

## 2025-05-27 NOTE — CONSENT
Informed Consent for Blood Component Transfusion Note    I have discussed with the patient the rationale for blood component transfusion; its benefits in treating or preventing fatigue, organ damage, or death; and its risk which includes mild transfusion reactions, rare risk of blood borne infection, or more serious but rare reactions. I have discussed the alternatives to transfusion, including the risk and consequences of not receiving transfusion. The patient had an opportunity to ask questions and had agreed to proceed with transfusion of blood components.    Electronically signed by Kevin Rangel MD on 5/27/25 at 11:49 AM EDT

## 2025-05-27 NOTE — PROGRESS NOTES
[facility] [place]  [address], [address]  [place]  [address]  W: [phone]  F: [phone]     HEME/ONC CONSULT    HPI:   Terry Grant is a 79 y.o. male who I am seeing for CMML      Interval history: was admitted from 3/10/25-3/15/25 for upper GI bleed, noted black stoools.  Received 3 U PRBCs.  EGD 3/12/25, unremarkable.  Colonoscopy on 3/14/25 with 1 nonbleeding cecal AVM    Reports gr 1 fatigue, gr 1 sob    Afebrile today    Review of systems was obtained and pertinent findings reviewed above. Past medical history, social history, family history, medications, and allergies are located in the electronic medical record.    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  - SRSF2 mutations can be seen in various myeloid neoplasms, and are associated with a poor prognosis in MDS, AML,  PMF, and PV.  - TET2 mutations can be seen in various myeloid neoplasms, and are associated with an increased response to  hypomethylating agents and a poor prognosis in MDS after hematopoietic stem cell transplantation.  - Co-mutation of TET2 and SRSF2 is reported to be highly predictive of a myeloid neoplasm characterized by  myelodysplasia and monocytosis, including, but not limited to, CMML    NGS:  ASXL1 L895Rhp*12; KRAS G12R; SRSF2 P95L; TET2 *, *; NO alterations detected in the following genes:  FLT3, IDH1, IDH2, NPM1      6/8/23:  pericardial window, negative    Hydrea 500 mg daily 6/9/23-  DR to 500 mg on Mon-Fri   Increased to daily on 8/22/24   Increased to 500 mg bid mon-Friday and daily on sat and Sunday on 9/9/24   Changed to 500 mg bid on Mon, Wed, Fri, 500 mg daily on Tues, thurs, Sat, Sunday on 10/17/24   Changed to 500 mg PO daily on 11/14/24    Decitabine-cedazuridine 2/4/25-2/8/25 (first

## 2025-05-27 NOTE — PROGRESS NOTES
Terry Grant is a 79 y.o. male is here today for a follow up.    1. Have you been to the ER, urgent care clinic since your last visit?  Hospitalized since your last visit?No    2. Have you seen or consulted any other health care providers outside of the [facility] since your last visit?  Include any pap smears or colon screening. No       5/27/2025  10:15 AM   Vitals    SYSTOLIC 127 !    DIASTOLIC 58 !    BP Site    Patient Position    BP Cuff Size    Pulse 90    Temp 97.8 °F (36.6 °C)    Respirations 18    SpO2 99 %    Weight - Scale       Legend:  ! Abnormall}{[p

## 2025-05-28 ENCOUNTER — HOSPITAL ENCOUNTER (OUTPATIENT)
Facility: HOSPITAL | Age: 80
Setting detail: INFUSION SERIES
Discharge: HOME | End: 2025-05-28
Payer: MEDICARE

## 2025-05-28 VITALS
HEART RATE: 102 BPM | DIASTOLIC BLOOD PRESSURE: 79 MMHG | SYSTOLIC BLOOD PRESSURE: 125 MMHG | OXYGEN SATURATION: 97 % | TEMPERATURE: 97.9 F | RESPIRATION RATE: 18 BRPM

## 2025-05-28 DIAGNOSIS — D64.81 ANEMIA DUE TO ANTINEOPLASTIC CHEMOTHERAPY: ICD-10-CM

## 2025-05-28 DIAGNOSIS — C93.10 CHRONIC MYELOMONOCYTIC LEUKEMIA NOT HAVING ACHIEVED REMISSION (HCC): Primary | ICD-10-CM

## 2025-05-28 DIAGNOSIS — T45.1X5A ANEMIA DUE TO ANTINEOPLASTIC CHEMOTHERAPY: ICD-10-CM

## 2025-05-28 PROCEDURE — P9040 RBC LEUKOREDUCED IRRADIATED: HCPCS

## 2025-05-28 PROCEDURE — 36430 TRANSFUSION BLD/BLD COMPNT: CPT

## 2025-05-28 RX ORDER — SODIUM CHLORIDE 9 MG/ML
20 INJECTION, SOLUTION INTRAVENOUS CONTINUOUS
OUTPATIENT
Start: 2025-05-28

## 2025-05-28 RX ORDER — DIPHENHYDRAMINE HYDROCHLORIDE 50 MG/ML
50 INJECTION, SOLUTION INTRAMUSCULAR; INTRAVENOUS
OUTPATIENT
Start: 2025-05-28

## 2025-05-28 RX ORDER — SODIUM CHLORIDE 0.9 % (FLUSH) 0.9 %
5-40 SYRINGE (ML) INJECTION PRN
OUTPATIENT
Start: 2025-05-28

## 2025-05-28 RX ORDER — HYDROCORTISONE SODIUM SUCCINATE 100 MG/2ML
100 INJECTION INTRAMUSCULAR; INTRAVENOUS
OUTPATIENT
Start: 2025-05-28

## 2025-05-28 RX ORDER — ONDANSETRON 2 MG/ML
8 INJECTION INTRAMUSCULAR; INTRAVENOUS
OUTPATIENT
Start: 2025-05-28

## 2025-05-28 RX ORDER — EPINEPHRINE 1 MG/ML
0.3 INJECTION, SOLUTION INTRAMUSCULAR; SUBCUTANEOUS PRN
OUTPATIENT
Start: 2025-05-28

## 2025-05-28 RX ORDER — SODIUM CHLORIDE 9 MG/ML
25 INJECTION, SOLUTION INTRAVENOUS PRN
OUTPATIENT
Start: 2025-05-28

## 2025-05-28 RX ORDER — ALBUTEROL SULFATE 90 UG/1
4 INHALANT RESPIRATORY (INHALATION) PRN
OUTPATIENT
Start: 2025-05-28

## 2025-05-28 RX ORDER — SODIUM CHLORIDE 9 MG/ML
INJECTION, SOLUTION INTRAVENOUS CONTINUOUS
OUTPATIENT
Start: 2025-05-28

## 2025-05-28 RX ORDER — FAMOTIDINE 10 MG/ML
20 INJECTION, SOLUTION INTRAVENOUS
OUTPATIENT
Start: 2025-05-28

## 2025-05-28 RX ORDER — ACETAMINOPHEN 325 MG/1
650 TABLET ORAL
OUTPATIENT
Start: 2025-05-28

## 2025-05-28 NOTE — PROGRESS NOTES
[facility] [place] Progress Note    Date: May 28, 2025    Name: Terry Grant    MRN: [id number]         : 1945    Mr. Grant Arrived ambulatory and in no distress for Blood Transfusion.  Assessment was completed, patient reports fatigue and SOB. 22G PIV placed in the right forearm. Signs/symptoms of adverse blood reaction discussed with pt, voiced understanding.    Blood consent reviewed from 25, and 25.     Mr. Grant's vitals were reviewed.  Vitals:    25 1507   BP: 125/79   Pulse: (!) 102   Resp: 18   Temp: 97.9 °F (36.6 °C)   SpO2: 97%     1st unit PRBCs started and infusing without difficulty, observed x 15 minutes  1st unit completed without adverse reaction noted, NS flushing line.    2nd unit PRBC's started and infusing without difficulty, observed x 15 minutes.   2nd unit completed without adverse reaction noted, NS flushing line.      Mr. Grant tolerated treatment well and was discharged [facility] in stable condition.   PIV flushed and removed per protocol. D/C instructions reviewed, copy to pt, voiced understanding. Patient aware of future appointments.    Future Appointments   Date Time Provider Department Center   2025  3:00 PM SS LAB CHAIR [facility]   2025 10:00 AM DIVYA SO CHAIR 1 [facility]   2025 10:30 AM Kevin Rangel MD ONCSF BS AMB       Michelle Medina RN  May 28, 2025

## 2025-06-09 ENCOUNTER — HOSPITAL ENCOUNTER (OUTPATIENT)
Facility: HOSPITAL | Age: 80
Setting detail: INFUSION SERIES
Discharge: HOME | End: 2025-06-09
Payer: MEDICARE

## 2025-06-09 ENCOUNTER — TELEPHONE (OUTPATIENT)
Age: 80
End: 2025-06-09

## 2025-06-09 VITALS
SYSTOLIC BLOOD PRESSURE: 108 MMHG | DIASTOLIC BLOOD PRESSURE: 56 MMHG | OXYGEN SATURATION: 99 % | HEART RATE: 105 BPM | RESPIRATION RATE: 18 BRPM | TEMPERATURE: 98 F

## 2025-06-09 DIAGNOSIS — D64.81 ANEMIA DUE TO ANTINEOPLASTIC CHEMOTHERAPY: ICD-10-CM

## 2025-06-09 DIAGNOSIS — C93.10 CHRONIC MYELOMONOCYTIC LEUKEMIA NOT HAVING ACHIEVED REMISSION (HCC): Primary | ICD-10-CM

## 2025-06-09 DIAGNOSIS — T45.1X5A ANEMIA DUE TO ANTINEOPLASTIC CHEMOTHERAPY: ICD-10-CM

## 2025-06-09 LAB
ALBUMIN SERPL-MCNC: 3.2 G/DL (ref 3.5–5)
ALBUMIN/GLOB SERPL: 0.7 (ref 1.1–2.2)
ALP SERPL-CCNC: 58 U/L (ref 45–117)
ALT SERPL-CCNC: 17 U/L (ref 12–78)
ANION GAP SERPL CALC-SCNC: 7 MMOL/L (ref 2–12)
AST SERPL-CCNC: 17 U/L (ref 15–37)
BASOPHILS # BLD: 0 K/UL (ref 0–0.1)
BASOPHILS NFR BLD: 0 % (ref 0–1)
BILIRUB SERPL-MCNC: 1 MG/DL (ref 0.2–1)
BUN SERPL-MCNC: 27 MG/DL (ref 6–20)
BUN/CREAT SERPL: 16 (ref 12–20)
CALCIUM SERPL-MCNC: 8.3 MG/DL (ref 8.5–10.1)
CHLORIDE SERPL-SCNC: 113 MMOL/L (ref 97–108)
CO2 SERPL-SCNC: 21 MMOL/L (ref 21–32)
CREAT SERPL-MCNC: 1.71 MG/DL (ref 0.7–1.3)
DIFFERENTIAL METHOD BLD: ABNORMAL
EOSINOPHIL # BLD: 0 K/UL (ref 0–0.4)
EOSINOPHIL NFR BLD: 0 % (ref 0–7)
ERYTHROCYTE [DISTWIDTH] IN BLOOD BY AUTOMATED COUNT: 21.9 % (ref 11.5–14.5)
GLOBULIN SER CALC-MCNC: 4.6 G/DL (ref 2–4)
GLUCOSE SERPL-MCNC: 101 MG/DL (ref 65–100)
HCT VFR BLD AUTO: 15.1 % (ref 36.6–50.3)
HGB BLD-MCNC: 4.4 G/DL (ref 12.1–17)
HISTORY CHECK: NORMAL
IMM GRANULOCYTES # BLD AUTO: 0 K/UL
IMM GRANULOCYTES NFR BLD AUTO: 0 %
LYMPHOCYTES # BLD: 0.75 K/UL (ref 0.8–3.5)
LYMPHOCYTES NFR BLD: 42 % (ref 12–49)
MCH RBC QN AUTO: 25.7 PG (ref 26–34)
MCHC RBC AUTO-ENTMCNC: 29.1 G/DL (ref 30–36.5)
MCV RBC AUTO: 88.3 FL (ref 80–99)
MONOCYTES # BLD: 0.58 K/UL (ref 0–1)
MONOCYTES NFR BLD: 32 % (ref 5–13)
NEUTS SEG # BLD: 0.47 K/UL (ref 1.8–8)
NEUTS SEG NFR BLD: 26 % (ref 32–75)
NRBC # BLD: 0.04 K/UL (ref 0–0.01)
NRBC BLD-RTO: 2.2 PER 100 WBC
PLATELET # BLD AUTO: 72 K/UL (ref 150–400)
POTASSIUM SERPL-SCNC: 4.1 MMOL/L (ref 3.5–5.1)
PROT SERPL-MCNC: 7.8 G/DL (ref 6.4–8.2)
RBC # BLD AUTO: 1.71 M/UL (ref 4.1–5.7)
RBC MORPH BLD: ABNORMAL
SODIUM SERPL-SCNC: 141 MMOL/L (ref 136–145)
WBC # BLD AUTO: 1.8 K/UL (ref 4.1–11.1)
WBC MORPH BLD: ABNORMAL

## 2025-06-09 PROCEDURE — 86923 COMPATIBILITY TEST ELECTRIC: CPT

## 2025-06-09 PROCEDURE — 85025 COMPLETE CBC W/AUTO DIFF WBC: CPT

## 2025-06-09 PROCEDURE — 86901 BLOOD TYPING SEROLOGIC RH(D): CPT

## 2025-06-09 PROCEDURE — 86850 RBC ANTIBODY SCREEN: CPT

## 2025-06-09 PROCEDURE — 80053 COMPREHEN METABOLIC PANEL: CPT

## 2025-06-09 PROCEDURE — 36415 COLL VENOUS BLD VENIPUNCTURE: CPT

## 2025-06-09 PROCEDURE — 86900 BLOOD TYPING SEROLOGIC ABO: CPT

## 2025-06-09 RX ORDER — FAMOTIDINE 10 MG/ML
20 INJECTION, SOLUTION INTRAVENOUS
Status: CANCELLED | OUTPATIENT
Start: 2025-06-09

## 2025-06-09 RX ORDER — EPINEPHRINE 1 MG/ML
0.3 INJECTION, SOLUTION INTRAMUSCULAR; SUBCUTANEOUS PRN
Status: CANCELLED | OUTPATIENT
Start: 2025-06-09

## 2025-06-09 RX ORDER — HYDROCORTISONE SODIUM SUCCINATE 100 MG/2ML
100 INJECTION INTRAMUSCULAR; INTRAVENOUS
Status: CANCELLED | OUTPATIENT
Start: 2025-06-09

## 2025-06-09 RX ORDER — ONDANSETRON 2 MG/ML
8 INJECTION INTRAMUSCULAR; INTRAVENOUS
Status: CANCELLED | OUTPATIENT
Start: 2025-06-09

## 2025-06-09 RX ORDER — SODIUM CHLORIDE 9 MG/ML
INJECTION, SOLUTION INTRAVENOUS CONTINUOUS
Status: CANCELLED | OUTPATIENT
Start: 2025-06-09

## 2025-06-09 RX ORDER — SODIUM CHLORIDE 9 MG/ML
25 INJECTION, SOLUTION INTRAVENOUS PRN
Status: CANCELLED | OUTPATIENT
Start: 2025-06-09

## 2025-06-09 RX ORDER — DIPHENHYDRAMINE HYDROCHLORIDE 50 MG/ML
50 INJECTION, SOLUTION INTRAMUSCULAR; INTRAVENOUS
Status: CANCELLED | OUTPATIENT
Start: 2025-06-09

## 2025-06-09 RX ORDER — SODIUM CHLORIDE 9 MG/ML
20 INJECTION, SOLUTION INTRAVENOUS CONTINUOUS
Status: CANCELLED | OUTPATIENT
Start: 2025-06-09

## 2025-06-09 RX ORDER — SODIUM CHLORIDE 0.9 % (FLUSH) 0.9 %
5-40 SYRINGE (ML) INJECTION PRN
Status: CANCELLED | OUTPATIENT
Start: 2025-06-09

## 2025-06-09 RX ORDER — ALBUTEROL SULFATE 90 UG/1
4 INHALANT RESPIRATORY (INHALATION) PRN
Status: CANCELLED | OUTPATIENT
Start: 2025-06-09

## 2025-06-09 RX ORDER — ACETAMINOPHEN 325 MG/1
650 TABLET ORAL
Status: CANCELLED | OUTPATIENT
Start: 2025-06-09

## 2025-06-09 NOTE — TELEPHONE ENCOUNTER
Pt spouse called in stating the pt is terribly weak and pale. She wanted to know if the pt could come in for a blood transfusion and to have labs drawn. Please advise       CB# [phone]

## 2025-06-10 ENCOUNTER — HOSPITAL ENCOUNTER (OUTPATIENT)
Facility: HOSPITAL | Age: 80
Setting detail: INFUSION SERIES
Discharge: HOME | End: 2025-06-10
Payer: MEDICARE

## 2025-06-10 VITALS
RESPIRATION RATE: 18 BRPM | TEMPERATURE: 98.4 F | DIASTOLIC BLOOD PRESSURE: 57 MMHG | HEART RATE: 72 BPM | OXYGEN SATURATION: 98 % | SYSTOLIC BLOOD PRESSURE: 105 MMHG

## 2025-06-10 DIAGNOSIS — D64.81 ANEMIA DUE TO ANTINEOPLASTIC CHEMOTHERAPY: ICD-10-CM

## 2025-06-10 DIAGNOSIS — T45.1X5A ANEMIA DUE TO ANTINEOPLASTIC CHEMOTHERAPY: ICD-10-CM

## 2025-06-10 DIAGNOSIS — C93.10 CHRONIC MYELOMONOCYTIC LEUKEMIA NOT HAVING ACHIEVED REMISSION (HCC): Primary | ICD-10-CM

## 2025-06-10 LAB — HISTORY CHECK: NORMAL

## 2025-06-10 PROCEDURE — 36430 TRANSFUSION BLD/BLD COMPNT: CPT

## 2025-06-10 PROCEDURE — 86901 BLOOD TYPING SEROLOGIC RH(D): CPT

## 2025-06-10 PROCEDURE — 36415 COLL VENOUS BLD VENIPUNCTURE: CPT

## 2025-06-10 PROCEDURE — 86900 BLOOD TYPING SEROLOGIC ABO: CPT

## 2025-06-10 PROCEDURE — 86850 RBC ANTIBODY SCREEN: CPT

## 2025-06-10 PROCEDURE — P9040 RBC LEUKOREDUCED IRRADIATED: HCPCS

## 2025-06-10 PROCEDURE — 86923 COMPATIBILITY TEST ELECTRIC: CPT

## 2025-06-10 RX ORDER — EPINEPHRINE 1 MG/ML
0.3 INJECTION, SOLUTION INTRAMUSCULAR; SUBCUTANEOUS PRN
Status: CANCELLED | OUTPATIENT
Start: 2025-06-10

## 2025-06-10 RX ORDER — ONDANSETRON 2 MG/ML
8 INJECTION INTRAMUSCULAR; INTRAVENOUS
Status: CANCELLED | OUTPATIENT
Start: 2025-06-10

## 2025-06-10 RX ORDER — SODIUM CHLORIDE 0.9 % (FLUSH) 0.9 %
5-40 SYRINGE (ML) INJECTION PRN
Status: CANCELLED | OUTPATIENT
Start: 2025-06-10

## 2025-06-10 RX ORDER — ACETAMINOPHEN 325 MG/1
650 TABLET ORAL
Status: CANCELLED | OUTPATIENT
Start: 2025-06-10

## 2025-06-10 RX ORDER — SODIUM CHLORIDE 9 MG/ML
INJECTION, SOLUTION INTRAVENOUS CONTINUOUS
Status: CANCELLED | OUTPATIENT
Start: 2025-06-10

## 2025-06-10 RX ORDER — ALBUTEROL SULFATE 90 UG/1
4 INHALANT RESPIRATORY (INHALATION) PRN
Status: CANCELLED | OUTPATIENT
Start: 2025-06-10

## 2025-06-10 RX ORDER — DIPHENHYDRAMINE HYDROCHLORIDE 50 MG/ML
50 INJECTION, SOLUTION INTRAMUSCULAR; INTRAVENOUS
Status: CANCELLED | OUTPATIENT
Start: 2025-06-10

## 2025-06-10 RX ORDER — HYDROCORTISONE SODIUM SUCCINATE 100 MG/2ML
100 INJECTION INTRAMUSCULAR; INTRAVENOUS
Status: CANCELLED | OUTPATIENT
Start: 2025-06-10

## 2025-06-10 RX ORDER — FAMOTIDINE 10 MG/ML
20 INJECTION, SOLUTION INTRAVENOUS
Status: CANCELLED | OUTPATIENT
Start: 2025-06-10

## 2025-06-10 RX ORDER — SODIUM CHLORIDE 9 MG/ML
20 INJECTION, SOLUTION INTRAVENOUS CONTINUOUS
Status: CANCELLED | OUTPATIENT
Start: 2025-06-10

## 2025-06-10 RX ORDER — SODIUM CHLORIDE 9 MG/ML
25 INJECTION, SOLUTION INTRAVENOUS PRN
Status: CANCELLED | OUTPATIENT
Start: 2025-06-10

## 2025-06-10 ASSESSMENT — PAIN SCALES - GENERAL: PAINLEVEL_OUTOF10: 0

## 2025-06-10 NOTE — PROGRESS NOTES
Outpatient Infusion Center Progress Note    Pt admit to [facility] for Blood Transfusion ambulatory in stable condition. Assessment completed. No new concerns voiced; pt reported OBANDO.     22g RFA PIV with positive blood return.    MD dot phrase in notes on 5/27/25.  Consent obtained 5/2/25.    Vital Signs:  Vitals:    06/10/25 1244 06/10/25 1447 06/10/25 1515 06/10/25 1717   BP: 117/60 (!) 109/59 114/67 (!) 105/57   Pulse: 100 78 80 72   Resp: 18 18 18 18   Temp: 98.6 °F (37 °C) 98.7 °F (37.1 °C) 98.8 °F (37.1 °C) 98.4 °F (36.9 °C)   SpO2: 99% 98% 100% 98%     1230 First unit initiated.  1440 First unit completed.     1500 Second unit initiated.  1717 Second unit completed.       Pt declined to be monitored post transfusion; no s/s of reaction.     Educated and provided with written material regarding s/s of delayed reaction to watch for at home.    Pt tolerated treatment well.     22g RFA PIV remains in place with curos cap, wrapped in coban to secure.   Prepare order released from plan to be administered tomorrow.  Spoke with Shonda in the blood bank to confirm.    D/c home ambulatory in no distress. Pt aware of next appointment scheduled for tomorrow at 0930.

## 2025-06-11 ENCOUNTER — APPOINTMENT (OUTPATIENT)
Facility: HOSPITAL | Age: 80
End: 2025-06-11
Payer: MEDICARE

## 2025-06-11 ENCOUNTER — HOSPITAL ENCOUNTER (OUTPATIENT)
Facility: HOSPITAL | Age: 80
Setting detail: INFUSION SERIES
Discharge: HOME | End: 2025-06-11
Payer: MEDICARE

## 2025-06-11 VITALS
DIASTOLIC BLOOD PRESSURE: 48 MMHG | SYSTOLIC BLOOD PRESSURE: 105 MMHG | TEMPERATURE: 97.8 F | RESPIRATION RATE: 18 BRPM | HEART RATE: 60 BPM

## 2025-06-11 DIAGNOSIS — D64.81 ANEMIA DUE TO ANTINEOPLASTIC CHEMOTHERAPY: ICD-10-CM

## 2025-06-11 DIAGNOSIS — C93.10 CHRONIC MYELOMONOCYTIC LEUKEMIA NOT HAVING ACHIEVED REMISSION (HCC): Primary | ICD-10-CM

## 2025-06-11 DIAGNOSIS — T45.1X5A ANEMIA DUE TO ANTINEOPLASTIC CHEMOTHERAPY: ICD-10-CM

## 2025-06-11 LAB
ABO + RH BLD: NORMAL
BLD PROD TYP BPU: NORMAL
BLD PROD TYP BPU: NORMAL
BLOOD BANK BLOOD PRODUCT EXPIRATION DATE: NORMAL
BLOOD BANK BLOOD PRODUCT EXPIRATION DATE: NORMAL
BLOOD BANK DISPENSE STATUS: NORMAL
BLOOD BANK DISPENSE STATUS: NORMAL
BLOOD BANK ISBT PRODUCT BLOOD TYPE: 9500
BLOOD BANK ISBT PRODUCT BLOOD TYPE: 9500
BLOOD BANK PRODUCT CODE: NORMAL
BLOOD BANK PRODUCT CODE: NORMAL
BLOOD BANK UNIT TYPE AND RH: NORMAL
BLOOD BANK UNIT TYPE AND RH: NORMAL
BLOOD GROUP ANTIBODIES SERPL: NORMAL
BPU ID: NORMAL
BPU ID: NORMAL
CROSSMATCH RESULT: NORMAL
CROSSMATCH RESULT: NORMAL
HISTORY CHECK: NORMAL
SPECIMEN EXP DATE BLD: NORMAL
UNIT DIVISION: 0
UNIT DIVISION: 0
UNIT ISSUE DATE/TIME: NORMAL
UNIT ISSUE DATE/TIME: NORMAL

## 2025-06-11 PROCEDURE — 36430 TRANSFUSION BLD/BLD COMPNT: CPT

## 2025-06-11 PROCEDURE — P9040 RBC LEUKOREDUCED IRRADIATED: HCPCS

## 2025-06-11 RX ORDER — SODIUM CHLORIDE 9 MG/ML
INJECTION, SOLUTION INTRAVENOUS CONTINUOUS
Status: CANCELLED | OUTPATIENT
Start: 2025-06-11

## 2025-06-11 RX ORDER — ONDANSETRON 2 MG/ML
8 INJECTION INTRAMUSCULAR; INTRAVENOUS
Status: CANCELLED | OUTPATIENT
Start: 2025-06-11

## 2025-06-11 RX ORDER — ACETAMINOPHEN 325 MG/1
650 TABLET ORAL
Status: CANCELLED | OUTPATIENT
Start: 2025-06-11

## 2025-06-11 RX ORDER — FAMOTIDINE 10 MG/ML
20 INJECTION, SOLUTION INTRAVENOUS
Status: CANCELLED | OUTPATIENT
Start: 2025-06-11

## 2025-06-11 RX ORDER — ALBUTEROL SULFATE 90 UG/1
4 INHALANT RESPIRATORY (INHALATION) PRN
Status: CANCELLED | OUTPATIENT
Start: 2025-06-11

## 2025-06-11 RX ORDER — DIPHENHYDRAMINE HYDROCHLORIDE 50 MG/ML
50 INJECTION, SOLUTION INTRAMUSCULAR; INTRAVENOUS
Status: CANCELLED | OUTPATIENT
Start: 2025-06-11

## 2025-06-11 RX ORDER — EPINEPHRINE 1 MG/ML
0.3 INJECTION, SOLUTION INTRAMUSCULAR; SUBCUTANEOUS PRN
Status: CANCELLED | OUTPATIENT
Start: 2025-06-11

## 2025-06-11 RX ORDER — SODIUM CHLORIDE 9 MG/ML
25 INJECTION, SOLUTION INTRAVENOUS PRN
Status: CANCELLED | OUTPATIENT
Start: 2025-06-11

## 2025-06-11 RX ORDER — HYDROCORTISONE SODIUM SUCCINATE 100 MG/2ML
100 INJECTION INTRAMUSCULAR; INTRAVENOUS
Status: CANCELLED | OUTPATIENT
Start: 2025-06-11

## 2025-06-11 RX ORDER — SODIUM CHLORIDE 0.9 % (FLUSH) 0.9 %
5-40 SYRINGE (ML) INJECTION PRN
Status: CANCELLED | OUTPATIENT
Start: 2025-06-11

## 2025-06-11 RX ORDER — SODIUM CHLORIDE 9 MG/ML
20 INJECTION, SOLUTION INTRAVENOUS CONTINUOUS
Status: CANCELLED | OUTPATIENT
Start: 2025-06-11

## 2025-06-11 ASSESSMENT — PAIN SCALES - GENERAL: PAINLEVEL_OUTOF10: 0

## 2025-06-11 NOTE — PROGRESS NOTES
Outpatient Infusion Center Progress Note    Pt admit to [facility] for Blood Transfusion ambulatory in stable condition. Assessment completed. No new concerns voiced; pt reported improved OBANDO.    22g RFA PIV in place from treatment yesterday. Positive blood return noted.    MD dot phrase in notes on 5/27/25.  Consent obtained 5/2/25.    Vital Signs:  Vitals:    06/11/25 1010 06/11/25 1030 06/11/25 1218 06/11/25 1247   BP: (!) 99/53 (!) 98/55 (!) 101/53 (!) 105/48   Pulse: 68 64 66 60   Resp: 18 18 18 18   Temp: 98.6 °F (37 °C) 97.7 °F (36.5 °C) 98.7 °F (37.1 °C) 97.8 °F (36.6 °C)   TempSrc: Temporal Temporal Temporal Temporal     1015 1 unit PRBCs initiated.  1218 1 unit PRBCs completed.     Pt declined to be monitored post transfusion; no s/s of reaction.     Educated and provided with written material regarding s/s of delayed reaction yesterday. Pt verbalizes understanding of symptoms to monitor for at home.    Pt tolerated treatment well.     PIV removed per protocol.    D/c home ambulatory in no distress. Pt aware of next appointment scheduled.

## 2025-06-17 ENCOUNTER — HOSPITAL ENCOUNTER (OUTPATIENT)
Facility: HOSPITAL | Age: 80
Setting detail: INFUSION SERIES
Discharge: HOME | End: 2025-06-17
Payer: MEDICARE

## 2025-06-17 ENCOUNTER — APPOINTMENT (OUTPATIENT)
Facility: HOSPITAL | Age: 80
End: 2025-06-17
Payer: MEDICARE

## 2025-06-17 ENCOUNTER — OFFICE VISIT (OUTPATIENT)
Age: 80
End: 2025-06-17
Payer: MEDICARE

## 2025-06-17 VITALS
BODY MASS INDEX: 23.26 KG/M2 | OXYGEN SATURATION: 98 % | HEART RATE: 75 BPM | TEMPERATURE: 97.9 F | RESPIRATION RATE: 18 BRPM | HEIGHT: 67 IN | DIASTOLIC BLOOD PRESSURE: 72 MMHG | WEIGHT: 148.2 LBS | SYSTOLIC BLOOD PRESSURE: 134 MMHG

## 2025-06-17 VITALS
SYSTOLIC BLOOD PRESSURE: 129 MMHG | TEMPERATURE: 97.7 F | RESPIRATION RATE: 18 BRPM | HEART RATE: 82 BPM | DIASTOLIC BLOOD PRESSURE: 67 MMHG | OXYGEN SATURATION: 99 %

## 2025-06-17 DIAGNOSIS — C93.10 CHRONIC MYELOMONOCYTIC LEUKEMIA NOT HAVING ACHIEVED REMISSION (HCC): Primary | ICD-10-CM

## 2025-06-17 DIAGNOSIS — C93.10 CHRONIC MYELOMONOCYTIC LEUKEMIA NOT HAVING ACHIEVED REMISSION (HCC): ICD-10-CM

## 2025-06-17 DIAGNOSIS — T45.1X5A ANEMIA DUE TO ANTINEOPLASTIC CHEMOTHERAPY: Primary | ICD-10-CM

## 2025-06-17 DIAGNOSIS — D64.81 ANEMIA DUE TO ANTINEOPLASTIC CHEMOTHERAPY: Primary | ICD-10-CM

## 2025-06-17 LAB
ABO + RH BLD: NORMAL
BASOPHILS # BLD: 0.04 K/UL (ref 0–0.1)
BASOPHILS NFR BLD: 2 % (ref 0–1)
BLOOD GROUP ANTIBODIES SERPL: NORMAL
DIFFERENTIAL METHOD BLD: ABNORMAL
EOSINOPHIL # BLD: 0 K/UL (ref 0–0.4)
EOSINOPHIL NFR BLD: 0 % (ref 0–7)
ERYTHROCYTE [DISTWIDTH] IN BLOOD BY AUTOMATED COUNT: 17.2 % (ref 11.5–14.5)
HCT VFR BLD AUTO: 25.3 % (ref 36.6–50.3)
HGB BLD-MCNC: 7.8 G/DL (ref 12.1–17)
IMM GRANULOCYTES # BLD AUTO: 0 K/UL (ref 0–0.04)
IMM GRANULOCYTES NFR BLD AUTO: 0 % (ref 0–0.5)
LYMPHOCYTES # BLD: 0.74 K/UL (ref 0.8–3.5)
LYMPHOCYTES NFR BLD: 37 % (ref 12–49)
MCH RBC QN AUTO: 26.5 PG (ref 26–34)
MCHC RBC AUTO-ENTMCNC: 30.8 G/DL (ref 30–36.5)
MCV RBC AUTO: 86.1 FL (ref 80–99)
MONOCYTES # BLD: 0.42 K/UL (ref 0–1)
MONOCYTES NFR BLD: 21 % (ref 5–13)
MYELOCYTES NFR BLD MANUAL: 1 %
NEUTS BAND NFR BLD MANUAL: 1 %
NEUTS SEG # BLD: 0.78 K/UL (ref 1.8–8)
NEUTS SEG NFR BLD: 38 % (ref 32–75)
NRBC # BLD: 0.02 K/UL (ref 0–0.01)
NRBC BLD-RTO: 1 PER 100 WBC
PLATELET # BLD AUTO: 81 K/UL (ref 150–400)
RBC # BLD AUTO: 2.94 M/UL (ref 4.1–5.7)
RBC MORPH BLD: ABNORMAL
SPECIMEN EXP DATE BLD: NORMAL
WBC # BLD AUTO: 2 K/UL (ref 4.1–11.1)

## 2025-06-17 PROCEDURE — 1123F ACP DISCUSS/DSCN MKR DOCD: CPT | Performed by: INTERNAL MEDICINE

## 2025-06-17 PROCEDURE — G2211 COMPLEX E/M VISIT ADD ON: HCPCS | Performed by: INTERNAL MEDICINE

## 2025-06-17 PROCEDURE — 86901 BLOOD TYPING SEROLOGIC RH(D): CPT

## 2025-06-17 PROCEDURE — 85025 COMPLETE CBC W/AUTO DIFF WBC: CPT

## 2025-06-17 PROCEDURE — 3078F DIAST BP <80 MM HG: CPT | Performed by: INTERNAL MEDICINE

## 2025-06-17 PROCEDURE — 3075F SYST BP GE 130 - 139MM HG: CPT | Performed by: INTERNAL MEDICINE

## 2025-06-17 PROCEDURE — 1036F TOBACCO NON-USER: CPT | Performed by: INTERNAL MEDICINE

## 2025-06-17 PROCEDURE — 36415 COLL VENOUS BLD VENIPUNCTURE: CPT

## 2025-06-17 PROCEDURE — 99213 OFFICE O/P EST LOW 20 MIN: CPT | Performed by: INTERNAL MEDICINE

## 2025-06-17 PROCEDURE — 1126F AMNT PAIN NOTED NONE PRSNT: CPT | Performed by: INTERNAL MEDICINE

## 2025-06-17 PROCEDURE — G8420 CALC BMI NORM PARAMETERS: HCPCS | Performed by: INTERNAL MEDICINE

## 2025-06-17 PROCEDURE — 86900 BLOOD TYPING SEROLOGIC ABO: CPT

## 2025-06-17 PROCEDURE — G8428 CUR MEDS NOT DOCUMENT: HCPCS | Performed by: INTERNAL MEDICINE

## 2025-06-17 PROCEDURE — 86850 RBC ANTIBODY SCREEN: CPT

## 2025-06-17 RX ORDER — SODIUM CHLORIDE 9 MG/ML
25 INJECTION, SOLUTION INTRAVENOUS PRN
OUTPATIENT
Start: 2025-06-17

## 2025-06-17 RX ORDER — EPINEPHRINE 1 MG/ML
0.3 INJECTION, SOLUTION INTRAMUSCULAR; SUBCUTANEOUS PRN
OUTPATIENT
Start: 2025-06-17

## 2025-06-17 RX ORDER — SODIUM CHLORIDE 9 MG/ML
INJECTION, SOLUTION INTRAVENOUS CONTINUOUS
OUTPATIENT
Start: 2025-06-17

## 2025-06-17 RX ORDER — ACETAMINOPHEN 325 MG/1
650 TABLET ORAL
OUTPATIENT
Start: 2025-06-17

## 2025-06-17 RX ORDER — HYDROCORTISONE SODIUM SUCCINATE 100 MG/2ML
100 INJECTION INTRAMUSCULAR; INTRAVENOUS
OUTPATIENT
Start: 2025-06-17

## 2025-06-17 RX ORDER — ALBUTEROL SULFATE 90 UG/1
4 INHALANT RESPIRATORY (INHALATION) PRN
OUTPATIENT
Start: 2025-06-17

## 2025-06-17 RX ORDER — SODIUM CHLORIDE 9 MG/ML
20 INJECTION, SOLUTION INTRAVENOUS CONTINUOUS
OUTPATIENT
Start: 2025-06-17

## 2025-06-17 RX ORDER — FAMOTIDINE 10 MG/ML
20 INJECTION, SOLUTION INTRAVENOUS
OUTPATIENT
Start: 2025-06-17

## 2025-06-17 RX ORDER — DIPHENHYDRAMINE HYDROCHLORIDE 50 MG/ML
50 INJECTION, SOLUTION INTRAMUSCULAR; INTRAVENOUS
OUTPATIENT
Start: 2025-06-17

## 2025-06-17 RX ORDER — SODIUM CHLORIDE 0.9 % (FLUSH) 0.9 %
5-40 SYRINGE (ML) INJECTION PRN
OUTPATIENT
Start: 2025-06-17

## 2025-06-17 RX ORDER — ONDANSETRON 2 MG/ML
8 INJECTION INTRAMUSCULAR; INTRAVENOUS
OUTPATIENT
Start: 2025-06-17

## 2025-06-17 ASSESSMENT — PAIN SCALES - GENERAL: PAINLEVEL_OUTOF10: 0

## 2025-06-17 NOTE — PROGRESS NOTES
[facility] [place]  [address], [address]  [place]  [address]  W: [phone]  F: [phone]     HEME/ONC CONSULT    HPI:   Terry Grant is a 79 y.o. male who I am seeing for CMML      Interval history: was admitted from 3/10/25-3/15/25 for upper GI bleed, noted black stoools.  Received 3 U PRBCs.  EGD 3/12/25, unremarkable.  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 Past medical history, social history, family history, medications, and allergies are located in the electronic medical record.    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  - SRSF2 mutations can be seen in various myeloid neoplasms, and are associated with a poor prognosis in MDS, AML,  PMF, and PV.  - TET2 mutations can be seen in various myeloid neoplasms, and are associated with an increased response to  hypomethylating agents and a poor prognosis in MDS after hematopoietic stem cell transplantation.  - Co-mutation of TET2 and SRSF2 is reported to be highly predictive of a myeloid neoplasm characterized by  myelodysplasia and monocytosis, including, but not limited to, CMML    NGS:  ASXL1 F738Jfw*12; KRAS G12R; SRSF2 P95L; TET2 *, *; NO alterations detected in the following genes:  FLT3, IDH1, IDH2, NPM1      6/8/23:  pericardial window, negative    Hydrea 500 mg daily 6/9/23-  DR to 500 mg on Mon-Fri   Increased to daily on 8/22/24   Increased to 500 mg bid mon-Friday and daily on sat and Sunday on 9/9/24   Changed to 500 mg bid on Mon, Wed, Fri, 500 mg daily on Tues, thurs, Sat, Sunday on 10/17/24   Changed to 500 mg PO daily on 11/14/24    Decitabine-cedazuridine 2/4/25-2/8/25 (first

## 2025-06-17 NOTE — PROGRESS NOTES
Terry Grant is a 79 y.o. male f/u Chronic myelomonocytic leukemia not having achieved remission     1. Have you been to the ER, urgent care clinic since your last visit?  Hospitalized since your last visit?No    2. Have you seen or consulted any other health care providers outside of the [facility] since your last visit?  Include any pap smears or colon screening. No

## 2025-06-17 NOTE — PROGRESS NOTES
[facility] Progress Note    Date: June 17, 2025        Pt arrived ambulatory to [facility] for Labs/Possible transfusion in stable condition.  Assessment completed. Peripheral labs drawn and sent for processing by [facility]     Labs reviewed. Criteria for treatment was not met. No transfusion needed.    Patient Vitals for the past 12 hrs:   Temp Pulse Resp BP SpO2   06/17/25 1045 97.7 °F (36.5 °C) 82 18 129/67 99 %       Recent Results (from the past 12 hours)   CBC with Auto Differential    Collection Time: 06/17/25 10:27 AM   Result Value Ref Range    WBC 2.0 (L) 4.1 - 11.1 K/uL    RBC 2.94 (L) 4.10 - 5.70 M/uL    Hemoglobin 7.8 (L) 12.1 - 17.0 g/dL    Hematocrit 25.3 (L) 36.6 - 50.3 %    MCV 86.1 80.0 - 99.0 FL    MCH 26.5 26.0 - 34.0 PG    MCHC 30.8 30.0 - 36.5 g/dL    RDW 17.2 (H) 11.5 - 14.5 %    Platelets 81 (L) 150 - 400 K/uL    Nucleated RBCs 1.0 (H) 0  WBC    nRBC 0.02 (H) 0.00 - 0.01 K/uL    Neutrophils % 38 32.0 - 75.0 %    Band Neutrophils 1 %    Lymphocytes % 37 12.0 - 49.0 %    Monocytes % 21 (H) 5.0 - 13.0 %    Eosinophils % 0 0.0 - 7.0 %    Basophils % 2 (H) 0.0 - 1.0 %    Myelocytes 1 %    Immature Granulocytes % 0 0.0 - 0.5 %    Neutrophils Absolute 0.78 (L) 1.80 - 8.00 K/UL    Lymphocytes Absolute 0.74 (L) 0.80 - 3.50 K/UL    Monocytes Absolute 0.42 0.00 - 1.00 K/UL    Eosinophils Absolute 0.00 0.00 - 0.40 K/UL    Basophils Absolute 0.04 0.00 - 0.10 K/UL    Immature Granulocytes Absolute 0.00 0.00 - 0.04 K/UL    Differential Type MANUAL      RBC Comment ANISOCYTOSIS  1+                 Tolerated treatment well, no adverse reactions noted. D/Cd from [facility] ambulatory and in no distress.  Patient is aware of next scheduled [facility] appointment on 07/08/25.    Future Appointments   Date Time Provider Department Center   7/8/2025  9:30 AM SS CHEMO CHAIR 22 [facility]   7/8/2025 10:00 AM Kevin Rangel MD [facility] BS AMB           Carolyn Reeves, MELANI  June 17, 2025

## 2025-07-08 ENCOUNTER — HOSPITAL ENCOUNTER (OUTPATIENT)
Facility: HOSPITAL | Age: 80
Setting detail: INFUSION SERIES
Discharge: HOME | End: 2025-07-08
Payer: MEDICARE

## 2025-07-08 ENCOUNTER — OFFICE VISIT (OUTPATIENT)
Age: 80
End: 2025-07-08
Payer: MEDICARE

## 2025-07-08 VITALS
RESPIRATION RATE: 18 BRPM | HEART RATE: 99 BPM | SYSTOLIC BLOOD PRESSURE: 125 MMHG | OXYGEN SATURATION: 99 % | DIASTOLIC BLOOD PRESSURE: 70 MMHG | TEMPERATURE: 98.2 F

## 2025-07-08 VITALS
HEIGHT: 67 IN | OXYGEN SATURATION: 99 % | WEIGHT: 147 LBS | DIASTOLIC BLOOD PRESSURE: 70 MMHG | BODY MASS INDEX: 23.07 KG/M2 | HEART RATE: 99 BPM | SYSTOLIC BLOOD PRESSURE: 125 MMHG | TEMPERATURE: 98.2 F | RESPIRATION RATE: 18 BRPM

## 2025-07-08 DIAGNOSIS — C93.10 CHRONIC MYELOMONOCYTIC LEUKEMIA NOT HAVING ACHIEVED REMISSION (HCC): Primary | ICD-10-CM

## 2025-07-08 DIAGNOSIS — D64.81 ANEMIA DUE TO ANTINEOPLASTIC CHEMOTHERAPY: ICD-10-CM

## 2025-07-08 DIAGNOSIS — T45.1X5A ANEMIA DUE TO ANTINEOPLASTIC CHEMOTHERAPY: ICD-10-CM

## 2025-07-08 LAB
ALBUMIN SERPL-MCNC: 3.4 G/DL (ref 3.5–5)
ALBUMIN/GLOB SERPL: 0.7 (ref 1.1–2.2)
ALP SERPL-CCNC: 59 U/L (ref 45–117)
ALT SERPL-CCNC: 23 U/L (ref 12–78)
ANION GAP SERPL CALC-SCNC: 7 MMOL/L (ref 2–12)
AST SERPL-CCNC: 20 U/L (ref 15–37)
BASOPHILS # BLD: 0 K/UL (ref 0–0.1)
BASOPHILS NFR BLD: 0 % (ref 0–1)
BILIRUB SERPL-MCNC: 0.6 MG/DL (ref 0.2–1)
BLASTS NFR BLD MANUAL: 1 %
BUN SERPL-MCNC: 26 MG/DL (ref 6–20)
BUN/CREAT SERPL: 17 (ref 12–20)
CALCIUM SERPL-MCNC: 8.7 MG/DL (ref 8.5–10.1)
CHLORIDE SERPL-SCNC: 108 MMOL/L (ref 97–108)
CO2 SERPL-SCNC: 22 MMOL/L (ref 21–32)
CREAT SERPL-MCNC: 1.5 MG/DL (ref 0.7–1.3)
DIFFERENTIAL METHOD BLD: ABNORMAL
EOSINOPHIL # BLD: 0.07 K/UL (ref 0–0.4)
EOSINOPHIL NFR BLD: 1 % (ref 0–7)
ERYTHROCYTE [DISTWIDTH] IN BLOOD BY AUTOMATED COUNT: 18.4 % (ref 11.5–14.5)
GLOBULIN SER CALC-MCNC: 4.9 G/DL (ref 2–4)
GLUCOSE SERPL-MCNC: 123 MG/DL (ref 65–100)
HCT VFR BLD AUTO: 21.1 % (ref 36.6–50.3)
HGB BLD-MCNC: 6.3 G/DL (ref 12.1–17)
HISTORY CHECK: NORMAL
IMM GRANULOCYTES # BLD AUTO: 0 K/UL
IMM GRANULOCYTES NFR BLD AUTO: 0 %
LYMPHOCYTES # BLD: 3.28 K/UL (ref 0.8–3.5)
LYMPHOCYTES NFR BLD: 49 % (ref 12–49)
MCH RBC QN AUTO: 25.5 PG (ref 26–34)
MCHC RBC AUTO-ENTMCNC: 29.9 G/DL (ref 30–36.5)
MCV RBC AUTO: 85.4 FL (ref 80–99)
METAMYELOCYTES NFR BLD MANUAL: 2 %
MONOCYTES # BLD: 1.34 K/UL (ref 0–1)
MONOCYTES NFR BLD: 20 % (ref 5–13)
MYELOCYTES NFR BLD MANUAL: 1 %
NEUTS BAND NFR BLD MANUAL: 1 % (ref 0–6)
NEUTS SEG # BLD: 1.74 K/UL (ref 1.8–8)
NEUTS SEG NFR BLD: 25 % (ref 32–75)
NRBC # BLD: 0.12 K/UL (ref 0–0.01)
NRBC BLD-RTO: 1.8 PER 100 WBC
PATH REV BLD -IMP: NORMAL
PLATELET # BLD AUTO: 118 K/UL (ref 150–400)
POTASSIUM SERPL-SCNC: 3.2 MMOL/L (ref 3.5–5.1)
PROT SERPL-MCNC: 8.3 G/DL (ref 6.4–8.2)
RBC # BLD AUTO: 2.47 M/UL (ref 4.1–5.7)
RBC MORPH BLD: ABNORMAL
SODIUM SERPL-SCNC: 137 MMOL/L (ref 136–145)
WBC # BLD AUTO: 6.7 K/UL (ref 4.1–11.1)
WBC MORPH BLD: ABNORMAL

## 2025-07-08 PROCEDURE — 80053 COMPREHEN METABOLIC PANEL: CPT

## 2025-07-08 PROCEDURE — 86900 BLOOD TYPING SEROLOGIC ABO: CPT

## 2025-07-08 PROCEDURE — 99214 OFFICE O/P EST MOD 30 MIN: CPT | Performed by: INTERNAL MEDICINE

## 2025-07-08 PROCEDURE — 1126F AMNT PAIN NOTED NONE PRSNT: CPT | Performed by: INTERNAL MEDICINE

## 2025-07-08 PROCEDURE — 3074F SYST BP LT 130 MM HG: CPT | Performed by: INTERNAL MEDICINE

## 2025-07-08 PROCEDURE — 85025 COMPLETE CBC W/AUTO DIFF WBC: CPT

## 2025-07-08 PROCEDURE — 3078F DIAST BP <80 MM HG: CPT | Performed by: INTERNAL MEDICINE

## 2025-07-08 PROCEDURE — 86850 RBC ANTIBODY SCREEN: CPT

## 2025-07-08 PROCEDURE — 86901 BLOOD TYPING SEROLOGIC RH(D): CPT

## 2025-07-08 PROCEDURE — 1036F TOBACCO NON-USER: CPT | Performed by: INTERNAL MEDICINE

## 2025-07-08 PROCEDURE — 86923 COMPATIBILITY TEST ELECTRIC: CPT

## 2025-07-08 PROCEDURE — 36415 COLL VENOUS BLD VENIPUNCTURE: CPT

## 2025-07-08 PROCEDURE — G8427 DOCREV CUR MEDS BY ELIG CLIN: HCPCS | Performed by: INTERNAL MEDICINE

## 2025-07-08 PROCEDURE — 1123F ACP DISCUSS/DSCN MKR DOCD: CPT | Performed by: INTERNAL MEDICINE

## 2025-07-08 PROCEDURE — G8420 CALC BMI NORM PARAMETERS: HCPCS | Performed by: INTERNAL MEDICINE

## 2025-07-08 PROCEDURE — 1159F MED LIST DOCD IN RCRD: CPT | Performed by: INTERNAL MEDICINE

## 2025-07-08 PROCEDURE — G2211 COMPLEX E/M VISIT ADD ON: HCPCS | Performed by: INTERNAL MEDICINE

## 2025-07-08 RX ORDER — SODIUM CHLORIDE 9 MG/ML
25 INJECTION, SOLUTION INTRAVENOUS PRN
Status: CANCELLED | OUTPATIENT
Start: 2025-07-08

## 2025-07-08 RX ORDER — FAMOTIDINE 10 MG/ML
20 INJECTION, SOLUTION INTRAVENOUS
Status: CANCELLED | OUTPATIENT
Start: 2025-07-08

## 2025-07-08 RX ORDER — SODIUM CHLORIDE 9 MG/ML
20 INJECTION, SOLUTION INTRAVENOUS CONTINUOUS
Status: CANCELLED | OUTPATIENT
Start: 2025-07-08

## 2025-07-08 RX ORDER — ALBUTEROL SULFATE 90 UG/1
4 INHALANT RESPIRATORY (INHALATION) PRN
Status: CANCELLED | OUTPATIENT
Start: 2025-07-08

## 2025-07-08 RX ORDER — ACETAMINOPHEN 325 MG/1
650 TABLET ORAL
Status: CANCELLED | OUTPATIENT
Start: 2025-07-08

## 2025-07-08 RX ORDER — EPINEPHRINE 1 MG/ML
0.3 INJECTION, SOLUTION INTRAMUSCULAR; SUBCUTANEOUS PRN
Status: CANCELLED | OUTPATIENT
Start: 2025-07-08

## 2025-07-08 RX ORDER — SODIUM CHLORIDE 0.9 % (FLUSH) 0.9 %
5-40 SYRINGE (ML) INJECTION PRN
Status: CANCELLED | OUTPATIENT
Start: 2025-07-08

## 2025-07-08 RX ORDER — HYDROCORTISONE SODIUM SUCCINATE 100 MG/2ML
100 INJECTION INTRAMUSCULAR; INTRAVENOUS
Status: CANCELLED | OUTPATIENT
Start: 2025-07-08

## 2025-07-08 RX ORDER — ONDANSETRON 2 MG/ML
8 INJECTION INTRAMUSCULAR; INTRAVENOUS
Status: CANCELLED | OUTPATIENT
Start: 2025-07-08

## 2025-07-08 RX ORDER — DIPHENHYDRAMINE HYDROCHLORIDE 50 MG/ML
50 INJECTION, SOLUTION INTRAMUSCULAR; INTRAVENOUS
Status: CANCELLED | OUTPATIENT
Start: 2025-07-08

## 2025-07-08 RX ORDER — SODIUM CHLORIDE 9 MG/ML
INJECTION, SOLUTION INTRAVENOUS CONTINUOUS
Status: CANCELLED | OUTPATIENT
Start: 2025-07-08

## 2025-07-08 ASSESSMENT — PATIENT HEALTH QUESTIONNAIRE - PHQ9
2. FEELING DOWN, DEPRESSED OR HOPELESS: NOT AT ALL
1. LITTLE INTEREST OR PLEASURE IN DOING THINGS: NOT AT ALL
SUM OF ALL RESPONSES TO PHQ QUESTIONS 1-9: 0

## 2025-07-08 NOTE — PROGRESS NOTES
Terry Grant is a 79 y.o. male is here today for a follow up.    1. Have you been to the ER, urgent care clinic since your last visit?  Hospitalized since your last visit?No    2. Have you seen or consulted any other health care providers outside of the [facility] since your last visit?  Include any pap smears or colon screening. No       7/8/2025  9:30 AM   Vitals    SYSTOLIC 125    DIASTOLIC 70    BP Site    Patient Position    BP Cuff Size    Pulse 99    Temp 98.2 °F (36.8 °C)    Respirations 18    SpO2 99 %

## 2025-07-08 NOTE — PROGRESS NOTES
[facility]  [address], [address]  [place]  [address]  W: [phone]  F: [phone]     HEME/ONC CONSULT    HPI:   Terry Grant is a 79 y.o. male who I am seeing for CMML      Interval history: was admitted from 3/10/25-3/15/25 for upper GI bleed, noted black stoools.  Received 3 U PRBCs.  EGD 3/12/25, unremarkable.  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 Past medical history, social history, family history, medications, and allergies are located in the electronic medical record.    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  - SRSF2 mutations can be seen in various myeloid neoplasms, and are associated with a poor prognosis in MDS, AML,  PMF, and PV.  - TET2 mutations can be seen in various myeloid neoplasms, and are associated with an increased response to  hypomethylating agents and a poor prognosis in MDS after hematopoietic stem cell transplantation.  - Co-mutation of TET2 and SRSF2 is reported to be highly predictive of a myeloid neoplasm characterized by  myelodysplasia and monocytosis, including, but not limited to, CMML    NGS:  ASXL1 A175Gjp*12; KRAS G12R; SRSF2 P95L; TET2 *, *; NO alterations detected in the following genes:  FLT3, IDH1, IDH2, NPM1      6/8/23:  pericardial window, negative    Hydrea 500 mg daily 6/9/23-  DR to 500 mg on Mon-Fri   Increased to daily on 8/22/24   Increased to 500 mg bid mon-Friday and daily on sat and Sunday on 9/9/24   Changed to 500 mg bid on Mon, Wed, Fri, 500 mg daily on Tues, thurs, Sat, Sunday on 10/17/24   Changed to 500 mg PO daily on 11/14/24    Decitabine-cedazuridine 2/4/25-2/8/25 (first

## 2025-07-08 NOTE — CONSENT
Informed Consent for Blood Component Transfusion Note    I have discussed with the patient the rationale for blood component transfusion; its benefits in treating or preventing fatigue, organ damage, or death; and its risk which includes mild transfusion reactions, rare risk of blood borne infection, or more serious but rare reactions. I have discussed the alternatives to transfusion, including the risk and consequences of not receiving transfusion. The patient had an opportunity to ask questions and had agreed to proceed with transfusion of blood components.    Electronically signed by Kevin Rangel MD on 7/8/25 at 11:18 AM EDT

## 2025-07-09 ENCOUNTER — HOSPITAL ENCOUNTER (OUTPATIENT)
Facility: HOSPITAL | Age: 80
Setting detail: INFUSION SERIES
Discharge: HOME | End: 2025-07-09
Payer: MEDICARE

## 2025-07-09 VITALS
TEMPERATURE: 98.8 F | SYSTOLIC BLOOD PRESSURE: 133 MMHG | BODY MASS INDEX: 23.02 KG/M2 | OXYGEN SATURATION: 97 % | RESPIRATION RATE: 18 BRPM | HEIGHT: 67 IN | HEART RATE: 80 BPM | DIASTOLIC BLOOD PRESSURE: 78 MMHG

## 2025-07-09 DIAGNOSIS — C93.10 CHRONIC MYELOMONOCYTIC LEUKEMIA NOT HAVING ACHIEVED REMISSION (HCC): Primary | ICD-10-CM

## 2025-07-09 DIAGNOSIS — D64.81 ANEMIA DUE TO ANTINEOPLASTIC CHEMOTHERAPY: ICD-10-CM

## 2025-07-09 DIAGNOSIS — T45.1X5A ANEMIA DUE TO ANTINEOPLASTIC CHEMOTHERAPY: ICD-10-CM

## 2025-07-09 PROCEDURE — P9040 RBC LEUKOREDUCED IRRADIATED: HCPCS

## 2025-07-09 PROCEDURE — 2500000003 HC RX 250 WO HCPCS: Performed by: INTERNAL MEDICINE

## 2025-07-09 PROCEDURE — 36430 TRANSFUSION BLD/BLD COMPNT: CPT

## 2025-07-09 RX ORDER — SODIUM CHLORIDE 9 MG/ML
25 INJECTION, SOLUTION INTRAVENOUS PRN
OUTPATIENT
Start: 2025-07-09

## 2025-07-09 RX ORDER — HYDROCORTISONE SODIUM SUCCINATE 100 MG/2ML
100 INJECTION INTRAMUSCULAR; INTRAVENOUS
OUTPATIENT
Start: 2025-07-09

## 2025-07-09 RX ORDER — SODIUM CHLORIDE 0.9 % (FLUSH) 0.9 %
5-40 SYRINGE (ML) INJECTION PRN
Status: DISCONTINUED | OUTPATIENT
Start: 2025-07-09 | End: 2025-07-10 | Stop reason: HOSPADM

## 2025-07-09 RX ORDER — SODIUM CHLORIDE 9 MG/ML
20 INJECTION, SOLUTION INTRAVENOUS CONTINUOUS
OUTPATIENT
Start: 2025-07-09

## 2025-07-09 RX ORDER — FAMOTIDINE 10 MG/ML
20 INJECTION, SOLUTION INTRAVENOUS
OUTPATIENT
Start: 2025-07-09

## 2025-07-09 RX ORDER — EPINEPHRINE 1 MG/ML
0.3 INJECTION, SOLUTION INTRAMUSCULAR; SUBCUTANEOUS PRN
OUTPATIENT
Start: 2025-07-09

## 2025-07-09 RX ORDER — ACETAMINOPHEN 325 MG/1
650 TABLET ORAL
OUTPATIENT
Start: 2025-07-09

## 2025-07-09 RX ORDER — SODIUM CHLORIDE 9 MG/ML
INJECTION, SOLUTION INTRAVENOUS CONTINUOUS
OUTPATIENT
Start: 2025-07-09

## 2025-07-09 RX ORDER — DIPHENHYDRAMINE HYDROCHLORIDE 50 MG/ML
50 INJECTION, SOLUTION INTRAMUSCULAR; INTRAVENOUS
OUTPATIENT
Start: 2025-07-09

## 2025-07-09 RX ORDER — SODIUM CHLORIDE 9 MG/ML
25 INJECTION, SOLUTION INTRAVENOUS PRN
Status: DISCONTINUED | OUTPATIENT
Start: 2025-07-09 | End: 2025-07-10 | Stop reason: HOSPADM

## 2025-07-09 RX ORDER — SODIUM CHLORIDE 0.9 % (FLUSH) 0.9 %
5-40 SYRINGE (ML) INJECTION PRN
OUTPATIENT
Start: 2025-07-09

## 2025-07-09 RX ORDER — ALBUTEROL SULFATE 90 UG/1
4 INHALANT RESPIRATORY (INHALATION) PRN
OUTPATIENT
Start: 2025-07-09

## 2025-07-09 RX ORDER — ONDANSETRON 2 MG/ML
8 INJECTION INTRAMUSCULAR; INTRAVENOUS
OUTPATIENT
Start: 2025-07-09

## 2025-07-09 RX ADMIN — Medication 10 ML: at 13:58

## 2025-07-09 RX ADMIN — Medication 10 ML: at 11:00

## 2025-07-09 ASSESSMENT — PAIN SCALES - GENERAL: PAINLEVEL_OUTOF10: 0

## 2025-07-09 NOTE — PROGRESS NOTES
[facility] Progress Note    Date: July 9, 2025      Pt arrived at [facility] ambulatory for Blood Transfusion.  Assessment completed, no new complaints voiced. #22 PIV placed to right lower arm with positive blood return. Blood consent from 5/2/25 available in media.      Signs/symptoms of adverse blood reaction discussed with pt, voiced understanding.    Vitals:    07/09/25 1012 07/09/25 1104 07/09/25 1125 07/09/25 1326   BP: 136/78 116/65 112/70 133/78   Pulse: 88 87 81 80   Resp: 18 18 18 18   Temp: 98 °F (36.7 °C) 99.3 °F (37.4 °C) 98.9 °F (37.2 °C) 98.8 °F (37.1 °C)   TempSrc: Temporal Temporal Temporal Temporal   SpO2: 97% 95% 97% 97%   Height: 1.702 m (5' 7\")           1110:  1st unit PRBCs started and infusing without difficulty, observed x 15 minutes  1326:  1st unit completed without adverse reaction noted, NS flushing line.        Patient tolerated transfusion well, no adverse reaction noted. Patient declined 1 hour post transfusion observation.  PIV flushed and removed per protocol. D/Cd from [facility] in no distress.  Patient aware of next [facility] appointment.      Future Appointments   Date Time Provider Department Center   7/29/2025  9:30 AM SS FASTTRACK 1 [facility]   7/29/2025 10:00 AM Kevin Rangel MD [facility] BS AMB   7/30/2025 10:30 AM SS CHEMO CHAIR 30 [facility]   8/19/2025  9:30 AM [facility]   8/20/2025 10:30 AM SS CHEMO CHAIR 25 [facility]   9/9/2025  9:30 AM SS FASTTRACK 1 [facility]   9/10/2025 10:30 AM SS CHEMO CHAIR 22 [facility]   9/30/2025  9:30 AM [facility]   10/1/2025 10:30 AM SS [facility]         DALI HASTINGS RN  July 9, 2025

## 2025-07-16 DIAGNOSIS — C93.10 CHRONIC MYELOMONOCYTIC LEUKEMIA NOT HAVING ACHIEVED REMISSION (HCC): ICD-10-CM

## 2025-07-17 RX ORDER — DEXAMETHASONE 0.5 MG/1
0.5 TABLET ORAL 2 TIMES DAILY WITH MEALS
Qty: 60 TABLET | Refills: 1 | Status: SHIPPED | OUTPATIENT
Start: 2025-07-17

## 2025-07-23 ENCOUNTER — TELEPHONE (OUTPATIENT)
Age: 80
End: 2025-07-23

## 2025-07-23 NOTE — TELEPHONE ENCOUNTER
Patient wife called. Patient not feeling well and is short of breath want to know if they can check hemoglobin before apt.    #[phone]

## 2025-07-23 NOTE — TELEPHONE ENCOUNTER
[facility]  [phone]    07/23/25 5:15 PM EDT - A phone call was made to the patient in regards of message received. Patient feels out of breath when walking and is requesting to get labs done tomorrow 7/24/2025. Patient was inform that a message will be sent to [facility] for an appointment. Patient had no further questions.

## 2025-07-24 ENCOUNTER — HOSPITAL ENCOUNTER (OUTPATIENT)
Facility: HOSPITAL | Age: 80
Setting detail: INFUSION SERIES
Discharge: HOME | End: 2025-07-24
Payer: MEDICARE

## 2025-07-24 VITALS
RESPIRATION RATE: 18 BRPM | HEART RATE: 116 BPM | DIASTOLIC BLOOD PRESSURE: 66 MMHG | OXYGEN SATURATION: 99 % | SYSTOLIC BLOOD PRESSURE: 102 MMHG

## 2025-07-24 DIAGNOSIS — T45.1X5A ANEMIA DUE TO ANTINEOPLASTIC CHEMOTHERAPY: ICD-10-CM

## 2025-07-24 DIAGNOSIS — C93.10 CHRONIC MYELOMONOCYTIC LEUKEMIA NOT HAVING ACHIEVED REMISSION (HCC): Primary | ICD-10-CM

## 2025-07-24 DIAGNOSIS — D64.81 ANEMIA DUE TO ANTINEOPLASTIC CHEMOTHERAPY: ICD-10-CM

## 2025-07-24 LAB
ALBUMIN SERPL-MCNC: 3.6 G/DL (ref 3.5–5)
ALBUMIN/GLOB SERPL: 0.7 (ref 1.1–2.2)
ALP SERPL-CCNC: 87 U/L (ref 45–117)
ALT SERPL-CCNC: 34 U/L (ref 12–78)
ANION GAP SERPL CALC-SCNC: 8 MMOL/L (ref 2–12)
AST SERPL-CCNC: 39 U/L (ref 15–37)
BASOPHILS # BLD: 0 K/UL (ref 0–0.1)
BASOPHILS NFR BLD: 0 % (ref 0–1)
BILIRUB SERPL-MCNC: 0.7 MG/DL (ref 0.2–1)
BUN SERPL-MCNC: 30 MG/DL (ref 6–20)
BUN/CREAT SERPL: 14 (ref 12–20)
CALCIUM SERPL-MCNC: 9.1 MG/DL (ref 8.5–10.1)
CHLORIDE SERPL-SCNC: 104 MMOL/L (ref 97–108)
CO2 SERPL-SCNC: 22 MMOL/L (ref 21–32)
CREAT SERPL-MCNC: 2.12 MG/DL (ref 0.7–1.3)
DIFFERENTIAL METHOD BLD: ABNORMAL
EOSINOPHIL # BLD: 0 K/UL (ref 0–0.4)
EOSINOPHIL NFR BLD: 0 % (ref 0–7)
ERYTHROCYTE [DISTWIDTH] IN BLOOD BY AUTOMATED COUNT: 18.7 % (ref 11.5–14.5)
GLOBULIN SER CALC-MCNC: 4.9 G/DL (ref 2–4)
GLUCOSE SERPL-MCNC: 124 MG/DL (ref 65–100)
HCT VFR BLD AUTO: 21.9 % (ref 36.6–50.3)
HGB BLD-MCNC: 6.3 G/DL (ref 12.1–17)
HISTORY CHECK: NORMAL
IMM GRANULOCYTES # BLD AUTO: 0 K/UL
IMM GRANULOCYTES NFR BLD AUTO: 0 %
LYMPHOCYTES # BLD: 10.5 K/UL (ref 0.8–3.5)
LYMPHOCYTES NFR BLD: 21 % (ref 12–49)
MCH RBC QN AUTO: 24.2 PG (ref 26–34)
MCHC RBC AUTO-ENTMCNC: 28.8 G/DL (ref 30–36.5)
MCV RBC AUTO: 84.2 FL (ref 80–99)
METAMYELOCYTES NFR BLD MANUAL: 5 %
MONOCYTES # BLD: 17.5 K/UL (ref 0–1)
MONOCYTES NFR BLD: 35 % (ref 5–13)
MYELOCYTES NFR BLD MANUAL: 4 %
NEUTS SEG # BLD: 10.5 K/UL (ref 1.8–8)
NEUTS SEG NFR BLD: 21 % (ref 32–75)
NRBC # BLD: 2.81 K/UL (ref 0–0.01)
NRBC BLD-RTO: 5.6 PER 100 WBC
OTHER CELLS NFR BLD MANUAL: 11
PATH REV BLD -IMP: NORMAL
PLATELET # BLD AUTO: 70 K/UL (ref 150–400)
POTASSIUM SERPL-SCNC: 3.8 MMOL/L (ref 3.5–5.1)
PROMYELOCYTES NFR BLD MANUAL: 3 %
PROT SERPL-MCNC: 8.5 G/DL (ref 6.4–8.2)
RBC # BLD AUTO: 2.6 M/UL (ref 4.1–5.7)
RBC MORPH BLD: ABNORMAL
SODIUM SERPL-SCNC: 134 MMOL/L (ref 136–145)
WBC # BLD AUTO: 50 K/UL (ref 4.1–11.1)
WBC MORPH BLD: ABNORMAL

## 2025-07-24 PROCEDURE — 86900 BLOOD TYPING SEROLOGIC ABO: CPT

## 2025-07-24 PROCEDURE — 86923 COMPATIBILITY TEST ELECTRIC: CPT

## 2025-07-24 PROCEDURE — 85025 COMPLETE CBC W/AUTO DIFF WBC: CPT

## 2025-07-24 PROCEDURE — 80053 COMPREHEN METABOLIC PANEL: CPT

## 2025-07-24 PROCEDURE — 36415 COLL VENOUS BLD VENIPUNCTURE: CPT

## 2025-07-24 PROCEDURE — 86850 RBC ANTIBODY SCREEN: CPT

## 2025-07-24 PROCEDURE — 86901 BLOOD TYPING SEROLOGIC RH(D): CPT

## 2025-07-24 RX ORDER — SODIUM CHLORIDE 9 MG/ML
INJECTION, SOLUTION INTRAVENOUS CONTINUOUS
Status: CANCELLED | OUTPATIENT
Start: 2025-07-24

## 2025-07-24 RX ORDER — DIPHENHYDRAMINE HYDROCHLORIDE 50 MG/ML
50 INJECTION, SOLUTION INTRAMUSCULAR; INTRAVENOUS
Status: CANCELLED | OUTPATIENT
Start: 2025-07-24

## 2025-07-24 RX ORDER — EPINEPHRINE 1 MG/ML
0.3 INJECTION, SOLUTION INTRAMUSCULAR; SUBCUTANEOUS PRN
Status: CANCELLED | OUTPATIENT
Start: 2025-07-24

## 2025-07-24 RX ORDER — ONDANSETRON 2 MG/ML
8 INJECTION INTRAMUSCULAR; INTRAVENOUS
Status: CANCELLED | OUTPATIENT
Start: 2025-07-24

## 2025-07-24 RX ORDER — ALBUTEROL SULFATE 90 UG/1
4 INHALANT RESPIRATORY (INHALATION) PRN
Status: CANCELLED | OUTPATIENT
Start: 2025-07-24

## 2025-07-24 RX ORDER — HYDROCORTISONE SODIUM SUCCINATE 100 MG/2ML
100 INJECTION INTRAMUSCULAR; INTRAVENOUS
Status: CANCELLED | OUTPATIENT
Start: 2025-07-24

## 2025-07-24 RX ORDER — SODIUM CHLORIDE 9 MG/ML
25 INJECTION, SOLUTION INTRAVENOUS PRN
Status: CANCELLED | OUTPATIENT
Start: 2025-07-24

## 2025-07-24 RX ORDER — ACETAMINOPHEN 325 MG/1
650 TABLET ORAL
Status: CANCELLED | OUTPATIENT
Start: 2025-07-24

## 2025-07-24 RX ORDER — SODIUM CHLORIDE 0.9 % (FLUSH) 0.9 %
5-40 SYRINGE (ML) INJECTION PRN
Status: CANCELLED | OUTPATIENT
Start: 2025-07-24

## 2025-07-24 RX ORDER — SODIUM CHLORIDE 9 MG/ML
20 INJECTION, SOLUTION INTRAVENOUS CONTINUOUS
Status: CANCELLED | OUTPATIENT
Start: 2025-07-24

## 2025-07-24 NOTE — TELEPHONE ENCOUNTER
[facility]  [phone]    07/24/25 9:22 AM EDT - Patient was notified that an appointment is available today at [facility] at 2pm for labs. Patient agreed and had no further questions.

## 2025-07-25 ENCOUNTER — HOSPITAL ENCOUNTER (OUTPATIENT)
Facility: HOSPITAL | Age: 80
Setting detail: INFUSION SERIES
Discharge: HOME | End: 2025-07-25
Payer: MEDICARE

## 2025-07-25 VITALS
SYSTOLIC BLOOD PRESSURE: 109 MMHG | OXYGEN SATURATION: 94 % | TEMPERATURE: 97.6 F | HEART RATE: 104 BPM | DIASTOLIC BLOOD PRESSURE: 68 MMHG | RESPIRATION RATE: 18 BRPM

## 2025-07-25 DIAGNOSIS — D64.81 ANEMIA DUE TO ANTINEOPLASTIC CHEMOTHERAPY: ICD-10-CM

## 2025-07-25 DIAGNOSIS — T45.1X5A ANEMIA DUE TO ANTINEOPLASTIC CHEMOTHERAPY: ICD-10-CM

## 2025-07-25 DIAGNOSIS — C93.10 CHRONIC MYELOMONOCYTIC LEUKEMIA NOT HAVING ACHIEVED REMISSION (HCC): Primary | ICD-10-CM

## 2025-07-25 PROCEDURE — P9040 RBC LEUKOREDUCED IRRADIATED: HCPCS

## 2025-07-25 PROCEDURE — 36430 TRANSFUSION BLD/BLD COMPNT: CPT

## 2025-07-25 RX ORDER — SODIUM CHLORIDE 9 MG/ML
25 INJECTION, SOLUTION INTRAVENOUS PRN
OUTPATIENT
Start: 2025-07-25

## 2025-07-25 RX ORDER — SODIUM CHLORIDE 9 MG/ML
25 INJECTION, SOLUTION INTRAVENOUS PRN
Status: DISCONTINUED | OUTPATIENT
Start: 2025-07-25 | End: 2025-07-26 | Stop reason: HOSPADM

## 2025-07-25 RX ORDER — DIPHENHYDRAMINE HYDROCHLORIDE 50 MG/ML
50 INJECTION, SOLUTION INTRAMUSCULAR; INTRAVENOUS
OUTPATIENT
Start: 2025-07-25

## 2025-07-25 RX ORDER — SODIUM CHLORIDE 9 MG/ML
INJECTION, SOLUTION INTRAVENOUS CONTINUOUS
OUTPATIENT
Start: 2025-07-25

## 2025-07-25 RX ORDER — ONDANSETRON 2 MG/ML
8 INJECTION INTRAMUSCULAR; INTRAVENOUS
OUTPATIENT
Start: 2025-07-25

## 2025-07-25 RX ORDER — EPINEPHRINE 1 MG/ML
0.3 INJECTION, SOLUTION INTRAMUSCULAR; SUBCUTANEOUS PRN
OUTPATIENT
Start: 2025-07-25

## 2025-07-25 RX ORDER — SODIUM CHLORIDE 9 MG/ML
20 INJECTION, SOLUTION INTRAVENOUS CONTINUOUS
OUTPATIENT
Start: 2025-07-25

## 2025-07-25 RX ORDER — SODIUM CHLORIDE 0.9 % (FLUSH) 0.9 %
5-40 SYRINGE (ML) INJECTION PRN
OUTPATIENT
Start: 2025-07-25

## 2025-07-25 RX ORDER — HYDROCORTISONE SODIUM SUCCINATE 100 MG/2ML
100 INJECTION INTRAMUSCULAR; INTRAVENOUS
OUTPATIENT
Start: 2025-07-25

## 2025-07-25 RX ORDER — SODIUM CHLORIDE 0.9 % (FLUSH) 0.9 %
5-40 SYRINGE (ML) INJECTION PRN
Status: DISCONTINUED | OUTPATIENT
Start: 2025-07-25 | End: 2025-07-26 | Stop reason: HOSPADM

## 2025-07-25 RX ORDER — ALBUTEROL SULFATE 90 UG/1
4 INHALANT RESPIRATORY (INHALATION) PRN
OUTPATIENT
Start: 2025-07-25

## 2025-07-25 RX ORDER — ACETAMINOPHEN 325 MG/1
650 TABLET ORAL
OUTPATIENT
Start: 2025-07-25

## 2025-07-25 NOTE — PROGRESS NOTES
Outpatient Infusion Center Progress Note        Date: 25    Name: Terry Grant    MRN: [id number]         : 1945    MD: Kevin Rangel MD      Mr. Grant admitted to hospitals for Blood Transfusion ([facility]) ambulatory in stable condition. Assessment completed. No new concerns voiced. 22 gauge peripheral IV obtained in the right arm without difficulty, line flushed and capped. Labs reviewed from 25.      ** Patient has received blood transfusions in the past and reports no previous transfusion reactions. **      Vitals:    25 1312 25 1335 25 1515   BP: (!) 102/57 100/63 109/68   Pulse: (!) 102 100 (!) 104   Resp: 18 18 18   Temp: 98.8 °F (37.1 °C) 98.8 °F (37.1 °C) 97.6 °F (36.4 °C)   TempSrc: Temporal Temporal    SpO2: 96% 96% 94%           **Per MD note, risks and benefits of blood transfusion discussed with Mr. Grant, patient is agreeable to transfusion.          Unit(s) received from blood bank. Two nurse verification completed for patient and blood product(s).     1st unit PRBCs started @ 60ml/hr and infusing without difficulty, observed x 15 minutes, rate increased to 172 to run over 2 hours.        Mr. Grant tolerated transfusion well, no adverse reaction noted.  Discharge instructions/transfusion education reviewed, patient voiced understanding.  Patient declined 1 hour post transfusion observation.        Post-Infusion Vitals:  Vitals:    25 1515   BP: 109/68   Pulse: (!) 104   Resp: 18   Temp: 97.6 °F (36.4 °C)   SpO2: 94%            Pt tolerated treatment well. IV maintained positive blood return throughout treatment, flushed with positive blood return at conclusion and removed and wrapped in coban per protocol. D/c home ambulatory in no distress. Patient is aware of next appointment on 25 @ 0930 at the [facility] location.        Future Appointments:  Future Appointments   Date Time Provider Department Center   2025  9:30 AM SS

## 2025-07-26 ENCOUNTER — APPOINTMENT (OUTPATIENT)
Facility: HOSPITAL | Age: 80
DRG: 834 | End: 2025-07-26
Payer: MEDICARE

## 2025-07-26 ENCOUNTER — HOSPITAL ENCOUNTER (INPATIENT)
Facility: HOSPITAL | Age: 80
LOS: 6 days | Discharge: NURSING, REHAB OR LONG-TERM CARE | DRG: 834 | End: 2025-08-01
Attending: EMERGENCY MEDICINE | Admitting: INTERNAL MEDICINE
Payer: MEDICARE

## 2025-07-26 DIAGNOSIS — C93.10 CHRONIC MYELOMONOCYTIC LEUKEMIA NOT HAVING ACHIEVED REMISSION (HCC): ICD-10-CM

## 2025-07-26 DIAGNOSIS — C92.00 ACUTE MYELOID LEUKEMIA NOT HAVING ACHIEVED REMISSION (HCC): ICD-10-CM

## 2025-07-26 DIAGNOSIS — D64.9 ANEMIA REQUIRING TRANSFUSIONS: Primary | ICD-10-CM

## 2025-07-26 PROBLEM — E87.20 LACTIC ACIDOSIS: Status: ACTIVE | Noted: 2025-07-26

## 2025-07-26 PROBLEM — N18.32 ACUTE KIDNEY INJURY SUPERIMPOSED ON STAGE 3B CHRONIC KIDNEY DISEASE (HCC): Status: ACTIVE | Noted: 2025-07-26

## 2025-07-26 PROBLEM — C95.00 ACUTE LEUKEMIA NOT HAVING ACHIEVED REMISSION (HCC): Status: ACTIVE | Noted: 2025-07-26

## 2025-07-26 PROBLEM — N17.9 ACUTE RENAL FAILURE: Status: ACTIVE | Noted: 2025-07-26

## 2025-07-26 PROBLEM — Z71.89 GOALS OF CARE, COUNSELING/DISCUSSION: Status: ACTIVE | Noted: 2025-07-26

## 2025-07-26 LAB
ABO + RH BLD: NORMAL
ALBUMIN SERPL-MCNC: 3.4 G/DL (ref 3.5–5)
ALBUMIN/GLOB SERPL: 0.7 (ref 1.1–2.2)
ALP SERPL-CCNC: 124 U/L (ref 45–117)
ALT SERPL-CCNC: 43 U/L (ref 12–78)
ANION GAP BLD CALC-SCNC: 17 (ref 10–20)
ANION GAP SERPL CALC-SCNC: 12 MMOL/L (ref 2–12)
AST SERPL-CCNC: 55 U/L (ref 15–37)
BASE DEFICIT BLD-SCNC: 8.5 MMOL/L
BASOPHILS # BLD: 0 K/UL (ref 0–0.1)
BASOPHILS NFR BLD: 0 % (ref 0–1)
BILIRUB SERPL-MCNC: 1 MG/DL (ref 0.2–1)
BLASTS NFR BLD MANUAL: 8 %
BLD PROD TYP BPU: NORMAL
BLOOD BANK BLOOD PRODUCT EXPIRATION DATE: NORMAL
BLOOD BANK DISPENSE STATUS: NORMAL
BLOOD BANK ISBT PRODUCT BLOOD TYPE: 9500
BLOOD BANK PRODUCT CODE: NORMAL
BLOOD BANK UNIT TYPE AND RH: NORMAL
BLOOD GROUP ANTIBODIES SERPL: NORMAL
BPU ID: NORMAL
BUN SERPL-MCNC: 36 MG/DL (ref 6–20)
BUN/CREAT SERPL: 12 (ref 12–20)
CA-I BLD-MCNC: 1.2 MMOL/L (ref 1.15–1.33)
CALCIUM SERPL-MCNC: 8.5 MG/DL (ref 8.5–10.1)
CHLORIDE BLD-SCNC: 108 MMOL/L (ref 100–111)
CHLORIDE SERPL-SCNC: 109 MMOL/L (ref 97–108)
CO2 BLD-SCNC: 17 MMOL/L (ref 22–29)
CO2 SERPL-SCNC: 16 MMOL/L (ref 21–32)
CREAT SERPL-MCNC: 3.07 MG/DL (ref 0.7–1.3)
CREAT UR-MCNC: 3.2 MG/DL (ref 0.6–1.3)
CROSSMATCH RESULT: NORMAL
DIFFERENTIAL METHOD BLD: ABNORMAL
EOSINOPHIL # BLD: 1.42 K/UL (ref 0–0.4)
EOSINOPHIL NFR BLD: 1 % (ref 0–7)
ERYTHROCYTE [DISTWIDTH] IN BLOOD BY AUTOMATED COUNT: 19 % (ref 11.5–14.5)
GLOBULIN SER CALC-MCNC: 4.8 G/DL (ref 2–4)
GLUCOSE BLD STRIP.AUTO-MCNC: 154 MG/DL (ref 74–99)
GLUCOSE SERPL-MCNC: 130 MG/DL (ref 65–100)
HCO3 BLDA-SCNC: 17 MMOL/L
HCT VFR BLD AUTO: 19.4 % (ref 36.6–50.3)
HCT VFR BLD AUTO: 22.7 % (ref 36.6–50.3)
HGB BLD-MCNC: 5.8 G/DL (ref 12.1–17)
HGB BLD-MCNC: 6.5 G/DL (ref 12.1–17)
HISTORY CHECK: NORMAL
HISTORY CHECK: NORMAL
IMM GRANULOCYTES # BLD AUTO: 0 K/UL
IMM GRANULOCYTES NFR BLD AUTO: 0 %
LACTATE BLD-SCNC: 1.48 MMOL/L (ref 0.4–2)
LACTATE BLD-SCNC: 8.16 MMOL/L (ref 0.4–2)
LACTATE BLD-SCNC: 8.75 MMOL/L (ref 0.4–2)
LACTATE SERPL-SCNC: 1.8 MMOL/L (ref 0.4–2)
LYMPHOCYTES # BLD: 25.6 K/UL (ref 0.8–3.5)
LYMPHOCYTES NFR BLD: 18 % (ref 12–49)
MAGNESIUM SERPL-MCNC: 2.7 MG/DL (ref 1.6–2.4)
MCH RBC QN AUTO: 24.9 PG (ref 26–34)
MCHC RBC AUTO-ENTMCNC: 28.6 G/DL (ref 30–36.5)
MCV RBC AUTO: 87 FL (ref 80–99)
METAMYELOCYTES NFR BLD MANUAL: 9 %
MONOCYTES # BLD: 51.19 K/UL (ref 0–1)
MONOCYTES NFR BLD: 36 % (ref 5–13)
MYELOCYTES NFR BLD MANUAL: 8 %
NEUTS BAND NFR BLD MANUAL: 1 % (ref 0–6)
NEUTS SEG # BLD: 28.44 K/UL (ref 1.8–8)
NEUTS SEG NFR BLD: 19 % (ref 32–75)
NRBC # BLD: 4.74 K/UL (ref 0–0.01)
NRBC BLD-RTO: 3.3 PER 100 WBC
PCO2 BLDV: 36.3 MMHG (ref 41–51)
PH BLDV: 7.29 (ref 7.32–7.42)
PHOSPHATE SERPL-MCNC: 5.2 MG/DL (ref 2.6–4.7)
PLATELET # BLD AUTO: 78 K/UL (ref 150–400)
PO2 BLDV: <27 MMHG (ref 25–40)
POTASSIUM BLD-SCNC: 4.3 MMOL/L (ref 3.5–5.5)
POTASSIUM SERPL-SCNC: 3.8 MMOL/L (ref 3.5–5.1)
PROT SERPL-MCNC: 8.2 G/DL (ref 6.4–8.2)
RBC # BLD AUTO: 2.61 M/UL (ref 4.1–5.7)
RBC MORPH BLD: ABNORMAL
SERVICE CMNT-IMP: ABNORMAL
SODIUM BLD-SCNC: 142 MMOL/L (ref 136–145)
SODIUM SERPL-SCNC: 137 MMOL/L (ref 136–145)
SPECIMEN EXP DATE BLD: NORMAL
SPECIMEN SITE: ABNORMAL
TROPONIN I SERPL HS-MCNC: 6 NG/L (ref 0–76)
UNIT DIVISION: 0
UNIT ISSUE DATE/TIME: NORMAL
URATE SERPL-MCNC: 12.7 MG/DL (ref 3.5–7.2)
WBC # BLD AUTO: 142.2 K/UL (ref 4.1–11.1)
WBC MORPH BLD: ABNORMAL

## 2025-07-26 PROCEDURE — 83605 ASSAY OF LACTIC ACID: CPT

## 2025-07-26 PROCEDURE — 85025 COMPLETE CBC W/AUTO DIFF WBC: CPT

## 2025-07-26 PROCEDURE — 82803 BLOOD GASES ANY COMBINATION: CPT

## 2025-07-26 PROCEDURE — P9040 RBC LEUKOREDUCED IRRADIATED: HCPCS

## 2025-07-26 PROCEDURE — 96375 TX/PRO/DX INJ NEW DRUG ADDON: CPT

## 2025-07-26 PROCEDURE — 2580000003 HC RX 258: Performed by: EMERGENCY MEDICINE

## 2025-07-26 PROCEDURE — 82947 ASSAY GLUCOSE BLOOD QUANT: CPT

## 2025-07-26 PROCEDURE — 6370000000 HC RX 637 (ALT 250 FOR IP): Performed by: INTERNAL MEDICINE

## 2025-07-26 PROCEDURE — 6370000000 HC RX 637 (ALT 250 FOR IP): Performed by: STUDENT IN AN ORGANIZED HEALTH CARE EDUCATION/TRAINING PROGRAM

## 2025-07-26 PROCEDURE — 2060000000 HC ICU INTERMEDIATE R&B

## 2025-07-26 PROCEDURE — 84550 ASSAY OF BLOOD/URIC ACID: CPT

## 2025-07-26 PROCEDURE — 86901 BLOOD TYPING SEROLOGIC RH(D): CPT

## 2025-07-26 PROCEDURE — 84100 ASSAY OF PHOSPHORUS: CPT

## 2025-07-26 PROCEDURE — 86923 COMPATIBILITY TEST ELECTRIC: CPT

## 2025-07-26 PROCEDURE — 93005 ELECTROCARDIOGRAM TRACING: CPT | Performed by: EMERGENCY MEDICINE

## 2025-07-26 PROCEDURE — 83735 ASSAY OF MAGNESIUM: CPT

## 2025-07-26 PROCEDURE — 99285 EMERGENCY DEPT VISIT HI MDM: CPT

## 2025-07-26 PROCEDURE — 99233 SBSQ HOSP IP/OBS HIGH 50: CPT | Performed by: STUDENT IN AN ORGANIZED HEALTH CARE EDUCATION/TRAINING PROGRAM

## 2025-07-26 PROCEDURE — 2500000003 HC RX 250 WO HCPCS: Performed by: INTERNAL MEDICINE

## 2025-07-26 PROCEDURE — 87154 CUL TYP ID BLD PTHGN 6+ TRGT: CPT

## 2025-07-26 PROCEDURE — 94761 N-INVAS EAR/PLS OXIMETRY MLT: CPT

## 2025-07-26 PROCEDURE — 86900 BLOOD TYPING SEROLOGIC ABO: CPT

## 2025-07-26 PROCEDURE — 84295 ASSAY OF SERUM SODIUM: CPT

## 2025-07-26 PROCEDURE — 71045 X-RAY EXAM CHEST 1 VIEW: CPT

## 2025-07-26 PROCEDURE — 2580000003 HC RX 258: Performed by: INTERNAL MEDICINE

## 2025-07-26 PROCEDURE — 84484 ASSAY OF TROPONIN QUANT: CPT

## 2025-07-26 PROCEDURE — 6360000002 HC RX W HCPCS: Performed by: EMERGENCY MEDICINE

## 2025-07-26 PROCEDURE — 85014 HEMATOCRIT: CPT

## 2025-07-26 PROCEDURE — 70450 CT HEAD/BRAIN W/O DYE: CPT

## 2025-07-26 PROCEDURE — 96365 THER/PROPH/DIAG IV INF INIT: CPT

## 2025-07-26 PROCEDURE — 36430 TRANSFUSION BLD/BLD COMPNT: CPT

## 2025-07-26 PROCEDURE — 86850 RBC ANTIBODY SCREEN: CPT

## 2025-07-26 PROCEDURE — 36415 COLL VENOUS BLD VENIPUNCTURE: CPT

## 2025-07-26 PROCEDURE — 80053 COMPREHEN METABOLIC PANEL: CPT

## 2025-07-26 PROCEDURE — 30233N1 TRANSFUSION OF NONAUTOLOGOUS RED BLOOD CELLS INTO PERIPHERAL VEIN, PERCUTANEOUS APPROACH: ICD-10-PCS | Performed by: INTERNAL MEDICINE

## 2025-07-26 PROCEDURE — 85018 HEMOGLOBIN: CPT

## 2025-07-26 PROCEDURE — 96361 HYDRATE IV INFUSION ADD-ON: CPT

## 2025-07-26 PROCEDURE — 87040 BLOOD CULTURE FOR BACTERIA: CPT

## 2025-07-26 PROCEDURE — 84132 ASSAY OF SERUM POTASSIUM: CPT

## 2025-07-26 PROCEDURE — 82330 ASSAY OF CALCIUM: CPT

## 2025-07-26 RX ORDER — ONDANSETRON 2 MG/ML
4 INJECTION INTRAMUSCULAR; INTRAVENOUS EVERY 6 HOURS PRN
Status: DISCONTINUED | OUTPATIENT
Start: 2025-07-26 | End: 2025-08-01 | Stop reason: HOSPADM

## 2025-07-26 RX ORDER — ACETAMINOPHEN 650 MG/1
650 SUPPOSITORY RECTAL EVERY 6 HOURS PRN
Status: DISCONTINUED | OUTPATIENT
Start: 2025-07-26 | End: 2025-08-01 | Stop reason: HOSPADM

## 2025-07-26 RX ORDER — SODIUM CHLORIDE 9 MG/ML
INJECTION, SOLUTION INTRAVENOUS PRN
Status: DISCONTINUED | OUTPATIENT
Start: 2025-07-26 | End: 2025-07-28

## 2025-07-26 RX ORDER — ACETAMINOPHEN 325 MG/1
650 TABLET ORAL EVERY 6 HOURS PRN
Status: DISCONTINUED | OUTPATIENT
Start: 2025-07-26 | End: 2025-08-01 | Stop reason: HOSPADM

## 2025-07-26 RX ORDER — SODIUM CHLORIDE 9 MG/ML
INJECTION, SOLUTION INTRAVENOUS CONTINUOUS
Status: DISCONTINUED | OUTPATIENT
Start: 2025-07-26 | End: 2025-07-26

## 2025-07-26 RX ORDER — 0.9 % SODIUM CHLORIDE 0.9 %
1000 INTRAVENOUS SOLUTION INTRAVENOUS ONCE
Status: COMPLETED | OUTPATIENT
Start: 2025-07-26 | End: 2025-07-26

## 2025-07-26 RX ORDER — PANTOPRAZOLE SODIUM 40 MG/1
40 TABLET, DELAYED RELEASE ORAL
Status: DISCONTINUED | OUTPATIENT
Start: 2025-07-26 | End: 2025-07-28

## 2025-07-26 RX ORDER — ONDANSETRON 4 MG/1
4 TABLET, ORALLY DISINTEGRATING ORAL EVERY 8 HOURS PRN
Status: DISCONTINUED | OUTPATIENT
Start: 2025-07-26 | End: 2025-08-01 | Stop reason: HOSPADM

## 2025-07-26 RX ORDER — SODIUM CHLORIDE 9 MG/ML
INJECTION, SOLUTION INTRAVENOUS PRN
Status: DISCONTINUED | OUTPATIENT
Start: 2025-07-26 | End: 2025-08-01 | Stop reason: HOSPADM

## 2025-07-26 RX ORDER — SODIUM CHLORIDE 0.9 % (FLUSH) 0.9 %
5-40 SYRINGE (ML) INJECTION PRN
Status: DISCONTINUED | OUTPATIENT
Start: 2025-07-26 | End: 2025-08-01 | Stop reason: HOSPADM

## 2025-07-26 RX ORDER — SODIUM CHLORIDE 0.9 % (FLUSH) 0.9 %
5-40 SYRINGE (ML) INJECTION EVERY 12 HOURS SCHEDULED
Status: DISCONTINUED | OUTPATIENT
Start: 2025-07-26 | End: 2025-08-01 | Stop reason: HOSPADM

## 2025-07-26 RX ORDER — HYDROXYUREA 500 MG/1
2000 CAPSULE ORAL 2 TIMES DAILY
Status: DISCONTINUED | OUTPATIENT
Start: 2025-07-26 | End: 2025-07-27

## 2025-07-26 RX ORDER — SODIUM CHLORIDE, SODIUM LACTATE, POTASSIUM CHLORIDE, CALCIUM CHLORIDE 600; 310; 30; 20 MG/100ML; MG/100ML; MG/100ML; MG/100ML
INJECTION, SOLUTION INTRAVENOUS CONTINUOUS
Status: DISCONTINUED | OUTPATIENT
Start: 2025-07-26 | End: 2025-07-27

## 2025-07-26 RX ORDER — POLYETHYLENE GLYCOL 3350 17 G/17G
17 POWDER, FOR SOLUTION ORAL DAILY PRN
Status: DISCONTINUED | OUTPATIENT
Start: 2025-07-26 | End: 2025-08-01 | Stop reason: HOSPADM

## 2025-07-26 RX ADMIN — PIPERACILLIN AND TAZOBACTAM 4500 MG: 4; .5 INJECTION, POWDER, FOR SOLUTION INTRAVENOUS at 09:44

## 2025-07-26 RX ADMIN — SODIUM CHLORIDE, SODIUM LACTATE, POTASSIUM CHLORIDE, AND CALCIUM CHLORIDE: .6; .31; .03; .02 INJECTION, SOLUTION INTRAVENOUS at 13:12

## 2025-07-26 RX ADMIN — ACETAMINOPHEN 650 MG: 325 TABLET ORAL at 19:25

## 2025-07-26 RX ADMIN — PANTOPRAZOLE SODIUM 40 MG: 40 TABLET, DELAYED RELEASE ORAL at 17:06

## 2025-07-26 RX ADMIN — ONDANSETRON 4 MG: 2 INJECTION, SOLUTION INTRAMUSCULAR; INTRAVENOUS at 09:05

## 2025-07-26 RX ADMIN — HYDROXYUREA 2000 MG: 500 CAPSULE ORAL at 19:25

## 2025-07-26 RX ADMIN — SODIUM CHLORIDE 1000 ML: 0.9 INJECTION, SOLUTION INTRAVENOUS at 08:53

## 2025-07-26 RX ADMIN — SODIUM CHLORIDE 1000 ML: 0.9 INJECTION, SOLUTION INTRAVENOUS at 09:46

## 2025-07-26 RX ADMIN — SODIUM CHLORIDE, PRESERVATIVE FREE 10 ML: 5 INJECTION INTRAVENOUS at 19:25

## 2025-07-26 RX ADMIN — HYDROXYUREA 2000 MG: 500 CAPSULE ORAL at 13:05

## 2025-07-26 RX ADMIN — SODIUM CHLORIDE 1000 ML: 0.9 INJECTION, SOLUTION INTRAVENOUS at 10:51

## 2025-07-26 ASSESSMENT — PAIN DESCRIPTION - ORIENTATION: ORIENTATION: RIGHT

## 2025-07-26 ASSESSMENT — PAIN - FUNCTIONAL ASSESSMENT
PAIN_FUNCTIONAL_ASSESSMENT: NONE - DENIES PAIN
PAIN_FUNCTIONAL_ASSESSMENT: NONE - DENIES PAIN

## 2025-07-26 ASSESSMENT — PAIN DESCRIPTION - LOCATION: LOCATION: ARM

## 2025-07-26 ASSESSMENT — PAIN SCALES - GENERAL: PAINLEVEL_OUTOF10: 10

## 2025-07-27 ENCOUNTER — APPOINTMENT (OUTPATIENT)
Facility: HOSPITAL | Age: 80
DRG: 834 | End: 2025-07-27
Payer: MEDICARE

## 2025-07-27 PROBLEM — N17.9 ACUTE RENAL FAILURE: Status: ACTIVE | Noted: 2025-07-27

## 2025-07-27 LAB
ACB COMPLEX DNA BLD POS QL NAA+NON-PROBE: NOT DETECTED
ACCESSION NUMBER, LLC1M: ABNORMAL
ALBUMIN SERPL-MCNC: 2.8 G/DL (ref 3.5–5)
ALBUMIN/GLOB SERPL: 0.8 (ref 1.1–2.2)
ALP SERPL-CCNC: 76 U/L (ref 45–117)
ALT SERPL-CCNC: 33 U/L (ref 12–78)
ANION GAP SERPL CALC-SCNC: 6 MMOL/L (ref 2–12)
AST SERPL-CCNC: 64 U/L (ref 15–37)
B FRAGILIS DNA BLD POS QL NAA+NON-PROBE: NOT DETECTED
BASOPHILS # BLD: 1.04 K/UL (ref 0–0.1)
BASOPHILS NFR BLD: 2 % (ref 0–1)
BILIRUB SERPL-MCNC: 0.6 MG/DL (ref 0.2–1)
BIOFIRE TEST COMMENT: ABNORMAL
BLASTS NFR BLD MANUAL: 16 %
BUN SERPL-MCNC: 43 MG/DL (ref 6–20)
BUN/CREAT SERPL: 16 (ref 12–20)
C ALBICANS DNA BLD POS QL NAA+NON-PROBE: NOT DETECTED
C AURIS DNA BLD POS QL NAA+NON-PROBE: NOT DETECTED
C GATTII+NEOFOR DNA BLD POS QL NAA+N-PRB: NOT DETECTED
C GLABRATA DNA BLD POS QL NAA+NON-PROBE: NOT DETECTED
C KRUSEI DNA BLD POS QL NAA+NON-PROBE: NOT DETECTED
C PARAP DNA BLD POS QL NAA+NON-PROBE: NOT DETECTED
C TROPICLS DNA BLD POS QL NAA+NON-PROBE: NOT DETECTED
CALCIUM SERPL-MCNC: 7.1 MG/DL (ref 8.5–10.1)
CHLORIDE SERPL-SCNC: 115 MMOL/L (ref 97–108)
CO2 SERPL-SCNC: 17 MMOL/L (ref 21–32)
CREAT SERPL-MCNC: 2.69 MG/DL (ref 0.7–1.3)
DIFFERENTIAL METHOD BLD: ABNORMAL
E CLOAC COMP DNA BLD POS NAA+NON-PROBE: NOT DETECTED
E COLI DNA BLD POS QL NAA+NON-PROBE: NOT DETECTED
E FAECALIS DNA BLD POS QL NAA+NON-PROBE: NOT DETECTED
E FAECIUM DNA BLD POS QL NAA+NON-PROBE: NOT DETECTED
ENTEROBACTERALES DNA BLD POS NAA+N-PRB: NOT DETECTED
EOSINOPHIL # BLD: 0.52 K/UL (ref 0–0.4)
EOSINOPHIL NFR BLD: 1 % (ref 0–7)
ERYTHROCYTE [DISTWIDTH] IN BLOOD BY AUTOMATED COUNT: 16.7 % (ref 11.5–14.5)
GLOBULIN SER CALC-MCNC: 3.7 G/DL (ref 2–4)
GLUCOSE SERPL-MCNC: 80 MG/DL (ref 65–100)
GP B STREP DNA BLD POS QL NAA+NON-PROBE: NOT DETECTED
HAEM INFLU DNA BLD POS QL NAA+NON-PROBE: NOT DETECTED
HCT VFR BLD AUTO: 21.3 % (ref 36.6–50.3)
HCT VFR BLD AUTO: 28.4 % (ref 36.6–50.3)
HGB BLD-MCNC: 6.6 G/DL (ref 12.1–17)
HGB BLD-MCNC: 9 G/DL (ref 12.1–17)
HISTORY CHECK: NORMAL
IMM GRANULOCYTES # BLD AUTO: 0 K/UL (ref 0–0.04)
IMM GRANULOCYTES NFR BLD AUTO: 0 % (ref 0–0.5)
K OXYTOCA DNA BLD POS QL NAA+NON-PROBE: NOT DETECTED
KLEBSIELLA SP DNA BLD POS QL NAA+NON-PRB: NOT DETECTED
KLEBSIELLA SP DNA BLD POS QL NAA+NON-PRB: NOT DETECTED
L MONOCYTOG DNA BLD POS QL NAA+NON-PROBE: NOT DETECTED
LYMPHOCYTES # BLD: 5.2 K/UL (ref 0.8–3.5)
LYMPHOCYTES NFR BLD: 10 % (ref 12–49)
MAGNESIUM SERPL-MCNC: 2.3 MG/DL (ref 1.6–2.4)
MCH RBC QN AUTO: 25.6 PG (ref 26–34)
MCHC RBC AUTO-ENTMCNC: 31 G/DL (ref 30–36.5)
MCV RBC AUTO: 82.6 FL (ref 80–99)
METAMYELOCYTES NFR BLD MANUAL: 6 %
MONOCYTES # BLD: 21.84 K/UL (ref 0–1)
MONOCYTES NFR BLD: 42 % (ref 5–13)
MYELOCYTES NFR BLD MANUAL: 2 %
N MEN DNA BLD POS QL NAA+NON-PROBE: NOT DETECTED
NEUTS BAND NFR BLD MANUAL: 2 %
NEUTS SEG # BLD: 10.92 K/UL (ref 1.8–8)
NEUTS SEG NFR BLD: 19 % (ref 32–75)
NRBC # BLD: 2.09 K/UL (ref 0–0.01)
NRBC BLD-RTO: 4 PER 100 WBC
P AERUGINOSA DNA BLD POS NAA+NON-PROBE: NOT DETECTED
PHOSPHATE SERPL-MCNC: 5.3 MG/DL (ref 2.6–4.7)
PHOSPHATE SERPL-MCNC: 5.4 MG/DL (ref 2.6–4.7)
PLATELET # BLD AUTO: 36 K/UL (ref 150–400)
POTASSIUM SERPL-SCNC: 4.5 MMOL/L (ref 3.5–5.1)
PROT SERPL-MCNC: 6.5 G/DL (ref 6.4–8.2)
PROTEUS SP DNA BLD POS QL NAA+NON-PROBE: NOT DETECTED
RBC # BLD AUTO: 2.58 M/UL (ref 4.1–5.7)
RBC MORPH BLD: ABNORMAL
RESISTANT GENE TARGETS: ABNORMAL
S AUREUS DNA BLD POS QL NAA+NON-PROBE: NOT DETECTED
S AUREUS+CONS DNA BLD POS NAA+NON-PROBE: NOT DETECTED
S EPIDERMIDIS DNA BLD POS QL NAA+NON-PRB: NOT DETECTED
S LUGDUNENSIS DNA BLD POS QL NAA+NON-PRB: NOT DETECTED
S MALTOPHILIA DNA BLD POS QL NAA+NON-PRB: NOT DETECTED
S MARCESCENS DNA BLD POS NAA+NON-PROBE: NOT DETECTED
S PNEUM DNA BLD POS QL NAA+NON-PROBE: NOT DETECTED
S PYO DNA BLD POS QL NAA+NON-PROBE: NOT DETECTED
SALMONELLA DNA BLD POS QL NAA+NON-PROBE: NOT DETECTED
SODIUM SERPL-SCNC: 138 MMOL/L (ref 136–145)
STREPTOCOCCUS DNA BLD POS NAA+NON-PROBE: DETECTED
URATE SERPL-MCNC: 12.9 MG/DL (ref 3.5–7.2)
URATE SERPL-MCNC: 14.8 MG/DL (ref 3.5–7.2)
WBC # BLD AUTO: 52 K/UL (ref 4.1–11.1)
WBC MORPH BLD: ABNORMAL

## 2025-07-27 PROCEDURE — 2060000000 HC ICU INTERMEDIATE R&B

## 2025-07-27 PROCEDURE — 6360000002 HC RX W HCPCS: Performed by: STUDENT IN AN ORGANIZED HEALTH CARE EDUCATION/TRAINING PROGRAM

## 2025-07-27 PROCEDURE — 85025 COMPLETE CBC W/AUTO DIFF WBC: CPT

## 2025-07-27 PROCEDURE — 84100 ASSAY OF PHOSPHORUS: CPT

## 2025-07-27 PROCEDURE — 36430 TRANSFUSION BLD/BLD COMPNT: CPT

## 2025-07-27 PROCEDURE — 84550 ASSAY OF BLOOD/URIC ACID: CPT

## 2025-07-27 PROCEDURE — 6360000002 HC RX W HCPCS: Performed by: INTERNAL MEDICINE

## 2025-07-27 PROCEDURE — 2700000000 HC OXYGEN THERAPY PER DAY

## 2025-07-27 PROCEDURE — 2580000003 HC RX 258: Performed by: INTERNAL MEDICINE

## 2025-07-27 PROCEDURE — 85014 HEMATOCRIT: CPT

## 2025-07-27 PROCEDURE — 2500000003 HC RX 250 WO HCPCS: Performed by: INTERNAL MEDICINE

## 2025-07-27 PROCEDURE — 6370000000 HC RX 637 (ALT 250 FOR IP): Performed by: NURSE PRACTITIONER

## 2025-07-27 PROCEDURE — 2580000003 HC RX 258: Performed by: STUDENT IN AN ORGANIZED HEALTH CARE EDUCATION/TRAINING PROGRAM

## 2025-07-27 PROCEDURE — P9040 RBC LEUKOREDUCED IRRADIATED: HCPCS

## 2025-07-27 PROCEDURE — 80053 COMPREHEN METABOLIC PANEL: CPT

## 2025-07-27 PROCEDURE — 99232 SBSQ HOSP IP/OBS MODERATE 35: CPT | Performed by: STUDENT IN AN ORGANIZED HEALTH CARE EDUCATION/TRAINING PROGRAM

## 2025-07-27 PROCEDURE — 36415 COLL VENOUS BLD VENIPUNCTURE: CPT

## 2025-07-27 PROCEDURE — 83735 ASSAY OF MAGNESIUM: CPT

## 2025-07-27 PROCEDURE — 94761 N-INVAS EAR/PLS OXIMETRY MLT: CPT

## 2025-07-27 PROCEDURE — 71045 X-RAY EXAM CHEST 1 VIEW: CPT

## 2025-07-27 PROCEDURE — 6370000000 HC RX 637 (ALT 250 FOR IP): Performed by: INTERNAL MEDICINE

## 2025-07-27 PROCEDURE — 6370000000 HC RX 637 (ALT 250 FOR IP): Performed by: STUDENT IN AN ORGANIZED HEALTH CARE EDUCATION/TRAINING PROGRAM

## 2025-07-27 PROCEDURE — 85018 HEMOGLOBIN: CPT

## 2025-07-27 RX ORDER — OXYCODONE HYDROCHLORIDE 5 MG/1
5 TABLET ORAL ONCE
Refills: 0 | Status: COMPLETED | OUTPATIENT
Start: 2025-07-27 | End: 2025-07-27

## 2025-07-27 RX ORDER — LEVOFLOXACIN 5 MG/ML
750 INJECTION, SOLUTION INTRAVENOUS EVERY 24 HOURS
Status: DISCONTINUED | OUTPATIENT
Start: 2025-07-27 | End: 2025-07-27

## 2025-07-27 RX ORDER — HYDROXYUREA 500 MG/1
1000 CAPSULE ORAL 2 TIMES DAILY
Status: DISCONTINUED | OUTPATIENT
Start: 2025-07-27 | End: 2025-08-01 | Stop reason: HOSPADM

## 2025-07-27 RX ORDER — OXYCODONE AND ACETAMINOPHEN 7.5; 325 MG/1; MG/1
1 TABLET ORAL EVERY 4 HOURS PRN
Refills: 0 | Status: DISCONTINUED | OUTPATIENT
Start: 2025-07-27 | End: 2025-07-31

## 2025-07-27 RX ORDER — MORPHINE SULFATE 2 MG/ML
2 INJECTION, SOLUTION INTRAMUSCULAR; INTRAVENOUS EVERY 4 HOURS PRN
Refills: 0 | Status: DISCONTINUED | OUTPATIENT
Start: 2025-07-27 | End: 2025-07-28

## 2025-07-27 RX ORDER — SODIUM CHLORIDE 9 MG/ML
INJECTION, SOLUTION INTRAVENOUS PRN
Status: DISCONTINUED | OUTPATIENT
Start: 2025-07-27 | End: 2025-07-28

## 2025-07-27 RX ADMIN — WATER 2000 MG: 1 INJECTION INTRAMUSCULAR; INTRAVENOUS; SUBCUTANEOUS at 08:23

## 2025-07-27 RX ADMIN — ACETAMINOPHEN 650 MG: 325 TABLET ORAL at 12:19

## 2025-07-27 RX ADMIN — HYDROXYUREA 1000 MG: 500 CAPSULE ORAL at 20:26

## 2025-07-27 RX ADMIN — PANTOPRAZOLE SODIUM 40 MG: 40 TABLET, DELAYED RELEASE ORAL at 17:19

## 2025-07-27 RX ADMIN — DOXYCYCLINE 100 MG: 100 INJECTION, POWDER, LYOPHILIZED, FOR SOLUTION INTRAVENOUS at 22:49

## 2025-07-27 RX ADMIN — MORPHINE SULFATE 2 MG: 2 INJECTION, SOLUTION INTRAMUSCULAR; INTRAVENOUS at 17:22

## 2025-07-27 RX ADMIN — SODIUM BICARBONATE: 84 INJECTION, SOLUTION INTRAVENOUS at 12:19

## 2025-07-27 RX ADMIN — DOXYCYCLINE 100 MG: 100 INJECTION, POWDER, LYOPHILIZED, FOR SOLUTION INTRAVENOUS at 12:14

## 2025-07-27 RX ADMIN — MORPHINE SULFATE 2 MG: 2 INJECTION, SOLUTION INTRAMUSCULAR; INTRAVENOUS at 23:43

## 2025-07-27 RX ADMIN — ONDANSETRON 4 MG: 2 INJECTION, SOLUTION INTRAMUSCULAR; INTRAVENOUS at 08:45

## 2025-07-27 RX ADMIN — ACETAMINOPHEN 650 MG: 325 TABLET ORAL at 23:41

## 2025-07-27 RX ADMIN — MORPHINE SULFATE 2 MG: 2 INJECTION, SOLUTION INTRAMUSCULAR; INTRAVENOUS at 08:45

## 2025-07-27 RX ADMIN — OXYCODONE 5 MG: 5 TABLET ORAL at 00:38

## 2025-07-27 RX ADMIN — HYDROXYUREA 2000 MG: 500 CAPSULE ORAL at 08:24

## 2025-07-27 RX ADMIN — SODIUM CHLORIDE 3 MG: 9 INJECTION, SOLUTION INTRAVENOUS at 12:13

## 2025-07-27 RX ADMIN — SODIUM CHLORIDE, PRESERVATIVE FREE 10 ML: 5 INJECTION INTRAVENOUS at 20:28

## 2025-07-27 RX ADMIN — PANTOPRAZOLE SODIUM 40 MG: 40 TABLET, DELAYED RELEASE ORAL at 05:51

## 2025-07-27 RX ADMIN — SODIUM CHLORIDE, PRESERVATIVE FREE 10 ML: 5 INJECTION INTRAVENOUS at 08:23

## 2025-07-27 ASSESSMENT — PAIN SCALES - GENERAL
PAINLEVEL_OUTOF10: 4
PAINLEVEL_OUTOF10: 10
PAINLEVEL_OUTOF10: 0
PAINLEVEL_OUTOF10: 10
PAINLEVEL_OUTOF10: 10
PAINLEVEL_OUTOF10: 5
PAINLEVEL_OUTOF10: 8
PAINLEVEL_OUTOF10: 10

## 2025-07-27 ASSESSMENT — PAIN DESCRIPTION - LOCATION
LOCATION: CHEST
LOCATION: OTHER (COMMENT)

## 2025-07-28 PROBLEM — C92.00 ACUTE MYELOID LEUKEMIA NOT HAVING ACHIEVED REMISSION (HCC): Status: ACTIVE | Noted: 2025-07-26

## 2025-07-28 PROBLEM — D64.9 ANEMIA REQUIRING TRANSFUSIONS: Status: ACTIVE | Noted: 2025-07-28

## 2025-07-28 PROBLEM — Z73.0 BURNOUT OF CAREGIVER: Status: ACTIVE | Noted: 2025-07-28

## 2025-07-28 PROBLEM — R06.02 SHORTNESS OF BREATH: Status: ACTIVE | Noted: 2025-07-28

## 2025-07-28 PROBLEM — Z51.5 PALLIATIVE CARE BY SPECIALIST: Status: ACTIVE | Noted: 2025-07-28

## 2025-07-28 PROBLEM — Z51.5 END OF LIFE CARE: Status: ACTIVE | Noted: 2025-07-28

## 2025-07-28 LAB
ABO + RH BLD: NORMAL
ANION GAP SERPL CALC-SCNC: 7 MMOL/L (ref 2–12)
BACTERIA SPEC CULT: ABNORMAL
BACTERIA SPEC CULT: ABNORMAL
BASOPHILS # BLD: 0.46 K/UL (ref 0–0.1)
BASOPHILS NFR BLD: 1 % (ref 0–1)
BLASTS NFR BLD MANUAL: 8 %
BLD PROD TYP BPU: NORMAL
BLOOD BANK BLOOD PRODUCT EXPIRATION DATE: NORMAL
BLOOD BANK DISPENSE STATUS: NORMAL
BLOOD BANK ISBT PRODUCT BLOOD TYPE: 5100
BLOOD BANK ISBT PRODUCT BLOOD TYPE: 6200
BLOOD BANK ISBT PRODUCT BLOOD TYPE: 6200
BLOOD BANK PRODUCT CODE: NORMAL
BLOOD BANK UNIT TYPE AND RH: NORMAL
BLOOD GROUP ANTIBODIES SERPL: NORMAL
BPU ID: NORMAL
BUN SERPL-MCNC: 32 MG/DL (ref 6–20)
BUN/CREAT SERPL: 14 (ref 12–20)
CALCIUM SERPL-MCNC: 7.4 MG/DL (ref 8.5–10.1)
CHLORIDE SERPL-SCNC: 108 MMOL/L (ref 97–108)
CO2 SERPL-SCNC: 24 MMOL/L (ref 21–32)
CREAT SERPL-MCNC: 2.34 MG/DL (ref 0.7–1.3)
CROSSMATCH RESULT: NORMAL
DIFFERENTIAL METHOD BLD: ABNORMAL
EKG ATRIAL RATE: 111 BPM
EKG DIAGNOSIS: NORMAL
EKG P AXIS: 13 DEGREES
EKG P-R INTERVAL: 134 MS
EKG Q-T INTERVAL: 352 MS
EKG QRS DURATION: 84 MS
EKG QTC CALCULATION (BAZETT): 478 MS
EKG R AXIS: -1 DEGREES
EKG T AXIS: 32 DEGREES
EKG VENTRICULAR RATE: 111 BPM
EOSINOPHIL # BLD: 0 K/UL (ref 0–0.4)
EOSINOPHIL NFR BLD: 0 % (ref 0–7)
ERYTHROCYTE [DISTWIDTH] IN BLOOD BY AUTOMATED COUNT: 17 % (ref 11.5–14.5)
GLUCOSE SERPL-MCNC: 113 MG/DL (ref 65–100)
HCT VFR BLD AUTO: 27.3 % (ref 36.6–50.3)
HGB BLD-MCNC: 8.7 G/DL (ref 12.1–17)
IMM GRANULOCYTES # BLD AUTO: 0 K/UL (ref 0–0.04)
IMM GRANULOCYTES NFR BLD AUTO: 0 % (ref 0–0.5)
LYMPHOCYTES # BLD: 1.39 K/UL (ref 0.8–3.5)
LYMPHOCYTES NFR BLD: 3 % (ref 12–49)
MCH RBC QN AUTO: 26 PG (ref 26–34)
MCHC RBC AUTO-ENTMCNC: 31.9 G/DL (ref 30–36.5)
MCV RBC AUTO: 81.7 FL (ref 80–99)
METAMYELOCYTES NFR BLD MANUAL: 6 %
MONOCYTES # BLD: 19.95 K/UL (ref 0–1)
MONOCYTES NFR BLD: 43 % (ref 5–13)
MYELOCYTES NFR BLD MANUAL: 2 %
NEUTS BAND NFR BLD MANUAL: 3 %
NEUTS SEG # BLD: 17.17 K/UL (ref 1.8–8)
NEUTS SEG NFR BLD: 34 % (ref 32–75)
NRBC # BLD: 1.21 K/UL (ref 0–0.01)
NRBC BLD-RTO: 2.6 PER 100 WBC
NT PRO BNP: 2397 PG/ML
PHOSPHATE SERPL-MCNC: 4.1 MG/DL (ref 2.6–4.7)
PHOSPHATE SERPL-MCNC: 5.5 MG/DL (ref 2.6–4.7)
PLATELET # BLD AUTO: 32 K/UL (ref 150–400)
PLATELET COMMENT: ABNORMAL
POTASSIUM SERPL-SCNC: 3.7 MMOL/L (ref 3.5–5.1)
RBC # BLD AUTO: 3.34 M/UL (ref 4.1–5.7)
RBC MORPH BLD: ABNORMAL
SERVICE CMNT-IMP: ABNORMAL
SODIUM SERPL-SCNC: 139 MMOL/L (ref 136–145)
SPECIMEN EXP DATE BLD: NORMAL
UNIT DIVISION: 0
UNIT ISSUE DATE/TIME: NORMAL
URATE SERPL-MCNC: 4.8 MG/DL (ref 3.5–7.2)
URATE SERPL-MCNC: 7.1 MG/DL (ref 3.5–7.2)
WBC # BLD AUTO: 46.4 K/UL (ref 4.1–11.1)

## 2025-07-28 PROCEDURE — 2700000000 HC OXYGEN THERAPY PER DAY

## 2025-07-28 PROCEDURE — 6360000002 HC RX W HCPCS: Performed by: STUDENT IN AN ORGANIZED HEALTH CARE EDUCATION/TRAINING PROGRAM

## 2025-07-28 PROCEDURE — 6370000000 HC RX 637 (ALT 250 FOR IP): Performed by: STUDENT IN AN ORGANIZED HEALTH CARE EDUCATION/TRAINING PROGRAM

## 2025-07-28 PROCEDURE — 93010 ELECTROCARDIOGRAM REPORT: CPT | Performed by: STUDENT IN AN ORGANIZED HEALTH CARE EDUCATION/TRAINING PROGRAM

## 2025-07-28 PROCEDURE — 2500000003 HC RX 250 WO HCPCS: Performed by: INTERNAL MEDICINE

## 2025-07-28 PROCEDURE — 83880 ASSAY OF NATRIURETIC PEPTIDE: CPT

## 2025-07-28 PROCEDURE — 80048 BASIC METABOLIC PNL TOTAL CA: CPT

## 2025-07-28 PROCEDURE — 99497 ADVNCD CARE PLAN 30 MIN: CPT | Performed by: STUDENT IN AN ORGANIZED HEALTH CARE EDUCATION/TRAINING PROGRAM

## 2025-07-28 PROCEDURE — 84100 ASSAY OF PHOSPHORUS: CPT

## 2025-07-28 PROCEDURE — 2060000000 HC ICU INTERMEDIATE R&B

## 2025-07-28 PROCEDURE — 94761 N-INVAS EAR/PLS OXIMETRY MLT: CPT

## 2025-07-28 PROCEDURE — 6360000002 HC RX W HCPCS: Performed by: INTERNAL MEDICINE

## 2025-07-28 PROCEDURE — 85025 COMPLETE CBC W/AUTO DIFF WBC: CPT

## 2025-07-28 PROCEDURE — 87040 BLOOD CULTURE FOR BACTERIA: CPT

## 2025-07-28 PROCEDURE — 6370000000 HC RX 637 (ALT 250 FOR IP): Performed by: INTERNAL MEDICINE

## 2025-07-28 PROCEDURE — 99233 SBSQ HOSP IP/OBS HIGH 50: CPT | Performed by: INTERNAL MEDICINE

## 2025-07-28 PROCEDURE — 99223 1ST HOSP IP/OBS HIGH 75: CPT | Performed by: STUDENT IN AN ORGANIZED HEALTH CARE EDUCATION/TRAINING PROGRAM

## 2025-07-28 PROCEDURE — 36415 COLL VENOUS BLD VENIPUNCTURE: CPT

## 2025-07-28 PROCEDURE — 84550 ASSAY OF BLOOD/URIC ACID: CPT

## 2025-07-28 PROCEDURE — 2580000003 HC RX 258: Performed by: INTERNAL MEDICINE

## 2025-07-28 RX ORDER — KETOROLAC TROMETHAMINE 30 MG/ML
15 INJECTION, SOLUTION INTRAMUSCULAR; INTRAVENOUS EVERY 6 HOURS PRN
Status: DISCONTINUED | OUTPATIENT
Start: 2025-07-28 | End: 2025-08-01 | Stop reason: HOSPADM

## 2025-07-28 RX ORDER — GLYCOPYRROLATE 0.2 MG/ML
0.2 INJECTION INTRAMUSCULAR; INTRAVENOUS EVERY 4 HOURS PRN
Status: DISCONTINUED | OUTPATIENT
Start: 2025-07-28 | End: 2025-08-01 | Stop reason: HOSPADM

## 2025-07-28 RX ORDER — FUROSEMIDE 10 MG/ML
20 INJECTION INTRAMUSCULAR; INTRAVENOUS ONCE
Status: COMPLETED | OUTPATIENT
Start: 2025-07-28 | End: 2025-07-28

## 2025-07-28 RX ORDER — DIAZEPAM 10 MG/2ML
2.5 INJECTION, SOLUTION INTRAMUSCULAR; INTRAVENOUS EVERY 30 MIN PRN
Status: DISCONTINUED | OUTPATIENT
Start: 2025-07-28 | End: 2025-08-01 | Stop reason: HOSPADM

## 2025-07-28 RX ADMIN — SODIUM BICARBONATE: 84 INJECTION, SOLUTION INTRAVENOUS at 14:09

## 2025-07-28 RX ADMIN — HYDROXYUREA 1000 MG: 500 CAPSULE ORAL at 08:26

## 2025-07-28 RX ADMIN — WATER 2000 MG: 1 INJECTION INTRAMUSCULAR; INTRAVENOUS; SUBCUTANEOUS at 08:26

## 2025-07-28 RX ADMIN — SODIUM CHLORIDE, PRESERVATIVE FREE 10 ML: 5 INJECTION INTRAVENOUS at 20:09

## 2025-07-28 RX ADMIN — MORPHINE SULFATE 2 MG: 2 INJECTION, SOLUTION INTRAMUSCULAR; INTRAVENOUS at 05:59

## 2025-07-28 RX ADMIN — HYDROMORPHONE HYDROCHLORIDE 0.5 MG: 1 INJECTION, SOLUTION INTRAMUSCULAR; INTRAVENOUS; SUBCUTANEOUS at 23:38

## 2025-07-28 RX ADMIN — SODIUM BICARBONATE: 84 INJECTION, SOLUTION INTRAVENOUS at 00:36

## 2025-07-28 RX ADMIN — FUROSEMIDE 20 MG: 10 INJECTION, SOLUTION INTRAMUSCULAR; INTRAVENOUS at 15:05

## 2025-07-28 RX ADMIN — GLYCOPYRROLATE 0.2 MG: 0.2 INJECTION INTRAMUSCULAR; INTRAVENOUS at 15:42

## 2025-07-28 RX ADMIN — HYDROMORPHONE HYDROCHLORIDE 0.5 MG: 1 INJECTION, SOLUTION INTRAMUSCULAR; INTRAVENOUS; SUBCUTANEOUS at 20:06

## 2025-07-28 RX ADMIN — HYDROMORPHONE HYDROCHLORIDE 0.5 MG: 1 INJECTION, SOLUTION INTRAMUSCULAR; INTRAVENOUS; SUBCUTANEOUS at 15:42

## 2025-07-28 RX ADMIN — DOXYCYCLINE 100 MG: 100 INJECTION, POWDER, LYOPHILIZED, FOR SOLUTION INTRAVENOUS at 10:43

## 2025-07-28 RX ADMIN — MORPHINE SULFATE 2 MG: 2 INJECTION, SOLUTION INTRAMUSCULAR; INTRAVENOUS at 13:50

## 2025-07-28 RX ADMIN — MORPHINE SULFATE 2 MG: 2 INJECTION, SOLUTION INTRAMUSCULAR; INTRAVENOUS at 09:54

## 2025-07-28 RX ADMIN — PANTOPRAZOLE SODIUM 40 MG: 40 TABLET, DELAYED RELEASE ORAL at 05:59

## 2025-07-28 ASSESSMENT — PAIN SCALES - GENERAL
PAINLEVEL_OUTOF10: 6
PAINLEVEL_OUTOF10: 9
PAINLEVEL_OUTOF10: 7
PAINLEVEL_OUTOF10: 5
PAINLEVEL_OUTOF10: 0
PAINLEVEL_OUTOF10: 7
PAINLEVEL_OUTOF10: 7
PAINLEVEL_OUTOF10: 0
PAINLEVEL_OUTOF10: 8
PAINLEVEL_OUTOF10: 7

## 2025-07-28 ASSESSMENT — PAIN DESCRIPTION - LOCATION
LOCATION: GENERALIZED
LOCATION: GENERALIZED

## 2025-07-29 PROCEDURE — 6360000002 HC RX W HCPCS: Performed by: STUDENT IN AN ORGANIZED HEALTH CARE EDUCATION/TRAINING PROGRAM

## 2025-07-29 PROCEDURE — 6370000000 HC RX 637 (ALT 250 FOR IP): Performed by: STUDENT IN AN ORGANIZED HEALTH CARE EDUCATION/TRAINING PROGRAM

## 2025-07-29 PROCEDURE — 2500000003 HC RX 250 WO HCPCS: Performed by: INTERNAL MEDICINE

## 2025-07-29 PROCEDURE — 2060000000 HC ICU INTERMEDIATE R&B

## 2025-07-29 PROCEDURE — 94761 N-INVAS EAR/PLS OXIMETRY MLT: CPT

## 2025-07-29 PROCEDURE — 94640 AIRWAY INHALATION TREATMENT: CPT

## 2025-07-29 PROCEDURE — 99233 SBSQ HOSP IP/OBS HIGH 50: CPT | Performed by: INTERNAL MEDICINE

## 2025-07-29 PROCEDURE — 99233 SBSQ HOSP IP/OBS HIGH 50: CPT | Performed by: STUDENT IN AN ORGANIZED HEALTH CARE EDUCATION/TRAINING PROGRAM

## 2025-07-29 RX ORDER — IPRATROPIUM BROMIDE AND ALBUTEROL SULFATE 2.5; .5 MG/3ML; MG/3ML
1 SOLUTION RESPIRATORY (INHALATION)
Status: DISCONTINUED | OUTPATIENT
Start: 2025-07-29 | End: 2025-08-01 | Stop reason: HOSPADM

## 2025-07-29 RX ADMIN — SODIUM CHLORIDE, PRESERVATIVE FREE 10 ML: 5 INJECTION INTRAVENOUS at 08:15

## 2025-07-29 RX ADMIN — HYDROMORPHONE HYDROCHLORIDE 0.5 MG: 1 INJECTION, SOLUTION INTRAMUSCULAR; INTRAVENOUS; SUBCUTANEOUS at 14:24

## 2025-07-29 RX ADMIN — IPRATROPIUM BROMIDE AND ALBUTEROL SULFATE 1 DOSE: .5; 3 SOLUTION RESPIRATORY (INHALATION) at 20:54

## 2025-07-29 RX ADMIN — SODIUM CHLORIDE, PRESERVATIVE FREE 10 ML: 5 INJECTION INTRAVENOUS at 20:44

## 2025-07-29 RX ADMIN — HYDROXYUREA 1000 MG: 500 CAPSULE ORAL at 20:42

## 2025-07-29 RX ADMIN — HYDROXYUREA 1000 MG: 500 CAPSULE ORAL at 08:14

## 2025-07-29 ASSESSMENT — PAIN SCALES - GENERAL
PAINLEVEL_OUTOF10: 0
PAINLEVEL_OUTOF10: 3
PAINLEVEL_OUTOF10: 10
PAINLEVEL_OUTOF10: 0
PAINLEVEL_OUTOF10: 0

## 2025-07-30 ENCOUNTER — HOSPITAL ENCOUNTER (OUTPATIENT)
Facility: HOSPITAL | Age: 80
Setting detail: INFUSION SERIES
End: 2025-07-30

## 2025-07-30 PROCEDURE — 2500000003 HC RX 250 WO HCPCS: Performed by: INTERNAL MEDICINE

## 2025-07-30 PROCEDURE — 97165 OT EVAL LOW COMPLEX 30 MIN: CPT

## 2025-07-30 PROCEDURE — 6370000000 HC RX 637 (ALT 250 FOR IP): Performed by: STUDENT IN AN ORGANIZED HEALTH CARE EDUCATION/TRAINING PROGRAM

## 2025-07-30 PROCEDURE — 97530 THERAPEUTIC ACTIVITIES: CPT

## 2025-07-30 PROCEDURE — 94640 AIRWAY INHALATION TREATMENT: CPT

## 2025-07-30 PROCEDURE — 1100000000 HC RM PRIVATE

## 2025-07-30 PROCEDURE — 6360000002 HC RX W HCPCS: Performed by: STUDENT IN AN ORGANIZED HEALTH CARE EDUCATION/TRAINING PROGRAM

## 2025-07-30 PROCEDURE — 6370000000 HC RX 637 (ALT 250 FOR IP): Performed by: INTERNAL MEDICINE

## 2025-07-30 PROCEDURE — 94761 N-INVAS EAR/PLS OXIMETRY MLT: CPT

## 2025-07-30 PROCEDURE — 97162 PT EVAL MOD COMPLEX 30 MIN: CPT

## 2025-07-30 PROCEDURE — 99233 SBSQ HOSP IP/OBS HIGH 50: CPT | Performed by: INTERNAL MEDICINE

## 2025-07-30 RX ADMIN — SODIUM CHLORIDE, PRESERVATIVE FREE 10 ML: 5 INJECTION INTRAVENOUS at 08:13

## 2025-07-30 RX ADMIN — HYDROXYUREA 1000 MG: 500 CAPSULE ORAL at 08:10

## 2025-07-30 RX ADMIN — HYDROMORPHONE HYDROCHLORIDE 0.5 MG: 1 INJECTION, SOLUTION INTRAMUSCULAR; INTRAVENOUS; SUBCUTANEOUS at 08:10

## 2025-07-30 RX ADMIN — IPRATROPIUM BROMIDE AND ALBUTEROL SULFATE 1 DOSE: .5; 3 SOLUTION RESPIRATORY (INHALATION) at 10:33

## 2025-07-30 RX ADMIN — HYDROXYUREA 1000 MG: 500 CAPSULE ORAL at 20:17

## 2025-07-30 RX ADMIN — SODIUM CHLORIDE, PRESERVATIVE FREE 10 ML: 5 INJECTION INTRAVENOUS at 20:19

## 2025-07-30 RX ADMIN — HYDROMORPHONE HYDROCHLORIDE 0.5 MG: 1 INJECTION, SOLUTION INTRAMUSCULAR; INTRAVENOUS; SUBCUTANEOUS at 04:31

## 2025-07-30 RX ADMIN — OXYCODONE HYDROCHLORIDE AND ACETAMINOPHEN 1 TABLET: 7.5; 325 TABLET ORAL at 14:20

## 2025-07-30 ASSESSMENT — PAIN DESCRIPTION - ORIENTATION
ORIENTATION: MID
ORIENTATION: MID

## 2025-07-30 ASSESSMENT — PAIN DESCRIPTION - LOCATION
LOCATION: BACK
LOCATION: GENERALIZED;BACK

## 2025-07-30 ASSESSMENT — PAIN DESCRIPTION - DESCRIPTORS
DESCRIPTORS: DISCOMFORT
DESCRIPTORS: ACHING;DISCOMFORT
DESCRIPTORS: ACHING;DISCOMFORT

## 2025-07-30 ASSESSMENT — PAIN SCALES - GENERAL
PAINLEVEL_OUTOF10: 6
PAINLEVEL_OUTOF10: 0
PAINLEVEL_OUTOF10: 6
PAINLEVEL_OUTOF10: 3

## 2025-07-31 PROCEDURE — 6360000002 HC RX W HCPCS: Performed by: STUDENT IN AN ORGANIZED HEALTH CARE EDUCATION/TRAINING PROGRAM

## 2025-07-31 PROCEDURE — 6370000000 HC RX 637 (ALT 250 FOR IP): Performed by: STUDENT IN AN ORGANIZED HEALTH CARE EDUCATION/TRAINING PROGRAM

## 2025-07-31 PROCEDURE — 2500000003 HC RX 250 WO HCPCS: Performed by: INTERNAL MEDICINE

## 2025-07-31 PROCEDURE — 97535 SELF CARE MNGMENT TRAINING: CPT

## 2025-07-31 PROCEDURE — 1100000000 HC RM PRIVATE

## 2025-07-31 PROCEDURE — 94761 N-INVAS EAR/PLS OXIMETRY MLT: CPT

## 2025-07-31 PROCEDURE — 99233 SBSQ HOSP IP/OBS HIGH 50: CPT | Performed by: INTERNAL MEDICINE

## 2025-07-31 RX ORDER — LORAZEPAM 2 MG/ML
0.5 CONCENTRATE ORAL
Status: DISCONTINUED | OUTPATIENT
Start: 2025-07-31 | End: 2025-08-01 | Stop reason: HOSPADM

## 2025-07-31 RX ORDER — OXYCODONE HCL 20 MG/ML
5 CONCENTRATE, ORAL ORAL
Refills: 0 | Status: DISCONTINUED | OUTPATIENT
Start: 2025-07-31 | End: 2025-08-01

## 2025-07-31 RX ADMIN — HYDROMORPHONE HYDROCHLORIDE 0.5 MG: 1 INJECTION, SOLUTION INTRAMUSCULAR; INTRAVENOUS; SUBCUTANEOUS at 14:34

## 2025-07-31 RX ADMIN — HYDROMORPHONE HYDROCHLORIDE 0.5 MG: 1 INJECTION, SOLUTION INTRAMUSCULAR; INTRAVENOUS; SUBCUTANEOUS at 20:05

## 2025-07-31 RX ADMIN — SODIUM CHLORIDE, PRESERVATIVE FREE 10 ML: 5 INJECTION INTRAVENOUS at 20:21

## 2025-07-31 RX ADMIN — HYDROXYUREA 1000 MG: 500 CAPSULE ORAL at 20:04

## 2025-07-31 RX ADMIN — HYDROMORPHONE HYDROCHLORIDE 0.5 MG: 1 INJECTION, SOLUTION INTRAMUSCULAR; INTRAVENOUS; SUBCUTANEOUS at 04:00

## 2025-07-31 RX ADMIN — SODIUM CHLORIDE, PRESERVATIVE FREE 10 ML: 5 INJECTION INTRAVENOUS at 08:48

## 2025-07-31 RX ADMIN — HYDROMORPHONE HYDROCHLORIDE 0.5 MG: 1 INJECTION, SOLUTION INTRAMUSCULAR; INTRAVENOUS; SUBCUTANEOUS at 08:48

## 2025-07-31 RX ADMIN — HYDROXYUREA 1000 MG: 500 CAPSULE ORAL at 08:48

## 2025-07-31 ASSESSMENT — PAIN DESCRIPTION - DESCRIPTORS
DESCRIPTORS: ACHING;DISCOMFORT
DESCRIPTORS: ACHING;DISCOMFORT
DESCRIPTORS: ACHING
DESCRIPTORS: ACHING;DISCOMFORT

## 2025-07-31 ASSESSMENT — PAIN SCALES - GENERAL
PAINLEVEL_OUTOF10: 9
PAINLEVEL_OUTOF10: 6
PAINLEVEL_OUTOF10: 9
PAINLEVEL_OUTOF10: 9
PAINLEVEL_OUTOF10: 8
PAINLEVEL_OUTOF10: 4

## 2025-07-31 ASSESSMENT — PAIN DESCRIPTION - LOCATION
LOCATION: BACK

## 2025-08-01 VITALS
RESPIRATION RATE: 17 BRPM | TEMPERATURE: 98.1 F | BODY MASS INDEX: 22.71 KG/M2 | HEIGHT: 67 IN | SYSTOLIC BLOOD PRESSURE: 103 MMHG | DIASTOLIC BLOOD PRESSURE: 69 MMHG | HEART RATE: 108 BPM | OXYGEN SATURATION: 94 % | WEIGHT: 144.69 LBS

## 2025-08-01 PROBLEM — N17.9 ACUTE KIDNEY INJURY SUPERIMPOSED ON STAGE 3B CHRONIC KIDNEY DISEASE (HCC): Status: RESOLVED | Noted: 2025-07-26 | Resolved: 2025-08-01

## 2025-08-01 PROBLEM — N17.9 ACUTE RENAL FAILURE: Status: RESOLVED | Noted: 2025-07-27 | Resolved: 2025-08-01

## 2025-08-01 PROBLEM — N18.32 ACUTE KIDNEY INJURY SUPERIMPOSED ON STAGE 3B CHRONIC KIDNEY DISEASE (HCC): Status: RESOLVED | Noted: 2025-07-26 | Resolved: 2025-08-01

## 2025-08-01 PROBLEM — R06.02 SHORTNESS OF BREATH: Status: RESOLVED | Noted: 2025-07-28 | Resolved: 2025-08-01

## 2025-08-01 PROBLEM — E87.20 ACIDOSIS: Status: RESOLVED | Noted: 2025-07-26 | Resolved: 2025-08-01

## 2025-08-01 LAB
BACTERIA SPEC CULT: NORMAL
SERVICE CMNT-IMP: NORMAL

## 2025-08-01 PROCEDURE — 6370000000 HC RX 637 (ALT 250 FOR IP): Performed by: STUDENT IN AN ORGANIZED HEALTH CARE EDUCATION/TRAINING PROGRAM

## 2025-08-01 PROCEDURE — 2700000000 HC OXYGEN THERAPY PER DAY

## 2025-08-01 PROCEDURE — 94761 N-INVAS EAR/PLS OXIMETRY MLT: CPT

## 2025-08-01 PROCEDURE — 6360000002 HC RX W HCPCS: Performed by: STUDENT IN AN ORGANIZED HEALTH CARE EDUCATION/TRAINING PROGRAM

## 2025-08-01 PROCEDURE — 99233 SBSQ HOSP IP/OBS HIGH 50: CPT | Performed by: STUDENT IN AN ORGANIZED HEALTH CARE EDUCATION/TRAINING PROGRAM

## 2025-08-01 PROCEDURE — 2500000003 HC RX 250 WO HCPCS: Performed by: INTERNAL MEDICINE

## 2025-08-01 PROCEDURE — 6370000000 HC RX 637 (ALT 250 FOR IP): Performed by: HOSPITALIST

## 2025-08-01 RX ORDER — LORAZEPAM 2 MG/ML
0.5 CONCENTRATE ORAL
Qty: 30 ML | Refills: 0 | Status: SHIPPED | OUTPATIENT
Start: 2025-08-01 | End: 2025-08-15

## 2025-08-01 RX ORDER — OXYCODONE HCL 20 MG/ML
10 CONCENTRATE, ORAL ORAL
Refills: 0 | Status: DISCONTINUED | OUTPATIENT
Start: 2025-08-01 | End: 2025-08-01 | Stop reason: HOSPADM

## 2025-08-01 RX ORDER — OXYCODONE HYDROCHLORIDE 5 MG/1
5 TABLET ORAL
Qty: 30 TABLET | Refills: 0 | Status: SHIPPED | OUTPATIENT
Start: 2025-08-01 | End: 2025-08-04

## 2025-08-01 RX ORDER — HYDROXYUREA 500 MG/1
1000 CAPSULE ORAL 2 TIMES DAILY
Qty: 120 CAPSULE | Refills: 0 | Status: SHIPPED | OUTPATIENT
Start: 2025-08-01

## 2025-08-01 RX ORDER — DEXAMETHASONE 0.5 MG/1
0.5 TABLET ORAL 2 TIMES DAILY WITH MEALS
Qty: 60 TABLET | Refills: 1 | Status: SHIPPED | OUTPATIENT
Start: 2025-08-01

## 2025-08-01 RX ADMIN — HYDROMORPHONE HYDROCHLORIDE 0.5 MG: 1 INJECTION, SOLUTION INTRAMUSCULAR; INTRAVENOUS; SUBCUTANEOUS at 07:47

## 2025-08-01 RX ADMIN — HYDROXYUREA 1000 MG: 500 CAPSULE ORAL at 07:49

## 2025-08-01 RX ADMIN — SODIUM CHLORIDE, PRESERVATIVE FREE 10 ML: 5 INJECTION INTRAVENOUS at 07:54

## 2025-08-01 RX ADMIN — Medication 10 MG: at 12:56

## 2025-08-01 ASSESSMENT — PAIN SCALES - GENERAL
PAINLEVEL_OUTOF10: 5
PAINLEVEL_OUTOF10: 8
PAINLEVEL_OUTOF10: 7

## 2025-08-01 ASSESSMENT — PAIN DESCRIPTION - LOCATION
LOCATION: NECK
LOCATION: BACK

## 2025-08-01 ASSESSMENT — PAIN DESCRIPTION - DESCRIPTORS
DESCRIPTORS: ACHING;DISCOMFORT
DESCRIPTORS: ACHING;DISCOMFORT

## 2025-08-13 ENCOUNTER — HOSPITAL ENCOUNTER (INPATIENT)
Facility: HOSPITAL | Age: 80
LOS: 5 days | Discharge: HOME | DRG: 835 | End: 2025-08-18
Attending: EMERGENCY MEDICINE | Admitting: HOSPITALIST
Payer: MEDICARE

## 2025-08-13 DIAGNOSIS — D64.9 SYMPTOMATIC ANEMIA: Primary | ICD-10-CM

## 2025-08-13 DIAGNOSIS — C92.00 ACUTE MYELOID LEUKEMIA NOT HAVING ACHIEVED REMISSION (HCC): ICD-10-CM

## 2025-08-13 DIAGNOSIS — D69.6 THROMBOCYTOPENIA: ICD-10-CM

## 2025-08-13 DIAGNOSIS — N18.9 ACUTE ON CHRONIC RENAL INSUFFICIENCY: ICD-10-CM

## 2025-08-13 DIAGNOSIS — N28.9 ACUTE ON CHRONIC RENAL INSUFFICIENCY: ICD-10-CM

## 2025-08-13 LAB
ALBUMIN SERPL-MCNC: 3.2 G/DL (ref 3.5–5.2)
ALBUMIN/GLOB SERPL: 0.6 (ref 1.1–2.2)
ALP SERPL-CCNC: 75 U/L (ref 40–129)
ALT SERPL-CCNC: 14 U/L (ref 10–50)
ANION GAP SERPL CALC-SCNC: 14 MMOL/L (ref 2–14)
AST SERPL-CCNC: 18 U/L (ref 10–50)
BASOPHILS # BLD: 0.01 K/UL (ref 0–0.1)
BASOPHILS NFR BLD: 1 % (ref 0–1)
BILIRUB SERPL-MCNC: 0.7 MG/DL (ref 0–1.2)
BUN SERPL-MCNC: 73 MG/DL (ref 8–23)
BUN/CREAT SERPL: 24 (ref 12–20)
CALCIUM SERPL-MCNC: 8.7 MG/DL (ref 8.8–10.2)
CHLORIDE SERPL-SCNC: 99 MMOL/L (ref 98–107)
CO2 SERPL-SCNC: 16 MMOL/L (ref 20–29)
CREAT SERPL-MCNC: 3.09 MG/DL (ref 0.7–1.2)
DIFFERENTIAL METHOD BLD: ABNORMAL
EOSINOPHIL # BLD: 0.01 K/UL (ref 0–0.4)
EOSINOPHIL NFR BLD: 1 % (ref 0–7)
ERYTHROCYTE [DISTWIDTH] IN BLOOD BY AUTOMATED COUNT: 17 % (ref 11.5–14.5)
GLOBULIN SER CALC-MCNC: 5.6 G/DL (ref 2–4)
GLUCOSE SERPL-MCNC: 89 MG/DL (ref 65–100)
HCT VFR BLD AUTO: 20.1 % (ref 36.6–50.3)
HGB BLD-MCNC: 6.5 G/DL (ref 12.1–17)
HISTORY CHECK: NORMAL
IMM GRANULOCYTES # BLD AUTO: 0 K/UL
IMM GRANULOCYTES NFR BLD AUTO: 0 %
LYMPHOCYTES # BLD: 0.56 K/UL (ref 0.8–3.5)
LYMPHOCYTES NFR BLD: 56 % (ref 12–49)
MCH RBC QN AUTO: 24.9 PG (ref 26–34)
MCHC RBC AUTO-ENTMCNC: 32.3 G/DL (ref 30–36.5)
MCV RBC AUTO: 77 FL (ref 80–99)
MONOCYTES # BLD: 0.24 K/UL (ref 0–1)
MONOCYTES NFR BLD: 24 % (ref 5–13)
NEUTS SEG # BLD: 0.18 K/UL (ref 1.8–8)
NEUTS SEG NFR BLD: 18 % (ref 32–75)
NRBC # BLD: 0 K/UL (ref 0–0.01)
NRBC BLD-RTO: 0 PER 100 WBC
PLATELET # BLD AUTO: 24 K/UL (ref 150–400)
POTASSIUM SERPL-SCNC: 5.3 MMOL/L (ref 3.5–5.1)
PROT SERPL-MCNC: 8.8 G/DL (ref 6.4–8.3)
RBC # BLD AUTO: 2.61 M/UL (ref 4.1–5.7)
RBC MORPH BLD: ABNORMAL
RBC MORPH BLD: ABNORMAL
SODIUM SERPL-SCNC: 129 MMOL/L (ref 136–145)
TROPONIN T SERPL HS-MCNC: 24.5 NG/L (ref 0–22)
WBC # BLD AUTO: 1 K/UL (ref 4.1–11.1)
WBC MORPH BLD: ABNORMAL

## 2025-08-13 PROCEDURE — 86923 COMPATIBILITY TEST ELECTRIC: CPT

## 2025-08-13 PROCEDURE — P9040 RBC LEUKOREDUCED IRRADIATED: HCPCS

## 2025-08-13 PROCEDURE — 86900 BLOOD TYPING SEROLOGIC ABO: CPT

## 2025-08-13 PROCEDURE — 86850 RBC ANTIBODY SCREEN: CPT

## 2025-08-13 PROCEDURE — 84484 ASSAY OF TROPONIN QUANT: CPT

## 2025-08-13 PROCEDURE — 1100000000 HC RM PRIVATE

## 2025-08-13 PROCEDURE — 86901 BLOOD TYPING SEROLOGIC RH(D): CPT

## 2025-08-13 PROCEDURE — 99285 EMERGENCY DEPT VISIT HI MDM: CPT

## 2025-08-13 PROCEDURE — 80053 COMPREHEN METABOLIC PANEL: CPT

## 2025-08-13 PROCEDURE — 2580000003 HC RX 258: Performed by: HOSPITALIST

## 2025-08-13 PROCEDURE — 36415 COLL VENOUS BLD VENIPUNCTURE: CPT

## 2025-08-13 PROCEDURE — 85025 COMPLETE CBC W/AUTO DIFF WBC: CPT

## 2025-08-13 RX ORDER — SODIUM CHLORIDE 0.9 % (FLUSH) 0.9 %
5-40 SYRINGE (ML) INJECTION EVERY 12 HOURS SCHEDULED
Status: DISCONTINUED | OUTPATIENT
Start: 2025-08-13 | End: 2025-08-18 | Stop reason: HOSPADM

## 2025-08-13 RX ORDER — SODIUM CHLORIDE 0.9 % (FLUSH) 0.9 %
5-40 SYRINGE (ML) INJECTION PRN
Status: DISCONTINUED | OUTPATIENT
Start: 2025-08-13 | End: 2025-08-18 | Stop reason: HOSPADM

## 2025-08-13 RX ORDER — HYDROXYUREA 500 MG/1
1000 CAPSULE ORAL 2 TIMES DAILY
Status: DISCONTINUED | OUTPATIENT
Start: 2025-08-13 | End: 2025-08-18 | Stop reason: HOSPADM

## 2025-08-13 RX ORDER — POLYETHYLENE GLYCOL 3350 17 G/17G
17 POWDER, FOR SOLUTION ORAL DAILY PRN
Status: DISCONTINUED | OUTPATIENT
Start: 2025-08-13 | End: 2025-08-18 | Stop reason: HOSPADM

## 2025-08-13 RX ORDER — SODIUM CHLORIDE 9 MG/ML
INJECTION, SOLUTION INTRAVENOUS CONTINUOUS
Status: DISCONTINUED | OUTPATIENT
Start: 2025-08-13 | End: 2025-08-14

## 2025-08-13 RX ORDER — ACETAMINOPHEN 650 MG/1
650 SUPPOSITORY RECTAL EVERY 6 HOURS PRN
Status: DISCONTINUED | OUTPATIENT
Start: 2025-08-13 | End: 2025-08-18 | Stop reason: HOSPADM

## 2025-08-13 RX ORDER — PANTOPRAZOLE SODIUM 40 MG/1
40 TABLET, DELAYED RELEASE ORAL
Status: DISCONTINUED | OUTPATIENT
Start: 2025-08-14 | End: 2025-08-18 | Stop reason: HOSPADM

## 2025-08-13 RX ORDER — ONDANSETRON 2 MG/ML
4 INJECTION INTRAMUSCULAR; INTRAVENOUS EVERY 6 HOURS PRN
Status: DISCONTINUED | OUTPATIENT
Start: 2025-08-13 | End: 2025-08-18 | Stop reason: HOSPADM

## 2025-08-13 RX ORDER — ONDANSETRON 4 MG/1
4 TABLET, ORALLY DISINTEGRATING ORAL EVERY 8 HOURS PRN
Status: DISCONTINUED | OUTPATIENT
Start: 2025-08-13 | End: 2025-08-18 | Stop reason: HOSPADM

## 2025-08-13 RX ORDER — SODIUM CHLORIDE 9 MG/ML
INJECTION, SOLUTION INTRAVENOUS PRN
Status: DISCONTINUED | OUTPATIENT
Start: 2025-08-13 | End: 2025-08-18 | Stop reason: HOSPADM

## 2025-08-13 RX ORDER — ACETAMINOPHEN 325 MG/1
650 TABLET ORAL EVERY 6 HOURS PRN
Status: DISCONTINUED | OUTPATIENT
Start: 2025-08-13 | End: 2025-08-18 | Stop reason: HOSPADM

## 2025-08-13 RX ORDER — DEXAMETHASONE 0.5 MG/1
0.5 TABLET ORAL 2 TIMES DAILY WITH MEALS
Status: DISCONTINUED | OUTPATIENT
Start: 2025-08-14 | End: 2025-08-18 | Stop reason: HOSPADM

## 2025-08-13 RX ADMIN — SODIUM CHLORIDE: 9 INJECTION, SOLUTION INTRAVENOUS at 23:52

## 2025-08-13 ASSESSMENT — PAIN SCALES - GENERAL: PAINLEVEL_OUTOF10: 2

## 2025-08-13 ASSESSMENT — LIFESTYLE VARIABLES
HOW OFTEN DO YOU HAVE A DRINK CONTAINING ALCOHOL: NEVER
HOW MANY STANDARD DRINKS CONTAINING ALCOHOL DO YOU HAVE ON A TYPICAL DAY: PATIENT DOES NOT DRINK

## 2025-08-13 ASSESSMENT — PAIN DESCRIPTION - LOCATION: LOCATION: GENERALIZED

## 2025-08-14 LAB
ANION GAP SERPL CALC-SCNC: 13 MMOL/L (ref 2–14)
BASOPHILS # BLD: 0 K/UL (ref 0–0.1)
BASOPHILS # BLD: 0 K/UL (ref 0–0.1)
BASOPHILS NFR BLD: 0 % (ref 0–1)
BASOPHILS NFR BLD: 0 % (ref 0–1)
BUN SERPL-MCNC: 76 MG/DL (ref 8–23)
BUN/CREAT SERPL: 26 (ref 12–20)
CALCIUM SERPL-MCNC: 8.2 MG/DL (ref 8.8–10.2)
CHLORIDE SERPL-SCNC: 100 MMOL/L (ref 98–107)
CO2 SERPL-SCNC: 16 MMOL/L (ref 20–29)
CREAT SERPL-MCNC: 2.96 MG/DL (ref 0.7–1.2)
DIFFERENTIAL METHOD BLD: ABNORMAL
DIFFERENTIAL METHOD BLD: ABNORMAL
EOSINOPHIL # BLD: 0 K/UL (ref 0–0.4)
EOSINOPHIL # BLD: 0.09 K/UL (ref 0–0.4)
EOSINOPHIL NFR BLD: 0 % (ref 0–7)
EOSINOPHIL NFR BLD: 10 % (ref 0–7)
ERYTHROCYTE [DISTWIDTH] IN BLOOD BY AUTOMATED COUNT: 15.9 % (ref 11.5–14.5)
ERYTHROCYTE [DISTWIDTH] IN BLOOD BY AUTOMATED COUNT: 16.9 % (ref 11.5–14.5)
GLUCOSE BLD STRIP.AUTO-MCNC: 121 MG/DL (ref 65–117)
GLUCOSE SERPL-MCNC: 63 MG/DL (ref 65–100)
HCT VFR BLD AUTO: 18.9 % (ref 36.6–50.3)
HCT VFR BLD AUTO: 19.6 % (ref 36.6–50.3)
HCT VFR BLD AUTO: 20.2 % (ref 36.6–50.3)
HCT VFR BLD AUTO: 22 % (ref 36.6–50.3)
HGB BLD-MCNC: 6.4 G/DL (ref 12.1–17)
HGB BLD-MCNC: 6.6 G/DL (ref 12.1–17)
HGB BLD-MCNC: 7.1 G/DL (ref 12.1–17)
HGB BLD-MCNC: 7.6 G/DL (ref 12.1–17)
HISTORY CHECK: NORMAL
IMM GRANULOCYTES # BLD AUTO: 0 K/UL
IMM GRANULOCYTES # BLD AUTO: 0 K/UL (ref 0–0.04)
IMM GRANULOCYTES NFR BLD AUTO: 0 %
IMM GRANULOCYTES NFR BLD AUTO: 0 % (ref 0–0.5)
LYMPHOCYTES # BLD: 0.31 K/UL (ref 0.8–3.5)
LYMPHOCYTES # BLD: 0.54 K/UL (ref 0.8–3.5)
LYMPHOCYTES NFR BLD: 44 % (ref 12–49)
LYMPHOCYTES NFR BLD: 60 % (ref 12–49)
MCH RBC QN AUTO: 26.2 PG (ref 26–34)
MCH RBC QN AUTO: 27 PG (ref 26–34)
MCHC RBC AUTO-ENTMCNC: 33.7 G/DL (ref 30–36.5)
MCHC RBC AUTO-ENTMCNC: 35.1 G/DL (ref 30–36.5)
MCV RBC AUTO: 76.8 FL (ref 80–99)
MCV RBC AUTO: 77.8 FL (ref 80–99)
METAMYELOCYTES NFR BLD MANUAL: 2 %
MONOCYTES # BLD: 0.22 K/UL (ref 0–1)
MONOCYTES # BLD: 0.27 K/UL (ref 0–1)
MONOCYTES NFR BLD: 24 % (ref 5–13)
MONOCYTES NFR BLD: 38 % (ref 5–13)
MYELOCYTES NFR BLD MANUAL: 2 %
NEUTS SEG # BLD: 0.05 K/UL (ref 1.8–8)
NEUTS SEG # BLD: 0.1 K/UL (ref 1.8–8)
NEUTS SEG NFR BLD: 14 % (ref 32–75)
NEUTS SEG NFR BLD: 6 % (ref 32–75)
NRBC # BLD: 0 K/UL (ref 0–0.01)
NRBC # BLD: 0 K/UL (ref 0–0.01)
NRBC BLD-RTO: 0 PER 100 WBC
NRBC BLD-RTO: 0 PER 100 WBC
PLATELET # BLD AUTO: 17 K/UL (ref 150–400)
PLATELET # BLD AUTO: 20 K/UL (ref 150–400)
PLATELET COMMENT: ABNORMAL
POTASSIUM SERPL-SCNC: 4.7 MMOL/L (ref 3.5–5.1)
RBC # BLD AUTO: 2.52 M/UL (ref 4.1–5.7)
RBC # BLD AUTO: 2.63 M/UL (ref 4.1–5.7)
RBC MORPH BLD: ABNORMAL
SERVICE CMNT-IMP: ABNORMAL
SODIUM SERPL-SCNC: 129 MMOL/L (ref 136–145)
TOTAL CELLS COUNTED SPEC: 50
TOTAL CELLS COUNTED SPEC: 50
TROPONIN T SERPL HS-MCNC: 26.6 NG/L (ref 0–22)
WBC # BLD AUTO: 0.7 K/UL (ref 4.1–11.1)
WBC # BLD AUTO: 0.9 K/UL (ref 4.1–11.1)
WBC MORPH BLD: ABNORMAL

## 2025-08-14 PROCEDURE — 2500000003 HC RX 250 WO HCPCS: Performed by: HOSPITALIST

## 2025-08-14 PROCEDURE — 36415 COLL VENOUS BLD VENIPUNCTURE: CPT

## 2025-08-14 PROCEDURE — 6370000000 HC RX 637 (ALT 250 FOR IP)

## 2025-08-14 PROCEDURE — 6360000002 HC RX W HCPCS: Performed by: HOSPITALIST

## 2025-08-14 PROCEDURE — P9040 RBC LEUKOREDUCED IRRADIATED: HCPCS

## 2025-08-14 PROCEDURE — 85025 COMPLETE CBC W/AUTO DIFF WBC: CPT

## 2025-08-14 PROCEDURE — 82962 GLUCOSE BLOOD TEST: CPT

## 2025-08-14 PROCEDURE — 99223 1ST HOSP IP/OBS HIGH 75: CPT | Performed by: INTERNAL MEDICINE

## 2025-08-14 PROCEDURE — 85018 HEMOGLOBIN: CPT

## 2025-08-14 PROCEDURE — 2060000000 HC ICU INTERMEDIATE R&B

## 2025-08-14 PROCEDURE — 84484 ASSAY OF TROPONIN QUANT: CPT

## 2025-08-14 PROCEDURE — 6370000000 HC RX 637 (ALT 250 FOR IP): Performed by: HOSPITALIST

## 2025-08-14 PROCEDURE — 99223 1ST HOSP IP/OBS HIGH 75: CPT | Performed by: STUDENT IN AN ORGANIZED HEALTH CARE EDUCATION/TRAINING PROGRAM

## 2025-08-14 PROCEDURE — 80048 BASIC METABOLIC PNL TOTAL CA: CPT

## 2025-08-14 PROCEDURE — 85014 HEMATOCRIT: CPT

## 2025-08-14 PROCEDURE — 94761 N-INVAS EAR/PLS OXIMETRY MLT: CPT

## 2025-08-14 RX ORDER — METOPROLOL TARTRATE 25 MG/1
12.5 TABLET, FILM COATED ORAL 2 TIMES DAILY
Status: DISCONTINUED | OUTPATIENT
Start: 2025-08-14 | End: 2025-08-18 | Stop reason: HOSPADM

## 2025-08-14 RX ORDER — SODIUM CHLORIDE 9 MG/ML
INJECTION, SOLUTION INTRAVENOUS PRN
Status: DISCONTINUED | OUTPATIENT
Start: 2025-08-14 | End: 2025-08-17

## 2025-08-14 RX ADMIN — HYDROXYUREA 1000 MG: 500 CAPSULE ORAL at 20:36

## 2025-08-14 RX ADMIN — METOPROLOL TARTRATE 12.5 MG: 25 TABLET, FILM COATED ORAL at 15:44

## 2025-08-14 RX ADMIN — SODIUM CHLORIDE, PRESERVATIVE FREE 10 ML: 5 INJECTION INTRAVENOUS at 00:00

## 2025-08-14 RX ADMIN — PANTOPRAZOLE SODIUM 40 MG: 40 TABLET, DELAYED RELEASE ORAL at 11:17

## 2025-08-14 RX ADMIN — PANTOPRAZOLE SODIUM 40 MG: 40 TABLET, DELAYED RELEASE ORAL at 15:44

## 2025-08-14 RX ADMIN — DEXAMETHASONE 0.5 MG: 0.5 TABLET ORAL at 18:20

## 2025-08-14 RX ADMIN — SODIUM CHLORIDE, PRESERVATIVE FREE 10 ML: 5 INJECTION INTRAVENOUS at 21:43

## 2025-08-14 RX ADMIN — HYDROXYUREA 1000 MG: 500 CAPSULE ORAL at 12:56

## 2025-08-14 RX ADMIN — DEXAMETHASONE 0.5 MG: 0.5 TABLET ORAL at 12:57

## 2025-08-14 ASSESSMENT — PAIN SCALES - GENERAL: PAINLEVEL_OUTOF10: 0

## 2025-08-15 PROBLEM — R53.81 PHYSICAL DEBILITY: Status: ACTIVE | Noted: 2025-08-15

## 2025-08-15 LAB
ALBUMIN SERPL-MCNC: 2.9 G/DL (ref 3.5–5.2)
ALBUMIN/GLOB SERPL: 0.6 (ref 1.1–2.2)
ALP SERPL-CCNC: 78 U/L (ref 40–129)
ALT SERPL-CCNC: 11 U/L (ref 10–50)
ANION GAP SERPL CALC-SCNC: 12 MMOL/L (ref 2–14)
ANION GAP SERPL CALC-SCNC: 13 MMOL/L (ref 2–14)
AST SERPL-CCNC: 16 U/L (ref 10–50)
BASOPHILS # BLD: 0 K/UL (ref 0–0.1)
BASOPHILS # BLD: 0 K/UL (ref 0–0.1)
BASOPHILS NFR BLD: 0 % (ref 0–1)
BASOPHILS NFR BLD: 0 % (ref 0–1)
BILIRUB SERPL-MCNC: 1 MG/DL (ref 0–1.2)
BUN SERPL-MCNC: 70 MG/DL (ref 8–23)
BUN SERPL-MCNC: 73 MG/DL (ref 8–23)
BUN/CREAT SERPL: 23 (ref 12–20)
BUN/CREAT SERPL: 25 (ref 12–20)
CALCIUM SERPL-MCNC: 8 MG/DL (ref 8.8–10.2)
CALCIUM SERPL-MCNC: 8.4 MG/DL (ref 8.8–10.2)
CHLORIDE SERPL-SCNC: 103 MMOL/L (ref 98–107)
CHLORIDE SERPL-SCNC: 104 MMOL/L (ref 98–107)
CO2 SERPL-SCNC: 14 MMOL/L (ref 20–29)
CO2 SERPL-SCNC: 16 MMOL/L (ref 20–29)
CREAT SERPL-MCNC: 2.94 MG/DL (ref 0.7–1.2)
CREAT SERPL-MCNC: 3.05 MG/DL (ref 0.7–1.2)
DIFFERENTIAL METHOD BLD: ABNORMAL
DIFFERENTIAL METHOD BLD: ABNORMAL
EOSINOPHIL # BLD: 0 K/UL (ref 0–0.4)
EOSINOPHIL # BLD: 0.02 K/UL (ref 0–0.4)
EOSINOPHIL NFR BLD: 0 % (ref 0–7)
EOSINOPHIL NFR BLD: 2 % (ref 0–7)
ERYTHROCYTE [DISTWIDTH] IN BLOOD BY AUTOMATED COUNT: 15.9 % (ref 11.5–14.5)
ERYTHROCYTE [DISTWIDTH] IN BLOOD BY AUTOMATED COUNT: 16.6 % (ref 11.5–14.5)
GLOBULIN SER CALC-MCNC: 5.2 G/DL (ref 2–4)
GLUCOSE SERPL-MCNC: 86 MG/DL (ref 65–100)
GLUCOSE SERPL-MCNC: 89 MG/DL (ref 65–100)
HCT VFR BLD AUTO: 19.5 % (ref 36.6–50.3)
HCT VFR BLD AUTO: 21.2 % (ref 36.6–50.3)
HCT VFR BLD AUTO: 21.6 % (ref 36.6–50.3)
HCT VFR BLD AUTO: 21.7 % (ref 36.6–50.3)
HGB BLD-MCNC: 6.7 G/DL (ref 12.1–17)
HGB BLD-MCNC: 7.2 G/DL (ref 12.1–17)
HGB BLD-MCNC: 7.5 G/DL (ref 12.1–17)
HGB BLD-MCNC: 7.5 G/DL (ref 12.1–17)
IMM GRANULOCYTES # BLD AUTO: 0 K/UL
IMM GRANULOCYTES # BLD AUTO: 0 K/UL (ref 0–0.04)
IMM GRANULOCYTES NFR BLD AUTO: 0 %
IMM GRANULOCYTES NFR BLD AUTO: 0 % (ref 0–0.5)
LYMPHOCYTES # BLD: 0.51 K/UL (ref 0.8–3.5)
LYMPHOCYTES # BLD: 0.59 K/UL (ref 0.8–3.5)
LYMPHOCYTES NFR BLD: 74 % (ref 12–49)
LYMPHOCYTES NFR BLD: 84 % (ref 12–49)
MCH RBC QN AUTO: 26.9 PG (ref 26–34)
MCH RBC QN AUTO: 27.1 PG (ref 26–34)
MCHC RBC AUTO-ENTMCNC: 34 G/DL (ref 30–36.5)
MCHC RBC AUTO-ENTMCNC: 34.7 G/DL (ref 30–36.5)
MCV RBC AUTO: 77.4 FL (ref 80–99)
MCV RBC AUTO: 79.7 FL (ref 80–99)
MONOCYTES # BLD: 0.07 K/UL (ref 0–1)
MONOCYTES # BLD: 0.16 K/UL (ref 0–1)
MONOCYTES NFR BLD: 12 % (ref 5–13)
MONOCYTES NFR BLD: 20 % (ref 5–13)
NEUTS BAND NFR BLD MANUAL: 2 %
NEUTS SEG # BLD: 0.02 K/UL (ref 1.8–8)
NEUTS SEG # BLD: 0.03 K/UL (ref 1.8–8)
NEUTS SEG NFR BLD: 2 % (ref 32–75)
NEUTS SEG NFR BLD: 4 % (ref 32–75)
NRBC # BLD: 0 K/UL (ref 0–0.01)
NRBC # BLD: 0 K/UL (ref 0–0.01)
NRBC BLD-RTO: 0 PER 100 WBC
NRBC BLD-RTO: 0 PER 100 WBC
PLATELET # BLD AUTO: 18 K/UL (ref 150–400)
PLATELET # BLD AUTO: 18 K/UL (ref 150–400)
PLATELET COMMENT: ABNORMAL
POTASSIUM SERPL-SCNC: 4.6 MMOL/L (ref 3.5–5.1)
POTASSIUM SERPL-SCNC: 5.5 MMOL/L (ref 3.5–5.1)
PROT SERPL-MCNC: 8 G/DL (ref 6.4–8.3)
RBC # BLD AUTO: 2.66 M/UL (ref 4.1–5.7)
RBC # BLD AUTO: 2.79 M/UL (ref 4.1–5.7)
RBC MORPH BLD: ABNORMAL
SODIUM SERPL-SCNC: 130 MMOL/L (ref 136–145)
SODIUM SERPL-SCNC: 132 MMOL/L (ref 136–145)
TOTAL CELLS COUNTED SPEC: 50
TOTAL CELLS COUNTED SPEC: 50
WBC # BLD AUTO: 0.6 K/UL (ref 4.1–11.1)
WBC # BLD AUTO: 0.8 K/UL (ref 4.1–11.1)
WBC MORPH BLD: ABNORMAL

## 2025-08-15 PROCEDURE — 94761 N-INVAS EAR/PLS OXIMETRY MLT: CPT

## 2025-08-15 PROCEDURE — 36415 COLL VENOUS BLD VENIPUNCTURE: CPT

## 2025-08-15 PROCEDURE — 6370000000 HC RX 637 (ALT 250 FOR IP): Performed by: HOSPITALIST

## 2025-08-15 PROCEDURE — 2060000000 HC ICU INTERMEDIATE R&B

## 2025-08-15 PROCEDURE — 6370000000 HC RX 637 (ALT 250 FOR IP)

## 2025-08-15 PROCEDURE — 85014 HEMATOCRIT: CPT

## 2025-08-15 PROCEDURE — 6360000002 HC RX W HCPCS: Performed by: HOSPITALIST

## 2025-08-15 PROCEDURE — 99233 SBSQ HOSP IP/OBS HIGH 50: CPT | Performed by: STUDENT IN AN ORGANIZED HEALTH CARE EDUCATION/TRAINING PROGRAM

## 2025-08-15 PROCEDURE — 6360000002 HC RX W HCPCS

## 2025-08-15 PROCEDURE — 2580000003 HC RX 258

## 2025-08-15 PROCEDURE — 2500000003 HC RX 250 WO HCPCS: Performed by: HOSPITALIST

## 2025-08-15 PROCEDURE — 85025 COMPLETE CBC W/AUTO DIFF WBC: CPT

## 2025-08-15 PROCEDURE — 6370000000 HC RX 637 (ALT 250 FOR IP): Performed by: STUDENT IN AN ORGANIZED HEALTH CARE EDUCATION/TRAINING PROGRAM

## 2025-08-15 PROCEDURE — 85018 HEMOGLOBIN: CPT

## 2025-08-15 PROCEDURE — 80053 COMPREHEN METABOLIC PANEL: CPT

## 2025-08-15 RX ORDER — ACETAMINOPHEN 325 MG/1
650 TABLET ORAL EVERY 8 HOURS SCHEDULED
Status: DISCONTINUED | OUTPATIENT
Start: 2025-08-15 | End: 2025-08-18 | Stop reason: HOSPADM

## 2025-08-15 RX ORDER — HYDROMORPHONE HYDROCHLORIDE 1 MG/ML
0.5 INJECTION, SOLUTION INTRAMUSCULAR; INTRAVENOUS; SUBCUTANEOUS EVERY 4 HOURS PRN
Status: DISCONTINUED | OUTPATIENT
Start: 2025-08-15 | End: 2025-08-17

## 2025-08-15 RX ORDER — OXYCODONE HYDROCHLORIDE 5 MG/1
5 TABLET ORAL EVERY 4 HOURS PRN
Refills: 0 | Status: DISCONTINUED | OUTPATIENT
Start: 2025-08-15 | End: 2025-08-18 | Stop reason: HOSPADM

## 2025-08-15 RX ORDER — SODIUM CHLORIDE, SODIUM LACTATE, POTASSIUM CHLORIDE, CALCIUM CHLORIDE 600; 310; 30; 20 MG/100ML; MG/100ML; MG/100ML; MG/100ML
INJECTION, SOLUTION INTRAVENOUS CONTINUOUS
Status: DISCONTINUED | OUTPATIENT
Start: 2025-08-15 | End: 2025-08-17

## 2025-08-15 RX ORDER — ALBUTEROL SULFATE 0.83 MG/ML
10 SOLUTION RESPIRATORY (INHALATION) ONCE
Status: DISCONTINUED | OUTPATIENT
Start: 2025-08-15 | End: 2025-08-18 | Stop reason: HOSPADM

## 2025-08-15 RX ORDER — SODIUM BICARBONATE 650 MG/1
650 TABLET ORAL 3 TIMES DAILY
Status: DISCONTINUED | OUTPATIENT
Start: 2025-08-15 | End: 2025-08-18 | Stop reason: HOSPADM

## 2025-08-15 RX ADMIN — SODIUM BICARBONATE 650 MG: 650 TABLET ORAL at 13:21

## 2025-08-15 RX ADMIN — ACETAMINOPHEN 650 MG: 325 TABLET ORAL at 13:50

## 2025-08-15 RX ADMIN — ACETAMINOPHEN 650 MG: 325 TABLET ORAL at 03:50

## 2025-08-15 RX ADMIN — DEXAMETHASONE 0.5 MG: 0.5 TABLET ORAL at 09:22

## 2025-08-15 RX ADMIN — SODIUM CHLORIDE, SODIUM LACTATE, POTASSIUM CHLORIDE, AND CALCIUM CHLORIDE: .6; .31; .03; .02 INJECTION, SOLUTION INTRAVENOUS at 10:13

## 2025-08-15 RX ADMIN — PANTOPRAZOLE SODIUM 40 MG: 40 TABLET, DELAYED RELEASE ORAL at 05:30

## 2025-08-15 RX ADMIN — HYDROMORPHONE HYDROCHLORIDE 0.5 MG: 1 INJECTION, SOLUTION INTRAMUSCULAR; INTRAVENOUS; SUBCUTANEOUS at 19:57

## 2025-08-15 RX ADMIN — DEXAMETHASONE 0.5 MG: 0.5 TABLET ORAL at 17:03

## 2025-08-15 RX ADMIN — ACETAMINOPHEN 650 MG: 325 TABLET ORAL at 19:58

## 2025-08-15 RX ADMIN — METOPROLOL TARTRATE 12.5 MG: 25 TABLET, FILM COATED ORAL at 09:22

## 2025-08-15 RX ADMIN — HYDROMORPHONE HYDROCHLORIDE 0.5 MG: 1 INJECTION, SOLUTION INTRAMUSCULAR; INTRAVENOUS; SUBCUTANEOUS at 11:13

## 2025-08-15 RX ADMIN — PANTOPRAZOLE SODIUM 40 MG: 40 TABLET, DELAYED RELEASE ORAL at 17:03

## 2025-08-15 RX ADMIN — HYDROXYUREA 1000 MG: 500 CAPSULE ORAL at 09:22

## 2025-08-15 RX ADMIN — HYDROXYUREA 1000 MG: 500 CAPSULE ORAL at 21:35

## 2025-08-15 RX ADMIN — SODIUM BICARBONATE 650 MG: 650 TABLET ORAL at 19:58

## 2025-08-15 RX ADMIN — SODIUM ZIRCONIUM CYCLOSILICATE 10 G: 5 POWDER, FOR SUSPENSION ORAL at 09:29

## 2025-08-15 RX ADMIN — SODIUM CHLORIDE, PRESERVATIVE FREE 10 ML: 5 INJECTION INTRAVENOUS at 20:05

## 2025-08-15 RX ADMIN — METOPROLOL TARTRATE 12.5 MG: 25 TABLET, FILM COATED ORAL at 19:58

## 2025-08-15 RX ADMIN — SODIUM CHLORIDE, PRESERVATIVE FREE 10 ML: 5 INJECTION INTRAVENOUS at 10:27

## 2025-08-15 ASSESSMENT — PAIN DESCRIPTION - DESCRIPTORS
DESCRIPTORS: PRESSURE;THROBBING
DESCRIPTORS: PRESSURE;THROBBING

## 2025-08-15 ASSESSMENT — PAIN SCALES - GENERAL
PAINLEVEL_OUTOF10: 0
PAINLEVEL_OUTOF10: 6
PAINLEVEL_OUTOF10: 2
PAINLEVEL_OUTOF10: 8
PAINLEVEL_OUTOF10: 2
PAINLEVEL_OUTOF10: 7

## 2025-08-15 ASSESSMENT — PAIN DESCRIPTION - ORIENTATION
ORIENTATION: LEFT;UPPER
ORIENTATION: LEFT;UPPER

## 2025-08-15 ASSESSMENT — PAIN - FUNCTIONAL ASSESSMENT
PAIN_FUNCTIONAL_ASSESSMENT: 0-10

## 2025-08-15 ASSESSMENT — PAIN DESCRIPTION - LOCATION
LOCATION: TEETH

## 2025-08-16 LAB
ANION GAP SERPL CALC-SCNC: 10 MMOL/L (ref 2–14)
BASOPHILS # BLD: 0 K/UL (ref 0–0.1)
BASOPHILS # BLD: 0.01 K/UL (ref 0–0.1)
BASOPHILS NFR BLD: 0 % (ref 0–1)
BASOPHILS NFR BLD: 1 % (ref 0–1)
BUN SERPL-MCNC: 68 MG/DL (ref 8–23)
BUN/CREAT SERPL: 24 (ref 12–20)
CALCIUM SERPL-MCNC: 8.2 MG/DL (ref 8.8–10.2)
CHLORIDE SERPL-SCNC: 105 MMOL/L (ref 98–107)
CO2 SERPL-SCNC: 18 MMOL/L (ref 20–29)
CREAT SERPL-MCNC: 2.82 MG/DL (ref 0.7–1.2)
DIFFERENTIAL METHOD BLD: ABNORMAL
DIFFERENTIAL METHOD BLD: ABNORMAL
EOSINOPHIL # BLD: 0 K/UL (ref 0–0.4)
EOSINOPHIL # BLD: 0 K/UL (ref 0–0.4)
EOSINOPHIL NFR BLD: 0 % (ref 0–7)
EOSINOPHIL NFR BLD: 0 % (ref 0–7)
ERYTHROCYTE [DISTWIDTH] IN BLOOD BY AUTOMATED COUNT: 16.8 % (ref 11.5–14.5)
ERYTHROCYTE [DISTWIDTH] IN BLOOD BY AUTOMATED COUNT: 16.9 % (ref 11.5–14.5)
GLUCOSE SERPL-MCNC: 98 MG/DL (ref 65–100)
HCT VFR BLD AUTO: 19.7 % (ref 36.6–50.3)
HCT VFR BLD AUTO: 22.7 % (ref 36.6–50.3)
HCT VFR BLD AUTO: 23.2 % (ref 36.6–50.3)
HGB BLD-MCNC: 6.8 G/DL (ref 12.1–17)
HGB BLD-MCNC: 7.6 G/DL (ref 12.1–17)
HGB BLD-MCNC: 7.9 G/DL (ref 12.1–17)
HISTORY CHECK: NORMAL
IMM GRANULOCYTES # BLD AUTO: 0 K/UL
IMM GRANULOCYTES # BLD AUTO: 0 K/UL (ref 0–0.04)
IMM GRANULOCYTES NFR BLD AUTO: 0 %
IMM GRANULOCYTES NFR BLD AUTO: 0 % (ref 0–0.5)
LYMPHOCYTES # BLD: 0.45 K/UL (ref 0.8–3.5)
LYMPHOCYTES # BLD: 0.49 K/UL (ref 0.8–3.5)
LYMPHOCYTES NFR BLD: 71 % (ref 12–49)
LYMPHOCYTES NFR BLD: 74 % (ref 12–49)
MCH RBC QN AUTO: 27.2 PG (ref 26–34)
MCH RBC QN AUTO: 27.2 PG (ref 26–34)
MCHC RBC AUTO-ENTMCNC: 34.1 G/DL (ref 30–36.5)
MCHC RBC AUTO-ENTMCNC: 34.5 G/DL (ref 30–36.5)
MCV RBC AUTO: 78.8 FL (ref 80–99)
MCV RBC AUTO: 80 FL (ref 80–99)
MONOCYTES # BLD: 0.08 K/UL (ref 0–1)
MONOCYTES # BLD: 0.15 K/UL (ref 0–1)
MONOCYTES NFR BLD: 14 % (ref 5–13)
MONOCYTES NFR BLD: 21 % (ref 5–13)
NEUTS SEG # BLD: 0.05 K/UL (ref 1.8–8)
NEUTS SEG # BLD: 0.07 K/UL (ref 1.8–8)
NEUTS SEG NFR BLD: 12 % (ref 32–75)
NEUTS SEG NFR BLD: 7 % (ref 32–75)
NRBC # BLD: 0 K/UL (ref 0–0.01)
NRBC # BLD: 0 K/UL (ref 0–0.01)
NRBC BLD-RTO: 0 PER 100 WBC
NRBC BLD-RTO: 0 PER 100 WBC
PLATELET # BLD AUTO: 18 K/UL (ref 150–400)
PLATELET # BLD AUTO: 18 K/UL (ref 150–400)
PLATELET COMMENT: ABNORMAL
POTASSIUM SERPL-SCNC: 5.3 MMOL/L (ref 3.5–5.1)
RBC # BLD AUTO: 2.5 M/UL (ref 4.1–5.7)
RBC # BLD AUTO: 2.9 M/UL (ref 4.1–5.7)
RBC MORPH BLD: ABNORMAL
SODIUM SERPL-SCNC: 132 MMOL/L (ref 136–145)
TOTAL CELLS COUNTED SPEC: 50
WBC # BLD AUTO: 0.6 K/UL (ref 4.1–11.1)
WBC # BLD AUTO: 0.7 K/UL (ref 4.1–11.1)
WBC MORPH BLD: ABNORMAL

## 2025-08-16 PROCEDURE — 6360000002 HC RX W HCPCS

## 2025-08-16 PROCEDURE — 85018 HEMOGLOBIN: CPT

## 2025-08-16 PROCEDURE — 6370000000 HC RX 637 (ALT 250 FOR IP): Performed by: INTERNAL MEDICINE

## 2025-08-16 PROCEDURE — 85025 COMPLETE CBC W/AUTO DIFF WBC: CPT

## 2025-08-16 PROCEDURE — 6370000000 HC RX 637 (ALT 250 FOR IP)

## 2025-08-16 PROCEDURE — 6370000000 HC RX 637 (ALT 250 FOR IP): Performed by: HOSPITALIST

## 2025-08-16 PROCEDURE — 2060000000 HC ICU INTERMEDIATE R&B

## 2025-08-16 PROCEDURE — 80048 BASIC METABOLIC PNL TOTAL CA: CPT

## 2025-08-16 PROCEDURE — 36430 TRANSFUSION BLD/BLD COMPNT: CPT

## 2025-08-16 PROCEDURE — P9040 RBC LEUKOREDUCED IRRADIATED: HCPCS

## 2025-08-16 PROCEDURE — 36415 COLL VENOUS BLD VENIPUNCTURE: CPT

## 2025-08-16 PROCEDURE — 2500000003 HC RX 250 WO HCPCS: Performed by: HOSPITALIST

## 2025-08-16 PROCEDURE — 85014 HEMATOCRIT: CPT

## 2025-08-16 PROCEDURE — 30233N1 TRANSFUSION OF NONAUTOLOGOUS RED BLOOD CELLS INTO PERIPHERAL VEIN, PERCUTANEOUS APPROACH: ICD-10-PCS

## 2025-08-16 PROCEDURE — 6360000002 HC RX W HCPCS: Performed by: HOSPITALIST

## 2025-08-16 PROCEDURE — 94761 N-INVAS EAR/PLS OXIMETRY MLT: CPT

## 2025-08-16 PROCEDURE — 6370000000 HC RX 637 (ALT 250 FOR IP): Performed by: STUDENT IN AN ORGANIZED HEALTH CARE EDUCATION/TRAINING PROGRAM

## 2025-08-16 RX ORDER — OXYCODONE AND ACETAMINOPHEN 7.5; 325 MG/1; MG/1
1 TABLET ORAL EVERY 4 HOURS PRN
Refills: 0 | Status: DISCONTINUED | OUTPATIENT
Start: 2025-08-16 | End: 2025-08-18 | Stop reason: HOSPADM

## 2025-08-16 RX ORDER — SODIUM CHLORIDE 9 MG/ML
INJECTION, SOLUTION INTRAVENOUS PRN
Status: DISCONTINUED | OUTPATIENT
Start: 2025-08-16 | End: 2025-08-17

## 2025-08-16 RX ADMIN — PANTOPRAZOLE SODIUM 40 MG: 40 TABLET, DELAYED RELEASE ORAL at 17:08

## 2025-08-16 RX ADMIN — HYDROXYUREA 1000 MG: 500 CAPSULE ORAL at 21:02

## 2025-08-16 RX ADMIN — ACETAMINOPHEN 650 MG: 325 TABLET ORAL at 06:20

## 2025-08-16 RX ADMIN — SODIUM CHLORIDE, PRESERVATIVE FREE 10 ML: 5 INJECTION INTRAVENOUS at 08:45

## 2025-08-16 RX ADMIN — SODIUM ZIRCONIUM CYCLOSILICATE 10 G: 5 POWDER, FOR SUSPENSION ORAL at 08:43

## 2025-08-16 RX ADMIN — SODIUM BICARBONATE 650 MG: 650 TABLET ORAL at 21:03

## 2025-08-16 RX ADMIN — DEXAMETHASONE 0.5 MG: 0.5 TABLET ORAL at 17:08

## 2025-08-16 RX ADMIN — METOPROLOL TARTRATE 12.5 MG: 25 TABLET, FILM COATED ORAL at 21:03

## 2025-08-16 RX ADMIN — HYDROMORPHONE HYDROCHLORIDE 0.5 MG: 1 INJECTION, SOLUTION INTRAMUSCULAR; INTRAVENOUS; SUBCUTANEOUS at 03:33

## 2025-08-16 RX ADMIN — HYDROMORPHONE HYDROCHLORIDE 0.5 MG: 1 INJECTION, SOLUTION INTRAMUSCULAR; INTRAVENOUS; SUBCUTANEOUS at 06:39

## 2025-08-16 RX ADMIN — METOPROLOL TARTRATE 12.5 MG: 25 TABLET, FILM COATED ORAL at 08:44

## 2025-08-16 RX ADMIN — ACETAMINOPHEN 650 MG: 325 TABLET ORAL at 13:29

## 2025-08-16 RX ADMIN — ACETAMINOPHEN 650 MG: 325 TABLET ORAL at 21:03

## 2025-08-16 RX ADMIN — SODIUM BICARBONATE 650 MG: 650 TABLET ORAL at 13:29

## 2025-08-16 RX ADMIN — SODIUM CHLORIDE, PRESERVATIVE FREE 10 ML: 5 INJECTION INTRAVENOUS at 21:10

## 2025-08-16 RX ADMIN — SODIUM BICARBONATE 650 MG: 650 TABLET ORAL at 08:43

## 2025-08-16 RX ADMIN — DEXAMETHASONE 0.5 MG: 0.5 TABLET ORAL at 08:44

## 2025-08-16 RX ADMIN — HYDROXYUREA 1000 MG: 500 CAPSULE ORAL at 08:46

## 2025-08-16 RX ADMIN — OXYCODONE HYDROCHLORIDE AND ACETAMINOPHEN 1 TABLET: 7.5; 325 TABLET ORAL at 17:08

## 2025-08-16 RX ADMIN — PANTOPRAZOLE SODIUM 40 MG: 40 TABLET, DELAYED RELEASE ORAL at 06:20

## 2025-08-16 ASSESSMENT — PAIN DESCRIPTION - LOCATION
LOCATION: TEETH
LOCATION: OTHER (COMMENT)
LOCATION: TEETH
LOCATION: TEETH
LOCATION: OTHER (COMMENT)

## 2025-08-16 ASSESSMENT — PAIN DESCRIPTION - DESCRIPTORS
DESCRIPTORS: ACHING

## 2025-08-16 ASSESSMENT — PAIN SCALES - GENERAL
PAINLEVEL_OUTOF10: 2
PAINLEVEL_OUTOF10: 3
PAINLEVEL_OUTOF10: 9
PAINLEVEL_OUTOF10: 6
PAINLEVEL_OUTOF10: 7
PAINLEVEL_OUTOF10: 6
PAINLEVEL_OUTOF10: 9
PAINLEVEL_OUTOF10: 2

## 2025-08-16 ASSESSMENT — PAIN - FUNCTIONAL ASSESSMENT
PAIN_FUNCTIONAL_ASSESSMENT: 0-10

## 2025-08-16 ASSESSMENT — PAIN DESCRIPTION - ORIENTATION
ORIENTATION: LEFT

## 2025-08-17 LAB
ANION GAP SERPL CALC-SCNC: 12 MMOL/L (ref 2–14)
BASOPHILS # BLD: 0 K/UL (ref 0–0.1)
BASOPHILS NFR BLD: 0 % (ref 0–1)
BUN SERPL-MCNC: 74 MG/DL (ref 8–23)
BUN/CREAT SERPL: 27 (ref 12–20)
CALCIUM SERPL-MCNC: 8.4 MG/DL (ref 8.8–10.2)
CHLORIDE SERPL-SCNC: 101 MMOL/L (ref 98–107)
CO2 SERPL-SCNC: 17 MMOL/L (ref 20–29)
CREAT SERPL-MCNC: 2.69 MG/DL (ref 0.7–1.2)
DIFFERENTIAL METHOD BLD: ABNORMAL
EOSINOPHIL # BLD: 0 K/UL (ref 0–0.4)
EOSINOPHIL NFR BLD: 0 % (ref 0–7)
ERYTHROCYTE [DISTWIDTH] IN BLOOD BY AUTOMATED COUNT: 16.9 % (ref 11.5–14.5)
GLUCOSE SERPL-MCNC: 92 MG/DL (ref 65–100)
HCT VFR BLD AUTO: 22.5 % (ref 36.6–50.3)
HGB BLD-MCNC: 7.8 G/DL (ref 12.1–17)
IMM GRANULOCYTES # BLD AUTO: 0 K/UL
IMM GRANULOCYTES NFR BLD AUTO: 0 %
LYMPHOCYTES # BLD: 0.41 K/UL (ref 0.8–3.5)
LYMPHOCYTES NFR BLD: 68 % (ref 12–49)
MCH RBC QN AUTO: 27.1 PG (ref 26–34)
MCHC RBC AUTO-ENTMCNC: 34.7 G/DL (ref 30–36.5)
MCV RBC AUTO: 78.1 FL (ref 80–99)
MONOCYTES # BLD: 0.12 K/UL (ref 0–1)
MONOCYTES NFR BLD: 20 % (ref 5–13)
NEUTS SEG # BLD: 0.07 K/UL (ref 1.8–8)
NEUTS SEG NFR BLD: 12 % (ref 32–75)
NRBC # BLD: 0 K/UL (ref 0–0.01)
NRBC BLD-RTO: 0 PER 100 WBC
PLATELET # BLD AUTO: 17 K/UL (ref 150–400)
POTASSIUM SERPL-SCNC: 5.2 MMOL/L (ref 3.5–5.1)
RBC # BLD AUTO: 2.88 M/UL (ref 4.1–5.7)
RBC MORPH BLD: ABNORMAL
SODIUM SERPL-SCNC: 130 MMOL/L (ref 136–145)
TOTAL CELLS COUNTED SPEC: 25
WBC # BLD AUTO: 0.6 K/UL (ref 4.1–11.1)

## 2025-08-17 PROCEDURE — 85025 COMPLETE CBC W/AUTO DIFF WBC: CPT

## 2025-08-17 PROCEDURE — 6370000000 HC RX 637 (ALT 250 FOR IP)

## 2025-08-17 PROCEDURE — 6360000002 HC RX W HCPCS: Performed by: HOSPITALIST

## 2025-08-17 PROCEDURE — 80048 BASIC METABOLIC PNL TOTAL CA: CPT

## 2025-08-17 PROCEDURE — 2580000003 HC RX 258

## 2025-08-17 PROCEDURE — 6370000000 HC RX 637 (ALT 250 FOR IP): Performed by: STUDENT IN AN ORGANIZED HEALTH CARE EDUCATION/TRAINING PROGRAM

## 2025-08-17 PROCEDURE — 6370000000 HC RX 637 (ALT 250 FOR IP): Performed by: HOSPITALIST

## 2025-08-17 PROCEDURE — 2060000000 HC ICU INTERMEDIATE R&B

## 2025-08-17 PROCEDURE — 94761 N-INVAS EAR/PLS OXIMETRY MLT: CPT

## 2025-08-17 PROCEDURE — 2500000003 HC RX 250 WO HCPCS: Performed by: HOSPITALIST

## 2025-08-17 RX ORDER — OXYMETAZOLINE HYDROCHLORIDE 0.05 G/100ML
2 SPRAY NASAL 2 TIMES DAILY PRN
Status: DISCONTINUED | OUTPATIENT
Start: 2025-08-17 | End: 2025-08-18 | Stop reason: HOSPADM

## 2025-08-17 RX ADMIN — ONDANSETRON 4 MG: 2 INJECTION, SOLUTION INTRAMUSCULAR; INTRAVENOUS at 06:11

## 2025-08-17 RX ADMIN — SODIUM BICARBONATE 650 MG: 650 TABLET ORAL at 09:45

## 2025-08-17 RX ADMIN — SODIUM CHLORIDE, SODIUM LACTATE, POTASSIUM CHLORIDE, AND CALCIUM CHLORIDE: .6; .31; .03; .02 INJECTION, SOLUTION INTRAVENOUS at 14:17

## 2025-08-17 RX ADMIN — PANTOPRAZOLE SODIUM 40 MG: 40 TABLET, DELAYED RELEASE ORAL at 16:13

## 2025-08-17 RX ADMIN — HYDROXYUREA 1000 MG: 500 CAPSULE ORAL at 21:14

## 2025-08-17 RX ADMIN — ACETAMINOPHEN 650 MG: 325 TABLET ORAL at 14:12

## 2025-08-17 RX ADMIN — SODIUM ZIRCONIUM CYCLOSILICATE 10 G: 5 POWDER, FOR SUSPENSION ORAL at 09:44

## 2025-08-17 RX ADMIN — SODIUM BICARBONATE 650 MG: 650 TABLET ORAL at 14:13

## 2025-08-17 RX ADMIN — ACETAMINOPHEN 650 MG: 325 TABLET ORAL at 21:14

## 2025-08-17 RX ADMIN — DEXAMETHASONE 0.5 MG: 0.5 TABLET ORAL at 09:44

## 2025-08-17 RX ADMIN — DEXAMETHASONE 0.5 MG: 0.5 TABLET ORAL at 16:13

## 2025-08-17 RX ADMIN — HYDROXYUREA 1000 MG: 500 CAPSULE ORAL at 09:47

## 2025-08-17 RX ADMIN — SODIUM CHLORIDE, PRESERVATIVE FREE 10 ML: 5 INJECTION INTRAVENOUS at 21:20

## 2025-08-17 RX ADMIN — SODIUM CHLORIDE, SODIUM LACTATE, POTASSIUM CHLORIDE, AND CALCIUM CHLORIDE: .6; .31; .03; .02 INJECTION, SOLUTION INTRAVENOUS at 01:31

## 2025-08-17 RX ADMIN — SODIUM BICARBONATE 650 MG: 650 TABLET ORAL at 21:16

## 2025-08-17 RX ADMIN — SODIUM CHLORIDE, PRESERVATIVE FREE 10 ML: 5 INJECTION INTRAVENOUS at 09:49

## 2025-08-17 RX ADMIN — METOPROLOL TARTRATE 12.5 MG: 25 TABLET, FILM COATED ORAL at 21:13

## 2025-08-17 RX ADMIN — METOPROLOL TARTRATE 12.5 MG: 25 TABLET, FILM COATED ORAL at 09:46

## 2025-08-17 ASSESSMENT — PAIN SCALES - GENERAL
PAINLEVEL_OUTOF10: 7
PAINLEVEL_OUTOF10: 2
PAINLEVEL_OUTOF10: 2
PAINLEVEL_OUTOF10: 0

## 2025-08-17 ASSESSMENT — PAIN DESCRIPTION - DESCRIPTORS
DESCRIPTORS: ACHING

## 2025-08-17 ASSESSMENT — PAIN DESCRIPTION - LOCATION
LOCATION: GENERALIZED
LOCATION: TEETH
LOCATION: OTHER (COMMENT)

## 2025-08-17 ASSESSMENT — PAIN - FUNCTIONAL ASSESSMENT
PAIN_FUNCTIONAL_ASSESSMENT: 0-10
PAIN_FUNCTIONAL_ASSESSMENT: 0-10

## 2025-08-17 ASSESSMENT — PAIN DESCRIPTION - ORIENTATION
ORIENTATION: LEFT
ORIENTATION: LEFT;UPPER

## 2025-08-18 VITALS
SYSTOLIC BLOOD PRESSURE: 97 MMHG | TEMPERATURE: 98.2 F | DIASTOLIC BLOOD PRESSURE: 67 MMHG | OXYGEN SATURATION: 100 % | HEART RATE: 113 BPM | RESPIRATION RATE: 16 BRPM | HEIGHT: 67 IN | WEIGHT: 118.17 LBS | BODY MASS INDEX: 18.55 KG/M2

## 2025-08-18 LAB
ABO + RH BLD: NORMAL
BLD PROD TYP BPU: NORMAL
BLOOD BANK BLOOD PRODUCT EXPIRATION DATE: NORMAL
BLOOD BANK DISPENSE STATUS: NORMAL
BLOOD BANK ISBT PRODUCT BLOOD TYPE: 5100
BLOOD BANK ISBT PRODUCT BLOOD TYPE: 5100
BLOOD BANK ISBT PRODUCT BLOOD TYPE: 9500
BLOOD BANK PRODUCT CODE: NORMAL
BLOOD BANK UNIT TYPE AND RH: NORMAL
BLOOD GROUP ANTIBODIES SERPL: NORMAL
BPU ID: NORMAL
CROSSMATCH RESULT: NORMAL
SPECIMEN EXP DATE BLD: NORMAL
UNIT DIVISION: 0
UNIT ISSUE DATE/TIME: NORMAL

## 2025-08-18 PROCEDURE — 6370000000 HC RX 637 (ALT 250 FOR IP): Performed by: STUDENT IN AN ORGANIZED HEALTH CARE EDUCATION/TRAINING PROGRAM

## 2025-08-18 PROCEDURE — 94761 N-INVAS EAR/PLS OXIMETRY MLT: CPT

## 2025-08-18 PROCEDURE — 6360000002 HC RX W HCPCS: Performed by: HOSPITALIST

## 2025-08-18 PROCEDURE — 97535 SELF CARE MNGMENT TRAINING: CPT

## 2025-08-18 PROCEDURE — 6370000000 HC RX 637 (ALT 250 FOR IP): Performed by: HOSPITALIST

## 2025-08-18 PROCEDURE — 97166 OT EVAL MOD COMPLEX 45 MIN: CPT

## 2025-08-18 PROCEDURE — 6370000000 HC RX 637 (ALT 250 FOR IP)

## 2025-08-18 PROCEDURE — 2500000003 HC RX 250 WO HCPCS: Performed by: HOSPITALIST

## 2025-08-18 RX ORDER — OXYCODONE HYDROCHLORIDE 5 MG/1
5 TABLET ORAL EVERY 4 HOURS PRN
Qty: 10 TABLET | Refills: 0 | Status: SHIPPED | OUTPATIENT
Start: 2025-08-18 | End: 2025-08-25

## 2025-08-18 RX ORDER — METOPROLOL TARTRATE 25 MG/1
12.5 TABLET, FILM COATED ORAL 2 TIMES DAILY
Qty: 60 TABLET | Refills: 3 | Status: SHIPPED | OUTPATIENT
Start: 2025-08-18

## 2025-08-18 RX ORDER — SODIUM BICARBONATE 650 MG/1
650 TABLET ORAL 3 TIMES DAILY
Qty: 90 TABLET | Refills: 0 | Status: SHIPPED | OUTPATIENT
Start: 2025-08-18

## 2025-08-18 RX ORDER — 0.9 % SODIUM CHLORIDE 0.9 %
500 INTRAVENOUS SOLUTION INTRAVENOUS ONCE
Status: DISCONTINUED | OUTPATIENT
Start: 2025-08-18 | End: 2025-08-18

## 2025-08-18 RX ORDER — OXYMETAZOLINE HYDROCHLORIDE 0.05 G/100ML
2 SPRAY NASAL 2 TIMES DAILY PRN
Qty: 1 EACH | Refills: 0 | Status: SHIPPED | OUTPATIENT
Start: 2025-08-18 | End: 2025-08-19

## 2025-08-18 RX ORDER — ONDANSETRON 4 MG/1
4 TABLET, ORALLY DISINTEGRATING ORAL EVERY 8 HOURS PRN
Qty: 10 TABLET | Refills: 0 | Status: SHIPPED | OUTPATIENT
Start: 2025-08-18

## 2025-08-18 RX ADMIN — SODIUM CHLORIDE, PRESERVATIVE FREE 5 ML: 5 INJECTION INTRAVENOUS at 09:00

## 2025-08-18 RX ADMIN — HYDROXYUREA 1000 MG: 500 CAPSULE ORAL at 13:24

## 2025-08-18 RX ADMIN — PANTOPRAZOLE SODIUM 40 MG: 40 TABLET, DELAYED RELEASE ORAL at 06:25

## 2025-08-18 RX ADMIN — ACETAMINOPHEN 650 MG: 325 TABLET ORAL at 13:25

## 2025-08-18 RX ADMIN — ACETAMINOPHEN 650 MG: 325 TABLET ORAL at 06:25

## 2025-08-18 RX ADMIN — OXYMETAZOLINE HYDROCHLORIDE 2 SPRAY: 0.5 SPRAY NASAL at 15:04

## 2025-08-18 RX ADMIN — DEXAMETHASONE 0.5 MG: 0.5 TABLET ORAL at 13:26

## 2025-08-18 RX ADMIN — SODIUM BICARBONATE 650 MG: 650 TABLET ORAL at 13:27

## 2025-08-18 ASSESSMENT — PAIN - FUNCTIONAL ASSESSMENT
PAIN_FUNCTIONAL_ASSESSMENT: 0-10
PAIN_FUNCTIONAL_ASSESSMENT: 0-10

## 2025-08-18 ASSESSMENT — PAIN DESCRIPTION - DESCRIPTORS
DESCRIPTORS: ACHING
DESCRIPTORS: ACHING

## 2025-08-18 ASSESSMENT — PAIN DESCRIPTION - ORIENTATION: ORIENTATION: LEFT;UPPER

## 2025-08-18 ASSESSMENT — PAIN SCALES - GENERAL
PAINLEVEL_OUTOF10: 0
PAINLEVEL_OUTOF10: 6
PAINLEVEL_OUTOF10: 2

## 2025-08-18 ASSESSMENT — PAIN DESCRIPTION - LOCATION
LOCATION: OTHER (COMMENT)
LOCATION: GENERALIZED

## 2025-09-03 ENCOUNTER — TELEPHONE (OUTPATIENT)
Age: 80
End: 2025-09-03

## (undated) DEVICE — DRAIN,WOUND,ROUND,24FR,5/16",FULL-FLUTED: Brand: MEDLINE

## (undated) DEVICE — SWAB CULT DBL W/O CHAR RAYON TIP AMIES GEL CLMN FOR COLL

## (undated) DEVICE — CANN NASAL O2 CAPNOGRAPHY AD -- FILTERLINE

## (undated) DEVICE — KIT COLON W/ 1.1OZ LUB AND 2 END

## (undated) DEVICE — DRAPE,LAP,CHOLE,W/TROUGHS,STERILE: Brand: MEDLINE

## (undated) DEVICE — KENDALL RADIOLUCENT FOAM MONITORING ELECTRODE -RECTANGULAR SHAPE: Brand: KENDALL

## (undated) DEVICE — 3M™ IOBAN™ 2 ANTIMICROBIAL INCISE DRAPE 6650EZ: Brand: IOBAN™ 2

## (undated) DEVICE — GUIDEWIRE ENDOSCP L150CM DIA0.035IN TIP 3CM PTFE NIT

## (undated) DEVICE — SUTURE PERMA-HAND 0 L18IN NONABSORBABLE BLK CT-1 L36MM 1/2 C021D

## (undated) DEVICE — DEVICE ES L3M EDGE COAT HEX LOK BLDE ELECTRD HOLSTER FORC

## (undated) DEVICE — ELECTRODE,RADIOTRANSLUCENT,FOAM,3PK: Brand: MEDLINE

## (undated) DEVICE — GOWN,SIRUS,NONRNF,SETINSLV,XL,20/CS: Brand: MEDLINE

## (undated) DEVICE — SOLUTION IV 500ML 0.9% SOD CHL PH 5 INJ USP VIAFLX PLAS

## (undated) DEVICE — CATH IV AUTOGRD BC PNK 20GA 25 -- INSYTE

## (undated) DEVICE — ADULT SPO2 SENSOR: Brand: NELLCOR

## (undated) DEVICE — SOLUTION IV 1000ML 140MEQ/L SOD 5MEQ/L K 3MEQ/L MG 27MEQ/L

## (undated) DEVICE — OPEN-END URETERAL CATHETER: Brand: COOK

## (undated) DEVICE — CANNULA CUSH AD W/ 14FT TBG

## (undated) DEVICE — 1200 GUARD II KIT W/5MM TUBE W/O VAC TUBE: Brand: GUARDIAN

## (undated) DEVICE — CONTAINER SPEC 20 ML LID NEUT BUFF FORMALIN 10 % POLYPR STS

## (undated) DEVICE — SOLIDIFIER FLD 2OZ 1500CC N DISINF IN BTL DISP SAFESORB

## (undated) DEVICE — TUBING ADMIN SET INTRAV ARTERI -- CONVERT TO ITEM 340436

## (undated) DEVICE — 4-PORT MANIFOLD: Brand: NEPTUNE 2

## (undated) DEVICE — SOLUTION IRRIG 1000ML STRL H2O USP PLAS POUR BTL

## (undated) DEVICE — GLOVE ORANGE PI 8   MSG9080

## (undated) DEVICE — COVER,TABLE,HEAVY DUTY,60"X90",STRL: Brand: MEDLINE

## (undated) DEVICE — WRAP SURG W1.31XL1.34M CARD FOR PT 165-172CM THERMOWRP

## (undated) DEVICE — FORCEPS BX PED L160CM JAW DIA1.8MM WRK CHN 2MM W/ NDL DISP

## (undated) DEVICE — SOLUTION IRRIGATION H2O 0797305] ICU MEDICAL INC]

## (undated) DEVICE — CATHETER,FOLEY,COUDE,LATEX,16FR,10ML: Brand: MEDLINE

## (undated) DEVICE — SKN PREP SPNG STKS PVP PNT STR: Brand: MEDLINE INDUSTRIES, INC.

## (undated) DEVICE — FORCEPS BX 240CM 2.4MM L NDL RAD JAW 4 M00513334

## (undated) DEVICE — PAD GEN USE BORDERED ADH 14IN 2IN AND 12IN 4IN GZ UNIV ST

## (undated) DEVICE — DRESSING FOAM W8.7XL9.1IN SAFETAC LAYR SELF ADH MEPILEX

## (undated) DEVICE — FIAPC® PROBE W/ FILTER 2200 C OD 2.3MM/6.9FR; L 2.2M/7.2FT: Brand: ERBE

## (undated) DEVICE — SUTURE MCRYL SZ 3-0 L27IN ABSRB UD L24MM PS-1 3/8 CIR PRIM Y936H

## (undated) DEVICE — TOTAL TRAY, 16FR 10ML SIL FOLEY, URN: Brand: MEDLINE

## (undated) DEVICE — GLOVE SURG SZ 6 THK91MIL LTX FREE SYN POLYISOPRENE ANTI

## (undated) DEVICE — SOLIDIFIER MEDC 1200ML -- CONVERT TO 356117

## (undated) DEVICE — SET ADMIN 16ML TBNG L100IN 2 Y INJ SITE IV PIGGY BK DISP (ORDER IN MULIPLES OF 48)

## (undated) DEVICE — BAG BELONG PT PERS CLEAR HANDL

## (undated) DEVICE — SYRINGE MEDICAL 3ML CLEAR PLASTIC STANDARD NON CONTROL LUERLOCK TIP DISPOSABLE

## (undated) DEVICE — Device

## (undated) DEVICE — GUIDEWIRE ENDOSCP L150CM DIA0.035IN TIP L15CM BENT PTFE

## (undated) DEVICE — TIDISHIELD TRANSPORT, CONTAINMENT COVER FOR BACK TABLE 4'6" (1.37M) TO 8' (2.43M) IN LENGTH: Brand: TIDISHIELD

## (undated) DEVICE — CATHETER IV 22GA L1IN OD0.8382-0.9144MM ID0.6096-0.6858MM 382523

## (undated) DEVICE — 1000ML,PRESSURE INFUSER W/STOPCOCK: Brand: MEDLINE

## (undated) DEVICE — PRESSURE MONITORING SET: Brand: TRUWAVE

## (undated) DEVICE — IV STRT KT 3282] LSL INDUSTRIES INC]

## (undated) DEVICE — 3M™ CUROS™ DISINFECTING CAP FOR NEEDLELESS CONNECTORS 270/CARTON 20 CARTONS/CASE CFF1-270: Brand: CUROS™

## (undated) DEVICE — SUTURE VCRL SZ 0 L18IN ABSRB VLT L40MM CT 1/2 CIR J752D

## (undated) DEVICE — ELECTRODE PT RET AD L9FT HI MOIST COND ADH HYDRGEL CORDED

## (undated) DEVICE — DRESSING SIL W4XL5IN ANTIBACT GELLING FBR CYTOFORM

## (undated) DEVICE — CONNECTOR DRNGE W3/8-0.5XH3/16XL3/16IN 2:1 SIL CARD STR

## (undated) DEVICE — COVER,LIGHT,CAMERA,HARD,1/PK,STRL: Brand: MEDLINE

## (undated) DEVICE — SET GRAV CK VLV NEEDLESS ST 3 GANGED 4WAY STPCOCK HI FLO 10

## (undated) DEVICE — CLIP LIG M BLU TI HRT SHP WIRE HORZ 600 PER BX

## (undated) DEVICE — 3M™ TEGADERM™ TRANSPARENT FILM DRESSING FRAME STYLE, 1626W, 4 IN X 4-3/4 IN (10 CM X 12 CM), 50/CT 4CT/CASE: Brand: 3M™ TEGADERM™

## (undated) DEVICE — GLOVE SURG SZ 7.5 L11.2IN THK9.8MIL STRW LTX POLYMER BEAD

## (undated) DEVICE — CYSTO-SFMC: Brand: MEDLINE INDUSTRIES, INC.

## (undated) DEVICE — BASIN ST MAJOR-NO CAUTERY: Brand: MEDLINE INDUSTRIES, INC.

## (undated) DEVICE — SYRINGE MED 3ML CLR PLAS STD N CTRL LUERLOCK TIP DISP

## (undated) DEVICE — SNARE ENDOSCP DIA9MM SHTH DIA2.4MM CLD FOR POLYP EXACTO

## (undated) DEVICE — BLUNTFILL WITH FILTER: Brand: MONOJECT

## (undated) DEVICE — DRAIN SURG SGL COLL PT TB FOR ATS BG OASIS

## (undated) DEVICE — TRAY PARACENTESIS/THORACENTESIS 18GA VLV DRNGE

## (undated) DEVICE — BITEBLOCK ENDOSCP 60FR MAXI WHT POLYETH STURDY W/ VELC WVN

## (undated) DEVICE — 40418 TRENDELENBURG ONE-STEP ARM PROTECTORS LARGE (1 PAIR): Brand: 40418 TRENDELENBURG ONE-STEP ARM PROTECTORS LARGE (1 PAIR)

## (undated) DEVICE — BLUNTFILL: Brand: MONOJECT

## (undated) DEVICE — SYRINGE MED 5ML STD CLR PLAS LUERLOCK TIP N CTRL DISP